# Patient Record
Sex: FEMALE | Race: WHITE | NOT HISPANIC OR LATINO | Employment: OTHER | URBAN - METROPOLITAN AREA
[De-identification: names, ages, dates, MRNs, and addresses within clinical notes are randomized per-mention and may not be internally consistent; named-entity substitution may affect disease eponyms.]

---

## 2021-09-07 ENCOUNTER — ANESTHESIA EVENT (INPATIENT)
Dept: OPERATING ROOM | Facility: HOSPITAL | Age: 61
DRG: 461 | End: 2021-09-07
Payer: COMMERCIAL

## 2021-09-07 NOTE — ANESTHESIOLOGIST PRE-PROCEDURE CHART REVIEW
I confirm that I have reviewed the available information in the medical record prior to the scheduled surgery.    Pending labs/covid/pcp note

## 2021-09-16 ENCOUNTER — APPOINTMENT (OUTPATIENT)
Dept: LAB | Facility: HOSPITAL | Age: 61
End: 2021-09-16
Attending: ORTHOPAEDIC SURGERY
Payer: COMMERCIAL

## 2021-09-16 ENCOUNTER — TRANSCRIBE ORDERS (OUTPATIENT)
Dept: REGISTRATION | Facility: HOSPITAL | Age: 61
End: 2021-09-16
Payer: COMMERCIAL

## 2021-09-16 ENCOUNTER — APPOINTMENT (OUTPATIENT)
Dept: PREADMISSION TESTING | Facility: HOSPITAL | Age: 61
End: 2021-09-16
Attending: ORTHOPAEDIC SURGERY
Payer: COMMERCIAL

## 2021-09-16 DIAGNOSIS — M17.0 BILATERAL PRIMARY OSTEOARTHRITIS OF KNEE: Primary | ICD-10-CM

## 2021-09-16 DIAGNOSIS — Z11.59 ENCOUNTER FOR SCREENING FOR OTHER VIRAL DISEASES: ICD-10-CM

## 2021-09-16 DIAGNOSIS — M17.0 BILATERAL PRIMARY OSTEOARTHRITIS OF KNEE: ICD-10-CM

## 2021-09-16 LAB
ABO + RH BLD: NORMAL
ALBUMIN SERPL-MCNC: 3.9 G/DL (ref 3.4–5)
ALP SERPL-CCNC: 71 IU/L (ref 35–126)
ALT SERPL-CCNC: 51 IU/L (ref 11–54)
ANION GAP SERPL CALC-SCNC: 10 MEQ/L (ref 3–15)
APTT PPP: 27 SEC (ref 23–35)
AST SERPL-CCNC: 45 IU/L (ref 15–41)
BILIRUB SERPL-MCNC: 0.4 MG/DL (ref 0.3–1.2)
BLD GP AB SCN SERPL QL: NEGATIVE
BLOOD BANK CMNT PATIENT-IMP: NORMAL
BUN SERPL-MCNC: 13 MG/DL (ref 8–20)
CALCIUM SERPL-MCNC: 9 MG/DL (ref 8.9–10.3)
CHLORIDE SERPL-SCNC: 103 MEQ/L (ref 98–109)
CO2 SERPL-SCNC: 27 MEQ/L (ref 22–32)
CREAT SERPL-MCNC: 0.8 MG/DL (ref 0.6–1.1)
D AG BLD QL: POSITIVE
ERYTHROCYTE [DISTWIDTH] IN BLOOD BY AUTOMATED COUNT: 13.2 % (ref 11.7–14.4)
EST. AVERAGE GLUCOSE BLD GHB EST-MCNC: 108 MG/DL
GFR SERPL CREATININE-BSD FRML MDRD: >60 ML/MIN/1.73M*2
GLUCOSE SERPL-MCNC: 93 MG/DL (ref 70–99)
HBA1C MFR BLD HPLC: 5.4 %
HCT VFR BLDCO AUTO: 37.6 % (ref 35–45)
HGB BLD-MCNC: 12.1 G/DL (ref 11.8–15.7)
INR PPP: 1
LABORATORY COMMENT REPORT: NORMAL
MCH RBC QN AUTO: 31 PG (ref 28–33.2)
MCHC RBC AUTO-ENTMCNC: 32.2 G/DL (ref 32.2–35.5)
MCV RBC AUTO: 96.4 FL (ref 83–98)
PDW BLD AUTO: 9.9 FL (ref 9.4–12.3)
PLATELET # BLD AUTO: 219 K/UL (ref 150–369)
POTASSIUM SERPL-SCNC: 4.8 MEQ/L (ref 3.6–5.1)
PROT SERPL-MCNC: 5.8 G/DL (ref 6–8.2)
PROTHROMBIN TIME: 12.8 SEC (ref 12.2–14.5)
RBC # BLD AUTO: 3.9 M/UL (ref 3.93–5.22)
SARS-COV-2 RNA RESP QL NAA+PROBE: NEGATIVE
SODIUM SERPL-SCNC: 140 MEQ/L (ref 136–144)
SPECIMEN EXP DATE BLD: NORMAL
WBC # BLD AUTO: 4.91 K/UL (ref 3.8–10.5)

## 2021-09-16 PROCEDURE — 85610 PROTHROMBIN TIME: CPT

## 2021-09-16 PROCEDURE — 86900 BLOOD TYPING SEROLOGIC ABO: CPT

## 2021-09-16 PROCEDURE — 36415 COLL VENOUS BLD VENIPUNCTURE: CPT

## 2021-09-16 PROCEDURE — 86850 RBC ANTIBODY SCREEN: CPT

## 2021-09-16 PROCEDURE — U0003 INFECTIOUS AGENT DETECTION BY NUCLEIC ACID (DNA OR RNA); SEVERE ACUTE RESPIRATORY SYNDROME CORONAVIRUS 2 (SARS-COV-2) (CORONAVIRUS DISEASE [COVID-19]), AMPLIFIED PROBE TECHNIQUE, MAKING USE OF HIGH THROUGHPUT TECHNOLOGIES AS DESCRIBED BY CMS-2020-01-R: HCPCS

## 2021-09-16 PROCEDURE — 85027 COMPLETE CBC AUTOMATED: CPT

## 2021-09-16 PROCEDURE — 85730 THROMBOPLASTIN TIME PARTIAL: CPT

## 2021-09-16 PROCEDURE — 83036 HEMOGLOBIN GLYCOSYLATED A1C: CPT

## 2021-09-16 PROCEDURE — 80053 COMPREHEN METABOLIC PANEL: CPT

## 2021-09-20 ENCOUNTER — APPOINTMENT (INPATIENT)
Dept: RADIOLOGY | Facility: HOSPITAL | Age: 61
DRG: 461 | End: 2021-09-20
Attending: ORTHOPAEDIC SURGERY
Payer: COMMERCIAL

## 2021-09-20 ENCOUNTER — ANESTHESIA (INPATIENT)
Dept: OPERATING ROOM | Facility: HOSPITAL | Age: 61
DRG: 461 | End: 2021-09-20
Payer: COMMERCIAL

## 2021-09-20 ENCOUNTER — HOSPITAL ENCOUNTER (INPATIENT)
Facility: HOSPITAL | Age: 61
LOS: 3 days | Discharge: HOME HEALTH | DRG: 461 | End: 2021-09-23
Attending: ORTHOPAEDIC SURGERY | Admitting: ORTHOPAEDIC SURGERY
Payer: COMMERCIAL

## 2021-09-20 DIAGNOSIS — M17.10 ARTHRITIS OF KNEE: Primary | ICD-10-CM

## 2021-09-20 PROBLEM — I10 HTN (HYPERTENSION): Status: ACTIVE | Noted: 2021-09-20

## 2021-09-20 PROBLEM — E78.5 HLD (HYPERLIPIDEMIA): Status: ACTIVE | Noted: 2021-09-20

## 2021-09-20 PROBLEM — G40.909 EPILEPSY (CMS/HCC): Status: ACTIVE | Noted: 2021-09-20

## 2021-09-20 LAB
ABO + RH BLD: NORMAL
ALBUMIN SERPL-MCNC: 4 G/DL (ref 3.4–5)
ALP SERPL-CCNC: 70 IU/L (ref 35–126)
ALT SERPL-CCNC: 48 IU/L (ref 11–54)
ANION GAP SERPL CALC-SCNC: 10 MEQ/L (ref 3–15)
AST SERPL-CCNC: 51 IU/L (ref 15–41)
BASOPHILS # BLD: 0.01 K/UL (ref 0.01–0.1)
BASOPHILS NFR BLD: 0.1 %
BILIRUB SERPL-MCNC: 0.5 MG/DL (ref 0.3–1.2)
BUN SERPL-MCNC: 13 MG/DL (ref 8–20)
CALCIUM SERPL-MCNC: 8.2 MG/DL (ref 8.9–10.3)
CHLORIDE SERPL-SCNC: 103 MEQ/L (ref 98–109)
CO2 SERPL-SCNC: 24 MEQ/L (ref 22–32)
CREAT SERPL-MCNC: 0.8 MG/DL (ref 0.6–1.1)
D AG BLD QL: POSITIVE
DIFFERENTIAL METHOD BLD: ABNORMAL
EOSINOPHIL # BLD: 0.01 K/UL (ref 0.04–0.36)
EOSINOPHIL NFR BLD: 0.1 %
ERYTHROCYTE [DISTWIDTH] IN BLOOD BY AUTOMATED COUNT: 12.5 % (ref 11.7–14.4)
GFR SERPL CREATININE-BSD FRML MDRD: >60 ML/MIN/1.73M*2
GLUCOSE SERPL-MCNC: 149 MG/DL (ref 70–99)
HCT VFR BLDCO AUTO: 37.9 % (ref 35–45)
HGB BLD-MCNC: 12.2 G/DL (ref 11.8–15.7)
IMM GRANULOCYTES # BLD AUTO: 0.04 K/UL (ref 0–0.08)
IMM GRANULOCYTES NFR BLD AUTO: 0.4 %
LABORATORY COMMENT REPORT: NORMAL
LYMPHOCYTES # BLD: 0.59 K/UL (ref 1.2–3.5)
LYMPHOCYTES NFR BLD: 5.7 %
MAGNESIUM SERPL-MCNC: 1.9 MG/DL (ref 1.8–2.5)
MCH RBC QN AUTO: 31.2 PG (ref 28–33.2)
MCHC RBC AUTO-ENTMCNC: 32.2 G/DL (ref 32.2–35.5)
MCV RBC AUTO: 96.9 FL (ref 83–98)
MONOCYTES # BLD: 0.08 K/UL (ref 0.28–0.8)
MONOCYTES NFR BLD: 0.8 %
NEUTROPHILS # BLD: 9.55 K/UL (ref 1.7–7)
NEUTS SEG NFR BLD: 92.9 %
NRBC BLD-RTO: 0 %
PDW BLD AUTO: 9.2 FL (ref 9.4–12.3)
PLATELET # BLD AUTO: 258 K/UL (ref 150–369)
POTASSIUM SERPL-SCNC: 4.1 MEQ/L (ref 3.6–5.1)
PROT SERPL-MCNC: 6.6 G/DL (ref 6–8.2)
RBC # BLD AUTO: 3.91 M/UL (ref 3.93–5.22)
SODIUM SERPL-SCNC: 137 MEQ/L (ref 136–144)
WBC # BLD AUTO: 10.28 K/UL (ref 3.8–10.5)

## 2021-09-20 PROCEDURE — 63600000 HC DRUGS/DETAIL CODE: Performed by: ORTHOPAEDIC SURGERY

## 2021-09-20 PROCEDURE — 25000000 HC PHARMACY GENERAL: Performed by: ORTHOPAEDIC SURGERY

## 2021-09-20 PROCEDURE — 27200000 HC STERILE SUPPLY: Performed by: ORTHOPAEDIC SURGERY

## 2021-09-20 PROCEDURE — 63600000 HC DRUGS/DETAIL CODE: Performed by: NURSE ANESTHETIST, CERTIFIED REGISTERED

## 2021-09-20 PROCEDURE — 25800000 HC PHARMACY IV SOLUTIONS: Performed by: ORTHOPAEDIC SURGERY

## 2021-09-20 PROCEDURE — 85025 COMPLETE CBC W/AUTO DIFF WBC: CPT | Performed by: HOSPITALIST

## 2021-09-20 PROCEDURE — 80175 DRUG SCREEN QUAN LAMOTRIGINE: CPT | Performed by: HOSPITALIST

## 2021-09-20 PROCEDURE — 63700000 HC SELF-ADMINISTRABLE DRUG: Performed by: STUDENT IN AN ORGANIZED HEALTH CARE EDUCATION/TRAINING PROGRAM

## 2021-09-20 PROCEDURE — 63600000 HC DRUGS/DETAIL CODE: Mod: JW | Performed by: NURSE ANESTHETIST, CERTIFIED REGISTERED

## 2021-09-20 PROCEDURE — 63700000 HC SELF-ADMINISTRABLE DRUG: Performed by: HOSPITALIST

## 2021-09-20 PROCEDURE — 83735 ASSAY OF MAGNESIUM: CPT | Performed by: HOSPITALIST

## 2021-09-20 PROCEDURE — 99254 IP/OBS CNSLTJ NEW/EST MOD 60: CPT | Performed by: STUDENT IN AN ORGANIZED HEALTH CARE EDUCATION/TRAINING PROGRAM

## 2021-09-20 PROCEDURE — 63700000 HC SELF-ADMINISTRABLE DRUG: Performed by: ORTHOPAEDIC SURGERY

## 2021-09-20 PROCEDURE — 36000005 HC OR LEVEL 5 INITIAL 30MIN: Performed by: ORTHOPAEDIC SURGERY

## 2021-09-20 PROCEDURE — 36415 COLL VENOUS BLD VENIPUNCTURE: CPT | Performed by: ORTHOPAEDIC SURGERY

## 2021-09-20 PROCEDURE — C1776 JOINT DEVICE (IMPLANTABLE): HCPCS | Performed by: ORTHOPAEDIC SURGERY

## 2021-09-20 PROCEDURE — 0SRD0J9 REPLACEMENT OF LEFT KNEE JOINT WITH SYNTHETIC SUBSTITUTE, CEMENTED, OPEN APPROACH: ICD-10-PCS | Performed by: ORTHOPAEDIC SURGERY

## 2021-09-20 PROCEDURE — 71000011 HC PACU PHASE 1 EA ADDL MIN: Performed by: ORTHOPAEDIC SURGERY

## 2021-09-20 PROCEDURE — 0SRC0J9 REPLACEMENT OF RIGHT KNEE JOINT WITH SYNTHETIC SUBSTITUTE, CEMENTED, OPEN APPROACH: ICD-10-PCS | Performed by: ORTHOPAEDIC SURGERY

## 2021-09-20 PROCEDURE — C1713 ANCHOR/SCREW BN/BN,TIS/BN: HCPCS | Performed by: ORTHOPAEDIC SURGERY

## 2021-09-20 PROCEDURE — 37000001 HC ANESTHESIA GENERAL: Performed by: ORTHOPAEDIC SURGERY

## 2021-09-20 PROCEDURE — 71000001 HC PACU PHASE 1 INITIAL 30MIN: Performed by: ORTHOPAEDIC SURGERY

## 2021-09-20 PROCEDURE — 80053 COMPREHEN METABOLIC PANEL: CPT | Performed by: HOSPITALIST

## 2021-09-20 PROCEDURE — 93005 ELECTROCARDIOGRAM TRACING: CPT | Performed by: ORTHOPAEDIC SURGERY

## 2021-09-20 PROCEDURE — 25000000 HC PHARMACY GENERAL: Performed by: ANESTHESIOLOGY

## 2021-09-20 PROCEDURE — 21400000 HC ROOM AND CARE CCU/INTERMEDIATE

## 2021-09-20 PROCEDURE — 25000000 HC PHARMACY GENERAL: Performed by: NURSE ANESTHETIST, CERTIFIED REGISTERED

## 2021-09-20 PROCEDURE — 36000015 HC OR LEVEL 5 EA ADDL MIN: Performed by: ORTHOPAEDIC SURGERY

## 2021-09-20 PROCEDURE — 73560 X-RAY EXAM OF KNEE 1 OR 2: CPT | Mod: 50

## 2021-09-20 PROCEDURE — 63600000 HC DRUGS/DETAIL CODE: Performed by: STUDENT IN AN ORGANIZED HEALTH CARE EDUCATION/TRAINING PROGRAM

## 2021-09-20 DEVICE — CEMENT BONE SMART SET MV 40G: Type: IMPLANTABLE DEVICE | Site: KNEE | Status: FUNCTIONAL

## 2021-09-20 DEVICE — FEMORAL COMPONENT JOURNEY II BCS COCR SIZE 4 LEFT: Type: IMPLANTABLE DEVICE | Site: KNEE | Status: FUNCTIONAL

## 2021-09-20 DEVICE — IMPLANTABLE DEVICE: Type: IMPLANTABLE DEVICE | Site: KNEE | Status: FUNCTIONAL

## 2021-09-20 DEVICE — PATELLAR COMPONENT RESURFACING ROUND 32MM: Type: IMPLANTABLE DEVICE | Site: KNEE | Status: FUNCTIONAL

## 2021-09-20 DEVICE — FEMORAL COMPONENT JOURNEY II BCS COCR SIZE 4 RIGHT: Type: IMPLANTABLE DEVICE | Site: KNEE | Status: FUNCTIONAL

## 2021-09-20 DEVICE — JOURNEY II BCS XLPE ARTICULAR                                    INSERT SIZE 1-2 RIGHT 9MM
Type: IMPLANTABLE DEVICE | Site: KNEE | Status: FUNCTIONAL
Brand: JOURNEY

## 2021-09-20 RX ORDER — OXYCODONE HYDROCHLORIDE 5 MG/1
5 TABLET ORAL EVERY 4 HOURS PRN
Status: DISCONTINUED | OUTPATIENT
Start: 2021-09-20 | End: 2021-09-22

## 2021-09-20 RX ORDER — NAPROXEN SODIUM 220 MG/1
81 TABLET, FILM COATED ORAL 2 TIMES DAILY
Status: DISCONTINUED | OUTPATIENT
Start: 2021-09-20 | End: 2021-09-23 | Stop reason: HOSPADM

## 2021-09-20 RX ORDER — ROPINIROLE 1 MG/1
2 TABLET, FILM COATED ORAL NIGHTLY
Status: DISCONTINUED | OUTPATIENT
Start: 2021-09-20 | End: 2021-09-23 | Stop reason: HOSPADM

## 2021-09-20 RX ORDER — ROPINIROLE 1 MG/1
3 TABLET, FILM COATED ORAL 3 TIMES DAILY
Status: DISCONTINUED | OUTPATIENT
Start: 2021-09-20 | End: 2021-09-20

## 2021-09-20 RX ORDER — AMOXICILLIN 250 MG
1 CAPSULE ORAL 2 TIMES DAILY
Status: DISCONTINUED | OUTPATIENT
Start: 2021-09-20 | End: 2021-09-23 | Stop reason: HOSPADM

## 2021-09-20 RX ORDER — HYDROMORPHONE HYDROCHLORIDE 1 MG/ML
0.5 INJECTION, SOLUTION INTRAMUSCULAR; INTRAVENOUS; SUBCUTANEOUS
Status: DISCONTINUED | OUTPATIENT
Start: 2021-09-20 | End: 2021-09-20 | Stop reason: HOSPADM

## 2021-09-20 RX ORDER — SODIUM CHLORIDE 9 MG/ML
INJECTION, SOLUTION INTRAVENOUS CONTINUOUS
Status: ACTIVE | OUTPATIENT
Start: 2021-09-20 | End: 2021-09-21

## 2021-09-20 RX ORDER — IBUPROFEN 200 MG
16-32 TABLET ORAL AS NEEDED
Status: DISCONTINUED | OUTPATIENT
Start: 2021-09-20 | End: 2021-09-23 | Stop reason: HOSPADM

## 2021-09-20 RX ORDER — ONDANSETRON HYDROCHLORIDE 2 MG/ML
4 INJECTION, SOLUTION INTRAVENOUS
Status: DISCONTINUED | OUTPATIENT
Start: 2021-09-20 | End: 2021-09-20 | Stop reason: HOSPADM

## 2021-09-20 RX ORDER — BUPIVACAINE HYDROCHLORIDE 7.5 MG/ML
INJECTION, SOLUTION INTRASPINAL
Status: COMPLETED | OUTPATIENT
Start: 2021-09-20 | End: 2021-09-20

## 2021-09-20 RX ORDER — HYDROMORPHONE HYDROCHLORIDE 1 MG/ML
0.25 INJECTION, SOLUTION INTRAMUSCULAR; INTRAVENOUS; SUBCUTANEOUS EVERY 4 HOURS PRN
Status: DISCONTINUED | OUTPATIENT
Start: 2021-09-20 | End: 2021-09-20

## 2021-09-20 RX ORDER — DEXTROSE 50 % IN WATER (D50W) INTRAVENOUS SYRINGE
25 AS NEEDED
Status: DISCONTINUED | OUTPATIENT
Start: 2021-09-20 | End: 2021-09-23 | Stop reason: HOSPADM

## 2021-09-20 RX ORDER — SERTRALINE HYDROCHLORIDE 100 MG/1
300 TABLET, FILM COATED ORAL DAILY
Status: DISCONTINUED | OUTPATIENT
Start: 2021-09-21 | End: 2021-09-23 | Stop reason: HOSPADM

## 2021-09-20 RX ORDER — MELOXICAM 7.5 MG/1
15 TABLET ORAL ONCE
Status: COMPLETED | OUTPATIENT
Start: 2021-09-20 | End: 2021-09-20

## 2021-09-20 RX ORDER — KETOROLAC TROMETHAMINE 30 MG/ML
30 INJECTION, SOLUTION INTRAMUSCULAR; INTRAVENOUS EVERY 6 HOURS PRN
Status: DISCONTINUED | OUTPATIENT
Start: 2021-09-20 | End: 2021-09-22

## 2021-09-20 RX ORDER — PREGABALIN 150 MG/1
150 CAPSULE ORAL ONCE
Status: COMPLETED | OUTPATIENT
Start: 2021-09-20 | End: 2021-09-20

## 2021-09-20 RX ORDER — HYDROMORPHONE HYDROCHLORIDE 1 MG/ML
0.25 INJECTION, SOLUTION INTRAMUSCULAR; INTRAVENOUS; SUBCUTANEOUS EVERY 4 HOURS PRN
Status: DISCONTINUED | OUTPATIENT
Start: 2021-09-20 | End: 2021-09-22

## 2021-09-20 RX ORDER — OXYCODONE HCL 20 MG/1
20 TABLET, FILM COATED, EXTENDED RELEASE ORAL ONCE
Status: COMPLETED | OUTPATIENT
Start: 2021-09-20 | End: 2021-09-20

## 2021-09-20 RX ORDER — LISINOPRIL 5 MG/1
5 TABLET ORAL DAILY
Status: DISCONTINUED | OUTPATIENT
Start: 2021-09-20 | End: 2021-09-21

## 2021-09-20 RX ORDER — ONDANSETRON 8 MG/1
8 TABLET, ORALLY DISINTEGRATING ORAL ONCE
Status: COMPLETED | OUTPATIENT
Start: 2021-09-20 | End: 2021-09-20

## 2021-09-20 RX ORDER — CEFAZOLIN SODIUM 2 G/50ML
SOLUTION INTRAVENOUS AS NEEDED
Status: DISCONTINUED | OUTPATIENT
Start: 2021-09-20 | End: 2021-09-20 | Stop reason: SURG

## 2021-09-20 RX ORDER — SODIUM CHLORIDE 9 MG/ML
INJECTION, SOLUTION INTRAVENOUS CONTINUOUS
Status: DISCONTINUED | OUTPATIENT
Start: 2021-09-20 | End: 2021-09-20

## 2021-09-20 RX ORDER — PROPOFOL 10 MG/ML
INJECTION, EMULSION INTRAVENOUS CONTINUOUS PRN
Status: DISCONTINUED | OUTPATIENT
Start: 2021-09-20 | End: 2021-09-20 | Stop reason: SURG

## 2021-09-20 RX ORDER — OXYCODONE HYDROCHLORIDE 5 MG/1
5 TABLET ORAL EVERY 4 HOURS PRN
Status: DISCONTINUED | OUTPATIENT
Start: 2021-09-20 | End: 2021-09-20

## 2021-09-20 RX ORDER — ONDANSETRON HYDROCHLORIDE 2 MG/ML
4 INJECTION, SOLUTION INTRAVENOUS EVERY 8 HOURS PRN
Status: DISCONTINUED | OUTPATIENT
Start: 2021-09-20 | End: 2021-09-23 | Stop reason: HOSPADM

## 2021-09-20 RX ORDER — POLYETHYLENE GLYCOL 3350 17 G/17G
17 POWDER, FOR SOLUTION ORAL DAILY
Status: DISCONTINUED | OUTPATIENT
Start: 2021-09-20 | End: 2021-09-23 | Stop reason: HOSPADM

## 2021-09-20 RX ORDER — LORAZEPAM 2 MG/ML
1 INJECTION INTRAMUSCULAR DAILY PRN
Status: DISCONTINUED | OUTPATIENT
Start: 2021-09-20 | End: 2021-09-23 | Stop reason: HOSPADM

## 2021-09-20 RX ORDER — BUSPIRONE HYDROCHLORIDE 10 MG/1
10 TABLET ORAL 4 TIMES DAILY
Status: DISCONTINUED | OUTPATIENT
Start: 2021-09-20 | End: 2021-09-23 | Stop reason: HOSPADM

## 2021-09-20 RX ORDER — MIDAZOLAM HYDROCHLORIDE 2 MG/2ML
INJECTION, SOLUTION INTRAMUSCULAR; INTRAVENOUS AS NEEDED
Status: DISCONTINUED | OUTPATIENT
Start: 2021-09-20 | End: 2021-09-20 | Stop reason: SURG

## 2021-09-20 RX ORDER — DEXTROSE 40 %
15-30 GEL (GRAM) ORAL AS NEEDED
Status: DISCONTINUED | OUTPATIENT
Start: 2021-09-20 | End: 2021-09-23 | Stop reason: HOSPADM

## 2021-09-20 RX ORDER — TRAMADOL HYDROCHLORIDE 50 MG/1
50 TABLET ORAL 4 TIMES DAILY
Status: DISCONTINUED | OUTPATIENT
Start: 2021-09-20 | End: 2021-09-20

## 2021-09-20 RX ORDER — ALUMINUM HYDROXIDE, MAGNESIUM HYDROXIDE, AND SIMETHICONE 1200; 120; 1200 MG/30ML; MG/30ML; MG/30ML
30 SUSPENSION ORAL EVERY 4 HOURS PRN
Status: DISCONTINUED | OUTPATIENT
Start: 2021-09-20 | End: 2021-09-23 | Stop reason: HOSPADM

## 2021-09-20 RX ORDER — ROPINIROLE 0.5 MG/1
0.5 TABLET, FILM COATED ORAL
Status: DISCONTINUED | OUTPATIENT
Start: 2021-09-21 | End: 2021-09-23 | Stop reason: HOSPADM

## 2021-09-20 RX ORDER — SODIUM CHLORIDE 0.9 G/100ML
INJECTION, SOLUTION IRRIGATION AS NEEDED
Status: DISCONTINUED | OUTPATIENT
Start: 2021-09-20 | End: 2021-09-20 | Stop reason: HOSPADM

## 2021-09-20 RX ORDER — PANTOPRAZOLE SODIUM 20 MG/1
20 TABLET, DELAYED RELEASE ORAL NIGHTLY
Status: DISCONTINUED | OUTPATIENT
Start: 2021-09-20 | End: 2021-09-23 | Stop reason: HOSPADM

## 2021-09-20 RX ORDER — LORAZEPAM 2 MG/ML
0.5 INJECTION INTRAMUSCULAR ONCE
Status: COMPLETED | OUTPATIENT
Start: 2021-09-20 | End: 2021-09-20

## 2021-09-20 RX ORDER — PHENYLEPHRINE HYDROCHLORIDE 10 MG/ML
INJECTION INTRAVENOUS AS NEEDED
Status: DISCONTINUED | OUTPATIENT
Start: 2021-09-20 | End: 2021-09-20 | Stop reason: SURG

## 2021-09-20 RX ORDER — LORAZEPAM 2 MG/ML
1 INJECTION INTRAMUSCULAR AS NEEDED
Status: DISCONTINUED | OUTPATIENT
Start: 2021-09-20 | End: 2021-09-20

## 2021-09-20 RX ORDER — DEXAMETHASONE SODIUM PHOSPHATE 4 MG/ML
INJECTION, SOLUTION INTRA-ARTICULAR; INTRALESIONAL; INTRAMUSCULAR; INTRAVENOUS; SOFT TISSUE AS NEEDED
Status: DISCONTINUED | OUTPATIENT
Start: 2021-09-20 | End: 2021-09-20 | Stop reason: SURG

## 2021-09-20 RX ORDER — MONTELUKAST SODIUM 10 MG/1
10 TABLET ORAL DAILY
Status: DISCONTINUED | OUTPATIENT
Start: 2021-09-21 | End: 2021-09-23 | Stop reason: HOSPADM

## 2021-09-20 RX ORDER — LORAZEPAM 2 MG/ML
INJECTION INTRAMUSCULAR
Status: DISPENSED
Start: 2021-09-20 | End: 2021-09-21

## 2021-09-20 RX ORDER — FAMOTIDINE 20 MG/1
20 TABLET, FILM COATED ORAL ONCE
Status: COMPLETED | OUTPATIENT
Start: 2021-09-20 | End: 2021-09-20

## 2021-09-20 RX ORDER — ROPINIROLE 1 MG/1
1 TABLET, FILM COATED ORAL
Status: DISCONTINUED | OUTPATIENT
Start: 2021-09-21 | End: 2021-09-23 | Stop reason: HOSPADM

## 2021-09-20 RX ORDER — FENTANYL CITRATE 50 UG/ML
50 INJECTION, SOLUTION INTRAMUSCULAR; INTRAVENOUS
Status: DISCONTINUED | OUTPATIENT
Start: 2021-09-20 | End: 2021-09-20 | Stop reason: HOSPADM

## 2021-09-20 RX ORDER — LAMOTRIGINE 200 MG/1
400 TABLET, EXTENDED RELEASE ORAL DAILY
Status: DISCONTINUED | OUTPATIENT
Start: 2021-09-21 | End: 2021-09-23 | Stop reason: HOSPADM

## 2021-09-20 RX ORDER — DEXAMETHASONE SODIUM PHOSPHATE 4 MG/ML
10 INJECTION, SOLUTION INTRA-ARTICULAR; INTRALESIONAL; INTRAMUSCULAR; INTRAVENOUS; SOFT TISSUE DAILY
Status: COMPLETED | OUTPATIENT
Start: 2021-09-20 | End: 2021-09-22

## 2021-09-20 RX ORDER — ATORVASTATIN CALCIUM 40 MG/1
40 TABLET, FILM COATED ORAL NIGHTLY
Status: DISCONTINUED | OUTPATIENT
Start: 2021-09-20 | End: 2021-09-23 | Stop reason: HOSPADM

## 2021-09-20 RX ADMIN — SODIUM CHLORIDE: 9 INJECTION, SOLUTION INTRAVENOUS at 18:16

## 2021-09-20 RX ADMIN — VANCOMYCIN HYDROCHLORIDE 1 G: 1 INJECTION, POWDER, LYOPHILIZED, FOR SOLUTION INTRAVENOUS at 11:55

## 2021-09-20 RX ADMIN — OXYCODONE HYDROCHLORIDE 20 MG: 20 TABLET, FILM COATED, EXTENDED RELEASE ORAL at 12:03

## 2021-09-20 RX ADMIN — DOCUSATE SODIUM AND SENNOSIDES 1 TABLET: 50; 8.6 TABLET ORAL at 21:06

## 2021-09-20 RX ADMIN — MELOXICAM 15 MG: 7.5 TABLET ORAL at 12:02

## 2021-09-20 RX ADMIN — MIDAZOLAM HYDROCHLORIDE 1 MG: 1 INJECTION, SOLUTION INTRAMUSCULAR; INTRAVENOUS at 12:23

## 2021-09-20 RX ADMIN — KETOROLAC TROMETHAMINE 30 MG: 30 INJECTION, SOLUTION INTRAMUSCULAR; INTRAVENOUS at 18:41

## 2021-09-20 RX ADMIN — SODIUM CHLORIDE: 9 INJECTION, SOLUTION INTRAVENOUS at 11:48

## 2021-09-20 RX ADMIN — TRANEXAMIC ACID 1200 MG: 100 INJECTION, SOLUTION INTRAVENOUS at 12:42

## 2021-09-20 RX ADMIN — ROPINIROLE HYDROCHLORIDE 2 MG: 1 TABLET, FILM COATED ORAL at 21:29

## 2021-09-20 RX ADMIN — PHENYLEPHRINE HYDROCHLORIDE 100 MCG: 10 INJECTION INTRAVENOUS at 14:07

## 2021-09-20 RX ADMIN — PROPOFOL 50 MCG/KG/MIN: 10 INJECTION, EMULSION INTRAVENOUS at 12:40

## 2021-09-20 RX ADMIN — CEFAZOLIN 2 G: 330 INJECTION, POWDER, FOR SOLUTION INTRAMUSCULAR; INTRAVENOUS at 12:49

## 2021-09-20 RX ADMIN — BUPIVACAINE HYDROCHLORIDE IN DEXTROSE 1.6 ML: 7.5 INJECTION, SOLUTION SUBARACHNOID at 12:37

## 2021-09-20 RX ADMIN — MIDAZOLAM HYDROCHLORIDE 1 MG: 1 INJECTION, SOLUTION INTRAMUSCULAR; INTRAVENOUS at 12:18

## 2021-09-20 RX ADMIN — DEXAMETHASONE SODIUM PHOSPHATE 10 MG: 4 INJECTION, SOLUTION INTRA-ARTICULAR; INTRALESIONAL; INTRAMUSCULAR; INTRAVENOUS; SOFT TISSUE at 16:07

## 2021-09-20 RX ADMIN — SODIUM CHLORIDE: 9 INJECTION, SOLUTION INTRAVENOUS at 19:55

## 2021-09-20 RX ADMIN — LORAZEPAM 0.5 MG: 2 INJECTION INTRAMUSCULAR; INTRAVENOUS at 19:17

## 2021-09-20 RX ADMIN — PANTOPRAZOLE SODIUM 20 MG: 20 TABLET, DELAYED RELEASE ORAL at 21:17

## 2021-09-20 RX ADMIN — DEXAMETHASONE SODIUM PHOSPHATE 10 MG: 4 INJECTION, SOLUTION INTRA-ARTICULAR; INTRALESIONAL; INTRAMUSCULAR; INTRAVENOUS; SOFT TISSUE at 12:53

## 2021-09-20 RX ADMIN — LISINOPRIL 5 MG: 5 TABLET ORAL at 21:07

## 2021-09-20 RX ADMIN — FAMOTIDINE 20 MG: 20 TABLET ORAL at 12:02

## 2021-09-20 RX ADMIN — BUSPIRONE HYDROCHLORIDE 10 MG: 10 TABLET ORAL at 21:16

## 2021-09-20 RX ADMIN — ONDANSETRON 8 MG: 8 TABLET, ORALLY DISINTEGRATING ORAL at 12:05

## 2021-09-20 RX ADMIN — CEFAZOLIN SODIUM 2 G: 10 POWDER, FOR SOLUTION INTRAVENOUS at 21:29

## 2021-09-20 RX ADMIN — ATORVASTATIN CALCIUM 40 MG: 40 TABLET, FILM COATED ORAL at 21:16

## 2021-09-20 RX ADMIN — ASPIRIN 81 MG CHEWABLE TABLET 81 MG: 81 TABLET CHEWABLE at 21:06

## 2021-09-20 RX ADMIN — PREGABALIN 150 MG: 150 CAPSULE ORAL at 12:03

## 2021-09-20 ASSESSMENT — PATIENT HEALTH QUESTIONNAIRE - PHQ9: SUM OF ALL RESPONSES TO PHQ9 QUESTIONS 1 & 2: 0

## 2021-09-20 NOTE — OP NOTE
Pre-op Diagnosis: Severe Arthritis of the Bilateral knees  Post-op Diagnosis: same    Procedure Bilateral Total knee replacement    Surgeon: Bennett Em MD  Assistant: Andreina Maria    Specimen: None    Anesthesia: Regional    EBL: Minimal    The patient was taken to the operating room where regional anesthesia was instituted by the anesthesiologist. Next both lower extremities were prepped and draped in usual sterile fashion. The right was addressed first. Next that limb was exsanguinated with an Esmarch bandage and the tourniquet was inflated to 350 mmHg. An anterior approach and he was utilizing incision was carried out through the subcutaneous tissue down to the level of the fascia. The fascia was then divided in a standard median parapatellar patella arthrotomy. Next a freehand osteotomy of the patella was performed and the patella was sized to 32 mm. A patella button of that size was then applied. Next the drill was used to gain access to intramedullary canal the femur area was thoroughly irrigated and evacuated.. The guide alice was placed and the distal femur was then cut at 6° of valgus in standard manner. The femur was then sized to size 4 and the distal femur was then cut using that size cutting block to accommodate a femoral component. Attention was turned to the tibia where extramedullary alignment was used to make a perpendicular cut of the proximal tibia in standard manner. Next a laminar  was placed to debride the posterior aspect of the knee of osteophytes and meniscal remnants. The tibia was then sized to size 2. A trial reduction using the previously mentioned sizes for femur and tibia respectively along with a 9 mm tibial component was felt to produce satisfactory range of motion stability kinematics balance and patellar tracking. These trials were then deemed acceptable and subsequently the trials were removed from the femur the knee was thoroughly irrigated and evacuated and  then dried. Next the cement was mixed in the doughy cement was infiltrated all the cut bony surfaces and the final components of the Journey II total knee replacement system by [employer] was then cemented into place.  Size 4 femur, size 2 tibia, 9 mm. Polyethylene spacer, and 32 mm patella were then cemented into place. Excess cement was removed from all areas and the knee was then evaluated for range of motion stability kinematics balance and patella tracking, all of which were satisfactory. At this point time  the wound was thoroughly irrigated and evacuated and then the wound was closed with #2 Quill the deep layer O- Quill in the subcutaneous layer and 3-0 Vicryl in the subcuticular layer augmented with Dermabond. Finally prior to the completion of closure, dilute Marcaine solution was infiltrated into the reggie-incisional tissues for postoperative pain control. A sterile compressive dressing was then placed and the tourniquet was then released. At this point in time a total knee was now performed on the opposite side exactly as had been described above. The sizes used were similar and specifically, size size 4 on the Femur, size size 2 on the tibia, 11 mm poly spacer, 32 mm patella button. Closeure was performed in the same way and dilute Marcaine was also used. The tourniquet was released, and the pateint was placed in a sterile compressive dressing. The patient was then transferred to the recovery room in stable condition. I was present for the entire case.

## 2021-09-20 NOTE — ANESTHESIA PREPROCEDURE EVALUATION
Relevant Problems   MUSCULOSKELETAL   (+) Arthritis of knee       Anesthesia ROS/MED HX    Anesthesia History - neg  Pulmonary    asthma  Neuro/Psych - neg  Cardiovascular   hypertension   Cardiac clearance reviewed, Covid19 Test Reviewed and ECG reviewed  Abnormal ECG      Comments: Medical and cardiac clearances noted.  GI/Hepatic   GERD    Control: well controlled  Musculoskeletal- neg  Renal Disease- neg  Endo/Other- neg  Body Habitus: Normal       Past Surgical History:   Procedure Laterality Date   •  SECTION      x1   • SINUS SURGERY         Physical Exam    Airway   Mallampati: III   TM distance: >3 FB   Neck ROM: full  Cardiovascular - normal   Rhythm: regular   Rate: normalPulmonary - normal   clear to auscultation  Other Findings   Medical and cardiac clearances noted.  Dental    Teeth Problems: caps        Anesthesia Plan    Plan: general and spinal   ASA 2

## 2021-09-20 NOTE — CONSULTS
Hospital Medicine Service -  Inpatient Consultation         Requesting Physician: Dr. Em    Reason for Consultation: medical management     HISTORY OF PRESENT ILLNESS        This is a 60 y.o. female with a pmh of epilepsy, unknown clear trigger, possibly light, seen s/p bilateral total knee. Patient had a brief seizure upon returning to the floor.  Abated without intervention but continued to have clonic spasms though mentation was at baseline.  Given low dose of ativan at this time. She does confirm having taken her lamictal this morning.  Has been taking echinacea. Has been supplementing vit k for cold.  at bedside.    PAST MEDICAL AND SURGICAL HISTORY        Past Medical History:   Diagnosis Date   • Arthritis     OA   • Asthma    • Depression    • Deviated septum    • Epilepsy (CMS/HCC)     Last seizure 8 years ago   • GERD (gastroesophageal reflux disease)    • Hypertension    • Lipid disorder    • Motion sickness    • RLS (restless legs syndrome)        Past Surgical History:   Procedure Laterality Date   •  SECTION      x1   • SINUS SURGERY         PCP: Edwige Huff CRNP    MEDICATIONS        Home Medications:  Medications Prior to Admission   Medication Sig Dispense Refill Last Dose   • ascorbic acid (VITAMIN C) 500 mg tablet Take 500 mg by mouth daily.   Past Week at Unknown time   • atorvastatin (LIPITOR) 40 mg tablet Take 40 mg by mouth nightly.   2021 at Unknown time   • busPIRone (BUSPAR) 10 mg tablet Take 10 mg by mouth 4 (four) times a day.   2021 at Unknown time   • ECHINACEA ORAL Take by mouth.   Past Week at Unknown time   • esomeprazole magnesium (NEXIUM ORAL) Take 20 mg by mouth nightly.   2021 at Unknown time   • fluticasone furoate-vilanteroL (BREO ELLIPTA) 200-25 mcg/dose blister with device powder for inhalation Inhale 1 puff daily.   2021 at Unknown time   • lamoTRIgine (LAMICTAL XR) 200 mg tablet extended release 24hr Take 400 mg by mouth  daily. Patient takes two 200 mg once a day in the morning   9/20/2021 at Unknown time   • lisinopriL (PRINIVIL) 5 mg tablet Take 5 mg by mouth daily.   9/19/2021 at Unknown time   • montelukast (SINGULAIR) 10 mg tablet Take 10 mg by mouth daily.   9/20/2021 at Unknown time   • multivitamin tablet Take 1 tablet by mouth daily.   Past Month at Unknown time   • nortriptyline (PAMELOR) 75 mg capsule Take 75 mg by mouth nightly.   9/19/2021 at Unknown time   • phytonadione, vit K1, (phytonadione, vitamin K1,) 100 mcg tablet Take 100 mcg by mouth daily.   Past Week at Unknown time   • rOPINIRole (REQUIP) 1 mg tablet Take 3 mg by mouth 3 (three) times a day. 0.5 mg in afternoon, 1 mg am and 1mg dinner and 2mg bedtime    9/20/2021 at Unknown time   • sertraline (ZOLOFT) 100 mg tablet Take 300 mg by mouth daily. Patient takes three 100 mg tabs in AM./   9/20/2021 at Unknown time       Current inpatient medications were personally reviewed.    ALLERGIES        Penicillins and Sulfa (sulfonamide antibiotics)    FAMILY HISTORY        History reviewed. No pertinent family history.    SOCIAL HISTORY        Social History     Socioeconomic History   • Marital status:      Spouse name: None   • Number of children: None   • Years of education: None   • Highest education level: None   Occupational History   • None   Tobacco Use   • Smoking status: Never Smoker   • Smokeless tobacco: Never Used   Vaping Use   • Vaping Use: Never used   Substance and Sexual Activity   • Alcohol use: Yes     Alcohol/week: 2.0 standard drinks     Types: 2 Glasses of wine per week   • Drug use: Never   • Sexual activity: None   Other Topics Concern   • None   Social History Narrative   • None     Social Determinants of Health     Financial Resource Strain:    • Difficulty of Paying Living Expenses: Not on file   Food Insecurity:    • Worried About Running Out of Food in the Last Year: Not on file   • Ran Out of Food in the Last Year: Not on file  "  Transportation Needs:    • Lack of Transportation (Medical): Not on file   • Lack of Transportation (Non-Medical): Not on file   Physical Activity:    • Days of Exercise per Week: Not on file   • Minutes of Exercise per Session: Not on file   Stress:    • Feeling of Stress : Not on file   Social Connections:    • Frequency of Communication with Friends and Family: Not on file   • Frequency of Social Gatherings with Friends and Family: Not on file   • Attends [employer] Services: Not on file   • Active Member of Clubs or Organizations: Not on file   • Attends Club or Organization Meetings: Not on file   • Marital Status: Not on file   Intimate Partner Violence:    • Fear of Current or Ex-Partner: Not on file   • Emotionally Abused: Not on file   • Physically Abused: Not on file   • Sexually Abused: Not on file   Housing Stability:    • Unable to Pay for Housing in the Last Year: Not on file   • Number of Places Lived in the Last Year: Not on file   • Unstable Housing in the Last Year: Not on file       REVIEW OF SYSTEMS        All other systems reviewed and negative except as noted in HPI    PHYSICAL EXAMINATION        Visit Vitals  /63   Pulse 84   Temp 36.6 °C (97.9 °F) (Oral)   Resp 18   Ht 1.575 m (5' 2\")   Wt 58.5 kg (129 lb)   SpO2 95%   Breastfeeding No   BMI 23.59 kg/m²     Body mass index is 23.59 kg/m².    Intake/Output Summary (Last 24 hours) at 9/20/2021 1759  Last data filed at 9/20/2021 1700  Gross per 24 hour   Intake 1262 ml   Output --   Net 1262 ml     Constitutional: no acute distress, well developed  Eyes: EOMI, non-icteric   ENMT: moist mucous membranes, no JVD  CV: RRR, no murmurs detected  Pulm: normal air entry, CTAB,   GI: Bowel sounds are normal, soft  MSK: no cyanosis, clubbing  Neuro: awake, alert, rhythmic twitching with movement    LABS / EKG        Labs  CBC:  Results from last 7 days   Lab Units 09/16/21  1025   WBC K/uL 4.91   HEMOGLOBIN g/dL 12.1   HEMATOCRIT % 37.6 "   PLATELETS K/uL 219        CMP:  Results from last 7 days   Lab Units 09/16/21  1025   SODIUM mEQ/L 140   POTASSIUM mEQ/L 4.8   CHLORIDE mEQ/L 103   CO2 mEQ/L 27   BUN mg/dL 13   CREATININE mg/dL 0.8   CALCIUM mg/dL 9.0   ALBUMIN g/dL 3.9   BILIRUBIN TOTAL mg/dL 0.4   ALK PHOS IU/L 71   ALT IU/L 51   AST IU/L 45*   GLUCOSE mg/dL 93       COAG:  Results from last 7 days   Lab Units 09/16/21  1025   INR  1.0   PTT sec 27         ECG/Telemetry      Imaging      ASSESSMENT AND RECOMMENDATIONS           HLD (hyperlipidemia)  Assessment & Plan  Cont statin    HTN (hypertension)  Assessment & Plan  Cont home meds    Epilepsy (CMS/[facility])  Assessment & Plan  First seizure in nearly 10 years  nonfocal exam,  Given low dose ativan for ?clonic activity given   Continue lamictal  On review of medications tramadol was ordered but not given. Has been dc'd  Consult neuro  Consider eeg  Continue per neuro  Seizure precautions    * Arthritis of knee  Assessment & Plan  No vit k supplement  Continue mgmt per primary team       Thank you for the opportunity to take part in the patient's care. Will continue to follow during her hospitalization     Emil Ross MD  9/20/2021

## 2021-09-20 NOTE — OR SURGEON
Pre-Procedure patient identification:  I am the primary operating surgeon/proceduralist and I have identified the patient and confirmed laterality is bilateral on 09/20/21 at 11:31 AM Bennett Em MD  Phone Number: [phone]

## 2021-09-20 NOTE — NURSING NOTE
Patient had arrived to unit from PACU at approximately 1740, patient was awake, alert and oriented x3, but appeared very shaky, per PACU RN this is how she presented there as well.  Able to complete assessment, patient denied any pain or discomfort, BLE wrapped in ace bandages and CDI with ice packs in place.  Denies any numbness or tingling to feet, able to dorsi/plantar flex and SCDS on.  VSS.     While talking with patient, she had taken a bite of pizza and swallowed, and then became unresponsive and seizing for approximately 2 mins. No choking as patient had swallowed, and then appeared in a post dictal state for an additional 2 mins. RRT was called immediately when started seizing.  After that, she was alert but disoriented to place and time.  Blood pressure was elevated, but now WNL.  Labs drawn, EKG done, no medications given.    At this time, patient placed on DASH tele monitor until able to transfer to tele bed.  VS again stable, on CHRISTINA monitoring per protocol/order.  Patient drank some juice and ate a cookie under RN supervision, but did not want to eat anything else. Spouse now at bedside and updated.  Still shaky, which patient/spouse report as not her baseline.  Resting in bed with alarm on and call bell in reach, waiting on tele bed.  Patient and spouse aware of transfer.

## 2021-09-20 NOTE — PERIOPERATIVE NURSING NOTE
Pt. Is progressing nicely. Pacu criteria for discharge has been met. V/S will be recorded every 1/2 hour.

## 2021-09-20 NOTE — NURSING NOTE
Patient for transfer to [facility] via bed, report given to Sandrine GILLIS.  Verified with jose Kahn for patient to travel off the floor without a monitor.  Spouse aware.  Spouse also made aware to bring patients own lamictal, as not stocked in hospital.

## 2021-09-20 NOTE — HOSPITAL COURSE
Fide is a 60 y.o. female admitted on 9/20/2021 with Osteoarthritis of both knees, unspecified osteoarthritis type [M17.0]  Arthritis of knee [M17.10]. Principal problem is Arthritis of knee.    Past Medical History  Fide has a past medical history of Arthritis, Asthma, Depression, Deviated septum, Epilepsy (CMS/[facility]), GERD (gastroesophageal reflux disease), Hypertension, Lipid disorder, Motion sickness, and RLS (restless legs syndrome).    History of Present Illness   Bilateral Total knee replacement per Dr. Em 9/20/21, WBAT BLE  Pt had a rapid response called 9/20 for a seizure. She also had low BP in 90s overnight.  Bolused 500cc and BP in low 100s.  Asymptomatic.

## 2021-09-20 NOTE — ANESTHESIA PROCEDURE NOTES
Spinal Block    Patient location during procedure: OR  Start time: 9/20/2021 12:34 PM  End time: 9/20/2021 12:38 PM  Staffing  Performed: anesthesiologist   Anesthesiologist: Gary Hernández MD  Reason for block: primary anesthetic  Preanesthetic Checklist  Completed: patient identified, site marked, surgical consent, pre-op evaluation, timeout performed, IV checked, risks and benefits discussed, monitors and equipment checked and sterile field maintained during procedure  Spinal Block  Patient position: sitting  Prep: Betadine and site prepped and draped  Patient monitoring: heart rate, cardiac monitor, continuous pulse ox and blood pressure  Approach: midline  Location: L3-4  Injection technique: single-shot  Needle  Needle type: pencil-tip   Needle gauge: 25 G  Needle length: 10 cm  Needle insertion depth: 6 cm  Assessment  Sensory level: T4  Medications Administered - 9/20/2021 12:37 PM   Bupivacaine PF (MARCAINE SPINAL) 0.75% intrathecal injection, 1.6 mL

## 2021-09-20 NOTE — ANESTHESIOLOGIST PRE-PROCEDURE ATTESTATION
Pre-Procedure Patient Identification:  I am the Primary Anesthesiologist and have identified the patient on 09/20/21 at 11:58 AM.   I have confirmed the procedure(s) will be performed by the following surgeon/proceduralist Bennett Em MD.

## 2021-09-20 NOTE — H&P
Ortho H&P  Admitting Diagnosis:  Osteoarthritis of both knees, unspecified osteoarthritis type [M17.0]  HPI     There is a hard copy H and P available for review. There are no changes and we may proceed.

## 2021-09-20 NOTE — BRIEF OP NOTE
KNEE TOTAL BILATERAL REPLACEMENT (B) Procedure Note    Procedure:    KNEE TOTAL BILATERAL REPLACEMENT  CPT(R) Code:  92107 - IN TOTAL KNEE ARTHROPLASTY      Pre-op Diagnosis     * Osteoarthritis of both knees, unspecified osteoarthritis type [M17.0]       Post-op Diagnosis     * Osteoarthritis of both knees, unspecified osteoarthritis type [M17.0]    Surgeon(s) and Role:     * Bennett Em MD - Primary     * Mario Soria DO - Resident - Assisting    Anesthesia: Choice    Staff:   Circulator: Emilia Vu, ELIS; Dagmar Garcia RN  Scrub Person: Shasta Villa RN  Registered Nurse First Assistant: Layla Maria RN    Procedure Details   Bilateral TKR    Estimated Blood Loss: No blood loss documented.    Specimens:                Order Name Source Comment Collection Info Order Time   REPEAT ABO/RH (TYPE CHECK) Blood, Venous  Collected By: Rachel Cummins RN 9/20/2021 11:35 AM     Release to patient   Immediate              Drains: * No LDAs found *    Implants:   Implant Name Type Inv. Item Serial No.  Lot No. LRB No. Used Action   CEMENT BONE SMART SET MV 40G - [id number]  CEMENT BONE SMART SET MV 40G  [employer] [id number] Right 2 Implanted   CEMENT BONE SMART SET MV 40G - [id number]  CEMENT BONE SMART SET MV 40G  [employer] [id number] Left 2 Implanted   TIBIAL BASEPLATE SIZE 2 RIGHT - [id number]  TIBIAL BASEPLATE SIZE 2 RIGHT  SMITH  [id number] Right 1 Implanted   Journey II Articular Insert    Other [id number] Right 1 Implanted   PATELLAR COMPONENT RESURFACING ROUND 32MM - [id number]  PATELLAR COMPONENT RESURFACING ROUND 32MM  [place]  [id number] Right 1 Implanted   FEMORAL COMPONENT JOURNEY II BCS COCR SIZE 4 RIGHT - [id number]  FEMORAL COMPONENT JOURNEY II BCS COCR SIZE 4 RIGHT  [place]  [id number] Right 1 Implanted   COMPONENT TIBIAL BASEPLATE NONPOROUS JOURNEY SIZE 2 LEFT - [id number]  COMPONENT TIBIAL BASEPLATE NONPOROUS JOURNEY SIZE 2 LEFT  SMITH  [id number] Left 1  Implanted   INSERT ARTIC JOURNEY II BCS XLPE SZ 1-2 LT 11MM - [id number]  INSERT ARTIC JOURNEY II BCS XLPE SZ 1-2 LT 11MM  [place]  [id number] Left 1 Implanted   PATELLAR COMPONENT RESURFACING ROUND 32MM - [id number]  PATELLAR COMPONENT RESURFACING ROUND 32MM  [place]  [id number] Left 1 Implanted   FEMORAL COMPONENT JOURNEY II BCS COCR SIZE 4 LEFT - [id number]  FEMORAL COMPONENT JOURNEY II BCS COCR SIZE 4 LEFT  SMITH  [id number] Left 1 Implanted              Complications:  None; patient tolerated the procedure well.           Disposition: PACU - hemodynamically stable.           Condition: stable    Bennett Em MD  Phone Number: [phone]

## 2021-09-21 PROBLEM — E83.51 HYPOCALCEMIA: Status: ACTIVE | Noted: 2021-09-21

## 2021-09-21 LAB
ANION GAP SERPL CALC-SCNC: 6 MEQ/L (ref 3–15)
ATRIAL RATE: 102
ATRIAL RATE: 103
BUN SERPL-MCNC: 13 MG/DL (ref 8–20)
CA-I BLD-SCNC: 1.07 MMOL/L (ref 1.15–1.27)
CALCIUM SERPL-MCNC: 7.5 MG/DL (ref 8.9–10.3)
CHLORIDE SERPL-SCNC: 105 MEQ/L (ref 98–109)
CO2 SERPL-SCNC: 24 MEQ/L (ref 22–32)
CREAT SERPL-MCNC: 0.6 MG/DL (ref 0.6–1.1)
ERYTHROCYTE [DISTWIDTH] IN BLOOD BY AUTOMATED COUNT: 12.6 % (ref 11.7–14.4)
GFR SERPL CREATININE-BSD FRML MDRD: >60 ML/MIN/1.73M*2
GLUCOSE SERPL-MCNC: 113 MG/DL (ref 70–99)
HCT VFR BLDCO AUTO: 29.3 % (ref 35–45)
HGB BLD-MCNC: 9.5 G/DL (ref 11.8–15.7)
MCH RBC QN AUTO: 31.1 PG (ref 28–33.2)
MCHC RBC AUTO-ENTMCNC: 32.4 G/DL (ref 32.2–35.5)
MCV RBC AUTO: 96.1 FL (ref 83–98)
P AXIS: 46
P AXIS: 50
PDW BLD AUTO: 9.1 FL (ref 9.4–12.3)
PLATELET # BLD AUTO: 232 K/UL (ref 150–369)
POTASSIUM SERPL-SCNC: 3.7 MEQ/L (ref 3.6–5.1)
PR INTERVAL: 154
PR INTERVAL: 156
QRS DURATION: 86
QRS DURATION: 90
QT INTERVAL: 356
QT INTERVAL: 366
QTC CALCULATION(BAZETT): 466
QTC CALCULATION(BAZETT): 477
R AXIS: -57
R AXIS: -57
RBC # BLD AUTO: 3.05 M/UL (ref 3.93–5.22)
SODIUM SERPL-SCNC: 135 MEQ/L (ref 136–144)
T WAVE AXIS: 46
T WAVE AXIS: 59
VENTRICULAR RATE: 102
VENTRICULAR RATE: 103
WBC # BLD AUTO: 12.84 K/UL (ref 3.8–10.5)

## 2021-09-21 PROCEDURE — 82306 VITAMIN D 25 HYDROXY: CPT | Performed by: HOSPITALIST

## 2021-09-21 PROCEDURE — 63600000 HC DRUGS/DETAIL CODE: Mod: JW | Performed by: ORTHOPAEDIC SURGERY

## 2021-09-21 PROCEDURE — 25000000 HC PHARMACY GENERAL: Performed by: ORTHOPAEDIC SURGERY

## 2021-09-21 PROCEDURE — 63700000 HC SELF-ADMINISTRABLE DRUG: Performed by: ORTHOPAEDIC SURGERY

## 2021-09-21 PROCEDURE — 36415 COLL VENOUS BLD VENIPUNCTURE: CPT | Performed by: ORTHOPAEDIC SURGERY

## 2021-09-21 PROCEDURE — 25800000 HC PHARMACY IV SOLUTIONS: Performed by: ORTHOPAEDIC SURGERY

## 2021-09-21 PROCEDURE — 80048 BASIC METABOLIC PNL TOTAL CA: CPT | Performed by: ORTHOPAEDIC SURGERY

## 2021-09-21 PROCEDURE — 97166 OT EVAL MOD COMPLEX 45 MIN: CPT | Mod: GO

## 2021-09-21 PROCEDURE — 97162 PT EVAL MOD COMPLEX 30 MIN: CPT | Mod: GP

## 2021-09-21 PROCEDURE — 63700000 HC SELF-ADMINISTRABLE DRUG: Performed by: HOSPITALIST

## 2021-09-21 PROCEDURE — 99223 1ST HOSP IP/OBS HIGH 75: CPT | Performed by: STUDENT IN AN ORGANIZED HEALTH CARE EDUCATION/TRAINING PROGRAM

## 2021-09-21 PROCEDURE — 99233 SBSQ HOSP IP/OBS HIGH 50: CPT | Performed by: HOSPITALIST

## 2021-09-21 PROCEDURE — 85027 COMPLETE CBC AUTOMATED: CPT | Performed by: ORTHOPAEDIC SURGERY

## 2021-09-21 PROCEDURE — 97530 THERAPEUTIC ACTIVITIES: CPT | Mod: GP

## 2021-09-21 PROCEDURE — 25800000 HC PHARMACY IV SOLUTIONS: Performed by: NURSE PRACTITIONER

## 2021-09-21 PROCEDURE — 80175 DRUG SCREEN QUAN LAMOTRIGINE: CPT | Performed by: STUDENT IN AN ORGANIZED HEALTH CARE EDUCATION/TRAINING PROGRAM

## 2021-09-21 PROCEDURE — 97116 GAIT TRAINING THERAPY: CPT | Mod: GP

## 2021-09-21 PROCEDURE — 25800000 HC PHARMACY IV SOLUTIONS: Performed by: HOSPITALIST

## 2021-09-21 PROCEDURE — 63700000 HC SELF-ADMINISTRABLE DRUG: Performed by: STUDENT IN AN ORGANIZED HEALTH CARE EDUCATION/TRAINING PROGRAM

## 2021-09-21 PROCEDURE — 82330 ASSAY OF CALCIUM: CPT | Performed by: STUDENT IN AN ORGANIZED HEALTH CARE EDUCATION/TRAINING PROGRAM

## 2021-09-21 PROCEDURE — 21400000 HC ROOM AND CARE CCU/INTERMEDIATE

## 2021-09-21 PROCEDURE — 25000000 HC PHARMACY GENERAL: Performed by: HOSPITALIST

## 2021-09-21 PROCEDURE — 25800000 HC PHARMACY IV SOLUTIONS: Performed by: STUDENT IN AN ORGANIZED HEALTH CARE EDUCATION/TRAINING PROGRAM

## 2021-09-21 PROCEDURE — 97535 SELF CARE MNGMENT TRAINING: CPT | Mod: GO

## 2021-09-21 PROCEDURE — 93005 ELECTROCARDIOGRAM TRACING: CPT | Performed by: ORTHOPAEDIC SURGERY

## 2021-09-21 RX ORDER — POTASSIUM CHLORIDE 750 MG/1
20 TABLET, FILM COATED, EXTENDED RELEASE ORAL AS NEEDED
Status: DISCONTINUED | OUTPATIENT
Start: 2021-09-21 | End: 2021-09-23 | Stop reason: HOSPADM

## 2021-09-21 RX ORDER — MELOXICAM 7.5 MG/1
7.5 TABLET ORAL DAILY
Qty: 30 TABLET | Refills: 0 | Status: ON HOLD | OUTPATIENT
Start: 2021-09-21 | End: 2021-10-14 | Stop reason: SDUPTHER

## 2021-09-21 RX ORDER — POTASSIUM CHLORIDE 750 MG/1
40 TABLET, FILM COATED, EXTENDED RELEASE ORAL AS NEEDED
Status: DISCONTINUED | OUTPATIENT
Start: 2021-09-21 | End: 2021-09-23 | Stop reason: HOSPADM

## 2021-09-21 RX ORDER — ALBUTEROL SULFATE 0.83 MG/ML
2.5 SOLUTION RESPIRATORY (INHALATION) EVERY 6 HOURS PRN
Status: DISCONTINUED | OUTPATIENT
Start: 2021-09-21 | End: 2021-09-23 | Stop reason: HOSPADM

## 2021-09-21 RX ORDER — POTASSIUM CHLORIDE 14.9 MG/ML
20 INJECTION INTRAVENOUS AS NEEDED
Status: DISCONTINUED | OUTPATIENT
Start: 2021-09-21 | End: 2021-09-23 | Stop reason: HOSPADM

## 2021-09-21 RX ORDER — ALBUTEROL SULFATE 90 UG/1
2 INHALANT RESPIRATORY (INHALATION) EVERY 6 HOURS PRN
Status: DISCONTINUED | OUTPATIENT
Start: 2021-09-21 | End: 2021-09-21 | Stop reason: CLARIF

## 2021-09-21 RX ORDER — FLUTICASONE FUROATE AND VILANTEROL 200; 25 UG/1; UG/1
1 POWDER RESPIRATORY (INHALATION) DAILY
Status: DISCONTINUED | OUTPATIENT
Start: 2021-09-21 | End: 2021-09-23 | Stop reason: HOSPADM

## 2021-09-21 RX ORDER — POLYETHYLENE GLYCOL 3350 17 G/17G
17 POWDER, FOR SOLUTION ORAL DAILY
Qty: 90 PACKET | Refills: 0 | COMMUNITY
Start: 2021-09-22 | End: 2021-10-15 | Stop reason: HOSPADM

## 2021-09-21 RX ORDER — AMOXICILLIN 250 MG
1 CAPSULE ORAL 2 TIMES DAILY
Qty: 178 TABLET | Refills: 0 | COMMUNITY
Start: 2021-09-21 | End: 2021-10-15 | Stop reason: HOSPADM

## 2021-09-21 RX ORDER — DOXYCYCLINE 100 MG/1
100 CAPSULE ORAL 2 TIMES DAILY
Qty: 14 CAPSULE | Refills: 0 | Status: SHIPPED | OUTPATIENT
Start: 2021-09-21 | End: 2021-09-28

## 2021-09-21 RX ORDER — NAPROXEN SODIUM 220 MG/1
81 TABLET, FILM COATED ORAL 2 TIMES DAILY
Qty: 178 TABLET | Refills: 0 | Status: ON HOLD | COMMUNITY
Start: 2021-09-21 | End: 2021-10-14 | Stop reason: SDUPTHER

## 2021-09-21 RX ADMIN — DOCUSATE SODIUM AND SENNOSIDES 1 TABLET: 50; 8.6 TABLET ORAL at 20:28

## 2021-09-21 RX ADMIN — VANCOMYCIN HYDROCHLORIDE 1 G: 1 INJECTION, POWDER, LYOPHILIZED, FOR SOLUTION INTRAVENOUS at 01:32

## 2021-09-21 RX ADMIN — ATORVASTATIN CALCIUM 40 MG: 40 TABLET, FILM COATED ORAL at 20:28

## 2021-09-21 RX ADMIN — BUSPIRONE HYDROCHLORIDE 10 MG: 10 TABLET ORAL at 13:25

## 2021-09-21 RX ADMIN — SODIUM CHLORIDE: 9 INJECTION, SOLUTION INTRAVENOUS at 03:45

## 2021-09-21 RX ADMIN — PANTOPRAZOLE SODIUM 20 MG: 20 TABLET, DELAYED RELEASE ORAL at 20:29

## 2021-09-21 RX ADMIN — ASPIRIN 81 MG CHEWABLE TABLET 81 MG: 81 TABLET CHEWABLE at 20:29

## 2021-09-21 RX ADMIN — ROPINIROLE HYDROCHLORIDE 1 MG: 1 TABLET, FILM COATED ORAL at 08:30

## 2021-09-21 RX ADMIN — ROPINIROLE HYDROCHLORIDE 0.5 MG: 0.5 TABLET, FILM COATED ORAL at 13:25

## 2021-09-21 RX ADMIN — BUSPIRONE HYDROCHLORIDE 10 MG: 10 TABLET ORAL at 17:45

## 2021-09-21 RX ADMIN — SODIUM CHLORIDE 1000 ML: 9 INJECTION, SOLUTION INTRAVENOUS at 10:41

## 2021-09-21 RX ADMIN — KETOROLAC TROMETHAMINE 30 MG: 30 INJECTION, SOLUTION INTRAMUSCULAR; INTRAVENOUS at 08:33

## 2021-09-21 RX ADMIN — BUSPIRONE HYDROCHLORIDE 10 MG: 10 TABLET ORAL at 08:30

## 2021-09-21 RX ADMIN — MONTELUKAST SODIUM 10 MG: 10 TABLET, FILM COATED ORAL at 08:30

## 2021-09-21 RX ADMIN — FLUTICASONE FUROATE AND VILANTEROL 1 PUFF: 200; 25 POWDER RESPIRATORY (INHALATION) at 11:46

## 2021-09-21 RX ADMIN — SODIUM CHLORIDE 500 ML: 9 INJECTION, SOLUTION INTRAVENOUS at 03:07

## 2021-09-21 RX ADMIN — KETOROLAC TROMETHAMINE 30 MG: 30 INJECTION, SOLUTION INTRAMUSCULAR; INTRAVENOUS at 20:28

## 2021-09-21 RX ADMIN — DEXAMETHASONE SODIUM PHOSPHATE 10 MG: 4 INJECTION, SOLUTION INTRA-ARTICULAR; INTRALESIONAL; INTRAMUSCULAR; INTRAVENOUS; SOFT TISSUE at 08:31

## 2021-09-21 RX ADMIN — CALCIUM CHLORIDE 1 G: 100 INJECTION, SOLUTION INTRAVENOUS at 09:49

## 2021-09-21 RX ADMIN — POTASSIUM CHLORIDE 20 MEQ: 750 TABLET, EXTENDED RELEASE ORAL at 10:45

## 2021-09-21 RX ADMIN — ROPINIROLE HYDROCHLORIDE 1 MG: 1 TABLET, FILM COATED ORAL at 17:45

## 2021-09-21 RX ADMIN — BUSPIRONE HYDROCHLORIDE 10 MG: 10 TABLET ORAL at 20:29

## 2021-09-21 RX ADMIN — SODIUM CHLORIDE: 9 INJECTION, SOLUTION INTRAVENOUS at 11:45

## 2021-09-21 RX ADMIN — CEFAZOLIN SODIUM 2 G: 10 POWDER, FOR SOLUTION INTRAVENOUS at 05:30

## 2021-09-21 RX ADMIN — DOCUSATE SODIUM AND SENNOSIDES 1 TABLET: 50; 8.6 TABLET ORAL at 08:30

## 2021-09-21 RX ADMIN — SODIUM CHLORIDE 500 ML: 9 INJECTION, SOLUTION INTRAVENOUS at 16:45

## 2021-09-21 RX ADMIN — SERTRALINE HYDROCHLORIDE 300 MG: 100 TABLET ORAL at 08:30

## 2021-09-21 RX ADMIN — LAMOTRIGINE 400 MG: 200 TABLET, EXTENDED RELEASE ORAL at 11:46

## 2021-09-21 RX ADMIN — OXYCODONE HYDROCHLORIDE 5 MG: 5 TABLET ORAL at 13:25

## 2021-09-21 RX ADMIN — ROPINIROLE HYDROCHLORIDE 2 MG: 1 TABLET, FILM COATED ORAL at 20:29

## 2021-09-21 RX ADMIN — ASPIRIN 81 MG CHEWABLE TABLET 81 MG: 81 TABLET CHEWABLE at 08:30

## 2021-09-21 RX ADMIN — KETOROLAC TROMETHAMINE 30 MG: 30 INJECTION, SOLUTION INTRAMUSCULAR; INTRAVENOUS at 14:56

## 2021-09-21 ASSESSMENT — COGNITIVE AND FUNCTIONAL STATUS - GENERAL
WALKING IN HOSPITAL ROOM: 2 - A LOT
AFFECT: WFL
HELP NEEDED FOR PERSONAL GROOMING: 3 - A LITTLE
HELP NEEDED FOR BATHING: 3 - A LITTLE
STANDING UP FROM CHAIR USING ARMS: 3 - A LITTLE
MOVING TO AND FROM BED TO CHAIR: 3 - A LITTLE
EATING MEALS: 4 - NONE
DRESSING REGULAR UPPER BODY CLOTHING: 3 - A LITTLE
AFFECT: WFL
AFFECT: WFL
MOVING TO AND FROM BED TO CHAIR: 3 - A LITTLE
DRESSING REGULAR UPPER BODY CLOTHING: 3 - A LITTLE
TOILETING: 3 - A LITTLE
AFFECT: WFL
CLIMB 3 TO 5 STEPS WITH RAILING: 2 - A LOT
STANDING UP FROM CHAIR USING ARMS: 3 - A LITTLE
TOILETING: 3 - A LITTLE
CLIMB 3 TO 5 STEPS WITH RAILING: 2 - A LOT
DRESSING REGULAR LOWER BODY CLOTHING: 3 - A LITTLE
EATING MEALS: 4 - NONE
WALKING IN HOSPITAL ROOM: 3 - A LITTLE
HELP NEEDED FOR PERSONAL GROOMING: 3 - A LITTLE
HELP NEEDED FOR BATHING: 2 - A LOT
DRESSING REGULAR LOWER BODY CLOTHING: 2 - A LOT

## 2021-09-21 NOTE — PROGRESS NOTES
BP have been soft.  Will give 500cc bolus.  Otherwise, asymptomatic.      - Will reassess in AM after bolus.      Mario Soria,   Orthopedic Surgery PGY-2  *[phone]

## 2021-09-21 NOTE — PLAN OF CARE
Problem: Adult Inpatient Plan of Care  Goal: Plan of Care Review  Outcome: Progressing  Flowsheets (Taken 9/21/2021 1146)  Plan of Care Reviewed With: patient  Outcome Summary: Pt seen for OT eval. Pt requires close S to min A for bed mobility and fxl transfers, + orthostatic hypotensive upon stance limiting mobility and fxl adls. Pt required max A for bedside LBD with close S for pericare/grooming bedside. Will continue to assess further fxl mobility and adl management as able. Medical team notified and pt to be receiving additional bolus post orthostatics. Anticipate ongoing progress as able. Rec home with family assist upon d/c     Problem: Joint Function Impaired (Knee Arthroplasty)  Goal: Optimal Functional Ability  Outcome: Progressing

## 2021-09-21 NOTE — PROGRESS NOTES
Per pt medical rounds today. Pt is not stable for d/c today.  Pt had knee total bilateral replacement. Pt is rec got [facility]. Pt choice is [facility]. SW made a referral for [facility] through [facility]. SW will follow for d/c planning needs. Camila Alves. MSW -secure chat

## 2021-09-21 NOTE — PROGRESS NOTES
Patient: Fide Aimn  Location:  [facility] 3 Main 0313  MRN:  [id number]  Today's date:  9/21/2021    Patient left in chair w/ call bell w/in reach & chair alarm activated. Pt's  present. RN notified of patient's status.    Fide is a 60 y.o. female admitted on 9/20/2021 with Osteoarthritis of both knees, unspecified osteoarthritis type [M17.0]  Arthritis of knee [M17.10]. Principal problem is Arthritis of knee.    Past Medical History  Fide has a past medical history of Arthritis, Asthma, Depression, Deviated septum, Epilepsy (CMS/HCC), GERD (gastroesophageal reflux disease), Hypertension, Lipid disorder, Motion sickness, and RLS (restless legs syndrome).    History of Present Illness   Bilateral Total knee replacement per Dr. Em 9/20/21, WBAT BLE  Pt had a rapid response called 9/20 for a seizure. She also had low BP in 90s overnight.  Bolused 500cc and BP in low 100s.  Asymptomatic.       PT Vitals    Date/Time Pulse SpO2 Pt Activity O2 Therapy BP BP Location BP Method Pt Position Observations [facility]   09/21/21 1331 92 93 % At rest None (Room air) 107/57 Right upper arm Automatic Lying PT/OT [facility]   09/21/21 1333 -- -- -- -- 113/51 Right upper arm Automatic Sitting PT/OT- EOB [facility]   09/21/21 1337 95 -- -- -- 95/52 Right upper arm Automatic Standing PT/OT [facility]   09/21/21 1350 88 92 % At rest None (Room air) 90/61 Right upper arm Automatic Sitting PT/OT- post mobility [facility]      PT Pain    Date/Time Pain Type Side/Orientation Location Rating: Rest Rating: Activity Interventions [facility]   09/21/21 1331 Pain Assessment bilateral knee 0 7 position adjusted EE          Prior Living Environment      Most Recent Value   People in Home spouse  [pt reports he can assist]   Current Living Arrangements home   Living Environment Comment 2SH w/ 0 steps to enter, [place] on first floor. 2nd floor bedroom/bathroom w/ walk in shower + GB. Pt reports she will have bed on first floor at d/c.        Prior Level of Function      Most  Recent Value   Dominant Hand left   Ambulation independent   Transferring independent   Toileting independent   Bathing independent   Dressing independent   Eating independent   Prior Level of Function Comment Independent PTA w/o AD, drives   Assistive Device Currently Used at Home grab bar,  shower chair           PT Evaluation and Treatment - 09/21/21 1330        PT Time Calculation    Start Time 1330     Stop Time 1354     Time Calculation (min) 24 min        Session Details    Document Type daily treatment/progress note     Mode of Treatment physical therapy        General Information    Patient Profile Reviewed yes     Patient/Family/Caregiver Comments/Observations Pt's  present t/o session     General Observations of Patient RN cleared pt for session. Pt received in bed, agreeable to therapy     Existing Precautions/Restrictions fall; aspiration; seizures; weight bearing; head of bed elevated 30 degrees        Weight-bearing Status    Left LE Weight-Bearing Status weight-bearing as tolerated (WBAT)     Right LE Weight-Bearing Status weight-bearing as tolerated (WBAT)        Cognition/Psychosocial    Affect/Mental Status (Cognition) WFL     Orientation Status (Cognition) oriented x 4     Follows Commands (Cognition) follows multi-step commands     Comment, Cognition pleasant/cooperaive/receptive. Req'd cues for safety during fxnl mobility        Range of Motion (ROM)    Left Lower Extremity (ROM) left LE ROM is WFL except; knee     Knee, Left (ROM) 70 degrees flexion, lacking 17 degrees of extension     Right Lower Extremity (ROM) right LE ROM is WFL except; knee     Knee, Right (ROM) 60 degrees flexion, lacking 10 degrees of extension     Comment: Range of Motion measured sitting in chair        Bed Mobility    [place], Supine to Sit close supervision     Assistive Device head of bed elevated     Comment (Bed Mobility) OOB to the left        Sit to Stand Transfer    [place], Sit to Stand  Transfer close supervision; verbal cues     Verbal Cues safety; hand placement     Assistive Device walker, front-wheeled     Comment from bed & commode        Stand to Sit Transfer    [place], Stand to Sit Transfer close supervision; verbal cues     Verbal Cues safety     Assistive Device walker, front-wheeled     Comment to commode & chair        Gait Training    [place], Gait close supervision     Assistive Device walker, front-wheeled   + chair follow    Distance in Feet 70 feet     Pattern (Gait) step-through     Deviations/Abnormal Patterns (Gait) base of support, narrow; gait speed decreased; step length decreased; stride length decreased   dec LE clearance    Maintains Weight-bearing Status (Gait) able to maintain     Comment (Gait/Stairs) 70'x1, 10'x1. Req'd cues for safe RW mgmt i.e. pushing instead of lifting. Fwd posture w/ inc reliance on RW for UE support. Pt deferred further ambulation at this time. Will assess 10MWT next session.        Stairs Training    Comment TBA. Pt & her  report 1 step to enter home        Safety Issues, Functional Mobility    Impairments Affecting Function (Mobility) balance; endurance/activity tolerance; pain; range of motion (ROM)        Balance    Static Sitting Balance WFL; sitting in chair; sitting, edge of bed     Sit to Stand Dynamic Balance mild impairment     Static Standing Balance WFL; supported   RW    Dynamic Standing Balance mild impairment; supported   RW    Comment, Balance close supervision for mobility, mildly unsteady but no overt LOBs observed        AM-PAC (TM) - Mobility (Current Function)    Turning from your back to your side while in a flat bed without using bedrails? 3 - A Little     Moving from lying on your back to sitting on the side of a flat bed without using bedrails? 3 - A Little     Moving to and from a bed to a chair? 3 - A Little     Standing up from a chair using your arms? 3 - A Little     To walk in a hospital room? 3 - A  Little     Climbing 3-5 steps with a railing? 2 - A Lot     AM-PAC (TM) Mobility Score 17        Assessment/Plan (PT)    Daily Outcome Statement PT treatment session complete. [facility] 17. Patient at close supervision level for mobility w/ RW d/t pain as well as balance, ROM & endurance deficits. Improved activity tolerance vs. initial evaluation. Req'd cues for safety at times. She req cont'd skilled PT to address noted deficits in order to max safety & independence for all fxnl activities & dec fall risk. Rec home w/ assist + home PT at d/c.     Rehab Potential good, to achieve stated therapy goals     Therapy Frequency 5 times/wk     Planned Therapy Interventions balance training; bed mobility training; gait training; neuromuscular re-education; home exercise program; patient/family education; stair training; ROM (range of motion); transfer training               PT Assessment/Plan      Most Recent Value   PT Recommended Discharge Disposition home with assistance,  home with home health at 09/21/2021 1330   Anticipated Equipment Needs at Discharge (PT) walker, front-wheeled at 09/21/2021 1330   Patient/Family Therapy Goals Statement decrease pain at 09/21/2021 1330                    Education Documentation  Rehabilitation Therapy, taught by Reba Stallings PT at 9/21/2021  2:20 PM.  Learner: Significant Other  Readiness: Acceptance  Method: Explanation, Handout  Response: Verbalizes Understanding  Comment: PT POC, safety during mobility, use of call bell, ensure staff member present prior to standing/mobility, provided ortho department knee handout to patient, to perform LE AROM/TE to promote mobility/strength    Rehabilitation Therapy, taught by Reba Stallings PT at 9/21/2021  2:20 PM.  Learner: Patient  Readiness: Acceptance  Method: Explanation, Handout  Response: Verbalizes Understanding  Comment: PT POC, safety during mobility, use of call bell, ensure staff member present prior to standing/mobility,  provided ortho department knee handout to patient, to perform LE AROM/TE to promote mobility/strength    Rehabilitation Therapy, taught by Reba Stallings, PT at 9/21/2021 12:31 PM.  Learner: Patient  Readiness: Acceptance  Method: Explanation  Response: Verbalizes Understanding  Comment: Role of PT, PT POC, safety during mobility, LE AROM/TE to promote mobility/strength, use of call bell          PT Goals      Most Recent Value   Bed Mobility Goal 1    Activity/Assistive Device bed mobility activities, all at 09/21/2021 0916   [place] independent at 09/21/2021 0916   Time Frame by discharge at 09/21/2021 0916   Progress/Outcome continuing progress toward goal,  goal ongoing at 09/21/2021 1330   Transfer Goal 1    Activity/Assistive Device all transfers,  walker, front-wheeled at 09/21/2021 0916   [place] modified independence at 09/21/2021 0916   Time Frame by discharge at 09/21/2021 0916   Progress/Outcome continuing progress toward goal,  goal ongoing at 09/21/2021 1330   Gait Training Goal 1    Activity/Assistive Device gait (walking locomotion),  walker, front-wheeled at 09/21/2021 0916   [place] modified independence at 09/21/2021 0916   Distance 150 at 09/21/2021 0916   Time Frame by discharge at 09/21/2021 0916   Progress/Outcome goal ongoing,  continuing progress toward goal at 09/21/2021 1330   Stairs Goal 1    Activity/Assistive Device stairs, all skills at 09/21/2021 1330   [place] supervision required at 09/21/2021 1330   Number of Stairs 1 at 09/21/2021 1330   Time Frame by discharge at 09/21/2021 1330   Progress/Outcome goal ongoing at 09/21/2021 1330

## 2021-09-21 NOTE — ASSESSMENT & PLAN NOTE
Status post bilateral total knee arthroplasty 9/20 by Dr. Em  Aspirin for DVT prophylaxis per orthopedics  Pain management , preop antibiotics per orthopedics  PT/OT eval  SNF placement

## 2021-09-21 NOTE — ANESTHESIA POSTPROCEDURE EVALUATION
Patient: Fide Amin    Procedure Summary     Date: 09/20/21 Room / Location:  OR 6 / PH OR    Anesthesia Start: 1221 Anesthesia Stop: 1458    Procedure: KNEE TOTAL BILATERAL REPLACEMENT (Bilateral Knee) Diagnosis:       Osteoarthritis of both knees, unspecified osteoarthritis type      (Osteoarthritis Both Knees M17.0)    Surgeons: Bennett Em MD Responsible Provider: Gary Hernández MD    Anesthesia Type: general, spinal ASA Status: 2          Anesthesia Type: general, spinal  PACU Vitals  9/20/2021 1449 - 9/20/2021 1549      9/20/2021  1455 9/20/2021  1500 9/20/2021  1515 9/20/2021  1530    BP: 99/54 96/53 106/59 107/56    Temp: 36.5 °C (97.7 °F) -- -- --    Pulse: 86 84 80 78    Resp: 16 14 18 22              9/20/2021  1545             BP: 107/57       Temp: --       Pulse: 80       Resp: 18               Anesthesia Post Evaluation    Pain management: adequate  Patient participation: complete - patient participated  Level of consciousness: awake and alert  Cardiovascular status: acceptable  Airway Patency: adequate  Respiratory status: acceptable  Hydration status: acceptable  Anesthetic complications: no  Comments: Patient seen and evaluated in PACU on date of surgery.

## 2021-09-21 NOTE — NURSING NOTE
Pt c/o severe cramping in RLE, however BP remains low. Ortho resident made aware and will see pt after their OR case.    Per ortho resident-  Hold narcotics at this time until BP stabilizes; Ice and elevate.     NV checks WNL. Wctm closely.

## 2021-09-21 NOTE — PATIENT CARE CONFERENCE
Care Progression Rounds Note  Date: 9/21/2021  Time: 10:36 AM     Patient Name: Fide Amin     Medical Record Number: [id number]   YOB: 1960  Sex: Female      Room/Bed: 0313    Admitting Diagnosis: Osteoarthritis of both knees, unspecified osteoarthritis type [M17.0]  Arthritis of knee [M17.10]   Admit Date/Time: 9/20/2021 11:08 AM    Primary Diagnosis: Arthritis of knee  Principal Problem: Arthritis of knee    GMLOS: pending  Anticipated Discharge Date: 9/22/2021    AM-PAC:  Mobility Score:      Discharge Planning:  Current Living Arrangements: home    Barriers to Discharge:  Barriers to Discharge: Medical issues not resolved    Participants:  , social work/services, nursing

## 2021-09-21 NOTE — PROGRESS NOTES
Patient:  Fide Amin  Location:  [facility] 3 Main 0313  MRN:  [id number]  Today's date:  9/21/2021   RN cleared for session. Pt left bedside chair with personal items in place and call bell within reach, posey alarm intact. Pt reports no further questions for OT at this time and all needs are met. RN notified      Fide is a 60 y.o. female admitted on 9/20/2021 with Osteoarthritis of both knees, unspecified osteoarthritis type [M17.0]  Arthritis of knee [M17.10]. Principal problem is Arthritis of knee.    Past Medical History  Fide has a past medical history of Arthritis, Asthma, Depression, Deviated septum, Epilepsy (CMS/HCC), GERD (gastroesophageal reflux disease), Hypertension, Lipid disorder, Motion sickness, and RLS (restless legs syndrome).    History of Present Illness   Bilateral Total knee replacement per Dr. Em 9/20/21, WBAT BLE  Pt had a rapid response called 9/20 for a seizure. She also had low BP in 90s overnight.  Bolused 500cc and BP in low 100s.  Asymptomatic.       OT Vitals    Date/Time Pulse SpO2 Pt Activity O2 Therapy BP BP Location BP Method Pt Position Observations [facility]   09/21/21 1331 92 93 % At rest None (Room air) 107/57 Right upper arm Automatic Lying PT/OT [facility]   09/21/21 1333 -- -- -- -- 113/51 Right upper arm Automatic Sitting PT/OT- EOB [facility]   09/21/21 1337 95 -- -- -- 95/52 Right upper arm Automatic Standing PT/OT [facility]      OT Pain    Date/Time Pain Type Side/Orientation Location Rating: Rest Rating: Activity Interventions [facility]   09/21/21 1325 Pain Assessment -- knee 5 -- medication administered [facility]   09/21/21 1331 Pain Assessment bilateral knee 0 7 position adjusted EE          Prior Living Environment      Most Recent Value   People in Home spouse  [pt reports he can assist]   Current Living Arrangements home   Living Environment Comment 2SH w/ 0 steps to enter, [place] on first floor. 2nd floor bedroom/bathroom w/ walk in shower + GB. Pt reports she will have bed on first floor at  d/c.        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    Environmental Supports and Barriers supportive spouse           OT Evaluation and Treatment - 09/21/21 1325        OT Time Calculation    Start Time 1325     Stop Time 1344     Time Calculation (min) 19 min        Session Details    Document Type daily treatment/progress note     Mode of Treatment occupational therapy        General Information    Patient Profile Reviewed yes     Patient/Family/Caregiver Comments/Observations RN cleared for session, pt spouse present     General Observations of Patient pt rec'd supine in bed, agreeable to participate in therapy     Existing Precautions/Restrictions aspiration; fall; seizures; weight bearing        Weight-bearing Status    Left LE Weight-Bearing Status weight-bearing as tolerated (WBAT)     Right LE Weight-Bearing Status weight-bearing as tolerated (WBAT)        Cognition/Psychosocial    Affect/Mental Status (Cognition) WFL     Orientation Status (Cognition) oriented x 4     Follows Commands (Cognition) WFL     Cognitive Function WFL     Comment, Cognition Pt is pleasant and cooperative, follows all commands and ask for assistance appropriately        Bed Mobility    [place], Supine to Sit close supervision     Assistive Device head of bed elevated     Comment (Bed Mobility) continues to demo good core control and strength to complete task, VSS with positional change        Transfers    Transfers toilet transfer     Maintains  Weight-Bearing Status (Transfers) able to maintain        Sit to Stand Transfer    [place], Sit to Stand Transfer close supervision     Verbal Cues safety     Assistive Device walker, front-wheeled     Comment increased anterior leaning prior to stance, VSS upon stance this session        Stand to Sit Transfer    [place], Stand to Sit Transfer close supervision     Verbal Cues safety     Assistive Device walker, front-wheeled     Comment quick eccentric control 2/2 pain, provided pt with cueing for BLE walkout method for pain management        Toilet Transfer    Transfer Technique sit-stand; stand-sit     [place], Toilet Transfer close supervision     Verbal Cues safety     Assistive Device commode, 3-in-1     Comment benefits from elevated surface for pain management, use of walkout method for descent        Safety Issues, Functional Mobility    Impairments Affecting Function (Mobility) balance; endurance/activity tolerance; pain     Comment, Safety Issues/Impairments (Mobility) VSS for session, requires close S with RW for prolonged mobility        Balance    Balance Assessment sitting static balance; sitting dynamic balance; sit to stand dynamic balance; standing static balance; standing dynamic balance     Static Sitting Balance WFL     Dynamic Sitting Balance WFL     Sit to Stand Dynamic Balance mild impairment     Static Standing Balance WFL     Dynamic Standing Balance mild impairment     Comment, Balance close S for safety with RW, no overt LOB noted, mild anterior leaning 2/2 pain        Functional Reach Test    Trial One: Functional Reach Test (in) 7.5 inches     Trial Two: Functional Reach Test (in) 7 inches     Average (in) 7.25 inches        Lower Body Dressing    Tasks doff; don; socks     [place] minimum assist (75% or more patient effort)     Position supported sitting     Comment initiated foward flexed technique with side sitting, required increased assistance for over toe  management        Grooming    Self-Performance washes, rinses and dries hands     [place] close supervision     Position supported standing     Comment close S for safety reaching outside [place] to obtain supplies        Toileting    [place] close supervision; perform bladder hygiene     Position supported sitting     Comment pt completed pericare from seated position with close S for balance/safety        AM-PAC (TM) - ADL (Current Function)    Putting on and taking off regular lower body clothing? 3 - A Little     Bathing? 3 - A Little     Toileting? 3 - A Little     Putting on/taking off regular upper body clothing? 3 - A Little     How much help for taking care of personal grooming? 3 - A Little     Eating meals? 4 - None     AM-PAC (TM) ADL Score 19        Assessment/Plan (OT)    Daily Outcome Statement Pt seen for OT session. Pt had improvements with orthostatics for session, required close S for bed mobility, fxl transfers/mobility with RW, commode transfer, toileting and grooming with min A for LBD. Pt is limited by pain, balance, endurance. Will continue to follow for OT to address POC. Rec home with assist/home health + home OT upon d/c.     Rehab Potential good, to achieve stated therapy goals     Therapy Frequency 5 times/wk               OT Assessment/Plan      Most Recent Value   OT Recommended Discharge Disposition home with assistance,  home with home health at 09/21/2021 1325   Anticipated Equipment Needs At Discharge (OT) commode, 3-in-1,  bathing equipment,  dressing equipment at 09/21/2021 0912   Patient/Family Therapy Goal Statement to go home at 09/21/2021 1325                    Education Documentation  Treatment Plan, taught by Lian Cuevas OT at 9/21/2021  2:59 PM.  Learner: Patient  Readiness: Acceptance  Method: Explanation  Response: Verbalizes Understanding  Comment: transfer safety, adl management    Treatment Plan, taught by Lian Cuevas OT at 9/21/2021 11:47  JORGE LUIS.  Learner: Patient  Readiness: Acceptance  Method: Explanation  Response: Verbalizes Understanding  Comment: transer safety, adl management          OT Goals      Most Recent Value   Bed Mobility Goal 1    Activity/Assistive Device bed mobility activities, all at 09/21/2021 0912   [place] modified independence at 09/21/2021 0912   Time Frame by discharge at 09/21/2021 0912   Progress/Outcome goal ongoing at 09/21/2021 0912   Transfer Goal 1    Activity/Assistive Device all transfers at 09/21/2021 0912   [place] modified independence at 09/21/2021 0912   Time Frame by discharge at 09/21/2021 0912   Progress/Outcome goal ongoing at 09/21/2021 0912   Dressing Goal 1    Activity/Adaptive Equipment dressing skills, all at 09/21/2021 0912   [place] modified independence at 09/21/2021 0912   Time Frame by discharge at 09/21/2021 0912   Progress/Outcome goal ongoing at 09/21/2021 0912   Toileting Goal 1    Activity/Assistive Device toileting skills, all at 09/21/2021 0912   [place] modified independence at 09/21/2021 0912   Time Frame by discharge at 09/21/2021 0912   Progress/Outcome goal ongoing at 09/21/2021 0912

## 2021-09-21 NOTE — NURSING NOTE
BP 97/53, pt asymptomatic. On call ortho team paged.    [id number] Call back received from on call ortho resident and verbal order received to give IV NSS bolus 500cc . Pt made aware.

## 2021-09-21 NOTE — PLAN OF CARE
Problem: Adult Inpatient Plan of Care  Goal: Plan of Care Review  Outcome: Progressing  Flowsheets (Taken 9/21/2021 0605)  Progress: improving  Plan of Care Reviewed With: patient  Outcome Summary: Pt AAOx3. S/P B/L total knee, dressing CDI. Neurovascular checks are intact. Denied any pain to B/L LE. IV bolus NSS 500cc given for low BP, BP improving. Tolerating IV fluids. No nausea/vomiting this shift. Purewick catheter intact and pt voiding without any issues. No seizure like activities this shift. Encouraged pt OOB, stated she will get OOB later this AM. No acute distress, call bell left in reach.

## 2021-09-21 NOTE — PROGRESS NOTES
Hospital Medicine Service -  Daily Progress Note       SUBJECTIVE     Interval History: No acute events overnight. No acute events overnight. Feels ok. Denies chest pain or dyspnea.     OBJECTIVE        Vital signs in last 24 hours:  Temp:  [36.4 °C (97.6 °F)-37 °C (98.6 °F)] 36.7 °C (98.1 °F)  Heart Rate:  [] 84  Resp:  [14-28] 15  BP: ()/(40-91) 102/51  I/O last 3 completed shifts:  In: 3392 [P.O.:480; I.V.:1700; IV Piggyback:1212]  Out: 700 [Urine:700]    PHYSICAL EXAMINATION        GEN: well-developed and well-nourished; not in acute distress  HEENT: normocephalic; atraumatic  NECK: no JVD; no bruits  CARDIO: regular rate and rhythm; no murmurs or rubs  RESP: clear to auscultation bilaterally; no rales, rhonchi, or wheezes  ABD: soft, non-distended, non-tender, normal bowel sounds  EXT: no cyanosis, clubbing, or edema, bilateral knee dressing  SKIN: clean, dry, warm, and intact  MUSCULOSKELETAL: no injury or deformity  NEURO: alert and oriented x 3; nonfocal  BEHAVIOR/EMOTIONAL: appropriate; cooperative     LABS / IMAGING / TELE        Labs  Lab Results   Component Value Date    WBC 12.84 (H) 09/21/2021    HGB 9.5 (L) 09/21/2021    HCT 29.3 (L) 09/21/2021    MCV 96.1 09/21/2021     09/21/2021     Lab Results   Component Value Date    GLUCOSE 113 (H) 09/21/2021    CALCIUM 7.5 (L) 09/21/2021     (L) 09/21/2021    K 3.7 09/21/2021    CO2 24 09/21/2021     09/21/2021    BUN 13 09/21/2021    CREATININE 0.6 09/21/2021     Lab Results   Component Value Date    INR 1.0 09/16/2021       Imaging  X-RAY KNEE BILATERAL 1 OR 2 VIEWS    Result Date: 9/20/2021  IMPRESSION: Satisfactory postoperative appearance status post right and left total knee arthroplasty.      ECG/Telemetry  I have independently reviewed the telemetry. No events for the last 24 hours.    ASSESSMENT AND PLAN      Epilepsy (CMS/[facility])  Assessment & Plan  First seizure in nearly 10 years  Continue lamictal  Patient seen by  neurology  Check Lamictal trough level  Patient compliant with medication and did not miss a dose of Lamictal  No need for EEG per neurology  Patient's  would bring in Lamictal XR    Hypocalcemia  Assessment & Plan  Replace, recheck      HLD (hyperlipidemia)  Assessment & Plan  Cont statin    HTN (hypertension)  Assessment & Plan  Cont home meds    * Arthritis of knee  Assessment & Plan  Status post bilateral total knee arthroplasty 9/20 by Dr. Em  Aspirin for DVT prophylaxis per orthopedics  Pain management , preop antibiotics per orthopedics  PT/OT eval  SNF placement       VTE Assessment: [place]    Code Status: Full Code  Estimated discharge date: 9/22/2021     Elgin Navarro MD  9/21/2021  9:56 AM

## 2021-09-21 NOTE — PLAN OF CARE
Problem: Adult Inpatient Plan of Care  Goal: Plan of Care Review  Flowsheets  Taken 9/21/2021 1530 by Shasta Alves MSW  Outcome Summary: SW met with pt at bedside for initial assessment. Pt lives with nickolas in a 2Sh with one step to enter. Pt will have FF set up upon d/c home. Pt denies hx of DME, SNF and HHC. Pt will use [facility] if rec. Pt is independent with ADL'S. pt confirmed [employer], [facility] and PCP. Pt is coviod vacc, Pt  will transport pt home.  Taken 9/21/2021 1230 by Reba Stallings PT  Plan of Care Reviewed With: patient  Goal: Readiness for Transition of Care  Intervention: Mutually Develop Transition Plan  Flowsheets  Taken 9/21/2021 1530 by Shasta Alves MSW  Anticipated Discharge Disposition: home with home health  Equipment Needed After Discharge: none  Discharge Coordination/Progress: Home when stable  Anticipated Changes Related to Illness: none  Readmission Within the Last 30 Days: no previous admission in last 30 days  Patient/Family Anticipated Services at Transition: none  Patient/Family Anticipates Transition to: home with family  Transportation Anticipated: family or friend will provide  Concerns to be Addressed:   no discharge needs identified   denies needs/concerns at this time  Type of Home Care Services:   home OT   home PT   nursing  Taken 9/21/2021 0916 by Reba Stallings PT  Assistive Device/Animal Currently Used at Home:   grab bar   shower chair

## 2021-09-21 NOTE — PROGRESS NOTES
Patient: Fide Amin  Location:  [facility] 3 Main 0313  MRN:  [id number]  Today's date:  9/21/2021     Patient left in bed w/ nasal cannula donned, call bell w/in reach & bed alarm activated. RN notified of patient's status.    Fide is a 60 y.o. female admitted on 9/20/2021 with Osteoarthritis of both knees, unspecified osteoarthritis type [M17.0]  Arthritis of knee [M17.10]. Principal problem is Arthritis of knee.    Past Medical History  Fide has a past medical history of Arthritis, Asthma, Depression, Deviated septum, Epilepsy (CMS/HCC), GERD (gastroesophageal reflux disease), Hypertension, Lipid disorder, Motion sickness, and RLS (restless legs syndrome).    History of Present Illness   Bilateral Total knee replacement per Dr. Em 9/20/21, WBAT BLE  Pt had a rapid response called 9/20 for a seizure. She also had low BP in 90s overnight.  Bolused 500cc and BP in low 100s.  Asymptomatic.       PT Vitals    Date/Time Pulse SpO2 Pt Activity O2 Therapy O2 Del Method O2 Flow Rate BP BP Location BP Method Pt Position Observations [facility]   09/21/21 0916 80 97 % At rest Supplemental oxygen Nasal cannula 2 L/min 99/55 Right upper arm Automatic Lying PT/OT [facility] [place]   09/21/21 0918 84 93 % At rest None (Room air) -- -- 83/52 Right upper arm Automatic Sitting PT/OT- EOB, pt asymptomatic [facility] [place]   09/21/21 0920 -- -- -- -- -- -- 65/40 Right upper arm Automatic Standing PT/OT-pt asymptomatic [facility] [place]   09/21/21 0922 -- -- -- -- -- -- 102/51 Right upper arm Automatic Lying PT/OT- back supine EE      PT Pain    Date/Time Pain Type Side/Orientation Location Rating: Rest Rating: Activity Interventions [facility]   09/21/21 0916 Pain Assessment bilateral knee 3 5 position adjusted EEC          Prior Living Environment      Most Recent Value   People in Home spouse  [pt reports he can assist]   Current Living Arrangements home   Living Environment Comment 2SH w/ 0 steps to enter, DE on first floor. 2nd floor bedroom/bathroom w/ walk in shower +  GB. Pt reports she will have bed on first floor at d/c.        Prior Level of Function      Most Recent Value   Dominant Hand left   Ambulation independent   Transferring independent   Toileting independent   Bathing independent   Dressing independent   Eating independent   Prior Level of Function Comment Independent PTA w/o AD, drives   Assistive Device Currently Used at Home grab bar,  shower chair           PT Evaluation and Treatment - 09/21/21 0916        PT Time Calculation    Start Time 0916     Stop Time 0932     Time Calculation (min) 16 min        Session Details    Document Type initial evaluation     Mode of Treatment physical therapy        General Information    Patient Profile Reviewed yes     General Observations of Patient RN cleared pt for session. Pt received in bed w/ nasal cannula donned, agreeable to therapy     Existing Precautions/Restrictions fall; aspiration; seizures; weight bearing; head of bed elevated 30 degrees        Weight-bearing Status    Left LE Weight-Bearing Status weight-bearing as tolerated (WBAT)     Right LE Weight-Bearing Status weight-bearing as tolerated (WBAT)        Cognition/Psychosocial    Affect/Mental Status (Cognition) WFL     Orientation Status (Cognition) oriented x 4     Follows Commands (Cognition) follows multi-step commands     Comment, Cognition pleasant/cooperative/receptive. Req'd cues for safety at times.        Sensory Assessment (Somatosensory)    Sensory Assessment (Somatosensory) LE sensation intact   grossly to light touch       Range of Motion (ROM)    Left Lower Extremity (ROM) left LE ROM is WFL except; knee     Knee, Left (ROM) 63 degrees flexion, lacking 23 degrees extension     Right Lower Extremity (ROM) right LE ROM is WFL except; knee     Knee, Right (ROM) 60 degrees flexion, lacking 15 degrees extension     Comment: Range of Motion measured sitting EOB        Strength Comprehensive (MMT)    Comment Assessed functionally BLEs at least 3/5         Bed Mobility    [place], Supine to Sit close supervision     [place], Sit to Supine minimum assist (75% or more patient effort); 1 person assist     Assistive Device head of bed elevated     Comment (Bed Mobility) OOB to the left, assist at LEs for sit to supine        Bed to Chair Transfer    Comment Deferred d/t orthostatic BP        Sit to Stand Transfer    [place], Sit to Stand Transfer close supervision; verbal cues     Verbal Cues safety; hand placement     Assistive Device walker, front-wheeled     Comment from bed        Stand to Sit Transfer    [place], Stand to Sit Transfer close supervision; verbal cues     Verbal Cues safety     Assistive Device walker, front-wheeled     Comment to bed        Gait Training    Comment (Gait/Stairs) Deferred d/t orthostatic BP        Safety Issues, Functional Mobility    Impairments Affecting Function (Mobility) balance; endurance/activity tolerance; pain; range of motion (ROM)     Comment, Safety Issues/Impairments (Mobility) Orthostatic BP (see vitals section, pt asymptomatic) limiting mobility assessment, RN & ortho NP notified        Balance    Balance Assessment sitting static balance; sit to stand dynamic balance; standing static balance; standing dynamic balance     Static Sitting Balance WFL; sitting, edge of bed     Sit to Stand Dynamic Balance mild impairment     Static Standing Balance WFL; supported   RW    Dynamic Standing Balance not tested        Therapeutic Exercise    Therapeutic Exercise lower extremity        Lower Extremity (Therapeutic Exercise)    Comment Educated regarding performing LE AROM/TE (i.e. glut sets, quad sets, heel slides) to promote mobility/strength        AM-PAC (TM) - Mobility (Current Function)    Turning from your back to your side while in a flat bed without using bedrails? 3 - A Little     Moving from lying on your back to sitting on the side of a flat bed without using bedrails? 3 - A Little     Moving  to and from a bed to a chair? 3 - A Little     Standing up from a chair using your arms? 3 - A Little     To walk in a hospital room? 2 - A Lot     Climbing 3-5 steps with a railing? 2 - A Lot     AM-PAC (TM) Mobility Score 16        Assessment/Plan (PT)    Daily Outcome Statement [facility] 16. Patient at close supervision/Nanda level for bed mobility & close supervision STS w/ RW. Further mobiltiy assessment deferred d/t orthostatic BP. She presents w/ pain as well as balance, endurance & ROM deficits which interfere w/ her ability to perform safe & independent fxnl mobility at this time. She req cont'd skilled PT to further assess mobility as able & address noted deficits in order to max fxnl independence & dec fall risk. Rec home w/ assist + home PT pending progress/further assessment.     Rehab Potential good, to achieve stated therapy goals     Therapy Frequency 5 times/wk     Planned Therapy Interventions balance training; bed mobility training; gait training; home exercise program; neuromuscular re-education; patient/family education; ROM (range of motion); strengthening; transfer training               PT Assessment/Plan      Most Recent Value   PT Recommended Discharge Disposition home with assistance,  home with home health  [pending progress/further assessment] at 09/21/2021 0916   Anticipated Equipment Needs at Discharge (PT) walker, front-wheeled at 09/21/2021 0916   Patient/Family Therapy Goals Statement return home at 09/21/2021 0916                    Education Documentation  Rehabilitation Therapy, taught by Reba Stallings, PT at 9/21/2021 12:31 PM.  Learner: Patient  Readiness: Acceptance  Method: Explanation  Response: Verbalizes Understanding  Comment: Role of PT, PT POC, safety during mobility, LE AROM/TE to promote mobility/strength, use of call bell          PT Goals      Most Recent Value   Bed Mobility Goal 1    Activity/Assistive Device bed mobility activities, all at 09/21/2021 0916    [place] independent at 09/21/2021 0916   Time Frame by discharge at 09/21/2021 0916   Progress/Outcome goal ongoing at 09/21/2021 0916   Transfer Goal 1    Activity/Assistive Device all transfers,  walker, front-wheeled at 09/21/2021 0916   [place] modified independence at 09/21/2021 0916   Time Frame by discharge at 09/21/2021 0916   Progress/Outcome goal ongoing at 09/21/2021 0916   Gait Training Goal 1    Activity/Assistive Device gait (walking locomotion),  walker, front-wheeled at 09/21/2021 0916   [place] modified independence at 09/21/2021 0916   Distance 150 at 09/21/2021 0916   Time Frame by discharge at 09/21/2021 0916   Progress/Outcome goal ongoing at 09/21/2021 0916

## 2021-09-21 NOTE — PLAN OF CARE
Problem: Adult Inpatient Plan of Care  Goal: Plan of Care Review  Outcome: Progressing  Flowsheets (Taken 9/21/2021 1230)  Progress: improving  Plan of Care Reviewed With: patient  Outcome Summary: close supervision/[place] bed mobility, close supervision STS w/ RW. Further mobility assessment limited d/t orthostatic BP. RN & ortho NP notified.

## 2021-09-21 NOTE — PROGRESS NOTES
Ortho Daily Progress Note    Subjective     Interval History: Patient seen and examined at bedside. Pt had a rapid response called yesterday for a seizure.  Feels fine this AM.  She also had low BP in 90s overnight.  Bolused 500cc and BP in low 100s.  Asymptomatic.        Objective     Vital signs in last 24 hours:  Temp:  [36.4 °C (97.6 °F)-37 °C (98.6 °F)] 36.4 °C (97.6 °F)  Heart Rate:  [] 80  Resp:  [14-28] 16  BP: ()/(51-91) 100/55      Intake/Output Summary (Last 24 hours) at 9/21/2021 0743  Last data filed at 9/21/2021 0645  Gross per 24 hour   Intake 3392 ml   Output 700 ml   Net 2692 ml     Intake/Output this shift:  No intake/output data recorded.    Labs:  Results from last 7 days   Lab Units 09/21/21  0525 09/20/21  1753 09/16/21  1025   WBC K/uL 12.84* 10.28 4.91   HEMOGLOBIN g/dL 9.5* 12.2 12.1   HEMATOCRIT % 29.3* 37.9 37.6   PLATELETS K/uL 232 258 219     Results from last 7 days   Lab Units 09/16/21  1025   INR  1.0   PTT sec 27       Microbiology Results     Procedure Component Value Units Date/Time    COVID-19 PAT/Pre-procedural [[id number]]  (Normal) Collected: 09/16/21 1025    Specimen: Nasopharyngeal Swab from Nasopharynx Updated: 09/16/21 2028    Narrative:      The following orders were created for panel order COVID-19 PAT/Pre-procedural.  Procedure                               Abnormality         Status                     ---------                               -----------         ------                     SARS-CoV-2 (COVID-19), PCR[[id number]]   Normal              Final result                 Please view results for these tests on the individual orders.    SARS-CoV-2 (COVID-19), PCR [[id number]]  (Normal) Collected: 09/16/21 1025    Specimen: Nasopharyngeal Swab from Nasopharynx Updated: 09/16/21 2028     SARS-CoV-2 (COVID-19) Negative            Imaging:  PO XR b/l knees: well positioned components       Physical Exam:  Gen: NAD, Cooperative  HEENT: No masses  CV: BCR, toes  warm and well perfused  ABD: Nontender, nondistended  Neuro: Alert and oriented  Psych: hernando mood/affect  Respiratory: no Respiratory distress  MSK:  B/L LE  Dressings CDI  SILT  Motor 5/5  Foot WWP    A/P: 60F s/p b/l TKAs 9/20    Ancef 24 hours  WBAT RLE  PT/OT  OOB  DC PLANNING  DVT: ASA  Pain control    Mario Soria DO PGY2  Page [phone]

## 2021-09-21 NOTE — ASSESSMENT & PLAN NOTE
First seizure in nearly 10 years  Continue lamictal  Patient seen by neurology  Check Lamictal trough level  Patient compliant with medication and did not miss a dose of Lamictal  No need for EEG per neurology  Patient's  would bring in Lamictal XR

## 2021-09-21 NOTE — CONSULTS
Inpatient Neurology Consultation - [facility]    Patient Name: Fide Amin  Patient : 1960  Patient MRN: [id number]    Date of service: 21  Requesting provider: Bennett Em MD [[id number]]    Reason for consultation: Seizure    HPI: 60-year-old woman with a history of epilepsy.  She is currently postop day 1 from bilateral knee replacements.  Neurology was consulted following a breakthrough seizure.  She was diagnosed with epilepsy in  following 2 generalized seizures.  She was placed on lamotrigine at that time.  She is currently on lamotrigine  mg daily.  She has had no further seizures since.  Following her surgery, she had a 2-minute generalized seizure.  She was given lorazepam.  She had some myoclonic jerking following the seizure.  Further semiology is not available.  She took her morning dose of lamotrigine.  She is currently at her baseline.    ROS: A 10-point review of systems was performed and was negative unless noted above.    • aspirin  81 mg oral BID   • atorvastatin  40 mg oral Nightly   • busPIRone  10 mg oral QID   • dexamethasone  10 mg intravenous Daily   • lamoTRIgine  400 mg oral Daily   • lisinopriL  5 mg oral Daily   • montelukast  10 mg oral Daily   • pantoprazole  20 mg oral Nightly   • polyethylene glycol  17 g oral Daily   • rOPINIRole  0.5 mg oral Daily with lunch   • rOPINIRole  1 mg oral BID AC   • rOPINIRole  2 mg oral Nightly   • sennosides-docusate sodium  1 tablet oral BID   • sertraline  300 mg oral Daily     Past Medical History:   Diagnosis Date   • Arthritis     OA   • Asthma    • Depression    • Deviated septum    • Epilepsy (CMS/HCC)     Last seizure 8 years ago   • GERD (gastroesophageal reflux disease)    • Hypertension    • Lipid disorder    • Motion sickness    • RLS (restless legs syndrome)      family history is not on file.  Social History     Tobacco Use   • Smoking status: Never Smoker   • Smokeless  tobacco: Never Used   Vaping Use   • Vaping Use: Never used   Substance Use Topics   • Alcohol use: Yes     Alcohol/week: 2.0 standard drinks     Types: 2 Glasses of wine per week   • Drug use: Never     Examination:   No acute distress.  Non toxic appearing.  Non labored breathing.  RRR.  Soft non tender abdomen.  Wide awake and alert.  Normal comprehension and attention.  No evidence of aphasia.  Pupils are equal and reactive.  Some difficulty maintaining smooth pursuit.  Conjugate gaze.  Full extraocular movements.  Full strength throughout.  Bilateral knee replacements.  Subtle, low amplitude diffuse myoclonic jerking.  Reports normal sensation throughout.   Brisk UE reflexes.     Assessment & Recommendations:     Regarding her seizure, patient has a history of well-controlled epilepsy on lamotrigine 400 mg XR therapy.  I placed an order for a lamotrigine trough.  She is on an adequate dose so I would not make any changes until her lamotrigine trough returns (will likely be acted upon in outpatient setting).    She is currently back to baseline.  No need for EEG or further neuro imaging at this point.       Regarding her mild myoclonus, likely toxic-metabolic in the setting of recent anesthetic.  She does have hypocalcemia - I will obtain an ionized calcium.  Please correct calcium as this can precipitate both seizures and myoclonus.        Tony Ty M.D.  Neurology

## 2021-09-21 NOTE — PROGRESS NOTES
Patient:  Fide Amin  Location:  [facility] 3 Main 0313  MRN:  [id number]  Today's date:  9/21/2021   RN cleared for session. Pt left bedside with personal items in place and call bell within reach,  alarm intact. Pt reports no further questions for OT at this time and all needs are met. RN notified of orthostatics      Fide is a 60 y.o. female admitted on 9/20/2021 with Osteoarthritis of both knees, unspecified osteoarthritis type [M17.0]  Arthritis of knee [M17.10]. Principal problem is Arthritis of knee.    Past Medical History  Fide has a past medical history of Arthritis, Asthma, Depression, Deviated septum, Epilepsy (CMS/HCC), GERD (gastroesophageal reflux disease), Hypertension, Lipid disorder, Motion sickness, and RLS (restless legs syndrome).    History of Present Illness   Bilateral Total knee replacement per Dr. Em 9/20/21, WBAT BLE  Pt had a rapid response called 9/20 for a seizure. She also had low BP in 90s overnight.  Bolused 500cc and BP in low 100s.  Asymptomatic.       OT Vitals    Date/Time Pulse SpO2 Pt Activity O2 Therapy O2 Del Method O2 Flow Rate BP BP Location BP Method Pt Position Observations [facility]   09/21/21 0916 80 97 % At rest Supplemental oxygen Nasal cannula 2 L/min 99/55 Right upper arm Automatic Lying PT/OT [facility]   09/21/21 0918 84 93 % At rest None (Room air) -- -- 83/52 Right upper arm Automatic Sitting PT/OT- EOB, pt asymptomatic [facility]   09/21/21 0920 -- -- -- -- -- -- 65/40 Right upper arm Automatic Standing PT/OT-pt asymptomatic [facility]   09/21/21 0922 -- -- -- -- -- -- 102/51 Right upper arm Automatic Lying PT/OT- back supine EE      OT Pain    Date/Time Pain Type Side/Orientation Location Rating: Rest Rating: Activity Interventions [facility]   09/21/21 0916 Pain Assessment bilateral knee 3 5 position adjusted EEC          Prior Living Environment      Most Recent Value   People in Home spouse  [pt reports he can assist]   Current Living Arrangements home   Living Environment  Comment 2SH w/ 0 steps to enter, [place] on first floor. 2nd floor bedroom/bathroom w/ walk in shower + GB. Pt reports she will have bed on first floor at d/c.        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    Environmental Supports and Barriers supportive spouse           OT Evaluation and Treatment - 09/21/21 0912        OT Time Calculation    Start Time 0912     Stop Time 0931     Time Calculation (min) 19 min        Session Details    Document Type initial evaluation     Mode of Treatment occupational therapy        General Information    Patient Profile Reviewed yes     Onset of Illness/Injury or Date of Surgery 09/20/21     Referring Physician Manav     Patient/Family/Caregiver Comments/Observations R cleared for session     General Observations of Patient pt rec'd supine in bed, agreeable to participate in therapy     Existing Precautions/Restrictions fall; weight bearing; seizures; aspiration        Weight-bearing Status    Left LE Weight-Bearing Status weight-bearing as tolerated (WBAT)     Right LE Weight-Bearing Status weight-bearing as tolerated (WBAT)        Cognition/Psychosocial    Affect/Mental Status (Cognition) WFL     Orientation Status (Cognition) oriented x 4     Follows Commands (Cognition) follows multi-step commands; over 90% accuracy     Cognitive Function WFL     Comment, Cognition Pt is pleasant and cooperative, follows multistep commands and ask for assistance appropriately        Hearing Assessment    Hearing Status WFL         Vision Assessment/Intervention    Visual Impairment/Limitations corrective lenses full-time        Sensory Assessment (Somatosensory)    Sensory Assessment (Somatosensory) UE sensation intact        Range of Motion (ROM)    Range of Motion ROM is WFL; bilateral upper extremities        Strength (Manual Muscle Testing)    Strength (Manual Muscle Testing) strength is WFL; bilateral upper extremities   4/5 grossly       Bed Mobility    [place], Supine to Sit close supervision     [place], Sit to Supine minimum assist (75% or more patient effort)     Assistive Device bed rails     Comment (Bed Mobility) able to bring BLE to EOB and boost at trunk, required increased assistance to return supine in bed 2/2 orthostatics. Upon initial EOB sitting, pt reported no dizziness, had mild drop in BP and performed ankle pumps/shoulder flex/ext to promote blood flow prior to standing        Sit to Stand Transfer    [place], Sit to Stand Transfer close supervision     Verbal Cues safety     Assistive Device walker, front-wheeled     Comment + orthostatic hypotensive upon stance, asymptomatic however pt continued to drop in BP with positional changes, returned safely supine in bed        Stand to Sit Transfer    [place], Stand to Sit Transfer minimum assist (75% or more patient effort)     Verbal Cues safety     Assistive Device walker, front-wheeled     Comment slow eccentric control, limited by orthostatics and mild pain        Toilet Transfer    Comment deferred for safety 2/2 orthostatic hypotension        Safety Issues, Functional Mobility    Impairments Affecting Function (Mobility) balance; endurance/activity tolerance; pain     Comment, Safety Issues/Impairments (Mobility) deferred mobility 2/2 orthostatic hypotension to 60/44 upon stance, returned safely supine in bed        Balance    Balance Assessment sitting static balance; sitting dynamic balance; sit to stand dynamic balance; standing static  balance; standing dynamic balance     Static Sitting Balance WFL     Dynamic Sitting Balance WFL     Sit to Stand Dynamic Balance mild impairment     Static Standing Balance mild impairment     Dynamic Standing Balance mild impairment     Balance Test Results Functional Reach Test     Comment, Balance close S to min A to support with RW, limited by orthostati hypotension        Functional Reach Test    Trial One: Functional Reach Test (in) 0 inches     Trial Two: Functional Reach Test (in) 0 inches   NT 2/2 orthostatic hypotension in stance    Average (in) 0 inches        Lower Body Dressing    Tasks doff; don; socks     [place] maximum assist (25-49% patient effort)     Position sitting up in bed     Comment comleted sitting up in bed 2/2 orthostatic hypotension, will continue to assess side sit foward flex technique as able. Pt has supportive spouse that can assist at home        Grooming    Self-Performance washes, rinses and dries hands     [place] supervision; set up     Position sitting up in bed     Comment adapted to upright in bed 2/2 orthostatics, required setup assistance        Toileting    [place] close supervision; perform bladder hygiene     Position edge of bed sitting     Comment pt initiated pericare from seated position prior to orthostatics occuring in stance        BADL Safety/Performance    Impairments, BADL Safety/Performance balance; endurance/activity tolerance; pain     Skilled BADL Treatment/Intervention BADL process/adaptation training     Progress in BADL Status cueing required; use of environmental adaptations        AM-PAC (TM) - ADL (Current Function)    Putting on and taking off regular lower body clothing? 2 - A Lot     Bathing? 2 - A Lot     Toileting? 3 - A Little     Putting on/taking off regular upper body clothing? 3 - A Little     How much help for taking care of personal grooming? 3 - A Little     Eating meals? 4 - None     AM-PAC (TM) ADL Score 17         Assessment/Plan (OT)    Daily Outcome Statement Pt seen for OT eval. Pt requires close S to min A for bed mobility and fxl transfers, + orthostatic hypotensive upon stance limiting mobility and fxl adls. Pt required max A for bedside LBD with close S for pericare/grooming bedside. Will continue to assess further fxl mobility and adl management as able. Medical team notified and pt to be receiving additional bolus post orthostatics. Anticipate ongoing progress as able. Rec home with family assist upon d/c     Rehab Potential good, to achieve stated therapy goals     Therapy Frequency 5 times/wk     Planned Therapy Interventions activity tolerance training; adaptive equipment training; BADL retraining; functional balance retraining; occupation/activity based interventions; patient/caregiver education/training; strengthening exercise; transfer/mobility retraining               OT Assessment/Plan      Most Recent Value   OT Recommended Discharge Disposition home with assistance at 09/21/2021 0912   Anticipated Equipment Needs At Discharge (OT) commode, 3-in-1,  bathing equipment,  dressing equipment at 09/21/2021 0912   Patient/Family Therapy Goal Statement to go home at 09/21/2021 0912                    Education Documentation  Treatment Plan, taught by Lian Cuevas OT at 9/21/2021 11:47 AM.  Learner: Patient  Readiness: Acceptance  Method: Explanation  Response: Verbalizes Understanding  Comment: transer safety, adl management          OT Goals      Most Recent Value   Bed Mobility Goal 1    Activity/Assistive Device bed mobility activities, all at 09/21/2021 0912   [place] modified independence at 09/21/2021 0912   Time Frame by discharge at 09/21/2021 0912   Progress/Outcome goal ongoing at 09/21/2021 0912   Transfer Goal 1    Activity/Assistive Device all transfers at 09/21/2021 0912   [place] modified independence at 09/21/2021 0912   Time Frame by discharge at 09/21/2021 0912   Progress/Outcome  goal ongoing at 09/21/2021 0912   Dressing Goal 1    Activity/Adaptive Equipment dressing skills, all at 09/21/2021 0912   [place] modified independence at 09/21/2021 0912   Time Frame by discharge at 09/21/2021 0912   Progress/Outcome goal ongoing at 09/21/2021 0912   Toileting Goal 1    Activity/Assistive Device toileting skills, all at 09/21/2021 0912   [place] modified independence at 09/21/2021 0912   Time Frame by discharge at 09/21/2021 0912   Progress/Outcome goal ongoing at 09/21/2021 0912

## 2021-09-21 NOTE — NURSING NOTE
Pt hypotensive, but asymptomatic. NV check intact. Most recent BP 88/46. Dr. Navarro made aware. Awaiting orders. Wctm closely.     1645: NSS bolus initiated. Wctm closely.

## 2021-09-22 LAB
25(OH)D3 SERPL-MCNC: 42 NG/ML (ref 30–100)
ALBUMIN SERPL-MCNC: 2.8 G/DL (ref 3.4–5)
ANION GAP SERPL CALC-SCNC: 6 MEQ/L (ref 3–15)
BASOPHILS # BLD: 0.03 K/UL (ref 0.01–0.1)
BASOPHILS NFR BLD: 0.3 %
BUN SERPL-MCNC: 11 MG/DL (ref 8–20)
CALCIUM SERPL-MCNC: 7.3 MG/DL (ref 8.9–10.3)
CHLORIDE SERPL-SCNC: 108 MEQ/L (ref 98–109)
CO2 SERPL-SCNC: 23 MEQ/L (ref 22–32)
CREAT SERPL-MCNC: 0.6 MG/DL (ref 0.6–1.1)
DIFFERENTIAL METHOD BLD: ABNORMAL
EOSINOPHIL # BLD: 0.07 K/UL (ref 0.04–0.36)
EOSINOPHIL NFR BLD: 0.7 %
ERYTHROCYTE [DISTWIDTH] IN BLOOD BY AUTOMATED COUNT: 12.9 % (ref 11.7–14.4)
GFR SERPL CREATININE-BSD FRML MDRD: >60 ML/MIN/1.73M*2
GLUCOSE SERPL-MCNC: 102 MG/DL (ref 70–99)
HCT VFR BLDCO AUTO: 26.1 % (ref 35–45)
HGB BLD-MCNC: 8.5 G/DL (ref 11.8–15.7)
IMM GRANULOCYTES # BLD AUTO: 0.03 K/UL (ref 0–0.08)
IMM GRANULOCYTES NFR BLD AUTO: 0.3 %
LYMPHOCYTES # BLD: 1.4 K/UL (ref 1.2–3.5)
LYMPHOCYTES NFR BLD: 13.5 %
MAGNESIUM SERPL-MCNC: 1.7 MG/DL (ref 1.8–2.5)
MCH RBC QN AUTO: 31.4 PG (ref 28–33.2)
MCHC RBC AUTO-ENTMCNC: 32.6 G/DL (ref 32.2–35.5)
MCV RBC AUTO: 96.3 FL (ref 83–98)
MONOCYTES # BLD: 0.82 K/UL (ref 0.28–0.8)
MONOCYTES NFR BLD: 7.9 %
NEUTROPHILS # BLD: 8.01 K/UL (ref 1.7–7)
NEUTS SEG NFR BLD: 77.3 %
NRBC BLD-RTO: 0 %
PDW BLD AUTO: 9.1 FL (ref 9.4–12.3)
PLATELET # BLD AUTO: 213 K/UL (ref 150–369)
POTASSIUM SERPL-SCNC: 3.7 MEQ/L (ref 3.6–5.1)
RBC # BLD AUTO: 2.71 M/UL (ref 3.93–5.22)
SODIUM SERPL-SCNC: 137 MEQ/L (ref 136–144)
WBC # BLD AUTO: 10.36 K/UL (ref 3.8–10.5)

## 2021-09-22 PROCEDURE — 63600000 HC DRUGS/DETAIL CODE: Performed by: NURSE PRACTITIONER

## 2021-09-22 PROCEDURE — 25800000 HC PHARMACY IV SOLUTIONS: Performed by: NURSE PRACTITIONER

## 2021-09-22 PROCEDURE — 97530 THERAPEUTIC ACTIVITIES: CPT | Mod: GP,CQ

## 2021-09-22 PROCEDURE — 25800000 HC PHARMACY IV SOLUTIONS: Performed by: HOSPITALIST

## 2021-09-22 PROCEDURE — 36415 COLL VENOUS BLD VENIPUNCTURE: CPT | Performed by: HOSPITALIST

## 2021-09-22 PROCEDURE — 21400000 HC ROOM AND CARE CCU/INTERMEDIATE

## 2021-09-22 PROCEDURE — 99232 SBSQ HOSP IP/OBS MODERATE 35: CPT | Performed by: HOSPITALIST

## 2021-09-22 PROCEDURE — 82040 ASSAY OF SERUM ALBUMIN: CPT | Performed by: HOSPITALIST

## 2021-09-22 PROCEDURE — 83735 ASSAY OF MAGNESIUM: CPT | Performed by: HOSPITALIST

## 2021-09-22 PROCEDURE — 63700000 HC SELF-ADMINISTRABLE DRUG: Performed by: ORTHOPAEDIC SURGERY

## 2021-09-22 PROCEDURE — 85025 COMPLETE CBC W/AUTO DIFF WBC: CPT | Performed by: HOSPITALIST

## 2021-09-22 PROCEDURE — 63600000 HC DRUGS/DETAIL CODE: Performed by: HOSPITALIST

## 2021-09-22 PROCEDURE — 63600000 HC DRUGS/DETAIL CODE: Mod: JW | Performed by: ORTHOPAEDIC SURGERY

## 2021-09-22 PROCEDURE — 80048 BASIC METABOLIC PNL TOTAL CA: CPT | Performed by: HOSPITALIST

## 2021-09-22 PROCEDURE — 63700000 HC SELF-ADMINISTRABLE DRUG: Performed by: STUDENT IN AN ORGANIZED HEALTH CARE EDUCATION/TRAINING PROGRAM

## 2021-09-22 PROCEDURE — 63700000 HC SELF-ADMINISTRABLE DRUG: Performed by: HOSPITALIST

## 2021-09-22 PROCEDURE — 63700000 HC SELF-ADMINISTRABLE DRUG: Performed by: NURSE PRACTITIONER

## 2021-09-22 RX ORDER — OXYCODONE HYDROCHLORIDE 5 MG/1
5 TABLET ORAL EVERY 4 HOURS PRN
Status: DISCONTINUED | OUTPATIENT
Start: 2021-09-22 | End: 2021-09-22

## 2021-09-22 RX ORDER — KETOROLAC TROMETHAMINE 30 MG/ML
30 INJECTION, SOLUTION INTRAMUSCULAR; INTRAVENOUS
Status: COMPLETED | OUTPATIENT
Start: 2021-09-22 | End: 2021-09-23

## 2021-09-22 RX ORDER — SODIUM CHLORIDE 9 MG/ML
INJECTION, SOLUTION INTRAVENOUS CONTINUOUS
Status: DISCONTINUED | OUTPATIENT
Start: 2021-09-22 | End: 2021-09-23

## 2021-09-22 RX ORDER — OXYCODONE HYDROCHLORIDE 5 MG/1
5-10 TABLET ORAL EVERY 4 HOURS PRN
Status: DISCONTINUED | OUTPATIENT
Start: 2021-09-22 | End: 2021-09-23

## 2021-09-22 RX ORDER — MORPHINE SULFATE 2 MG/ML
2 INJECTION, SOLUTION INTRAMUSCULAR; INTRAVENOUS EVERY 4 HOURS PRN
Status: DISCONTINUED | OUTPATIENT
Start: 2021-09-22 | End: 2021-09-22

## 2021-09-22 RX ORDER — CALCIUM GLUCONATE 98 MG/ML
1 INJECTION, SOLUTION INTRAVENOUS ONCE
Status: DISCONTINUED | OUTPATIENT
Start: 2021-09-22 | End: 2021-09-22

## 2021-09-22 RX ADMIN — ASPIRIN 81 MG CHEWABLE TABLET 81 MG: 81 TABLET CHEWABLE at 20:57

## 2021-09-22 RX ADMIN — OXYCODONE HYDROCHLORIDE 5 MG: 5 TABLET ORAL at 05:26

## 2021-09-22 RX ADMIN — DOCUSATE SODIUM AND SENNOSIDES 1 TABLET: 50; 8.6 TABLET ORAL at 09:52

## 2021-09-22 RX ADMIN — MONTELUKAST SODIUM 10 MG: 10 TABLET, FILM COATED ORAL at 09:51

## 2021-09-22 RX ADMIN — DEXAMETHASONE SODIUM PHOSPHATE 10 MG: 4 INJECTION, SOLUTION INTRA-ARTICULAR; INTRALESIONAL; INTRAMUSCULAR; INTRAVENOUS; SOFT TISSUE at 09:52

## 2021-09-22 RX ADMIN — OXYCODONE HYDROCHLORIDE 5 MG: 5 TABLET ORAL at 21:19

## 2021-09-22 RX ADMIN — ROPINIROLE HYDROCHLORIDE 1 MG: 1 TABLET, FILM COATED ORAL at 16:56

## 2021-09-22 RX ADMIN — BUSPIRONE HYDROCHLORIDE 10 MG: 10 TABLET ORAL at 17:53

## 2021-09-22 RX ADMIN — ASPIRIN 81 MG CHEWABLE TABLET 81 MG: 81 TABLET CHEWABLE at 09:51

## 2021-09-22 RX ADMIN — SERTRALINE HYDROCHLORIDE 300 MG: 100 TABLET ORAL at 09:52

## 2021-09-22 RX ADMIN — BUSPIRONE HYDROCHLORIDE 10 MG: 10 TABLET ORAL at 09:51

## 2021-09-22 RX ADMIN — POLYETHYLENE GLYCOL (3350) 17 G: 17 POWDER, FOR SOLUTION ORAL at 09:51

## 2021-09-22 RX ADMIN — SODIUM CHLORIDE: 9 INJECTION, SOLUTION INTRAVENOUS at 17:53

## 2021-09-22 RX ADMIN — SODIUM CHLORIDE: 9 INJECTION, SOLUTION INTRAVENOUS at 04:44

## 2021-09-22 RX ADMIN — PANTOPRAZOLE SODIUM 20 MG: 20 TABLET, DELAYED RELEASE ORAL at 21:19

## 2021-09-22 RX ADMIN — BUSPIRONE HYDROCHLORIDE 10 MG: 10 TABLET ORAL at 22:29

## 2021-09-22 RX ADMIN — CALCIUM GLUCONATE 1000 MG: 98 INJECTION, SOLUTION INTRAVENOUS at 17:12

## 2021-09-22 RX ADMIN — OXYCODONE HYDROCHLORIDE 5 MG: 5 TABLET ORAL at 10:54

## 2021-09-22 RX ADMIN — KETOROLAC TROMETHAMINE 30 MG: 30 INJECTION, SOLUTION INTRAMUSCULAR; INTRAVENOUS at 10:51

## 2021-09-22 RX ADMIN — BUSPIRONE HYDROCHLORIDE 10 MG: 10 TABLET ORAL at 14:27

## 2021-09-22 RX ADMIN — KETOROLAC TROMETHAMINE 30 MG: 30 INJECTION, SOLUTION INTRAMUSCULAR; INTRAVENOUS at 04:10

## 2021-09-22 RX ADMIN — SODIUM CHLORIDE 1000 ML: 9 INJECTION, SOLUTION INTRAVENOUS at 12:45

## 2021-09-22 RX ADMIN — OXYCODONE HYDROCHLORIDE 10 MG: 5 TABLET ORAL at 15:29

## 2021-09-22 RX ADMIN — KETOROLAC TROMETHAMINE 30 MG: 30 INJECTION, SOLUTION INTRAMUSCULAR; INTRAVENOUS at 16:58

## 2021-09-22 RX ADMIN — FLUTICASONE FUROATE AND VILANTEROL 1 PUFF: 200; 25 POWDER RESPIRATORY (INHALATION) at 09:53

## 2021-09-22 RX ADMIN — OXYCODONE HYDROCHLORIDE 5 MG: 5 TABLET ORAL at 09:50

## 2021-09-22 RX ADMIN — ROPINIROLE HYDROCHLORIDE 1 MG: 1 TABLET, FILM COATED ORAL at 09:52

## 2021-09-22 RX ADMIN — ROPINIROLE HYDROCHLORIDE 0.5 MG: 0.5 TABLET, FILM COATED ORAL at 12:49

## 2021-09-22 RX ADMIN — LAMOTRIGINE 400 MG: 200 TABLET, EXTENDED RELEASE ORAL at 09:54

## 2021-09-22 RX ADMIN — KETOROLAC TROMETHAMINE 30 MG: 30 INJECTION, SOLUTION INTRAMUSCULAR; INTRAVENOUS at 22:08

## 2021-09-22 RX ADMIN — ATORVASTATIN CALCIUM 40 MG: 40 TABLET, FILM COATED ORAL at 21:19

## 2021-09-22 RX ADMIN — ROPINIROLE HYDROCHLORIDE 2 MG: 1 TABLET, FILM COATED ORAL at 21:19

## 2021-09-22 RX ADMIN — DOCUSATE SODIUM AND SENNOSIDES 1 TABLET: 50; 8.6 TABLET ORAL at 20:57

## 2021-09-22 ASSESSMENT — COGNITIVE AND FUNCTIONAL STATUS - GENERAL
MOVING TO AND FROM BED TO CHAIR: 3 - A LITTLE
STANDING UP FROM CHAIR USING ARMS: 3 - A LITTLE
WALKING IN HOSPITAL ROOM: 3 - A LITTLE
CLIMB 3 TO 5 STEPS WITH RAILING: 2 - A LOT

## 2021-09-22 NOTE — DISCHARGE INSTRUCTIONS
TOTAL KNEE      MEDICATION:    If you are taking Aspirin:  Enteric coated aspirin 2 times a day for blood clot prevention and take for 6 weeks.  May use over-the-counter Pepcid or Zantac if stomach upset occurs.    PAIN CONTROL:    You will be given a prescription for pain medication. Take your pain medicine as prescribed with food if possible. You can expect to have pain during the healing process from the incision and it will improve.     Use anti-inflammatories everyday (if prescribed) until you see your doctor. Opioid pain reliever is to be taken only as needed for pain.    DO NOT DRIVE WHILE TAKING PAIN MEDICATION.    Some patients find that they have some difficulty with constipation after surgery. This is due to a combination of things. Most of this is due to the pain medication you are taking. The pain medications, along with a decrease in activity, can sometimes lead to discomfort from constipation. You should eat plenty of fresh fruits, vegetables, drink fruit juices and drink plenty of water.    INCISION CARE:  Look at incision every day. Keep incision clean and dry.  Maintain Mepilex dressing for 5 days total.  Maintain ELISHA stockings. May remove for showering and at night while sleeping.  If you have steri-strips, leave them on until they fall off.  Your staples or stitches will be removed about 18 to 24 days after surgery. Your incision will heal and the swelling and bruising will get better over the next 3 weeks.  If you have glue closing your incision, do NOT pull or pick off.  It will dissolve naturally.  You may shower but no tub baths, swimming, jacuzzi tubs, or any other activity that would require submersion of your incision in water.      Call your doctor if you notice any of the following:  Fever over 101.5 degrees  Drainage from incision  Increased swelling around incision  Increased redness around incision line  Thigh/calf pain or swelling in legs  Chest pain  Chest congestion  Problems with  breathing    ACTIVITY:  You may progress to a cane when ready.  You may put as much weight as you can tolerate on your operative leg (unless instructed otherwise).  Balance rest and activity.    DO NOT SMOKE! Smoking is not healthy for your healing process.  No driving for at least 3 weeks. You must also be off of prescription opioids.    KNEE PRECAUTIONS:  No pillow under the knee.  No twisting or kneeling.    FOLLOW Up:  Call now for an appointment in 3 weeks from your date of surgery with Dr. Em. [phone]        *FOR ANY ORTHOPEDIC ISSUES OR CONCERNS THAT NEED TO BE ADDRESSED IN PERSON, YOU MAY REPORT TO THE URGENT CARE LOCATED AT THE [facility] WHICH HAS EVENING AND WEEKEND HOURS, INSTEAD OF REPORTING TO THE EMERGENCY ROOM    For your and/or your 's information, important details about activity and expectations that were reviewed during your hospital stay can be at found in our Discharge Video overview at [email or web address]       [facility]- referral completed for skilled nursing and physical therapy. Phone: [phone]. If you do not hear from agency within 24 hours of discharge please call the listed number.

## 2021-09-22 NOTE — PROGRESS NOTES
Hospital Medicine Service -  Daily Progress Note       SUBJECTIVE     Interval History: No acute events overnight. No acute events overnight. Feels ok. Denies chest pain or dyspnea.  No episodes of seizures.  Reports bilateral knee pain as she is  Unable to take narcotics as needed secondary to low blood pressure   OBJECTIVE        Vital signs in last 24 hours:  Temp:  [36.7 °C (98.1 °F)-37.1 °C (98.8 °F)] 37.1 °C (98.7 °F)  Heart Rate:  [79-90] 84  Resp:  [18] 18  BP: (76-97)/(42-58) 86/50  I/O last 3 completed shifts:  In: 3370 [P.O.:720; I.V.:1800; IV Piggyback:850]  Out: 1100 [Urine:1100]    PHYSICAL EXAMINATION        GEN: well-developed and well-nourished; not in acute distress  HEENT: normocephalic; atraumatic  NECK: no JVD; no bruits  CARDIO: regular rate and rhythm; no murmurs or rubs  RESP: clear to auscultation bilaterally; no rales, rhonchi, or wheezes  ABD: soft, non-distended, non-tender, normal bowel sounds  EXT: no cyanosis, clubbing, or edema, bilateral knee dressing  SKIN: clean, dry, warm, and intact  MUSCULOSKELETAL: no injury or deformity  NEURO: alert and oriented x 3; nonfocal  BEHAVIOR/EMOTIONAL: appropriate; cooperative     LABS / IMAGING / TELE        Labs  Lab Results   Component Value Date    WBC 10.36 09/22/2021    HGB 8.5 (L) 09/22/2021    HCT 26.1 (L) 09/22/2021    MCV 96.3 09/22/2021     09/22/2021     Lab Results   Component Value Date    GLUCOSE 102 (H) 09/22/2021    CALCIUM 7.3 (L) 09/22/2021     09/22/2021    K 3.7 09/22/2021    CO2 23 09/22/2021     09/22/2021    BUN 11 09/22/2021    CREATININE 0.6 09/22/2021     Lab Results   Component Value Date    INR 1.0 09/16/2021       Imaging  X-RAY KNEE BILATERAL 1 OR 2 VIEWS    Result Date: 9/20/2021  IMPRESSION: Satisfactory postoperative appearance status post right and left total knee arthroplasty.      ECG/Telemetry  I have independently reviewed the telemetry. No events for the last 24 hours.    ASSESSMENT AND  PLAN      Epilepsy (CMS/[facility])  Assessment & Plan  First seizure in nearly 10 years  Continue lamictal  Patient seen by neurology  Lamictal trough level pending  Patient compliant with medication and did not miss a dose of Lamictal  No need for EEG per neurology  PT/OT following  Patient does not want SNF placement    Hypocalcemia  Assessment & Plan  Replace, recheck      HLD (hyperlipidemia)  Assessment & Plan  Cont statin    HTN (hypertension)  Assessment & Plan  Cont home meds    * Arthritis of knee  Assessment & Plan  Status post bilateral total knee arthroplasty 9/20 by Dr. Em  Aspirin for DVT prophylaxis per orthopedics  Pain management , preop antibiotics per orthopedics  PT/OT eval  SNF placement    Hypotension  Likely secondary to narcotics, dehydration, blood loss  Hydrate  Encourage patient to increase p.o. fluid intake  Follow blood pressure    Anemia  Secondary to postoperative blood loss  Hemoglobin trended down from 12.1 to 8.5   VTE Assessment: [place]    Code Status: Full Code  Estimated discharge date: 9/23/2021     Elgin Navarro MD  9/22/2021  2:49 PM

## 2021-09-22 NOTE — PROGRESS NOTES
Ortho Daily Progress Note    Subjective     Interval History: Patient seen and examined at bedside. Had low BP's overnight and some 10/10 L thigh cramping.  Bolused 500cc and BP in low 100s.  Asymptomatic.  Denies new numbness/tingling.      Objective     Vital signs in last 24 hours:  Temp:  [36.7 °C (98.1 °F)-37.1 °C (98.8 °F)] 36.7 °C (98.1 °F)  Heart Rate:  [71-95] 85  Resp:  [15-18] 18  BP: ()/(40-61) 93/50      Intake/Output Summary (Last 24 hours) at 9/22/2021 0744  Last data filed at 9/22/2021 0444  Gross per 24 hour   Intake 1240 ml   Output 400 ml   Net 840 ml     Intake/Output this shift:  No intake/output data recorded.    Labs:  Results from last 7 days   Lab Units 09/22/21  0413 09/21/21  0525 09/20/21  1753   WBC K/uL 10.36 12.84* 10.28   HEMOGLOBIN g/dL 8.5* 9.5* 12.2   HEMATOCRIT % 26.1* 29.3* 37.9   PLATELETS K/uL 213 232 258     Results from last 7 days   Lab Units 09/16/21  1025   INR  1.0   PTT sec 27       Microbiology Results     Procedure Component Value Units Date/Time    COVID-19 PAT/Pre-procedural [[id number]]  (Normal) Collected: 09/16/21 1025    Specimen: Nasopharyngeal Swab from Nasopharynx Updated: 09/16/21 2028    Narrative:      The following orders were created for panel order COVID-19 PAT/Pre-procedural.  Procedure                               Abnormality         Status                     ---------                               -----------         ------                     SARS-CoV-2 (COVID-19), PCR[[id number]]   Normal              Final result                 Please view results for these tests on the individual orders.    SARS-CoV-2 (COVID-19), PCR [[id number]]  (Normal) Collected: 09/16/21 1025    Specimen: Nasopharyngeal Swab from Nasopharynx Updated: 09/16/21 2028     SARS-CoV-2 (COVID-19) Negative            Imaging:  PO XR b/l knees: well positioned components       Physical Exam:  Gen: NAD, Cooperative  HEENT: No masses  CV: BCR, toes warm and well perfused  ABD:  Nontender, nondistended  Neuro: Alert and oriented  Psych: [place] mood/affect  Respiratory: no Respiratory distress  MSK:  B/L LE  Dressings CDI - L dressing with strike through along superior portion of incision  - significant edema and ecchymosis around the knee  - SILT  - Motor in tact Q/HS/TA/Gsc/ehl/fhl  - Foot WWP    A/P: 60F POD #2 s/p b/l TKAs 9/20. Continuing to have low blood pressures    Plan:    - continue to bolus with fluids for BP support  - multimodal pain control  - ice, elevation  - WBAT RLE  - PT/OT  - OOB  - DC PLANNING  - DVT: ASA    Plan to be discussed with Dr. Manav Vu, DO  Orthopedic Surgery, PGY-1  Please page #[phone] with any questions or concerns

## 2021-09-22 NOTE — PATIENT CARE CONFERENCE
Care Progression Rounds Note  Date: 9/22/2021  Time: 11:03 AM     Patient Name: Fide Amin     Medical Record Number: [id number]   YOB: 1960  Sex: Female      Room/Bed: 0313    Admitting Diagnosis: Osteoarthritis of both knees, unspecified osteoarthritis type [M17.0]  Arthritis of knee [M17.10]   Admit Date/Time: 9/20/2021 11:08 AM    Primary Diagnosis: Arthritis of knee  Principal Problem: Arthritis of knee    GMLOS: pending  Anticipated Discharge Date: 9/23/2021    AM-PAC:  Mobility Score: 17    Discharge Planning:  Current Living Arrangements: home  Anticipated Discharge Disposition: home with home health  Type of Home Care Services: home OT, home PT, nursing    Barriers to Discharge:  Barriers to Discharge: Medical issues not resolved    Participants:  nursing, , social work/services

## 2021-09-22 NOTE — PROGRESS NOTES
Patient: Fide Amin  Location: [facility] 3 Main 0313  MRN: [id number]  Today's date: 9/22/2021    Attempted to see patient for therapy. Unable due to medical hold (BP 86/47 supine Holding PT till Later).

## 2021-09-22 NOTE — PROGRESS NOTES
Patient: Fide Amin  Location: [facility] 3 Main 0313  MRN: [id number]  Today's date: 9/22/2021    Attempted to see patient for therapy. Unable due to medical hold.   pt with low BP, to be held for now per RN. OT to f/u later today as able/appropriate

## 2021-09-22 NOTE — PROGRESS NOTES
Spoke with patient's spouse who is agreeable to RN/PT home care services with [facility].  Spouse made aware that there will be an out of pocket co insurance per visit and agreed to payment.  Spouse declined bedside commode at this time stating that patient has a first floor set up and prefers to use  powder room.   Will continue to follow.

## 2021-09-22 NOTE — PROGRESS NOTES
9/22/202111:14 AM spoke to Ortho NP, plan is for probable dc tomorrow. Pt set up with [facility], sw updated [facility] liaison. Ghislaine Hill LSW [phone]

## 2021-09-22 NOTE — PROGRESS NOTES
Patient: Fide Amin  Location:  [facility] 3 Main 0313  MRN:  [id number]  Today's date:  9/22/2021 Pt. In the chair Alarm on call castro in hand     Fide is a 60 y.o. female admitted on 9/20/2021 with Osteoarthritis of both knees, unspecified osteoarthritis type [M17.0]  Arthritis of knee [M17.10]. Principal problem is Arthritis of knee.    Past Medical History  Fide has a past medical history of Arthritis, Asthma, Depression, Deviated septum, Epilepsy (CMS/HCC), GERD (gastroesophageal reflux disease), Hypertension, Lipid disorder, Motion sickness, and RLS (restless legs syndrome).    History of Present Illness   Bilateral Total knee replacement per Dr. Em 9/20/21, WBAT BLE  Pt had a rapid response called 9/20 for a seizure. She also had low BP in 90s overnight.  Bolused 500cc and BP in low 100s.  Asymptomatic.       PT Vitals    Date/Time BP [facility]   09/22/21 1354 86/50 after ambulation 110/66 NOÉ      PT Pain    Date/Time Pain Type Rating: Rest Rating: Activity Interventions [facility]   09/22/21 1354 Pain Assessment 3 5 position adjusted Ice 20 min After session NOÉ          Prior Living Environment      Most Recent Value   People in Home spouse  [pt reports he can assist]   Current Living Arrangements home   Living Environment Comment 2SH w/ 0 steps to enter, [place] on first floor. 2nd floor bedroom/bathroom w/ walk in shower + GB. Pt reports she will have bed on first floor at d/c.        Prior Level of Function      Most Recent Value   Dominant Hand left   Ambulation independent   Transferring independent   Toileting independent   Bathing independent   Dressing independent   Eating independent   Prior Level of Function Comment Independent PTA w/o AD, drives   Assistive Device Currently Used at Home grab bar,  shower chair           PT Evaluation and Treatment - 09/22/21 1354        PT Time Calculation    Start Time 1354     Stop Time 1423     Time Calculation (min) 29 min        Session Details    Document Type  daily treatment/progress note     Mode of Treatment physical therapy        General Information    Patient Profile Reviewed yes     Onset of Illness/Injury or Date of Surgery 09/20/21     General Observations of Patient Pt. in the chair     Existing Precautions/Restrictions fall; aspiration; seizures        Weight-bearing Status    Left LE Weight-Bearing Status weight-bearing as tolerated (WBAT)     Right LE Weight-Bearing Status weight-bearing as tolerated (WBAT)        Range of Motion (ROM)    Knee, Left (ROM) AROM sitting -18'-74'     Knee, Right (ROM) AROM sitting -16-70'        Sit to Stand Transfer    Waller, Sit to Stand Transfer supervision     Assistive Device walker, front-wheeled     Comment steady stood three times this session        Stand to Sit Transfer    [place], Stand to Sit Transfer supervision     Assistive Device walker, front-wheeled     Comment sitting no allowing knees to bend with sitting reeducated on improving ROM        Gait Training    Waller, Gait supervision     Assistive Device walker, front-wheeled     Distance in Feet 80 feet     Pattern (Gait) step-to     Advanced Gait Activity 10 Meter Walk Test (Self-Selected Velocity)     Comment (Gait/Stairs) Pt following step to gait no dizzyness BP increasing with activity        10 Meter Walk Test (Self-Selected Velocity)    Trial One: Ten Meter Walk Test (sec) 59 seconds     Trial Two: Ten Meter Walk Test (sec) 54 seconds     Trial One: Gait Speed (m/s) 0.1 m/s     Trial Two: Gait Speed (m/s) 0.11 m/s     Average Gait Speed (m/s): Two Trials 0.11 m/s        Balance    Static Sitting Balance WFL     Dynamic Sitting Balance WFL     Sit to Stand Dynamic Balance mild impairment     Static Standing Balance mild impairment     Dynamic Standing Balance mild impairment     Comment, Balance with RW        Lower Extremity (Therapeutic Exercise)    Exercise Position/Type supine; seated     General Exercise quad sets; gluteal sets;  heel slides; LAQ (long arc quad); ankle pumps     Reps and Sets x8 x15 ankle pumps        AM-PAC (TM) - Mobility (Current Function)    Turning from your back to your side while in a flat bed without using bedrails? 4 - None     Moving from lying on your back to sitting on the side of a flat bed without using bedrails? 4 - None     Moving to and from a bed to a chair? 3 - A Little     Standing up from a chair using your arms? 3 - A Little     To walk in a hospital room? 3 - A Little     Climbing 3-5 steps with a railing? 2 - A Lot     AM-PAC (TM) Mobility Score 19        Assessment/Plan (PT)    Daily Outcome Statement [facility] 19 Supervision all mobiity Progressing BP increasing during ambulation     Rehab Potential good, to achieve stated therapy goals     Therapy Frequency 5 times/wk     Planned Therapy Interventions bed mobility training; gait training; stair training; ROM (range of motion)               PT Assessment/Plan      Most Recent Value   PT Recommended Discharge Disposition home with home health,  home with assistance at 09/22/2021 1354   Anticipated Equipment Needs at Discharge (PT) walker, front-wheeled at 09/22/2021 1354   Patient/Family Therapy Goals Statement decrease pain at 09/21/2021 1330                    Education Documentation  Rehabilitation Therapy, taught by Joel Thomas PTA at 9/22/2021  2:38 PM.  Learner: Patient  Readiness: Acceptance  Method: Explanation  Response: Verbalizes Understanding, Needs Reinforcement  Comment: exercises call if needing to stand. walker use          PT Goals      Most Recent Value   Bed Mobility Goal 1    Activity/Assistive Device bed mobility activities, all at 09/21/2021 0916   [place] independent at 09/21/2021 0916   Time Frame by discharge at 09/21/2021 0916   Progress/Outcome continuing progress toward goal,  goal ongoing at 09/21/2021 1330   Transfer Goal 1    Activity/Assistive Device all transfers,  walker, front-wheeled at 09/21/2021 0916    [place] modified independence at 09/21/2021 0916   Time Frame by discharge at 09/21/2021 0916   Progress/Outcome continuing progress toward goal,  goal ongoing at 09/21/2021 1330   Gait Training Goal 1    Activity/Assistive Device gait (walking locomotion),  walker, front-wheeled at 09/21/2021 0916   [place] modified independence at 09/21/2021 0916   Distance 150 at 09/21/2021 0916   Time Frame by discharge at 09/21/2021 0916   Progress/Outcome goal ongoing,  continuing progress toward goal at 09/21/2021 1330   Stairs Goal 1    Activity/Assistive Device stairs, all skills at 09/21/2021 1330   [place] supervision required at 09/21/2021 1330   Number of Stairs 1 at 09/21/2021 1330   Time Frame by discharge at 09/21/2021 1330   Progress/Outcome goal ongoing at 09/21/2021 1330

## 2021-09-22 NOTE — NURSING NOTE
At 4 am bp 86/42 manually and hr 86 patient asymptomatic with severe pain given Toradol no relief. Made Dr. Martínez aware renewed IV fluids at 100 ml/hr and wants oxycodone dose given. Will continue to monitor.     Lali Cortez RN

## 2021-09-23 ENCOUNTER — APPOINTMENT (INPATIENT)
Dept: RADIOLOGY | Facility: HOSPITAL | Age: 61
DRG: 461 | End: 2021-09-23
Attending: HOSPITALIST
Payer: COMMERCIAL

## 2021-09-23 VITALS
RESPIRATION RATE: 16 BRPM | BODY MASS INDEX: 23.74 KG/M2 | SYSTOLIC BLOOD PRESSURE: 98 MMHG | TEMPERATURE: 98.5 F | HEIGHT: 62 IN | HEART RATE: 91 BPM | WEIGHT: 129 LBS | OXYGEN SATURATION: 92 % | DIASTOLIC BLOOD PRESSURE: 48 MMHG

## 2021-09-23 LAB
LAMOTRIGINE SERPL-MCNC: 9 MCG/ML (ref 4–18)
SARS-COV-2 RNA RESP QL NAA+PROBE: NEGATIVE

## 2021-09-23 PROCEDURE — U0002 COVID-19 LAB TEST NON-CDC: HCPCS | Performed by: NURSE PRACTITIONER

## 2021-09-23 PROCEDURE — 63700000 HC SELF-ADMINISTRABLE DRUG: Performed by: NURSE PRACTITIONER

## 2021-09-23 PROCEDURE — 63600000 HC DRUGS/DETAIL CODE: Performed by: NURSE PRACTITIONER

## 2021-09-23 PROCEDURE — 63600105 HC IODINE BASED CONTRAST: Performed by: HOSPITALIST

## 2021-09-23 PROCEDURE — 63700000 HC SELF-ADMINISTRABLE DRUG: Performed by: ORTHOPAEDIC SURGERY

## 2021-09-23 PROCEDURE — G1004 CDSM NDSC: HCPCS

## 2021-09-23 PROCEDURE — 71260 CT THORAX DX C+: CPT | Mod: ME

## 2021-09-23 PROCEDURE — 99233 SBSQ HOSP IP/OBS HIGH 50: CPT | Performed by: HOSPITALIST

## 2021-09-23 PROCEDURE — 25800000 HC PHARMACY IV SOLUTIONS: Performed by: HOSPITALIST

## 2021-09-23 PROCEDURE — 97530 THERAPEUTIC ACTIVITIES: CPT | Mod: GP,CQ

## 2021-09-23 PROCEDURE — 63700000 HC SELF-ADMINISTRABLE DRUG: Performed by: HOSPITALIST

## 2021-09-23 PROCEDURE — 63700000 HC SELF-ADMINISTRABLE DRUG: Performed by: STUDENT IN AN ORGANIZED HEALTH CARE EDUCATION/TRAINING PROGRAM

## 2021-09-23 RX ORDER — CEFUROXIME AXETIL 500 MG/1
500 TABLET ORAL 2 TIMES DAILY
Qty: 10 TABLET | Refills: 0 | Status: SHIPPED | OUTPATIENT
Start: 2021-09-23 | End: 2021-09-28

## 2021-09-23 RX ORDER — BACITRACIN 500 UNIT/G
OINTMENT (GRAM) TOPICAL 3 TIMES DAILY
Status: DISCONTINUED | OUTPATIENT
Start: 2021-09-23 | End: 2021-09-23 | Stop reason: HOSPADM

## 2021-09-23 RX ORDER — HYDROMORPHONE HYDROCHLORIDE 2 MG/1
2 TABLET ORAL EVERY 4 HOURS PRN
Status: DISCONTINUED | OUTPATIENT
Start: 2021-09-23 | End: 2021-09-23 | Stop reason: HOSPADM

## 2021-09-23 RX ORDER — AZITHROMYCIN 500 MG/1
500 TABLET, FILM COATED ORAL DAILY
Qty: 5 TABLET | Refills: 0 | Status: SHIPPED | OUTPATIENT
Start: 2021-09-23 | End: 2021-09-28

## 2021-09-23 RX ORDER — ACETAMINOPHEN 325 MG/1
975 TABLET ORAL 3 TIMES DAILY
Status: DISCONTINUED | OUTPATIENT
Start: 2021-09-23 | End: 2021-09-23 | Stop reason: HOSPADM

## 2021-09-23 RX ORDER — ACETAMINOPHEN 325 MG/1
975 TABLET ORAL 3 TIMES DAILY
Qty: 810 TABLET | Refills: 0 | COMMUNITY
Start: 2021-09-23 | End: 2021-10-15 | Stop reason: HOSPADM

## 2021-09-23 RX ORDER — HYDROMORPHONE HYDROCHLORIDE 2 MG/1
2 TABLET ORAL EVERY 4 HOURS PRN
Qty: 30 TABLET | Refills: 0 | Status: SHIPPED | OUTPATIENT
Start: 2021-09-23 | End: 2021-09-28

## 2021-09-23 RX ORDER — LISINOPRIL 5 MG/1
5 TABLET ORAL DAILY
Qty: 30 TABLET | Refills: 0 | Status: SHIPPED | OUTPATIENT
Start: 2021-09-23 | End: 2021-10-15 | Stop reason: HOSPADM

## 2021-09-23 RX ORDER — BACITRACIN ZINC 500 UNIT/G
OINTMENT (GRAM) TOPICAL DAILY
Qty: 120 G | Refills: 0 | Status: SHIPPED | OUTPATIENT
Start: 2021-09-23 | End: 2021-10-03

## 2021-09-23 RX ORDER — BISACODYL 10 MG/1
10 SUPPOSITORY RECTAL DAILY PRN
Status: DISCONTINUED | OUTPATIENT
Start: 2021-09-23 | End: 2021-09-23 | Stop reason: HOSPADM

## 2021-09-23 RX ADMIN — ACETAMINOPHEN 975 MG: 325 TABLET, FILM COATED ORAL at 14:16

## 2021-09-23 RX ADMIN — KETOROLAC TROMETHAMINE 30 MG: 30 INJECTION, SOLUTION INTRAMUSCULAR; INTRAVENOUS at 16:21

## 2021-09-23 RX ADMIN — ROPINIROLE HYDROCHLORIDE 0.5 MG: 0.5 TABLET, FILM COATED ORAL at 11:15

## 2021-09-23 RX ADMIN — HYDROMORPHONE HYDROCHLORIDE 2 MG: 2 TABLET ORAL at 09:45

## 2021-09-23 RX ADMIN — BACITRACIN: 500 OINTMENT TOPICAL at 09:22

## 2021-09-23 RX ADMIN — SERTRALINE HYDROCHLORIDE 300 MG: 100 TABLET ORAL at 09:17

## 2021-09-23 RX ADMIN — POLYETHYLENE GLYCOL (3350) 17 G: 17 POWDER, FOR SOLUTION ORAL at 09:26

## 2021-09-23 RX ADMIN — FLUTICASONE FUROATE AND VILANTEROL 1 PUFF: 200; 25 POWDER RESPIRATORY (INHALATION) at 09:25

## 2021-09-23 RX ADMIN — ROPINIROLE HYDROCHLORIDE 1 MG: 1 TABLET, FILM COATED ORAL at 16:19

## 2021-09-23 RX ADMIN — DOCUSATE SODIUM AND SENNOSIDES 1 TABLET: 50; 8.6 TABLET ORAL at 09:17

## 2021-09-23 RX ADMIN — BUSPIRONE HYDROCHLORIDE 10 MG: 10 TABLET ORAL at 09:17

## 2021-09-23 RX ADMIN — KETOROLAC TROMETHAMINE 30 MG: 30 INJECTION, SOLUTION INTRAMUSCULAR; INTRAVENOUS at 11:16

## 2021-09-23 RX ADMIN — BUSPIRONE HYDROCHLORIDE 10 MG: 10 TABLET ORAL at 14:16

## 2021-09-23 RX ADMIN — IOHEXOL 100 ML: 350 INJECTION, SOLUTION INTRAVENOUS at 15:16

## 2021-09-23 RX ADMIN — ASPIRIN 81 MG CHEWABLE TABLET 81 MG: 81 TABLET CHEWABLE at 09:17

## 2021-09-23 RX ADMIN — OXYCODONE HYDROCHLORIDE 10 MG: 5 TABLET ORAL at 06:45

## 2021-09-23 RX ADMIN — KETOROLAC TROMETHAMINE 30 MG: 30 INJECTION, SOLUTION INTRAMUSCULAR; INTRAVENOUS at 04:00

## 2021-09-23 RX ADMIN — ACETAMINOPHEN 975 MG: 325 TABLET, FILM COATED ORAL at 09:17

## 2021-09-23 RX ADMIN — MONTELUKAST SODIUM 10 MG: 10 TABLET, FILM COATED ORAL at 09:17

## 2021-09-23 RX ADMIN — LAMOTRIGINE 400 MG: 200 TABLET, EXTENDED RELEASE ORAL at 09:19

## 2021-09-23 RX ADMIN — ROPINIROLE HYDROCHLORIDE 1 MG: 1 TABLET, FILM COATED ORAL at 06:45

## 2021-09-23 RX ADMIN — SODIUM CHLORIDE: 9 INJECTION, SOLUTION INTRAVENOUS at 03:14

## 2021-09-23 RX ADMIN — OXYCODONE HYDROCHLORIDE 10 MG: 5 TABLET ORAL at 02:10

## 2021-09-23 ASSESSMENT — COGNITIVE AND FUNCTIONAL STATUS - GENERAL
WALKING IN HOSPITAL ROOM: 3 - A LITTLE
STANDING UP FROM CHAIR USING ARMS: 3 - A LITTLE
MOVING TO AND FROM BED TO CHAIR: 3 - A LITTLE
CLIMB 3 TO 5 STEPS WITH RAILING: 3 - A LITTLE

## 2021-09-23 NOTE — CONSULTS
Mrs. Amin was in bed when I entered the room trying to get the tv to work.  She is hoping to be released soon and looking forward to going home.  No spiritual care needs at this time.    Angie Mina, Spiritual Care Associate

## 2021-09-23 NOTE — PROGRESS NOTES
Ortho Daily Progress Note    Subjective     Interval History: Patient seen and examined at bedside. BP slowly improving, now low 100s systolic.  Asymptomatic.  Denies new numbness/tingling.  She does admit to some blisters over laterals aspects of b/l knees.  This could be from swelling.        Objective     Vital signs in last 24 hours:  Temp:  [36.6 °C (97.9 °F)-37.1 °C (98.8 °F)] 37.1 °C (98.8 °F)  Heart Rate:  [76-87] 76  Resp:  [18-20] 20  BP: ()/(44-58) 108/53      Intake/Output Summary (Last 24 hours) at 9/23/2021 0630  Last data filed at 9/22/2021 1400  Gross per 24 hour   Intake 500 ml   Output --   Net 500 ml     Intake/Output this shift:  No intake/output data recorded.    Labs:  Results from last 7 days   Lab Units 09/22/21  0413 09/21/21  0525 09/20/21  1753   WBC K/uL 10.36 12.84* 10.28   HEMOGLOBIN g/dL 8.5* 9.5* 12.2   HEMATOCRIT % 26.1* 29.3* 37.9   PLATELETS K/uL 213 232 258     Results from last 7 days   Lab Units 09/16/21  1025   INR  1.0   PTT sec 27       Microbiology Results     Procedure Component Value Units Date/Time    COVID-19 PAT/Pre-procedural [[id number]]  (Normal) Collected: 09/16/21 1025    Specimen: Nasopharyngeal Swab from Nasopharynx Updated: 09/16/21 2028    Narrative:      The following orders were created for panel order COVID-19 PAT/Pre-procedural.  Procedure                               Abnormality         Status                     ---------                               -----------         ------                     SARS-CoV-2 (COVID-19), PCR[[id number]]   Normal              Final result                 Please view results for these tests on the individual orders.    SARS-CoV-2 (COVID-19), PCR [[id number]]  (Normal) Collected: 09/16/21 1025    Specimen: Nasopharyngeal Swab from Nasopharynx Updated: 09/16/21 2028     SARS-CoV-2 (COVID-19) Negative            Imaging:  PO XR b/l knees: well positioned components       Physical Exam:  Gen: NAD, Cooperative  HEENT: No  masses  CV: BCR, toes warm and well perfused  ABD: Nontender, nondistended  Neuro: Alert and oriented  Psych: hernando mood/affect  Respiratory: no Respiratory distress  MSK:  B/L LE  Dressings CDI - L dressing with strike through along superior portion of incision  - significant edema and ecchymosis around the knee now with blisters over lateral aspects of b/l knees  - SILT  - Motor in tact Q/HS/TA/Gsc/ehl/fhl  - Foot WWP    A/P: 60F s/p b/l TKAs 9/20. Continuing to have low blood pressures but improving     Plan:    - continue to bolus with fluids for BP support  - multimodal pain control  - ice, elevation  - WBAT RLE  - PT/OT  - OOB  - DC PLANNING  - DVT: ASA    Plan to be discussed with Dr. Manav Soria, DO  Orthopedic Surgery, PGY-2  Please page #[phone] with any questions or concerns

## 2021-09-23 NOTE — NURSING NOTE
Discussed discharge instructions wit pt and pt's . IV access removed from pt. Telemetry monitor removed from pt and returned to monitor station. Medications returned to pt. Pt to leave hospital in private vehicle with all of her belongings.

## 2021-09-23 NOTE — PLAN OF CARE
Problem: Adult Inpatient Plan of Care  Goal: Plan of Care Review  Outcome: Progressing     Problem: Fall Injury Risk  Goal: Absence of Fall and Fall-Related Injury  Outcome: Progressing     Problem: Joint Function Impaired (Knee Arthroplasty)  Goal: Optimal Functional Ability  Outcome: Progressing    Pt for CT Chest. Awaiting results. Pt continues on room air. Pt resting in bedside chair.  at the bedside. Fall precautions in place, chair alarm turned on, and call bell within reach. Will continue to monitor pt till end of shift.

## 2021-09-23 NOTE — PROGRESS NOTES
9/23/202111:49 AM spoke to ortho NP, plan is for dc home today. NP wants to make sure homecare is set up including a visiting RN. Spoke to [facility] liaison and provided update. sw met with pt at bedside, pt agreeable to homecare and has very supportive . Pt was provided a RW from therapy department. pts spouse declined the need for commode as pt has first floor bathroom set up. Ghislaine Hill LSW [phone]

## 2021-09-23 NOTE — NURSING NOTE
Pt desaturating on room air. AYE Meyers, made aware of this. Kalpesh, RRT, at the bedside to assess. Pt's oxygen level was 92-97% on room air. Kalpesh encouraged IS usage. Dr. Navarro asked to come see pt. Will continue to monitor pt till end of shift.

## 2021-09-23 NOTE — PROGRESS NOTES
Patient: Fide Amin  Location:  [facility] 3 Main 0313  MRN:  [id number]  Today's date:  9/23/2021 Pt. In the chair alarm on call castro in hand     Fide is a 60 y.o. female admitted on 9/20/2021 with Osteoarthritis of both knees, unspecified osteoarthritis type [M17.0]  Arthritis of knee [M17.10]. Principal problem is Arthritis of knee.    Past Medical History  Fide has a past medical history of Arthritis, Asthma, Depression, Deviated septum, Epilepsy (CMS/HCC), GERD (gastroesophageal reflux disease), Hypertension, Lipid disorder, Motion sickness, and RLS (restless legs syndrome).    History of Present Illness   Bilateral Total knee replacement per Dr. Em 9/20/21, WBAT BLE  Pt had a rapid response called 9/20 for a seizure. She also had low BP in 90s overnight.  Bolused 500cc and BP in low 100s.  Asymptomatic.       PT Vitals    Date/Time Pulse SpO2 BP [facility]   09/23/21 1115 85 97 % 122/58 NOÉ      PT Pain    Date/Time Pain Type Location Rating: Rest Rating: Activity Interventions [facility]   09/23/21 1115 Pain Assessment knee 6 6 position adjusted NOÉ   09/23/21 1116 Pain Assessment knee 6 6 -- BRM          Prior Living Environment      Most Recent Value   People in Home spouse  [pt reports he can assist]   Current Living Arrangements home   Living Environment Comment 2SH w/ 0 steps to enter, [place] on first floor. 2nd floor bedroom/bathroom w/ walk in shower + GB. Pt reports she will have bed on first floor at d/c.        Prior Level of Function      Most Recent Value   Dominant Hand left   Ambulation independent   Transferring independent   Toileting independent   Bathing independent   Dressing independent   Eating independent   Prior Level of Function Comment Independent PTA w/o AD, drives   Assistive Device Currently Used at Home grab bar,  shower chair           PT Evaluation and Treatment - 09/23/21 1115        PT Time Calculation    Start Time 1115     Stop Time 1153     Time Calculation (min) 38 min         Session Details    Document Type daily treatment/progress note     Mode of Treatment physical therapy        General Information    Patient Profile Reviewed yes     Onset of Illness/Injury or Date of Surgery 09/20/21     General Observations of Patient Pt. i the chair     Existing Precautions/Restrictions fall; aspiration; seizures        Weight-bearing Status    Left LE Weight-Bearing Status weight-bearing as tolerated (WBAT)     Right LE Weight-Bearing Status weight-bearing as tolerated (WBAT)        Range of Motion (ROM)    Knee, Left (ROM) AROM sitting -16'-68'     Knee, Right (ROM) AROM sittting -14'-70'        Bed Mobility    Comment (Bed Mobility) reports  will assist.        Sit to Stand Transfer    [place], Sit to Stand Transfer supervision     Assistive Device walker, front-wheeled     Comment steady to walker stood 4 time this session        Stand to Sit Transfer    [place], Stand to Sit Transfer supervision     Assistive Device walker, front-wheeled     Comment encouraged to keep feet on the ground allow kneees to bend not able  to follow not effecting balance        Gait Training    [place], Gait supervision     Assistive Device walker, front-wheeled     Distance in Feet 85 feet     Pattern (Gait) step-to     Comment (Gait/Stairs) Pt steady with step to gait stopping every 15' to assess Spo2 85-95 with insp. effort. [place] notified        Stairs Training    [place], Stairs minimum assist (75% or more patient effort); 1 person assist     Assistive Device other (see comments)   walker    Number of Stairs 1     Comment  instructed on on  step backwards then off frontwards  demonstrated correctly        Balance    Sit to Stand Dynamic Balance WFL     Static Standing Balance WFL     Dynamic Standing Balance WFL   with RW       Lower Extremity (Therapeutic Exercise)    Exercise Position/Type seated; AROM (active range of motion)     General Exercise quad sets; ankle  pumps; heel slides; LAQ (long arc quad); gluteal sets     Reps and Sets x8        AM-PAC (TM) - Mobility (Current Function)    Turning from your back to your side while in a flat bed without using bedrails? 3 - A Little     Moving from lying on your back to sitting on the side of a flat bed without using bedrails? 3 - A Little     Moving to and from a bed to a chair? 3 - A Little     Standing up from a chair using your arms? 3 - A Little     To walk in a hospital room? 3 - A Little     Climbing 3-5 steps with a railing? 3 - A Little     AM-PAC (TM) Mobility Score 18        Assessment/Plan (PT)    Daily Outcome Statement [facility] 18 supervision transfers and and gait  min A on step for stability of walker only. Pt. will benefit from walker since surgery . will benefit from cont PT. RN aware of fluctuating Spo2 before and during session     Rehab Potential good, to achieve stated therapy goals     Therapy Frequency 5 times/wk     Planned Therapy Interventions ROM (range of motion)               PT Assessment/Plan      Most Recent Value   PT Recommended Discharge Disposition home with home health,  home with assistance at 09/23/2021 1115   Anticipated Equipment Needs at Discharge (PT) walker, front-wheeled at 09/23/2021 1115   Patient/Family Therapy Goals Statement decrease pain at 09/21/2021 1330                    Education Documentation  Post op Program Education, taught by Joel Thomas PTA at 9/23/2021 12:11 PM.  Learner: Family  Readiness: Acceptance  Method: Explanation  Response: Verbalizes Understanding  Comment: Transfers Gait, step into house  simulation    Post op Program Education, taught by Joel Thomas PTA at 9/23/2021 12:11 PM.  Learner: Patient  Readiness: Acceptance  Method: Explanation  Response: Verbalizes Understanding  Comment: Transfers Gait, step into house  simulation          PT Goals      Most Recent Value   Bed Mobility Goal 1    Activity/Assistive Device bed mobility activities, all at  09/21/2021 0916   [place] independent at 09/21/2021 0916   Time Frame by discharge at 09/21/2021 0916   Progress/Outcome continuing progress toward goal,  goal ongoing at 09/21/2021 1330   Transfer Goal 1    Activity/Assistive Device all transfers,  walker, front-wheeled at 09/21/2021 0916   [place] modified independence at 09/21/2021 0916   Time Frame by discharge at 09/21/2021 0916   Progress/Outcome continuing progress toward goal,  goal ongoing at 09/21/2021 1330   Gait Training Goal 1    Activity/Assistive Device gait (walking locomotion),  walker, front-wheeled at 09/21/2021 0916   [place] modified independence at 09/21/2021 0916   Distance 150 at 09/21/2021 0916   Time Frame by discharge at 09/21/2021 0916   Progress/Outcome goal ongoing,  continuing progress toward goal at 09/21/2021 1330   Stairs Goal 1    Activity/Assistive Device stairs, all skills at 09/21/2021 1330   [place] supervision required at 09/21/2021 1330   Number of Stairs 1 at 09/21/2021 1330   Time Frame by discharge at 09/21/2021 1330   Progress/Outcome goal ongoing at 09/21/2021 1330

## 2021-09-23 NOTE — PLAN OF CARE
Plan of Care Review  Plan of Care Reviewed With: patient  Progress: improving  Outcome Summary: pt complain of 10/10 pain to knees. PRN oxycodone provides minimal relief,  toradol appears to be effective with Ice pack.  pt complaining of constipation, prn dulcolax ordered. IVF infusing at 125ml/hr. VSS.

## 2021-09-23 NOTE — PROGRESS NOTES
CT results reviewed with Dr. Navarro>  No CT evidence of pulmonary embolism.  Scattered bilateral airspace opacities most likely representing multifocal pneumonia.  Covid 19 pneumonia should be excluded.  Will check stat Covid test  Patient medically ok for dc per Dr. Navarro on ceftin 500mg BID and zithromax 500mg daily x 5 days  Results as well as plan discussed with patient and  who was at bedside.

## 2021-09-23 NOTE — PROGRESS NOTES
Patient: Fide Amin  Location: [facility] 3 Main 0313  MRN: [id number]  Today's date: 9/23/2021    Attempted to see patient for therapy. Unable due to medical hold.  Attempted to see Patient however Patient for dressing Change will follow as able.

## 2021-09-23 NOTE — NURSING NOTE
Spoke with Dr. Navarro about pt's oxygen saturation level. CT Scan ordered. Pt made aware of this. Will continue to monitor pt till end of shift.

## 2021-09-23 NOTE — NURSING NOTE
Pt's BP was 92/43. AYE Magallanes, made aware of this. Will continue to monitor pt till end of shift.

## 2021-09-23 NOTE — PROGRESS NOTES
Hospital Medicine Service -  Daily Progress Note       SUBJECTIVE     Interval History: No acute events overnight. No acute events overnight. Feels ok. Denies chest pain or dyspnea. Reports bilateral thigh pain.  Blisters noted around bilateral knee replacements.  Desaturated to 80s when she ambulated with PT today. Sats improved after incentive spirometry   OBJECTIVE        Vital signs in last 24 hours:  Temp:  [36.6 °C (97.9 °F)-37.1 °C (98.8 °F)] 36.7 °C (98.1 °F)  Heart Rate:  [76-88] 85  Resp:  [18-20] 18  BP: ()/(42-58) 122/58  I/O last 3 completed shifts:  In: 1500 [P.O.:500; I.V.:1000]  Out: -     PHYSICAL EXAMINATION        GEN: well-developed and well-nourished; not in acute distress  HEENT: normocephalic; atraumatic  NECK: no JVD; no bruits  CARDIO: regular rate and rhythm; no murmurs or rubs  RESP: clear to auscultation bilaterally; no rales, rhonchi, or wheezes  ABD: soft, non-distended, non-tender, normal bowel sounds  EXT: no cyanosis, clubbing, or edema, bilateral knee dressing blisters around the knee bilaterally  SKIN: clean, dry, warm, and intact  MUSCULOSKELETAL: no injury or deformity  NEURO: alert and oriented x 3; nonfocal  BEHAVIOR/EMOTIONAL: appropriate; cooperative     LABS / IMAGING / TELE        Labs  Lab Results   Component Value Date    WBC 10.36 09/22/2021    HGB 8.5 (L) 09/22/2021    HCT 26.1 (L) 09/22/2021    MCV 96.3 09/22/2021     09/22/2021     Lab Results   Component Value Date    GLUCOSE 102 (H) 09/22/2021    CALCIUM 7.3 (L) 09/22/2021     09/22/2021    K 3.7 09/22/2021    CO2 23 09/22/2021     09/22/2021    BUN 11 09/22/2021    CREATININE 0.6 09/22/2021     Lab Results   Component Value Date    INR 1.0 09/16/2021       Imaging  X-RAY KNEE BILATERAL 1 OR 2 VIEWS    Result Date: 9/20/2021  IMPRESSION: Satisfactory postoperative appearance status post right and left total knee arthroplasty.      ECG/Telemetry  I have independently reviewed the  telemetry. No events for the last 24 hours.    ASSESSMENT AND PLAN      Epilepsy (CMS/[facility])  Assessment & Plan  First seizure in nearly 10 years  Continue lamictal  Patient seen by neurology  Lamictal trough level pending  Patient compliant with medication and did not miss a dose of Lamictal  No need for EEG per neurology  PT/OT following  Patient does not want SNF placement  Home with home care    Hypocalcemia  Assessment & Plan  Replace, recheck  Vitamin D3 normal 42      HLD (hyperlipidemia)  Assessment & Plan  Cont statin    HTN (hypertension)  Assessment & Plan  Cont home meds    * Arthritis of knee  Assessment & Plan  Status post bilateral total knee arthroplasty 9/20 by Dr. Em  Aspirin for DVT prophylaxis per orthopedics  Pain management , preop antibiotics per orthopedics  PT/OT eval  SNF placement    Hypotension  Likely secondary to narcotics, dehydration, blood loss  Hydrate  Encourage patient to increase p.o. fluid intake  Follow blood pressure    Anemia  Secondary to postoperative blood loss  Hemoglobin trended down from 12.1 to 8.5    Hypoxia with exertion secondary to pneumonia  Would check CT chest to rule out PE  Likely secondary to atelectasis  Sats improved with incentive spirometry and patient does not require oxygen  ADD:  CT chest showed no evidence of PE showed scattered bilateral airspace opacities most likely representing multifocal pneumonia COVID-19 should be ruled out  Repeat COVID-19 test is negative  Patient initiated on Ceftin and Zithromax for 5 days  Patient is discharged home in stable condition   VTE Assessment: [place]    Code Status: Full Code  Estimated discharge date: 9/23/2021     Elgin Navarro MD  9/23/2021  2:56 PM

## 2021-09-23 NOTE — PROGRESS NOTES
Patient: Fide Amin  Location: [facility] 3 Main 0313  MRN: [id number]  Today's date: 9/23/2021    Attempted to see patient for therapy. Unable due to medical hold.  RN reports patient for Cat Scan to R/O PE Hold OT

## 2021-09-24 NOTE — DISCHARGE SUMMARY
Ortho Discharge Summary    Admitting Provider: Bennett Em MD  Discharge Provider: No att. providers found  Primary Care Physician at Discharge: Edwige Huff CRNP [phone]     Admission Date: 9/20/2021     Discharge Date: 9/24/2021    Primary Discharge Diagnosis  Arthritis of knee    Secondary Discharge Diagnosis      Discharge Disposition  [facility]  Code Status at Discharge: Prior    Discharge Medications     Medication List      START taking these medications    acetaminophen 325 mg tablet  Commonly known as: TYLENOL  Take 3 tablets (975 mg total) by mouth 3 (three) times a day for 270 doses.  Dose: 975 mg     aspirin 81 mg chewable tablet  Take 1 tablet (81 mg total) by mouth 2 (two) times a day for 178 doses.  Dose: 81 mg     azithromycin 500 mg tablet  Commonly known as: ZITHROMAX  Take 1 tablet (500 mg total) by mouth daily for 5 days.  Dose: 500 mg     bacitracin ointment  Apply topically daily for 10 days.     cefuroxime 500 mg tablet  Commonly known as: CEFTIN  Take 1 tablet (500 mg total) by mouth 2 (two) times a day for 5 days.  Dose: 500 mg     doxycycline hyclate 100 mg capsule  Commonly known as: VIBRAMYCIN  Take 1 capsule (100 mg total) by mouth 2 (two) times a day for 7 days.  Dose: 100 mg     HYDROmorphone 2 mg tablet  Commonly known as: DILAUDID  Take 1 tablet (2 mg total) by mouth every 4 (four) hours as needed for moderate pain or severe pain for up to 5 days.  Dose: 2 mg     meloxicam 7.5 mg tablet  Commonly known as: MOBIC  Take 1 tablet (7.5 mg total) by mouth daily.  Dose: 7.5 mg     polyethylene glycol 17 gram packet  Commonly known as: MIRALAX  Take 17 g by mouth daily for 90 doses.  Dose: 17 g     sennosides-docusate sodium 8.6-50 mg  Commonly known as: SENOKOT-S  Take 1 tablet by mouth 2 (two) times a day for 178 doses.  Dose: 1 tablet        CHANGE how you take these medications    lisinopriL 5 mg tablet  Commonly known as: PRINIVIL  Take 1 tablet (5 mg  total) by mouth daily. HOLD FOR 1 WEEK- PLEASE FOLLOW UP WITH YOUR PCP BEFORE RESTARTING  Dose: 5 mg  What changed: additional instructions        CONTINUE taking these medications    ascorbic acid 500 mg tablet  Commonly known as: VITAMIN C  Take 500 mg by mouth daily.  Dose: 500 mg     atorvastatin 40 mg tablet  Commonly known as: LIPITOR  Take 40 mg by mouth nightly.  Dose: 40 mg     busPIRone 10 mg tablet  Commonly known as: BUSPAR  Take 10 mg by mouth 4 (four) times a day.  Dose: 10 mg     ECHINACEA ORAL  Take by mouth.     fluticasone furoate-vilanteroL 200-25 mcg/dose blister with device powder for inhalation  Commonly known as: BREO ELLIPTA  Inhale 1 puff daily.  Dose: 1 puff     lamoTRIgine 200 mg tablet extended release 24hr  Commonly known as: LAMICTAL XR  Take 400 mg by mouth daily. Patient takes two 200 mg once a day in the morning  Dose: 400 mg     montelukast 10 mg tablet  Commonly known as: SINGULAIR  Take 10 mg by mouth daily.  Dose: 10 mg     multivitamin tablet  Take 1 tablet by mouth daily.  Dose: 1 tablet     NEXIUM ORAL  Take 20 mg by mouth nightly.  Dose: 20 mg     nortriptyline 75 mg capsule  Commonly known as: PAMELOR  Take 75 mg by mouth nightly.  Dose: 75 mg     phytonadione (vitamin K1) 100 mcg tablet  Take 100 mcg by mouth daily.  Dose: 100 mcg     rOPINIRole 1 mg tablet  Commonly known as: REQUIP  Take 3 mg by mouth 3 (three) times a day. 0.5 mg in afternoon, 1 mg am and 1mg dinner and 2mg bedtime  Dose: 3 mg     sertraline 100 mg tablet  Commonly known as: ZOLOFT  Take 300 mg by mouth daily. Patient takes three 100 mg tabs in AM./  Dose: 300 mg            Active Issues Requiring Follow-up  Issue: bilateral TKR  Responsible Individual: patient  What is Needed:   Follow-up Appointments Arranged: Yes     Outpatient Follow-Up  Encounter Information    This patient does not currently have any appointments scheduled.           Test Results Pending at Discharge  Pending Labs     Order Current  Status    Lamotrigine In process          DETAILS OF HOSPITAL STAY    Presenting Problem/History of Present Illness  Osteoarthritis of both knees, unspecified osteoarthritis type [M17.0]  Arthritis of knee [M17.10]      Hospital Course  Patient was admitted for elective bilateral total knee replacement surgery which was carried out in the operating room without complication.  Patient on the way from the PACU to the floor or soon after arriving on the floor did sustain a seizure.  The patient does have a history of seizures but had not had one in about a decade.  Patient had no further issues regarding seizures going forward.  And she had a standard convalescence her pain was well controlled and her DVT prophylaxis was provided by aspirin.  The patient completed her in-hospital physical therapy and demonstrated the ability to care for herself, and was subsequently discharged to home in stable condition with instructions to follow-up with Dr. Em in roughly 3 weeks time.    Operative Procedures Performed  Procedure(s):  KNEE TOTAL BILATERAL REPLACEMENT  Consults: general medicine

## 2021-09-25 LAB — LAMOTRIGINE SERPL-MCNC: 6.7 MCG/ML (ref 4–18)

## 2021-10-04 ENCOUNTER — BMR PREADMISSION ASSESSMENT (OUTPATIENT)
Dept: ADMISSIONS | Facility: REHABILITATION | Age: 61
End: 2021-10-04

## 2021-10-05 VITALS
SYSTOLIC BLOOD PRESSURE: 133 MMHG | OXYGEN SATURATION: 96 % | HEART RATE: 82 BPM | DIASTOLIC BLOOD PRESSURE: 61 MMHG | TEMPERATURE: 97.5 F | HEIGHT: 62 IN | WEIGHT: 130 LBS | BODY MASS INDEX: 23.92 KG/M2

## 2021-10-05 PROBLEM — F32.A DEPRESSION: Status: ACTIVE | Noted: 2021-10-05

## 2021-10-05 PROBLEM — Z96.653 HISTORY OF TOTAL KNEE ARTHROPLASTY, BILATERAL: Status: ACTIVE | Noted: 2021-10-05

## 2021-10-05 LAB
BUN SERPL-MCNC: 13 MG/DL
CHLORIDE SERPL-SCNC: 105 MEQ/L
CO2 SERPL-SCNC: 25 MEQ/L
CREAT SERPL-MCNC: 0.6 MG/DL
EXTERNAL HEMATOCRIT: 27 %
EXTERNAL HEMOGLOBIN: 8.5 G/DL
EXTERNAL PLATELET COUNT: 545 K/ΜL
EXTERNAL WBC: 6.9 G/DL
GLUCOSE SERPL-MCNC: 95 MG/DL
POTASSIUM SERPL-SCNC: 3.7 MEQ/L
SODIUM SERPL-SCNC: 139 MEQ/L

## 2021-10-05 NOTE — HOSPITAL COURSE
61yo female who presented to [facility] on 9/20 for planned B TKA. CT of chest completed 9/23 results reviewed with no CT evidence of pulmonary embolism.  Scattered bilateral airspace opacities noted and believed to be most likely representing multifocal pneumonia.  Covid 19 pneumonia was excluded. Patient was medically cleared for discahrge on ceftin 500mg BID and zithromax 500mg daily x 5 days and with instructions for f/u care. Pt. Presented to [facility] on 9/24 for SOB and dx with aspiration PNA. Pt. Intubated on 9/25/21 and extubated on 10/1/21. Pt. Is now medically stable and ready for d/c.    At baseline, pt. Is fully independent with all mobility and self-care. Pt. Presently requires mod A with mobility and self-care, is mildly confused and demo decreased activity tolerance and functional endurance. Pt. Is therefore recommended for acute inpatient rehab to maximize level of functional independence and safety for d/c home and return to previous life roles.     Medications as of 10/5/21:  Acetaminophen  Albuterol  Atorvastatin  Bisacodyl  Breo ellipta  Buspirone  Colace  Docusate sodium-senna  Hydromorphone  Lamotrigine  Lorazepam  Lovenox  Miralax  Montelukast  Nortriptyline  Oxycodone  Ropinirole  Senna  Sertraline  Tylenol  Vitamin C    Pulmonology 10/4/21:  Completed 7 days of antibiotics  MRSA cultures negative, sputum cultures ordered no growth thus far, blood cultures with no growth to date  Extubated to nasal cannula  Aspiration precautions  HOB >35-40  SPO2 goals 92%-96%  Scheduled duonebs, fine to continue Breo 200  CXR with persistent bibasilar opacities, will need follow-up imaging in 4 to 6 weeks to 100 resolution  VT prophylaxis with Lovenox  PT for mobilization, patient deconditioned from recent illness  Outpatient pulmonary follow up with Dr. Leyva

## 2021-10-05 NOTE — BMR PREADMISSION NOTE
[facility]  Preadmission Assessment    Patient Name:  Fide Amin  YOB: 1960    Referral Date:  10/04/21  Evaluation Date:  10/05/21  Referring Facility Admission Date: 09/24/21  Referring Facility: [facility]   Referring Provider:       Reason for Referral: Fide Amin is a 60 y.o. female whose primary indication for inpatient rehabilitation is Ortho.     Pertinent History of Current Functional Problem:  59yo female who presented to [facility] on 9/20 for planned B TKA. CT of chest completed 9/23 results reviewed with no CT evidence of pulmonary embolism.  Scattered bilateral airspace opacities noted and believed to be most likely representing multifocal pneumonia.  Covid 19 pneumonia was excluded. Patient was medically cleared for discahrge on ceftin 500mg BID and zithromax 500mg daily x 5 days and with instructions for f/u care. Pt. Presented to [facility] on 9/24 for SOB and dx with aspiration PNA. Pt. Intubated on 9/25/21 and extubated on 10/1/21. Pt. Is now medically stable and ready for d/c.    At baseline, pt. Is fully independent with all mobility and self-care. Pt. Presently requires mod A with mobility and self-care, is mildly confused and demo decreased activity tolerance and functional endurance. Pt. Is therefore recommended for acute inpatient rehab to maximize level of functional independence and safety for d/c home and return to previous life roles.     Medications as of 10/5/21:  Acetaminophen  Albuterol  Atorvastatin  Bisacodyl  Breo ellipta  Buspirone  Colace  Docusate sodium-senna  Hydromorphone  Lamotrigine  Lorazepam  Lovenox  Miralax  Montelukast  Nortriptyline  Oxycodone  Ropinirole  Senna  Sertraline  Tylenol  Vitamin C    Pulmonology 10/4/21:  Completed 7 days of antibiotics  MRSA cultures negative, sputum cultures ordered no growth thus far, blood cultures with no growth to date  Extubated to nasal cannula  Aspiration precautions  HOB >35-40  SPO2  goals 92%-96%  Scheduled duonebs, fine to continue Breo 200  CXR with persistent bibasilar opacities, will need follow-up imaging in 4 to 6 weeks to 100 resolution  VT prophylaxis with Lovenox  PT for mobilization, patient deconditioned from recent illness  Outpatient pulmonary follow up with Dr. Leyva      Active Medical Conditions:  Patient Active Problem List   Diagnosis   • Arthritis of knee   • Epilepsy (CMS/HCC)   • HTN (hypertension)   • HLD (hyperlipidemia)   • Hypocalcemia   • Depression   • History of total knee arthroplasty, bilateral       Active Medications:  Active Medications   Medication Sig Dispense Refill   • acetaminophen (TYLENOL) 325 mg tablet Take 3 tablets (975 mg total) by mouth 3 (three) times a day for 270 doses. 810 tablet 0   • ascorbic acid (VITAMIN C) 500 mg tablet Take 500 mg by mouth daily.     • aspirin 81 mg chewable tablet Take 1 tablet (81 mg total) by mouth 2 (two) times a day for 178 doses. 178 tablet 0   • atorvastatin (LIPITOR) 40 mg tablet Take 40 mg by mouth nightly.     • busPIRone (BUSPAR) 10 mg tablet Take 10 mg by mouth 4 (four) times a day.     • ECHINACEA ORAL Take by mouth.     • esomeprazole magnesium (NEXIUM ORAL) Take 20 mg by mouth nightly.     • fluticasone furoate-vilanteroL (BREO ELLIPTA) 200-25 mcg/dose blister with device powder for inhalation Inhale 1 puff daily.     • lamoTRIgine (LAMICTAL XR) 200 mg tablet extended release 24hr Take 400 mg by mouth daily. Patient takes two 200 mg once a day in the morning     • lisinopriL (PRINIVIL) 5 mg tablet Take 1 tablet (5 mg total) by mouth daily. HOLD FOR 1 WEEK- PLEASE FOLLOW UP WITH YOUR PCP BEFORE RESTARTING 30 tablet 0   • meloxicam (MOBIC) 7.5 mg tablet Take 1 tablet (7.5 mg total) by mouth daily. 30 tablet 0   • montelukast (SINGULAIR) 10 mg tablet Take 10 mg by mouth daily.     • multivitamin tablet Take 1 tablet by mouth daily.     • nortriptyline (PAMELOR) 75 mg capsule Take 75 mg by mouth nightly.     •  phytonadione, vit K1, (phytonadione, vitamin K1,) 100 mcg tablet Take 100 mcg by mouth daily.     • polyethylene glycol (MIRALAX) 17 gram packet Take 17 g by mouth daily for 90 doses. 90 packet 0   • rOPINIRole (REQUIP) 1 mg tablet Take 3 mg by mouth 3 (three) times a day. 0.5 mg in afternoon, 1 mg am and 1mg dinner and 2mg bedtime      • sennosides-docusate sodium (SENOKOT-S) 8.6-50 mg Take 1 tablet by mouth 2 (two) times a day for 178 doses. 178 tablet 0   • sertraline (ZOLOFT) 100 mg tablet Take 300 mg by mouth daily. Patient takes three 100 mg tabs in AM./     No medication comments found.    HISTORY:    Past Medical History:   Diagnosis Date   • Arthritis     OA   • Asthma    • Depression    • Deviated septum    • Epilepsy (CMS/HCC)     Last seizure 8 years ago   • GERD (gastroesophageal reflux disease)    • Hypertension    • Lipid disorder    • Motion sickness    • RLS (restless legs syndrome)      Past Surgical History:   Procedure Laterality Date   •  SECTION      x1   • SINUS SURGERY       Tobacco Use as of 10/4/2021     Smoking Status Smoking Start Date Smoking Quit Date Packs/Day Years Used    Never Smoker -- -- -- --    Types Comments Smokeless Tobacco Status Smokeless Tobacco Quit Date Source    -- -- Never Used -- Provider            Alcohol Use as of 10/4/2021     Alcohol Use Drinks/Week Alcohol/Week Comments Source    Yes 2 Glasses of wine 2.0 standard drinks -- Provider            Drug Use as of 10/4/2021     Drug Use Types Frequency Comments Source    Never -- -- -- Provider            Sexual Activity as of 10/4/2021     Sexually Active Birth Control Partners Comments Source    -- -- -- -- Provider            Socioeconomic as of 10/4/2021     Marital Status Spouse Name Number of Children Years Education Education Level Preferred Language Ethnicity Race Source     -- -- -- -- English Not , /a, or [place] origin White --        Allergies  Allergies   Allergen Reactions    • Penicillins Other (see comments)     Rash and Asthma issues   • Sulfa (Sulfonamide Antibiotics) Hives       Premorbid Functional Status:   Dominant Hand: left  Ambulation: assistive equipment (SC)  Transferring: independent  Toileting: assistive equipment (commode)  Bathing: assistive equipment (showe chair)  Dressing: independent  Eating: independent  Communication: understands/communicates without difficulty  Swallowing: swallows foods/liquids without difficulty  Baseline Diet/Method of Nutritional Intake: no diet restrictions  Assistive Device/Animal Currently Used at Home: shower chair; commode, 3-in-1; cane, straight; walker, front-wheeled; raised toilet  Prior Level of Function Comment: Mod I    Living Environment:  People in Home: spouse  Current Living Arrangements: home  Living Environment Comment: 2 STH; primary bathroom and bedroom on 2nd floor - walk in shower with shower chair with back, raisted toilet seat w/ rails  Number of Stairs, Main Entrance: 3  Number of Stairs, Within Home, Primary: 12    TEST RESULTS:  Chemistry (Up to last 3 results from the past 720 hours)      09/21 0525 09/22 0413 10/05 0000    Sodium       135            137            139         Potassium       3.7            3.7            3.7         BUN       13            11            13         Creatinine       0.6            0.6            0.6         Glucose       113            102            95         CO2       24            23            25         Chloride       105            108            105           Hepatic (Up to last 3 results from the past 720 hours)      09/16 1025 09/20 1753    ALT (SGPT)       51            48         AST (SGOT)       45            51         Alkaline Phosphatase       71            70         Bilirubin, Total       0.4            0.5           Metabolic (Up to last 3 results from the past 720 hours)      09/16 1025    Hemoglobin A1C       5.4           Hematologic (Up to last 3 results from  the past 720 hours)      09/21 0525 09/22 0413 10/05 0000    WBC       12.84            10.36            6.9         Hemoglobin       9.5  Comment:  ALL RESULTS HAVE BEEN CHECKED            8.5            8.5         Hematocrit       29.3            26.1            27         Platelets       232            213            545         Protime       --            --            --         INR       --            --            --           09/16 1025        WBC       --           Hemoglobin       --           Hematocrit       --           Platelets       --           Protime       12.8           INR       1.0  Comment:  INR has no defined significance when PT is within Reference Range.             Other (Up to last 3 results from the past 720 hours)      09/16 1025    INR       1.0  Comment:  INR has no defined significance when PT is within Reference Range.                  ASSESSMENT:  Vitals:  Temp:  [36.4 °C (97.5 °F)] 36.4 °C (97.5 °F)  Heart Rate:  [82] 82  BP: (133)/(61) 133/61  FiO2 (%) (Set):  [28 %] 28 %    Lines/Drains/Airways:  Lines, Drains & Airways: Peripheral IV  Peripheral Iv Insertion Date: 10/03/21    Risk for Clinical Complications:  Falls: Moderate    Precautions:  Existing Precautions/Restrictions: fall; seizures; aspiration      Current Diet:   Diet: thin liquids, regular solids    Current Functional Status:   Preadmission Current Function     Row Name 10/05/21 1300       Cognition/Psychosocial    Orientation Status (Cognition) oriented x 3       Upper Body Dressing    [place] supervision       Lower Body Dressing    [place] maximum assist (25-49% patient effort)       Toileting    [place] maximum assist (25-49% patient effort)       Grooming    [place] supervision       Bed Mobility    [place], Supine to Sit supervision       Bed to Chair Transfer    [place], Bed to Chair moderate assist (50-74% patient effort)    Assistive Device walker, front-wheeled       Sit to  Stand Transfer    [place], Sit to Stand Transfer moderate assist (50-74% patient effort)    Assistive Device walker, front-wheeled       Stand to Sit Transfer    [place], Stand to Sit Transfer moderate assist (50-74% patient effort)    Assistive Device walker, front-wheeled       Gait Training    [place], Gait moderate assist (50-74% patient effort)    Assistive Device walker, front-wheeled    Distance in Feet 5 feet                Support System:       Patient/Family Goals:       Educational Background:      RECOMMENDATIONS / PLAN:  Special Needs:  Is an  Needed/Used?: N  Spiritual, Cultural Beliefs, Jainism Practices, Values that Affect Care: no      Plan:  Identified Referral Needs: speech language pathology, physical therapy, occupational therapy, medical consultative services  OT Frequency: 5-7 times per week  OT Intensity: 1 hour  PT Frequency: 5-7 times per week  PT Intensity: 1 hour  SLP Frequency: 5-7 times per week  SLP Intensity: 1 hour    Therapy Intensity: Requires, can tolerate and will benefit from 3 hours of therapy at least 5 days per week  Projected Length of Stay (days): 10 days  Patient is willing to participate in rehab program: yes    Impairments to be addressed: cognitive function, mobility, safety, self-care  Medical Necessity Admission Criteria: seizure disorder, confusion/cognitive issues, active infection, other active medical conditions (see comments) (B TKR)    Expected Level of Function at Discharge:  Expected Functional Improvement: cognitive function; mobility; safety; self-care  Self-Care: Independent  Sphincter Control: Independent  Transfers: Independent  Locomotion: Independent  Communication: Independent  Social Cognition: Independent      Post-Discharge Needs:  Anticipated Discharge Disposition: home with home health  Type of Home Care Services: home OT; home PT  Equipment Needed After Discharge: none  Discharge Coordination/Progress: Home when  stable

## 2021-10-06 ENCOUNTER — HOSPITAL ENCOUNTER (INPATIENT)
Facility: REHABILITATION | Age: 61
LOS: 9 days | Discharge: HOME HEALTH | DRG: 948 | End: 2021-10-15
Attending: PHYSICAL MEDICINE & REHABILITATION | Admitting: PHYSICAL MEDICINE & REHABILITATION
Payer: COMMERCIAL

## 2021-10-06 DIAGNOSIS — F43.23 ADJUSTMENT DISORDER WITH MIXED ANXIETY AND DEPRESSED MOOD: ICD-10-CM

## 2021-10-06 DIAGNOSIS — R60.9 EDEMA, UNSPECIFIED TYPE: ICD-10-CM

## 2021-10-06 DIAGNOSIS — F43.22 ADJUSTMENT DISORDER WITH ANXIETY: ICD-10-CM

## 2021-10-06 DIAGNOSIS — J81.0 ACUTE PULMONARY EDEMA (CMS/HCC): Primary | ICD-10-CM

## 2021-10-06 PROBLEM — R53.81 PHYSICAL DECONDITIONING: Status: ACTIVE | Noted: 2021-10-06

## 2021-10-06 PROCEDURE — 12800000 HC ROOM AND CARE SEMIPRIVATE REHAB

## 2021-10-06 PROCEDURE — 63700000 HC SELF-ADMINISTRABLE DRUG: Performed by: PHYSICAL MEDICINE & REHABILITATION

## 2021-10-06 RX ORDER — ALUMINUM HYDROXIDE, MAGNESIUM HYDROXIDE, AND SIMETHICONE 1200; 120; 1200 MG/30ML; MG/30ML; MG/30ML
30 SUSPENSION ORAL EVERY 4 HOURS PRN
Status: DISCONTINUED | OUTPATIENT
Start: 2021-10-06 | End: 2021-10-15 | Stop reason: HOSPADM

## 2021-10-06 RX ORDER — HYDROMORPHONE HYDROCHLORIDE 2 MG/1
2 TABLET ORAL EVERY 4 HOURS PRN
Status: DISCONTINUED | OUTPATIENT
Start: 2021-10-06 | End: 2021-10-15 | Stop reason: HOSPADM

## 2021-10-06 RX ORDER — MELOXICAM 7.5 MG/1
7.5 TABLET ORAL DAILY
Status: DISCONTINUED | OUTPATIENT
Start: 2021-10-07 | End: 2021-10-15 | Stop reason: HOSPADM

## 2021-10-06 RX ORDER — DOCUSATE SODIUM 100 MG/1
100 CAPSULE, LIQUID FILLED ORAL 2 TIMES DAILY
Status: DISCONTINUED | OUTPATIENT
Start: 2021-10-06 | End: 2021-10-07

## 2021-10-06 RX ORDER — ACETAMINOPHEN 325 MG/1
650 TABLET ORAL
Status: DISCONTINUED | OUTPATIENT
Start: 2021-10-06 | End: 2021-10-15 | Stop reason: HOSPADM

## 2021-10-06 RX ORDER — PANTOPRAZOLE SODIUM 40 MG/1
40 TABLET, DELAYED RELEASE ORAL
Status: DISCONTINUED | OUTPATIENT
Start: 2021-10-07 | End: 2021-10-15 | Stop reason: HOSPADM

## 2021-10-06 RX ORDER — LAMOTRIGINE 100 MG/1
400 TABLET ORAL DAILY
Status: DISCONTINUED | OUTPATIENT
Start: 2021-10-07 | End: 2021-10-06

## 2021-10-06 RX ORDER — ACETAMINOPHEN 325 MG/1
650 TABLET ORAL EVERY 4 HOURS PRN
Status: DISCONTINUED | OUTPATIENT
Start: 2021-10-06 | End: 2021-10-15 | Stop reason: HOSPADM

## 2021-10-06 RX ORDER — ALBUTEROL SULFATE 90 UG/1
2 INHALANT RESPIRATORY (INHALATION) EVERY 4 HOURS PRN
Status: DISCONTINUED | OUTPATIENT
Start: 2021-10-06 | End: 2021-10-15 | Stop reason: HOSPADM

## 2021-10-06 RX ORDER — BISACODYL 10 MG/1
10 SUPPOSITORY RECTAL DAILY PRN
Status: DISCONTINUED | OUTPATIENT
Start: 2021-10-06 | End: 2021-10-15 | Stop reason: HOSPADM

## 2021-10-06 RX ORDER — ROPINIROLE 1 MG/1
1 TABLET, FILM COATED ORAL DAILY
Status: DISCONTINUED | OUTPATIENT
Start: 2021-10-07 | End: 2021-10-15 | Stop reason: HOSPADM

## 2021-10-06 RX ORDER — MONTELUKAST SODIUM 10 MG/1
10 TABLET ORAL NIGHTLY
Status: DISCONTINUED | OUTPATIENT
Start: 2021-10-06 | End: 2021-10-15 | Stop reason: HOSPADM

## 2021-10-06 RX ORDER — ROPINIROLE 1 MG/1
2 TABLET, FILM COATED ORAL NIGHTLY
Status: DISCONTINUED | OUTPATIENT
Start: 2021-10-06 | End: 2021-10-15 | Stop reason: HOSPADM

## 2021-10-06 RX ORDER — ROPINIROLE 0.5 MG/1
0.5 TABLET, FILM COATED ORAL
Status: DISCONTINUED | OUTPATIENT
Start: 2021-10-07 | End: 2021-10-15 | Stop reason: HOSPADM

## 2021-10-06 RX ORDER — NORTRIPTYLINE HYDROCHLORIDE 25 MG/1
75 CAPSULE ORAL NIGHTLY
Status: DISCONTINUED | OUTPATIENT
Start: 2021-10-06 | End: 2021-10-15 | Stop reason: HOSPADM

## 2021-10-06 RX ORDER — LAMOTRIGINE 200 MG/1
400 TABLET, EXTENDED RELEASE ORAL DAILY
Status: DISCONTINUED | OUTPATIENT
Start: 2021-10-07 | End: 2021-10-15 | Stop reason: HOSPADM

## 2021-10-06 RX ORDER — ATORVASTATIN CALCIUM 40 MG/1
40 TABLET, FILM COATED ORAL DAILY
Status: DISCONTINUED | OUTPATIENT
Start: 2021-10-07 | End: 2021-10-07

## 2021-10-06 RX ORDER — ROPINIROLE 1 MG/1
1 TABLET, FILM COATED ORAL
Status: DISCONTINUED | OUTPATIENT
Start: 2021-10-06 | End: 2021-10-15 | Stop reason: HOSPADM

## 2021-10-06 RX ORDER — LIDOCAINE 560 MG/1
2 PATCH PERCUTANEOUS; TOPICAL; TRANSDERMAL DAILY
Status: DISCONTINUED | OUTPATIENT
Start: 2021-10-07 | End: 2021-10-15 | Stop reason: HOSPADM

## 2021-10-06 RX ORDER — POLYETHYLENE GLYCOL 3350 17 G/17G
17 POWDER, FOR SOLUTION ORAL DAILY PRN
Status: DISCONTINUED | OUTPATIENT
Start: 2021-10-06 | End: 2021-10-15 | Stop reason: HOSPADM

## 2021-10-06 RX ORDER — SERTRALINE HYDROCHLORIDE 100 MG/1
300 TABLET, FILM COATED ORAL DAILY
Status: DISCONTINUED | OUTPATIENT
Start: 2021-10-07 | End: 2021-10-15 | Stop reason: HOSPADM

## 2021-10-06 RX ORDER — SENNOSIDES 8.6 MG/1
2 TABLET ORAL
Status: DISCONTINUED | OUTPATIENT
Start: 2021-10-07 | End: 2021-10-07

## 2021-10-06 RX ORDER — FAMOTIDINE 20 MG/1
20 TABLET, FILM COATED ORAL NIGHTLY
Status: DISCONTINUED | OUTPATIENT
Start: 2021-10-06 | End: 2021-10-15 | Stop reason: HOSPADM

## 2021-10-06 RX ORDER — ASCORBIC ACID 500 MG
500 TABLET ORAL DAILY
Status: DISCONTINUED | OUTPATIENT
Start: 2021-10-07 | End: 2021-10-07

## 2021-10-06 RX ORDER — BUSPIRONE HYDROCHLORIDE 5 MG/1
10 TABLET ORAL
Status: DISCONTINUED | OUTPATIENT
Start: 2021-10-06 | End: 2021-10-15 | Stop reason: HOSPADM

## 2021-10-06 RX ORDER — NAPROXEN SODIUM 220 MG/1
81 TABLET, FILM COATED ORAL 2 TIMES DAILY
Status: DISCONTINUED | OUTPATIENT
Start: 2021-10-06 | End: 2021-10-15 | Stop reason: HOSPADM

## 2021-10-06 RX ADMIN — ROPINIROLE HYDROCHLORIDE 1 MG: 1 TABLET, FILM COATED ORAL at 18:54

## 2021-10-06 RX ADMIN — FAMOTIDINE 20 MG: 20 TABLET ORAL at 21:07

## 2021-10-06 RX ADMIN — ASPIRIN 81 MG CHEWABLE TABLET 81 MG: 81 TABLET CHEWABLE at 21:10

## 2021-10-06 RX ADMIN — ROPINIROLE HYDROCHLORIDE 2 MG: 1 TABLET, FILM COATED ORAL at 21:08

## 2021-10-06 RX ADMIN — ACETAMINOPHEN 650 MG: 325 TABLET, FILM COATED ORAL at 18:54

## 2021-10-06 RX ADMIN — MONTELUKAST 10 MG: 10 TABLET, FILM COATED ORAL at 21:10

## 2021-10-06 RX ADMIN — BUSPIRONE HYDROCHLORIDE 10 MG: 5 TABLET ORAL at 21:06

## 2021-10-06 RX ADMIN — NORTRIPTYLINE HYDROCHLORIDE 75 MG: 25 CAPSULE ORAL at 21:07

## 2021-10-06 ASSESSMENT — PATIENT HEALTH QUESTIONNAIRE - PHQ9: SUM OF ALL RESPONSES TO PHQ9 QUESTIONS 1 & 2: 0

## 2021-10-06 NOTE — H&P
Patient Name: Fide Amin  MRN: [id number]  : 1960  Admission Date: 10/6/2021    Chief Complaint:    Dysfunction in ambulation, transfers and self-care status post recent bilateral total knee replacements, recent pneumonia, ventilator-dependent respiratory failure, seizure disorder, deconditioning, multiple medical problems. (Patient was admitted to [facility] on 10/6/21. Records reviewed on 10/6/21. Patient evaluated on 10/6/21 at about 5:35 PM. Full H&P done on 10/6/21. Orders put in CPOE on 10/6/21. Plan of care discussed with patient and with her  at bedside.  Discussed with nurse.)    History of Present Illness:    Ms. Fide Amin is a 60-year-old right handed white female with chronic conditions significant for polyarticular degenerative arthritis, hypertension, hyperlipidemia, seizure disorder, anxiety, depression, asthma, restless leg syndrome had elective bilateral total knee replacements secondary to degenerative arthritis of both knees by Dr. Bennett Em at [facility] on 21. Postoperatively, on the way from the PACU to the floor or soon after arriving on the floor, the patient did sustain a seizure and she does have a history of seizure disorder but had not had one in about a decade.  She was seen by neurologist at [facility] after her seizure.  Postoperative course was significant for hypotension which was felt to be secondary to narcotics, dehydration as well as blood loss.  She had hypoxia with exertion. CT of chest on 21 revealed no CT evidence of pulmonary embolism, but scattered bilateral airspace opacities were noted and believed to be most likely representing multifocal pneumonia.  Covid 19 pneumonia was excluded. She was allowed weightbearing as tolerated on both lower extremities and on Aspirin and SCDs for DVT prophylaxis. She was apparently offered SNF placement per her records but she declined it.  She was medically cleared for discharge  on Ceftin 500mg BID and Zithromax 500mg daily x 5 days and with instructions for follow-up care and discharged to home on 21 with home care.  Subsequently, she presented to the ER at [facility] on 21 with increased shortness of breath and was diagnosed with aspiration pneumonia.  She was intubated on 21 and eventually extubated on 10/1/21. She reports she was on oxygen in the acute care hospital and will try to wean her off oxygen as possible.  She has had multiple blisters on both knees.  On 10/5/21, hemoglobin was 8.5, WBC count 6.9, platelets were 545, BUN was 13 and creatinine was 0.6.  She has been needing assistance for mobility, transfers, self-care. I have reviewed the preadmission screening and concur with that information and there are no significant changes from patient's preadmission screening. She is transferred to [facility] on 10/6/21 for further rehabilitation care.    Past Medical History:    Past Medical History:   Diagnosis Date   • Arthritis     OA   • Asthma    • Depression    • Deviated septum    • Epilepsy (CMS/HCC)     Last seizure 8 years ago   • GERD (gastroesophageal reflux disease)    • Hypertension    • Lipid disorder    • Motion sickness    • RLS (restless legs syndrome)        Past Surgical History:    Past Surgical History:   Procedure Laterality Date   •  SECTION      x1   • SINUS SURGERY         Medications:    Current Facility-Administered Medications   Medication Dose Route Frequency   • acetaminophen  650 mg oral With meals & nightly   • acetaminophen  650 mg oral q4h PRN   • acetaminophen  650 mg oral q4h PRN   • albuterol HFA  2 puff inhalation q4h PRN   • alum-mag hydroxide-simeth  30 mL oral q4h PRN   • [START ON 10/7/2021] ascorbic acid  500 mg oral Daily   • aspirin  81 mg oral BID   • [START ON 10/7/2021] atorvastatin  40 mg oral Daily   • bisacodyL  10 mg rectal Daily PRN   • busPIRone  10 mg oral With meals &  nightly   • docusate sodium  100 mg oral BID   • famotidine  20 mg oral Nightly   • HYDROmorphone  2 mg oral q4h PRN   • [START ON 10/7/2021] lamoTRIgine  400 mg oral Daily   • [START ON 10/7/2021] lidocaine  2 patch Topical Daily   • [START ON 10/7/2021] meloxicam  7.5 mg oral Daily   • montelukast  10 mg oral Nightly   • [START ON 10/7/2021] multivit-min-iron-folic-vit K1  1 tablet oral Daily   • NON FORMULARY MEDICATION REQUEST  2 puff inhalation Daily   • nortriptyline  75 mg oral Nightly   • [START ON 10/7/2021] pantoprazole  40 mg oral Daily before breakfast   • polyethylene glycol  17 g oral Daily PRN   • [START ON 10/7/2021] rOPINIRole  0.5 mg oral Daily with lunch   • rOPINIRole  1 mg oral Daily with dinner   • [START ON 10/7/2021] rOPINIRole  1 mg oral Daily   • rOPINIRole  2 mg oral Nightly   • [START ON 10/7/2021] senna  2 tablet oral Daily with lunch   • [START ON 10/7/2021] sertraline  300 mg oral Daily       Allergies:    Allergies   Allergen Reactions   • Penicillins Other (see comments)     Rash and Asthma issues   • Sulfa (Sulfonamide Antibiotics) Hives       Family History:    No family history on file.    Social History:    The patient is  and lives with her  in a 2 level home.  3 steps to enter, bedroom and bathroom on the second level, full flight of stairs to reach up there.  She retired from working as an .  She denies smoking, drinks alcohol socially and denies using recreational drugs.    Social History     Tobacco Use   • Smoking status: Never Smoker   • Smokeless tobacco: Never Used   Vaping Use   • Vaping Use: Never used   Substance Use Topics   • Alcohol use: Yes     Alcohol/week: 2.0 standard drinks     Types: 2 Glasses of wine per week   • Drug use: Never       Functional History:    The patient is a right-hand dominant person. She was independent in ADLs and ambulation prior to her hospitalization.     Review Of Systems:    A complete and  "comprehensive review of systems was performed. The patient denies any chest pain, shortness of breath. She uses glasses. She indicates last bowel movement was today.  She indicates that she is able to void. I mentioned to her we will monitor post void residuals. She has some pain in her knees.  She does have large blisters around both knee incisions.  Otherwise, a complete and comprehensive review of systems is negative.    Physical Examination:    Blood pressure (!) 147/60, pulse 83, temperature 36.7 °C (98.1 °F), temperature source Oral, resp. rate 20, height 1.575 m (5' 2.01\"), weight 63.9 kg (140 lb 14 oz), SpO2 92 %, not currently breastfeeding.    Body mass index is 25.76 kg/m².    General Appearance: Not in acute distress  Head/Ear/Nose/Throat: Normocephalic; Atraumatic.   Eye: EOMI; PERRL.   Neck: No JVD; No Bruits.   Respiratory: Decreased breath sounds at bases.   Cardiovascular: RRR; Normal S1, S2.   Gastrointestinal: Soft; NT; +BS.   Extremities: Bilateral lower extremity edema noted.  Healing incisions noted on anterior aspect of both knees.  She has multiple large blisters present on both knees, with the ones on the right knee drying out.  Dressing is present on the blisters on left knee. She is able to do active straight leg raise more easily on the right and with some difficulty on the left.  Musculoskeletal: Functional active range of motion in both upper extremities.  Some limitation of active range of motion in both hips and knees, limited by pain.    Neurological: AAO ×3. Speech is fluent. Cranial nerve examination does not reveal any gross facial asymmetry. Strength testing about 4+/5 strength in both upper extremities.  Bilateral hip flexion is 3/5.  Right quadriceps is 3+/5, left quadriceps is 3/5.  Bilateral ankle dorsi and plantar flexion are 4/5.  She is grossly able to localize touch and position sense in her toes.  Deep tendon reflexes are hypoactive and symmetric bilaterally.  Plantars " are flexor.  Coordination is functional upper extremities.  Both knee jerks were not tested.  Behavior/Emotional: Appropriate; Cooperative.   Skin: No obvious rashes noted.  Bilateral knee incisions healing.  She has multiple large blisters present on both knees, with the ones on the right knee drying out.  Dressing is present on the blisters on left knee.  She has bruising noted in both lower extremities including her feet after recent bilateral knee replacements.    Assessment and Plan:    ASSESSMENT PLAN:  1. 60-year-old right handed white female with chronic conditions significant for polyarticular degenerative arthritis, hypertension, hyperlipidemia, seizure disorder, anxiety, depression, asthma, restless leg syndrome had elective bilateral total knee replacements secondary to degenerative arthritis of both knees by Dr. Bennett Em at [facility] on 9/20/21. Postoperatively, on the way from the PACU to the floor or soon after arriving on the floor, the patient did sustain a seizure and she does have a history of seizure disorder but had not had one in about a decade.  She was seen by neurologist at [facility] after her seizure.  Postoperative course was significant for hypotension which was felt to be secondary to narcotics, dehydration as well as blood loss.  She had hypoxia with exertion. CT of chest on 9/23/21 revealed no CT evidence of pulmonary embolism, but scattered bilateral airspace opacities were noted and believed to be most likely representing multifocal pneumonia.  Covid 19 pneumonia was excluded. She was allowed weightbearing as tolerated on both lower extremities and on Aspirin and SCDs for DVT prophylaxis. She was apparently offered SNF placement per her records but she declined it.  She was medically cleared for discharge on Ceftin 500mg BID and Zithromax 500mg daily x 5 days and with instructions for follow-up care and discharged to home on 9/23/21 with home care.  Subsequently, she  presented to the ER at [facility] on 9/24/21 with increased shortness of breath and was diagnosed with aspiration pneumonia.  She was intubated on 9/25/21 and eventually extubated on 10/1/21. She reports she was on oxygen in the acute care hospital and will try to wean her off oxygen as possible.  She has had multiple blisters on both knees.  On 10/5/21, hemoglobin was 8.5, WBC count 6.9, platelets were 545, BUN was 13 and creatinine was 0.6.  She has been needing assistance for mobility, transfers, self-care. She is transferred to [facility] on 10/6/21 for further rehabilitation care.    2. DVT prophylaxis - on Aspirin.  On SCDs.  Check doppler.    3.  Deconditioning - status post recent bilateral total knee replacements, recent pneumonia, ventilator-dependent respiratory failure, seizure disorder, deconditioning, multiple medical problems - continue PT, OT, psychology.  Follow falls precautions, cardiac precautions, monitor pulse oximetry in therapy.    4. GI - On Pepcid for GI prophylaxis.  On Protonix.  Continue Colace, Senokot.  On PRN MiraLAX.  On PRN Dulcolax suppository. On PRN Maalox.      5.  - voiding.  Denies dysuria.  Monitor post residuals.    6.  Seizure disorder -on Lamictal XR.  Follow seizure precautions.    7. Pain - on Mobic.  On Tylenol.  On PRN Dilaudid.  Ice to knees.  On topical Lidoderm patches.    8. Hematology - monitor hemoglobin, platelets.  Anemia - on MVI.    9.  Psychiatry - history of anxiety and depression - on BuSpar.  On Zoloft.  On Pamelor.    10.  Pulmonary - H/O asthma, recent pneumonia, ventilator-dependent respiratory failure.  On Singulair.  On Breo Ellipta.  On PRN Ventolin HFA.    11.  Restless leg syndrome - on Requip.    12. Skin - bilateral knee incisions stable.  Multiple blisters noted on both incisions.    13.  Hyperlipidemia - on Lipitor.    14. F/E/N - On MVI. On vitamin C.      15.  Rehabilitation medicine - Continue  comprehensive rehabilitation care. Continue PT, OT, psychology.  We will follow falls precautions, cardiac precautions, monitor pulse oximetry in therapy and follow aspiration precautions.      16. Reviewed labs today.    17. Consultations - Dr. Nunez will be consulted from internal medicine standpoint.  Dr. Vaughn of psychiatry will be consulted regarding anxiety and depression.  Nutrition will be consulted.  Will consult psychology.  She will follow-up appointment with orthopedic surgeon on outpatient basis.    Estimated Length Of Stay:    Will be about 10 days, and will be adjusted in team meetings depending upon functional status and progress in therapy.     Goals Of This Stay:    Goals of this stay would be to increase her strength; improve her endurance; maximize her independence in transfers, ambulation, self care, keeping her weight-bearing as tolerated on both lower extremities; improving range of motion of her knees, evaluate the need for assistive devices and adaptive equipment; do patient and family education; do caregiver training as appropriate; monitor her medically, monitor healing of her knee incisions and try to control her pain.     Post Admission Physician Evaluation:    Fide Amin is admitted to [facility] for comprehensive inpatient rehabilitation for Ortho status post recent bilateral total knee replacements, recent pneumonia, ventilator-dependent respiratory failure, seizure disorder, deconditioning, multiple medical problems with functional deficits in cognitive function; mobility; safety; self-care. Patient is receiving the following services: speech language pathology; physical therapy; occupational therapy; medical consultative services, rehabitation nursing care, case management evaluation, psychology services.    Active medical management is required for  Patient Active Problem List   Diagnosis   • Arthritis of knee   • Epilepsy (CMS/[facility])   • HTN (hypertension)    • HLD (hyperlipidemia)   • Hypocalcemia   • Depression   • History of total knee arthroplasty, bilateral   • Physical deconditioning       Premorbid Function  Dominant Hand: left  Ambulation: assistive equipment (SC)  Transferring: independent  Toileting: assistive equipment (commode)  Bathing: assistive equipment (showe chair)  Dressing: independent  Eating: independent  Communication: understands/communicates without difficulty  Swallowing: swallows foods/liquids without difficulty  Baseline Diet/Method of Nutritional Intake: no diet restrictions  Assistive Device/Animal Currently Used at Home: shower chair; commode, 3-in-1; cane, straight; walker, front-wheeled; raised toilet  Prior Level of Function Comment: Mod I      Current Function  Gait  [place], Gait: moderate assist (50-74% patient effort)  Assistive Device: walker, front-wheeled  Comment (Gait/Stairs): Pt steady with step to gait stopping every 15' to assess Spo2 85-95 with insp. effort. [place] notified    Stairs  [place], Stairs: minimum assist (75% or more patient effort); 1 person assist  Assistive Device: other (see comments) (walker)  Number of Stairs: 1  Comment:  instructed on on  step backwards then off frontwards  demonstrated correctly    Wheelchair     Transfers  [place], Supine to Sit: supervision  [place], Sit to Supine: minimum assist (75% or more patient effort); 1 person assist  Assistive Device: head of bed elevated  Comment (Bed Mobility): reports  will assist.  Maintains Weight-Bearing Status (Transfers): able to maintain  [place], Bed to Chair: moderate assist (50-74% patient effort)  Assistive Device: walker, front-wheeled  Comment: Deferred d/t orthostatic BP  [place], Sit to Stand Transfer: moderate assist (50-74% patient effort)  Verbal Cues: safety; hand placement  Assistive Device: walker, front-wheeled  Comment: steady to walker stood 4 time this session  [place], Stand  to Sit Transfer: moderate assist (50-74% patient effort)  Verbal Cues: safety  Assistive Device: walker, front-wheeled  Comment: encouraged to keep feet on the ground allow kneees to bend not able  to follow not effecting balance    Transfer Technique: sit-stand; stand-sit  [place], Toilet Transfer: close supervision  Verbal Cues: safety  Assistive Device: commode, 3-in-1  Comment: benefits from elevated surface for pain management, use of walkout method for descent    Self Care  Tasks: doff; don; socks  [place]: maximum assist (25-49% patient effort)  Comment: initiated foward flexed technique with side sitting, required increased assistance for over toe management  [place]: maximum assist (25-49% patient effort)  Comment: pt completed pericare from seated position with close S for balance/safety  Self-Performance: washes, rinses and dries hands  [place]: supervision  Comment: close S for safety reaching outside [place] to obtain supplies    Cognition  Affect/Mental Status (Cognition): WFL  Orientation Status (Cognition): oriented x 3  Follows Commands (Cognition): follows multi-step commands  Cognitive Function: WFL  Comment, Cognition: pleasant/cooperaive/receptive. Req'd cues for safety during fxnl mobility    Communication     Swallow       Risk for Complications  Falls: Moderate      Expected Level of Function  Expected Functional Improvement: cognitive function; mobility; safety; self-care  Self-Care: Independent  Sphincter Control: Independent  Transfers: Independent  Locomotion: Independent  Communication: Independent  Social Cognition: Independent      Anticipated Discharge Plan  Anticipated Discharge Disposition: home with home health  Type of Home Care Services: home OT; home PT      I have reviewed the pre-admission screening and there are no relevant changes.    Expected length of stay: 10 days          Fito Michael MD  10/6/2021

## 2021-10-07 ENCOUNTER — APPOINTMENT (INPATIENT)
Dept: PHYSICAL THERAPY | Facility: REHABILITATION | Age: 61
DRG: 948 | End: 2021-10-07
Payer: COMMERCIAL

## 2021-10-07 ENCOUNTER — APPOINTMENT (INPATIENT)
Dept: CARDIOLOGY | Facility: REHABILITATION | Age: 61
DRG: 948 | End: 2021-10-07
Attending: PHYSICAL MEDICINE & REHABILITATION
Payer: COMMERCIAL

## 2021-10-07 ENCOUNTER — APPOINTMENT (INPATIENT)
Dept: WOUND CARE | Facility: REHABILITATION | Age: 61
DRG: 948 | End: 2021-10-07
Payer: COMMERCIAL

## 2021-10-07 ENCOUNTER — APPOINTMENT (INPATIENT)
Dept: RADIOLOGY | Facility: REHABILITATION | Age: 61
DRG: 948 | End: 2021-10-07
Attending: INTERNAL MEDICINE
Payer: COMMERCIAL

## 2021-10-07 ENCOUNTER — APPOINTMENT (OUTPATIENT)
Dept: PSYCHOLOGY | Facility: CLINIC | Age: 61
End: 2021-10-07
Attending: PHYSICAL MEDICINE & REHABILITATION
Payer: COMMERCIAL

## 2021-10-07 ENCOUNTER — APPOINTMENT (INPATIENT)
Dept: OCCUPATIONAL THERAPY | Facility: REHABILITATION | Age: 61
DRG: 948 | End: 2021-10-07
Payer: COMMERCIAL

## 2021-10-07 LAB
BILIRUB UR QL STRIP.AUTO: NEGATIVE MG/DL
BSA FOR ECHO PROCEDURE: 1.67 M2
CLARITY UR REFRACT.AUTO: CLEAR
COLOR UR AUTO: YELLOW
GLUCOSE UR STRIP.AUTO-MCNC: NEGATIVE MG/DL
HGB UR QL STRIP.AUTO: NEGATIVE
KETONES UR STRIP.AUTO-MCNC: NEGATIVE MG/DL
LEUKOCYTE ESTERASE UR QL STRIP.AUTO: NEGATIVE
NITRITE UR QL STRIP.AUTO: NEGATIVE
PH UR STRIP.AUTO: 7 [PH]
PROT UR QL STRIP.AUTO: NEGATIVE
SP GR UR REFRACT.AUTO: 1.02
UROBILINOGEN UR STRIP-ACNC: 0.2 EU/DL

## 2021-10-07 PROCEDURE — 90791 PSYCH DIAGNOSTIC EVALUATION: CPT | Performed by: PSYCHOLOGIST

## 2021-10-07 PROCEDURE — 63600000 HC DRUGS/DETAIL CODE: Performed by: INTERNAL MEDICINE

## 2021-10-07 PROCEDURE — 97163 PT EVAL HIGH COMPLEX 45 MIN: CPT

## 2021-10-07 PROCEDURE — 63700000 HC SELF-ADMINISTRABLE DRUG: Performed by: PHYSICAL MEDICINE & REHABILITATION

## 2021-10-07 PROCEDURE — 97167 OT EVAL HIGH COMPLEX 60 MIN: CPT | Mod: GO

## 2021-10-07 PROCEDURE — 12800000 HC ROOM AND CARE SEMIPRIVATE REHAB

## 2021-10-07 PROCEDURE — 71046 X-RAY EXAM CHEST 2 VIEWS: CPT

## 2021-10-07 PROCEDURE — 63700000 HC SELF-ADMINISTRABLE DRUG: Performed by: INTERNAL MEDICINE

## 2021-10-07 PROCEDURE — 81003 URINALYSIS AUTO W/O SCOPE: CPT | Performed by: SURGERY

## 2021-10-07 PROCEDURE — 93970 EXTREMITY STUDY: CPT | Mod: 50

## 2021-10-07 PROCEDURE — 97535 SELF CARE MNGMENT TRAINING: CPT | Mod: GO

## 2021-10-07 RX ORDER — ENOXAPARIN SODIUM 100 MG/ML
40 INJECTION SUBCUTANEOUS
Status: DISCONTINUED | OUTPATIENT
Start: 2021-10-07 | End: 2021-10-15 | Stop reason: HOSPADM

## 2021-10-07 RX ORDER — GUAIFENESIN 600 MG/1
600 TABLET, EXTENDED RELEASE ORAL 2 TIMES DAILY
Status: DISCONTINUED | OUTPATIENT
Start: 2021-10-07 | End: 2021-10-15 | Stop reason: HOSPADM

## 2021-10-07 RX ORDER — FLUTICASONE FUROATE AND VILANTEROL 200; 25 UG/1; UG/1
1 POWDER RESPIRATORY (INHALATION) DAILY
Status: DISCONTINUED | OUTPATIENT
Start: 2021-10-07 | End: 2021-10-08

## 2021-10-07 RX ORDER — ATORVASTATIN CALCIUM 40 MG/1
40 TABLET, FILM COATED ORAL
Status: DISCONTINUED | OUTPATIENT
Start: 2021-10-08 | End: 2021-10-15 | Stop reason: HOSPADM

## 2021-10-07 RX ORDER — AMOXICILLIN 250 MG
2 CAPSULE ORAL 2 TIMES DAILY
Status: DISCONTINUED | OUTPATIENT
Start: 2021-10-07 | End: 2021-10-14

## 2021-10-07 RX ORDER — ALBUTEROL SULFATE 90 UG/1
2 INHALANT RESPIRATORY (INHALATION)
Status: DISCONTINUED | OUTPATIENT
Start: 2021-10-07 | End: 2021-10-15 | Stop reason: HOSPADM

## 2021-10-07 RX ADMIN — BUSPIRONE HYDROCHLORIDE 10 MG: 5 TABLET ORAL at 12:39

## 2021-10-07 RX ADMIN — BUSPIRONE HYDROCHLORIDE 10 MG: 5 TABLET ORAL at 21:11

## 2021-10-07 RX ADMIN — ACETAMINOPHEN 650 MG: 325 TABLET, FILM COATED ORAL at 21:12

## 2021-10-07 RX ADMIN — MELOXICAM 7.5 MG: 7.5 TABLET ORAL at 09:00

## 2021-10-07 RX ADMIN — ROPINIROLE HYDROCHLORIDE 2 MG: 1 TABLET, FILM COATED ORAL at 21:11

## 2021-10-07 RX ADMIN — FLUTICASONE FUROATE AND VILANTEROL 1 PUFF: 200; 25 POWDER RESPIRATORY (INHALATION) at 16:30

## 2021-10-07 RX ADMIN — ACETAMINOPHEN 650 MG: 325 TABLET, FILM COATED ORAL at 12:38

## 2021-10-07 RX ADMIN — LAMOTRIGINE 400 MG: 200 TABLET, EXTENDED RELEASE ORAL at 09:00

## 2021-10-07 RX ADMIN — FAMOTIDINE 20 MG: 20 TABLET ORAL at 21:11

## 2021-10-07 RX ADMIN — ACETAMINOPHEN 650 MG: 325 TABLET, FILM COATED ORAL at 09:00

## 2021-10-07 RX ADMIN — ROPINIROLE HYDROCHLORIDE 0.5 MG: 0.5 TABLET, FILM COATED ORAL at 12:38

## 2021-10-07 RX ADMIN — SERTRALINE HYDROCHLORIDE 300 MG: 100 TABLET ORAL at 09:00

## 2021-10-07 RX ADMIN — PANTOPRAZOLE SODIUM 40 MG: 40 TABLET, DELAYED RELEASE ORAL at 09:00

## 2021-10-07 RX ADMIN — ROPINIROLE HYDROCHLORIDE 1 MG: 1 TABLET, FILM COATED ORAL at 16:28

## 2021-10-07 RX ADMIN — ROPINIROLE HYDROCHLORIDE 1 MG: 1 TABLET, FILM COATED ORAL at 09:00

## 2021-10-07 RX ADMIN — ASPIRIN 81 MG CHEWABLE TABLET 81 MG: 81 TABLET CHEWABLE at 21:11

## 2021-10-07 RX ADMIN — BUSPIRONE HYDROCHLORIDE 10 MG: 5 TABLET ORAL at 16:28

## 2021-10-07 RX ADMIN — ALBUTEROL SULFATE 2 PUFF: 90 AEROSOL, METERED RESPIRATORY (INHALATION) at 14:13

## 2021-10-07 RX ADMIN — MONTELUKAST 10 MG: 10 TABLET, FILM COATED ORAL at 21:11

## 2021-10-07 RX ADMIN — LIDOCAINE 2 PATCH: 246 PATCH TOPICAL at 09:00

## 2021-10-07 RX ADMIN — ALBUTEROL SULFATE 2 PUFF: 90 AEROSOL, METERED RESPIRATORY (INHALATION) at 18:38

## 2021-10-07 RX ADMIN — GUAIFENESIN 600 MG: 600 TABLET ORAL at 12:00

## 2021-10-07 RX ADMIN — ENOXAPARIN SODIUM 40 MG: 100 INJECTION SUBCUTANEOUS at 18:42

## 2021-10-07 RX ADMIN — Medication 1 TABLET: at 09:00

## 2021-10-07 RX ADMIN — ASPIRIN 81 MG CHEWABLE TABLET 81 MG: 81 TABLET CHEWABLE at 09:00

## 2021-10-07 RX ADMIN — ACETAMINOPHEN 650 MG: 325 TABLET, FILM COATED ORAL at 16:27

## 2021-10-07 RX ADMIN — NORTRIPTYLINE HYDROCHLORIDE 75 MG: 25 CAPSULE ORAL at 21:12

## 2021-10-07 RX ADMIN — GUAIFENESIN 600 MG: 600 TABLET ORAL at 21:12

## 2021-10-07 RX ADMIN — BUSPIRONE HYDROCHLORIDE 10 MG: 5 TABLET ORAL at 09:00

## 2021-10-07 SDOH — ECONOMIC STABILITY: FOOD INSECURITY: WITHIN THE PAST 12 MONTHS, YOU WORRIED THAT YOUR FOOD WOULD RUN OUT BEFORE YOU GOT MONEY TO BUY MORE.: NEVER TRUE

## 2021-10-07 SDOH — ECONOMIC STABILITY: FOOD INSECURITY: WITHIN THE PAST 12 MONTHS, THE FOOD YOU BOUGHT JUST DIDN'T LAST AND YOU DIDN'T HAVE MONEY TO GET MORE.: NEVER TRUE

## 2021-10-07 ASSESSMENT — MINI MENTAL STATE EXAM
WHAT STATE [OR PROVINCE] ARE WE IN?: 1-->CORRECT
SAY: I WOULD LIKE YOU TO REPEAT THIS PHRASE AFTER ME: NO IFS, ANDS, OR BUTS.: 1-->ABLE TO PERFORM
WHAT IS TODAY'S DATE?: 1-->CORRECT
PLEASE NAME 2 OBJECTS? (EX. PEN, WATCH): 2-->NAMES THEM BOTH OBJECTS
WHAT CITY/TOWN ARE WE IN?: 1-->CORRECT
SPELL THE WORD WORLD. NOW SPELL IT BACKWARDS.: 5-->DLROW
WHAT DAY OF THE WEEK IS THIS?: 1-->CORRECT
NOW WHAT WERE THE THREE OBJECTS I ASKED YOU TO REMEMBER?: 1-->1 OUT OF 3 CORRECT
WHAT IS THE NAME OF THIS BUILDING [IN FACILITY]?/WHAT IS THE STREET ADDRESS OF THIS HOUSE [IN HOME]?: 1-->CORRECT
SAY:  READ THE WORDS ON THE PAGE AND THEN DO WHAT IT SAYS.  THEN HAND THE PERSON
THE SHEET WITH CLOSE YOUR EYES ON IT.  IF THE SUBJECT READS AND DOES NOT CLOSE THEIR
EYES, REPEAT UP TO THREE TIMES.  SCORE ONLY IF SUBJECT CLOSES EYES.: 1-->ABLE TO PERFORM
SAY: PUT THE PAPER DOWN ON THE FLOOR, SCORE IF PAPER IS PLACED BACK ON FLOOR: 3-->ALL CORRECT
NOW WHAT WERE THE THREE OBJECTS I ASKED YOU TO REMEMBER?: 3-->ALL CORRECT
WHAT YEAR IS THIS?: 1-->CORRECT
WHAT IS THE NAME OF THIS BUILDING [IN FACILITY]?/WHAT IS THE STREET ADDRESS OF THIS HOUSE [IN HOME]?: 1-->CORRECT
WHICH SEASON IS THIS?: 1-->CORRECT
WHAT MONTH IS THIS?: 1-->CORRECT
HAND THE PERSON A PENCIL AND PAPER. SAY: WRITE ANY COMPLETE SENTENCE ON THAT

PIECE OF PAPER. (NOTE: THE SENTENCE MUST MAKE SENSE. IGNORE SPELLING ERRORS): 1-->ABLE TO PERFORM

## 2021-10-07 ASSESSMENT — COGNITIVE AND FUNCTIONAL STATUS - GENERAL
CONCENTRATION: IMPAIRED, MILD
AFFECT: FULL RANGE
PSYCHOMOTOR FUNCTIONING: DECREASED
AROUSAL LEVEL: ALERT
REMOTE MEMORY: WNL
MOOD: FRUSTRATED;HOPEFUL
ORIENTATION: FULLY ORIENTED
IMPULSE CONTROL: INTACT
LIBIDO: NON-CONTRIBUTORY
DELUSIONS: NONE OR AGE APPROPRIATE
RECENT MEMORY: WNL
SPEECH: REGULAR;UNABLE TO ASSESS
EYE_CONTACT: WNL
INSIGHT: INTACT
SLEEP_WAKE_CYCLE: EARLY MORNING WAKING
APPETITE: NO CHANGE
ATTENTION: WNL
APPEARANCE: WELL GROOMED
THOUGHT_CONTENT: APPROPRIATE
EST. PREMORBID INTELLIGENCE: ABOVE AVERAGE
PERCEPTUAL FUNCTION: NORMAL
THOUGHT_PROCESS: WNL

## 2021-10-07 NOTE — PLAN OF CARE
Plan of Care Review  Plan of Care Reviewed With: patient  Progress: improving  Outcome Summary: Pt AAOx4, pleasant, cooperative. Medications reviewed. Seizure precautions maintained. Pt requests 2L O2 via NC AAT. Aspirin for DVT prophylaxis. Continent of b/b. PVRs completed today. B/l knee blisters and incisions open to air. Min A with RW for mobility. B/L LE edema. Pain managed with tylenol. Pt requests influenza vaccination if not contraindicated with pnuemonia diagnosis, physicians aware. Patient able to make needs known, call bell within reach. Safety maintained. No current concerns for nursing.

## 2021-10-07 NOTE — PROGRESS NOTES
Patient: Fide Amin  Location: [facility] [address]  MRN: [id number]  Today's date: 10/7/2021    History of Present Illness  Fide is a 60 y.o. female admitted on 10/6/2021 with Physical deconditioning [R53.81]. Principal problem is Physical deconditioning.   Mrs. Fide Amin is a 60 year old female who presented to [facility] on 9/20/21 for planned B TKA by Dr. Em. Pt suffered a seizure post op after 8 years of having no seizures. CT of chest completed 9/23 results reviewed with no CT evidence of pulmonary embolism.  Scattered bilateral airspace opacities noted and believed to be most likely representing multifocal pneumonia.  Covid 19 pneumonia was excluded. Patient was medically cleared for discharge 9/23/21 on ceftin 500mg BID and zithromax 500mg daily x 5 days and with instructions for f/u care.  Pt. Present[facility] 9/[facility] for SOB and dx with aspiration PNA. Pt. Intubated on 9/25/21 and extubated on 10/1/21. Pt is now medically stable and admitted to [facility] for acute inpatient rehab.      Past Medical History  Fide has a past medical history of Arthritis, Asthma, Depression, Deviated septum, Epilepsy (CMS/HCC), GERD (gastroesophageal reflux disease), Hypertension, Lipid disorder, Motion sickness, and RLS (restless legs syndrome).      OT Vitals    Date/Time Pulse SpO2 Pt Activity O2 Therapy O2 Del Method O2 Flow Rate BP BP Location BP Method Pt Position [facility]   10/07/21 0735 88 90 % At rest Supplemental oxygen Nasal cannula 2 L/min 143/65 Left upper arm Automatic Lying JV   10/07/21 0815 -- 90 % At rest Supplemental oxygen Nasal cannula 2 L/min -- -- -- -- JV   10/07/21 0817 94 94 % At rest Supplemental oxygen Nasal cannula 3 L/min 163/64 Left upper arm Automatic Sitting JV   10/07/21 0823 92 90 % -- following transfer Supplemental oxygen Nasal cannula 3 L/min 143/65 Left upper arm Automatic Sitting JV      OT Pain    Date/Time Pain Type Side/Orientation Location Rating: Rest  Interventions Who   10/07/21 0735 Pain Assessment left knee 5 diversional activity provided J   10/07/21 0820 Pain Reassessment -- -- 10 -- TG          Prior Living Environment      Most Recent Value   People in Home spouse  [4 kids]   Current Living Arrangements home   Home Accessibility stairs to enter home (Group),  stairs within home (Group)  [2+1 STEPHANY, ff inside, NE 1st floor]   Living Environment Comment Pt lives in 2 . Pt reports two small steps to enter and FF to 2nd floor. Pt reports plans to have 1st floor set-up. Pt has guest room on 1st floor c 1/2 bath. Pt reports purchased commode and 2 RTS c handles.   Number of Stairs, Main Entrance 2   Surface of Stairs, Main Entrance concrete   Stair Railings, Main Entrance none   Location, Patient Bedroom second floor, must negotiate stairs to access   Patient Bedroom Access Comment Ability to have first floor set up   Location, Bathroom first (main) floor,  second floor, must negotiate stairs to access   Bathroom Access Comment 2nd floor FB c shower stall. (+) grab bar in shower stall. Pt reports has built in shower bench in stall.   Number of Stairs, Within Home, Primary other (see comments)  [7+7]   Surface of Stairs, Within Home, Primary hardwood   Stair Railings, Within Home, Primary railings on both sides of stairs   Landing, Stairs, Within Home, Primary railings present          Prior Level of Function      Most Recent Value   Dominant Hand right   Ambulation independent   Transferring independent   Toileting independent   Bathing independent   Dressing independent   Prior Level of Function Comment Pt was d/c'd home for one day after surgery using RW.   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   Occupational History/Life  "Experiences Pt lives at home in [facility]  ([place]). Pt reports  also retired. Pt is a . Pt has epilepsy and hx of last seizure 2011 then had a seizure after BKA surgery 9/2021.   Patient Goals \"To get well enough to be able to go home and get PT there\"           IRF OT Evaluation and Treatment - 10/07/21 0740        OT Time Calculation    Start Time 0730     Stop Time 0850     Time Calculation (min) 80 min        Session Details    Document Type initial evaluation     Mode of Treatment occupational therapy; individual therapy        General Information    Patient Profile Reviewed yes     General Observations of Patient Pt received awake in bed; pleasant and agreeable to eval.     Existing Precautions/Restrictions cardiac; aspiration; fall; seizures        Cognition/Psychosocial    Cognitive Function memory deficit     Comment, Short Term Memory 3/3 immediate and 3/3 delayed recall c category clue required to ID 1/3 words. 14/15 BIMS.        Vision Assessment/Intervention    Visual Impairment/Limitations corrective lenses full-time; WFL        Sensory Assessment (Somatosensory)    Left UE Sensory Assessment intact; proprioception; light touch awareness     Right UE Sensory Assessment intact; proprioception; light touch awareness        Range of Motion (ROM)    Range of Motion bilateral upper extremities; ROM is WNL        Strength Comprehensive (MMT)    Comment Strength 3/5 per functional observation. MMT to be assessed.        Bed Mobility    Comment (Bed Mobility) MIN A supine to sit EOB for scooting anteriorly to edge of bed only.        Sit to Stand Transfer    [place], Sit to Stand Transfer minimum assist (75% or more patient effort)     Verbal Cues preparatory posture; safety; technique     Assistive Device walker, front-wheeled        Stand to Sit Transfer    [place], Stand to Sit Transfer minimum assist (75% or more patient effort)     Verbal Cues hand placement; safety     " Assistive Device walker, front-wheeled        Stand Pivot Transfer    [place], Stand Pivot/Stand Step Transfer moderate assist (50-74% patient effort)     Verbal Cues preparatory posture; safety; technique     Assistive Device walker, front-wheeled     Comment SPT EOB to w/c and short distance ambulatory approach        Toilet Transfer    Comment Unsafe to complete prior to therapeutic intervention.        Shower Transfer    Comment Unsafe to complete prior to therapeutic intervention.        Safety Issues, Functional Mobility    Comment, Safety Issues/Impairments (Mobility) MIN A ambulating c RW short distance within bathroom only.        Motor Skills    Coordination 9 Hole Peg Test of Fine Motor Coordination Results   TBA       Bathing    Self-Performance chest; left arm; abdomen; right arm; front perineal area; buttocks; left upper leg; right upper leg     [place] Assistance buttocks; left lower leg, including foot; right lower leg, including foot     [place] minimum assist (75% or more patient effort)     Position supported sitting; supported standing     Setup Assistance adaptive equipment setup; obtain supplies     Adaptive Equipment grab bar/tub rail; hand-held shower spray hose; shower chair     Comment Pt performs seated on shower chair c back. VCs for safety to prevent bending forward c washing/drying feet. MIN A for thoroughness dry feet. Assist to dry buttocks in standing.        Upper Body Dressing    Self-Performance threads left arm, shirt; threads right arm, shirt; pulls shirt over head/around back; pulls shirt down/adjusts     [place] supervision; set up     Position supported sitting     Adaptive Equipment none     Comment VCs for managing nasal cannula.        Lower Body Dressing    Self-Performance threads left leg, underpants; threads right leg, underpants     [place] Assistance obtains clothes; pulls underpants up or down; threads left leg, pants/shorts; threads right leg,  pants/shorts; pulls pants/shorts up or down; dons/doffs left sock; dons/doffs right sock     [place] maximum assist (25-49% patient effort)     Position supported sitting; supported standing     Adaptive Equipment none     [place], Footwear maximum assist (25-49% patient effort)     Comment VCs and assist provided for safety 2* pt bending too far forward to complete threading tasks. OT threading pants for safety. Pt standing c anterior grab bar support while OT assists c clothing magement up. Pt doffs socks, requires D assist to don footwear.        Grooming    [place] Assistance brushes/jacobs hair; oral care (brushing teeth, cleaning dentures)     [place] supervision; set up     Position supported sitting; sink side     Setup Assistance obtain supplies     Adaptive Equipment none     [place], Oral Hygiene supervision; set up     Comment Seated in w/c        Toileting    [place] moderate assist (50-74% patient effort)     Position unsupported standing; unsupported sitting     Setup Assistance adaptive equipment setup     Adaptive Equipment bariatric; commode, 3-in-1     Comment MOD A for balance in stance while pt performs clothing management down. Pt performs hygiene.        Therapy Assessment/Plan (OT)    Rehab Potential/Prognosis (OT) good, to achieve stated therapy goals     Frequency of Treatment (OT) 5-7 times per week; 60-90 minutes per day     Estimated Duration of Therapy (OT) 3 weeks     Problem List (OT) balance; mobility; strength; range of motion (ROM); pain   Activity tolerance    Activity Limitations Related to Problem List BADLs not performed adequately or safely; IADLs not performed adequately or safely; community activities not performed adequately or safely; home management activity not performed adequately or safely     Planned Therapy Interventions activity tolerance training; adaptive equipment training; BADL retraining; functional balance retraining; IADL retraining;  occupation/activity based interventions; passive ROM/stretching; patient/caregiver education/training; ROM/therapeutic exercise; strengthening exercise; transfer/mobility retraining     Comment, Therapy Assessment/Plan (OT) Initial OT eval completed. Pt was I PTA in BADLs and functional transfers. Pt currently unsafe to complete toilet and shower transfer s skilled OT intervention. Pt currently requiring assistance for safe performance of all BADLs. Pt will benefit from skilled OT intervention to maximize I and safety c BADLs and functional transfers and to address above stated problem areas. OT POC established                      Education Documentation  Orientation to Care Setting, Routine, taught by Vangie Cordova OT at 10/7/2021  2:37 PM.  Learner: Patient  Readiness: Acceptance  Method: Explanation  Response: Verbalizes Understanding  Comment: Pt educated on role of IP OT and OT POC. Pt educated on 3 hr rule and use of call bell.          IRF OT Goals      Most Recent Value   Transfer Goal 1    Activity/Assistive Device toilet at 10/07/2021 0740   [place] supervision required at 10/07/2021 0740   Time Frame short-term goal (STG),  1 week at 10/07/2021 0740   Transfer Goal 2    Activity/Assistive Device toilet at 10/07/2021 0740   [place] modified independence at 10/07/2021 0740   Time Frame long-term goal (LTG),  14 days or less at 10/07/2021 0740   Transfer Goal 3    Activity/Assistive Device shower at 10/07/2021 0740   [place] supervision required at 10/07/2021 0740   Time Frame short-term goal (STG),  1 week at 10/07/2021 0740   Transfer Goal 4    Activity/Assistive Device shower at 10/07/2021 0740   [place] modified independence at 10/07/2021 0740   Time Frame long-term goal (LTG),  14 days or less at 10/07/2021 0740   Bathing Goal 1    Activity/Assistive Device bathing skills, all at 10/07/2021 0740   [place] supervision required at 10/07/2021 0740   Time Frame short-term goal  (STG),  1 week at 10/07/2021 0740   Bathing Goal 2    Activity/Assistive Device bathing skills, all at 10/07/2021 0740   [place] set-up required at 10/07/2021 0740   Time Frame long-term goal (LTG),  14 days or less at 10/07/2021 0740   UB Dressing Goal 1    Activity/Assistive Device upper body dressing at 10/07/2021 0740   [place] minimum assist (75% or more patient effort) at 10/07/2021 0740   Time Frame short-term goal (STG),  1 week at 10/07/2021 0740   Strategies/Barriers c clothing retrieval at 10/07/2021 0740   UB Dressing Goal 2    [place] modified independence at 10/07/2021 0740   Time Frame long-term goal (LTG),  14 days or less at 10/07/2021 0740   LB Dressing Goal 1    [place] minimum assist (75% or more patient effort) at 10/07/2021 0740   Time Frame short-term goal (STG),  1 week at 10/07/2021 0740   Strategies/Barriers c AE at 10/07/2021 0740   LB Dressing Goal 2    [place] modified independence at 10/07/2021 0740   Time Frame long-term goal (LTG),  14 days or less at 10/07/2021 0740   Grooming Goal 1    [place] supervision required at 10/07/2021 0740   Time Frame short-term goal (STG),  1 week at 10/07/2021 0740   Strategies/Barriers standing at 10/07/2021 0740   Grooming Goal 2    [place] modified independence at 10/07/2021 0740   Time Frame long-term goal (LTG),  14 days or less at 10/07/2021 0740   Toileting Goal 1    [place] minimum assist (75% or more patient effort) at 10/07/2021 0740   Time Frame short-term goal (STG),  1 week at 10/07/2021 0740   Toileting Goal 2    [place] modified independence at 10/07/2021 0740   Time Frame long-term goal (LTG),  14 days or less at 10/07/2021 0740

## 2021-10-07 NOTE — PLAN OF CARE
Plan of Care Review  Plan of Care Reviewed With: patient  Progress: improving  Outcome Summary: Patient is AAOx3. Meds  and safety measures were reviewed with patient. Patient is sleeping well on 2 L oxygen. no c/o pain or discomfort.

## 2021-10-07 NOTE — CONSULTS
Consult Note    Reason For Referral: Medical comanagements  Referring Provider: Fito Chisholm MD   Outside records reviewed.    CC:S/p b/l TKA, epilepsy with post op recurrence, aspiration multifocal PNA complicated by VDRF, physical deconditioning.     59 yo female, with PMH of epilepsy (last episodes 2011), depression/anxiety, RLS with intermittent clonic spasms, asthma on ICS/LABA and rescue albuterol inhaler, HTN, HLD, anemia, hypocalcemia, DJD with severe b/l knee OA for which she presented to [facility] on 9/20/21 and underwent an elective b/l TKA, of note, following her surgery, she has a transient seizure episode, resolved by its self, further complicated by suspected aspiration, CT of chest on 9/23 reviewed no evidence of pulmonary embolism, scattered bilateral airspace opacities, believed to be most likely representing multifocal pneumonia. Covid 19 pneumonia was excluded. Patient was medically cleared for discahrge on ceftin 500mg BID and zithromax 500mg daily x 5 days and with instructions for f/u care. After discharge from [facility], she presented and was admitted to [facility] on 9/24 for dyspnea, diagnosed with aspiration PNA with acute respiratory failure, s/p intubation/ventilation support on 9/25/21 and extubated on 10/1/21. Completed 7-day course of ABX, MRSA negative, BCX NGTD, provided bronchodilator and steroid therapy, CXR with persistent bibasilar opacities, will need follow-up imaging in 4 to 6 weeks. Functionally, she has physical deconditioning, requiring inpt acute rehab, transferred to [facility] on 10/6/21.          Denies nausea, vomiting, abdominal pain or discomfort, dysuria, cough/sputum, running nose, sore throat, chest pain, palpitation, SOB or orthopnea, dizziness or LH,  HA.    Tolerating Diet:regular thin liquid diet       Allergies:    Allergies   Allergen Reactions   • Penicillins Other (see comments)     Rash and Asthma issues   • Sulfa (Sulfonamide Antibiotics) Hives       Current medication  list:  Current Facility-Administered Medications   Medication Dose Route Frequency Provider Last Rate Last Admin   • acetaminophen (TYLENOL) tablet 650 mg  650 mg oral With meals & nightly Fito Michael MD   650 mg at 10/07/21 0900   • acetaminophen (TYLENOL) tablet 650 mg  650 mg oral q4h PRN Fito Michael MD       • acetaminophen (TYLENOL) tablet 650 mg  650 mg oral q4h PRN Fito Michael MD       • albuterol HFA (VENTOLIN HFA) 90 mcg/actuation inhaler 2 puff  2 puff inhalation q4h PRN Fito Michael MD       • alum-mag hydroxide-simeth (MAALOX) 200-200-20 mg/5 mL suspension 30 mL  30 mL oral q4h PRN Fito Michael MD       • aspirin chewable tablet 81 mg  81 mg oral BID Fito Michael MD   81 mg at 10/07/21 0900   • [START ON 10/8/2021] atorvastatin (LIPITOR) tablet 40 mg  40 mg oral Daily (6p) Noé Nunez MD       • bisacodyL (DULCOLAX) 10 mg suppository 10 mg  10 mg rectal Daily PRN Fito Michael MD       • busPIRone (BUSPAR) tablet 10 mg  10 mg oral With meals & nightly Fito Michael MD   10 mg at 10/07/21 0900   • docusate sodium (COLACE) capsule 100 mg  100 mg oral BID Fito Michael MD       • famotidine (PEPCID) tablet 20 mg  20 mg oral Nightly Fito Michael MD   20 mg at 10/06/21 2107   • HYDROmorphone (DILAUDID) tablet 2 mg  2 mg oral q4h PRN Fito Michael MD       • lamoTRIgine (LAMICTAL XR) extended release tablet 400 mg  400 mg oral Daily Fito Michael MD   400 mg at 10/07/21 0900   • lidocaine (ASPERCREME) 4 % topical patch 2 patch  2 patch Topical Daily Fito Michael MD   2 patch at 10/07/21 0900   • meloxicam (MOBIC) tablet 7.5 mg  7.5 mg oral Daily Fito Michael MD   7.5 mg at 10/07/21 0900   • montelukast (SINGULAIR) tablet 10 mg  10 mg oral Nightly Fito Michael MD   10 mg at 10/06/21 2110   • multivit-min-iron-folic-vit K1 (CENTRUM) chewable tablet 1 tablet  1 tablet oral Daily Fito Michael MD   1  tablet at 10/07/21 09   • NON FORMULARY MEDICATION REQUEST  2 puff inhalation Daily Fito Michael MD       • nortriptyline (PAMELOR) capsule 75 mg  75 mg oral Nightly Fito Michael MD   75 mg at 10/06/21 2107   • pantoprazole (PROTONIX) tablet,delayed release (DR/EC) 40 mg  40 mg oral Daily before breakfast Fito Michael MD   40 mg at 10/07/21 09   • polyethylene glycol (MIRALAX) 17 gram packet 17 g  17 g oral Daily PRN Fito Michael MD       • rOPINIRole (REQUIP) tablet 0.5 mg  0.5 mg oral Daily with lunch Fito Michael MD       • rOPINIRole (REQUIP) tablet 1 mg  1 mg oral Daily with dinner Fito Michael MD   1 mg at 10/06/21 1854   • rOPINIRole (REQUIP) tablet 1 mg  1 mg oral Daily Fito Michael MD   1 mg at 10/07/21 09   • rOPINIRole (REQUIP) tablet 2 mg  2 mg oral Nightly Fito Michael MD   2 mg at 10/06/21 2108   • senna (SENOKOT) tablet 2 tablet  2 tablet oral Daily with lunch Fito Michael MD       • sertraline (ZOLOFT) tablet 300 mg  300 mg oral Daily Fito Michael MD   300 mg at 10/07/21 09       Past Medical History:   Past Medical History:   Diagnosis Date   • Arthritis     OA   • Asthma    • Depression    • Deviated septum    • Epilepsy (CMS/HCC)     Last seizure 8 years ago   • GERD (gastroesophageal reflux disease)    • Hypertension    • Lipid disorder    • Motion sickness    • RLS (restless legs syndrome)        Past Surgical History:   Past Surgical History:   Procedure Laterality Date   •  SECTION      x1   • SINUS SURGERY         Social History:   Social History     Socioeconomic History   • Marital status:      Spouse name: Not on file   • Number of children: Not on file   • Years of education: Not on file   • Highest education level: Not on file   Occupational History   • Not on file   Tobacco Use   • Smoking status: Never Smoker   • Smokeless tobacco: Never Used   Vaping Use   • Vaping Use: Never used   Substance  "and Sexual Activity   • Alcohol use: Yes     Alcohol/week: 2.0 standard drinks     Types: 2 Glasses of wine per week   • Drug use: Never   • Sexual activity: Not on file   Other Topics Concern   • Not on file   Social History Narrative   • Not on file     Social Determinants of Health     Financial Resource Strain:    • Difficulty of Paying Living Expenses: Not on file   Food Insecurity: No Food Insecurity   • Worried About Running Out of Food in the Last Year: Never true   • Ran Out of Food in the Last Year: Never true   Transportation Needs:    • Lack of Transportation (Medical): Not on file   • Lack of Transportation (Non-Medical): Not on file   Physical Activity:    • Days of Exercise per Week: Not on file   • Minutes of Exercise per Session: Not on file   Stress:    • Feeling of Stress : Not on file   Social Connections:    • Frequency of Communication with Friends and Family: Not on file   • Frequency of Social Gatherings with Friends and Family: Not on file   • Attends [employer] Services: Not on file   • Active Member of Clubs or Organizations: Not on file   • Attends Club or Organization Meetings: Not on file   • Marital Status: Not on file   Intimate Partner Violence:    • Fear of Current or Ex-Partner: Not on file   • Emotionally Abused: Not on file   • Physically Abused: Not on file   • Sexually Abused: Not on file   Housing Stability:    • Unable to Pay for Housing in the Last Year: Not on file   • Number of Places Lived in the Last Year: Not on file   • Unstable Housing in the Last Year: Not on file       Family History:   Reviewed, not contributory to her current HPI    ROS  Complete Review of Systems:  All other systems reviewed and not remarkable except as noted in the HPI.    Vital signs:  Visit Vitals  BP (!) 149/66 (BP Location: Right upper arm, Patient Position: Sitting)   Pulse 91   Temp 38 °C (100.4 °F) (Oral)   Resp 20   Ht 1.575 m (5' 2.01\")   Wt 63.9 kg (140 lb 14 oz)   SpO2 95%   BMI 25.76 " kg/m²       Physical Examination:  Head/Ear/Nose/Throat: normocephalic; atraumatic; moisture mouth mm, no oropharyngeal thrush noted.   Eyes: anicteric sclera, EOMI; PERRL.   Neck : supple, no JVD, no carotid bruits appeciated.   Respiratory: no evidence of labored breathing, lung sounds CTA b/l, good aeration bibasilar area, no w/r/c.   Cardiovascular: RRR; normal S1, S2; no m/r/g; no S3 or S4.   Gastrointestinal: soft; NT; BS normal; mildly distended; no CVAT b/l.   Genitourinary: no vazquez.   Extremities : s/p b/l TKA, incisional site residual erythema with mild swelling .   Neurological: AO x 2-3, fluent speeches, following commands, CNS II-XII grossly intact; no focal neurologic deficits, noted intermittent clonic spasms.   Behavior/Emotional: in NAD, appropriate; cooperative.   Skin: clean, dry and intact.    Labs:  Lab Results   Component Value Date    WBC 10.36 09/22/2021    HGB 8.5 (L) 09/22/2021    HCT 26.1 (L) 09/22/2021    MCV 96.3 09/22/2021     09/22/2021     Lab Results   Component Value Date    GLUCOSE 95 10/05/2021    CALCIUM 7.3 (L) 09/22/2021     10/05/2021    K 3.7 10/05/2021    CO2 25 10/05/2021     10/05/2021    BUN 13 10/05/2021    CREATININE 0.6 10/05/2021       Imaging  OSH imaging study reports reviewed      Assessment    CC:S/p b/l TKA, epilepsy with post op recurrence, aspiration multifocal PNA complicated by VDRF, physical deconditioning.     61 yo female, with PMH of epilepsy (last episodes 2011), depression/anxiety, RLS with intermittent clonic spasms, asthma on ICS/LABA and rescue albuterol inhaler, HTN, HLD, anemia, hypocalcemia, DJD with severe b/l knee OA for which she presented to [facility] on 9/20/21 and underwent an elective b/l TKA, of note, following her surgery, she has a transient seizure episode, resolved by its self, further complicated by suspected aspiration, CT of chest on 9/23 reviewed no evidence of pulmonary embolism, scattered bilateral airspace  opacities, believed to be most likely representing multifocal pneumonia. Covid 19 pneumonia was excluded. Patient was medically cleared for discahrge on ceftin 500mg BID and zithromax 500mg daily x 5 days and with instructions for f/u care. After discharge from [facility], she presented and was admitted to [facility] on 9/24 for dyspnea, diagnosed with aspiration PNA with acute respiratory failure, s/p intubation/ventilation support on 9/25/21 and extubated on 10/1/21. Completed 7-day course of ABX, MRSA negative, BCX NGTD, provided bronchodilator and steroid therapy, CXR with persistent bibasilar opacities, will need follow-up imaging in 4 to 6 weeks. Functionally, she has physical deconditioning, requiring inpt acute rehab, transferred to [facility] on 10/6/21.     1. S/p b/l TKA, epilepsy with post op recurrence, aspiration multifocal PNA complicated by VDRF, physical deconditioning: inpt acute rehab, gait and balancing training, fall precaution, medical management, DVT prophylaxis, dermal defense, f/u with surgeon and pulmonologist as scheduled.    2.Pulm-asthma AE, s/p aspiration multifocal PNA complicated by VDRF, residual air space diseases: monitor SO2, prn NC O2, keep SO2>92%, incentive spirometry, LABA/ICS, bronchodilator   nebulizer, mucolytic agent, pulm toileting. Repeat CXR, f/u with her pulmonologist.     3. Anemia: f/u H/H, no need PRBC based on current H/H level, check iron profile, po iron with Vit C as indicated.    4. GI-constipation, GERD: provide constipation bowel regimen, po hydration, fiber intake, timed toilet visits; PPI for GERD and GI prophy.    5. S/p b/l TKA, residual b/l knee swelling, risk of DVT, DVT prophy: SCD, early ambulation, SQ lovenox, check LE venous DUS to r/o DVT.      6. HTN, HLD: not on HTN meds, monitor BP, add as indicated, on statin .     7. Neuro- epilepsy with post op recurrence, neuropathy, RLS with intermittent clonic spasms: consult neurologist, cont AED, on Requip and  nortriptyline, iron supplement if has deficiency, regular neuro checks, f/u with her neurologist as outpt     8. Renal, electrolytes: monitor renal function and volume status,   monitor and keep electrolytes balance.    9. Psych-anxiety, depression: on Buspar and sertraline, consult psychiatrist for comanagement, psychology CBT, SW support.     10. - urinary retention/intontinence: timed voiding trial, monitor PVR with prn cic, check UA/UCX, UTI precaution.    Billing code: [id number]  Diagnoses:  Patient Active Problem List   Diagnosis   • Arthritis of knee   • Epilepsy (CMS/[facility])   • HTN (hypertension)   • HLD (hyperlipidemia)   • Hypocalcemia   • Depression   • History of total knee arthroplasty, bilateral   • Physical deconditioning           Noé Nunez MD  10/7/2021

## 2021-10-07 NOTE — PROGRESS NOTES
Subjective    Patient seen and examined on rounds.  Chart reviewed.  Events overnight noted.  History reviewed briefly with patient.    CC:  Deficits in mobility, transfers, self-care status post recent bilateral total knee replacements, recent pneumonia, ventilator-dependent respiratory failure, seizure disorder, deconditioning, multiple medical problems    HPI:  Ms. Fide Amin is a 60-year-old right handed white female with chronic conditions significant for polyarticular degenerative arthritis, hypertension, hyperlipidemia, seizure disorder, anxiety, depression, asthma, restless leg syndrome had elective bilateral total knee replacements secondary to degenerative arthritis of both knees by Dr. Bennett Em at [facility] on 9/20/21. Postoperatively, on the way from the PACU to the floor or soon after arriving on the floor, the patient did sustain a seizure and she does have a history of seizure disorder but had not had one in about a decade.  She was seen by neurologist at [facility] after her seizure.  Postoperative course was significant for hypotension which was felt to be secondary to narcotics, dehydration as well as blood loss.  She had hypoxia with exertion. CT of chest on 9/23/21 revealed no CT evidence of pulmonary embolism, but scattered bilateral airspace opacities were noted and believed to be most likely representing multifocal pneumonia.  Covid 19 pneumonia was excluded. She was allowed weightbearing as tolerated on both lower extremities and on Aspirin and SCDs for DVT prophylaxis. She was apparently offered SNF placement per her records but she declined it.  She was medically cleared for discharge on Ceftin 500mg BID and Zithromax 500mg daily x 5 days and with instructions for follow-up care and discharged to home on 9/23/21 with home care.  Subsequently, she presented to the ER at [facility] on 9/24/21 with increased shortness of breath and was diagnosed with  "aspiration pneumonia.  She was intubated on 9/25/21 and eventually extubated on 10/1/21. She reports she was on oxygen in the acute care hospital and will try to wean her off oxygen as possible.  She has had multiple blisters on both knees.  On 10/5/21, hemoglobin was 8.5, WBC count 6.9, platelets were 545, BUN was 13 and creatinine was 0.6.  She has been needing assistance for mobility, transfers, self-care. She is transferred to [facility] on 10/6/21 for further rehabilitation care.     SUBJ: Slept well last night.  Tolerating therapies per endurance.  Denies increased pain.  Had a bowel movement.  Voiding well.  Inspected bilateral knee incisions which are stable.  She did not have lab work today, labs will be drawn tomorrow per nursing.    ROS: Denies chest pain or shortness of breath. Other ROS negative. Past, family, social history is unchanged.    Physical Exam      Blood pressure (!) 119/57, pulse 75, temperature 37 °C (98.6 °F), temperature source Oral, resp. rate 20, height 1.575 m (5' 2.01\"), weight 63.9 kg (140 lb 14 oz), SpO2 92 %, not currently breastfeeding.  Body mass index is 25.76 kg/m².    General Appearance: Not in acute distress  Head/Ear/Nose/Throat: Normocephalic; Atraumatic.   Eye: EOMI; PERRL.   Neck: No JVD; No Bruits.   Respiratory: Decreased breath sounds at bases.   Cardiovascular: RRR; Normal S1, S2.   Gastrointestinal: Soft; NT; +BS.   Extremities: Bilateral lower extremity edema noted.  Healing incisions noted on anterior aspect of both knees.  She has multiple large blisters present on both knees, with the ones on the right knee drying out.  Dressing is present on the blisters on left knee. She is able to do active straight leg raise more easily on the right and with some difficulty on the left.  Musculoskeletal: Functional active range of motion in both upper extremities.  Some limitation of active range of motion in both hips and knees, limited by pain.  "   Neurological: AAO ×3. Speech is fluent. Cranial nerve examination does not reveal any gross facial asymmetry. Strength testing about 4+/5 strength in both upper extremities.  Bilateral hip flexion is 3/5.  Right quadriceps is 3+/5, left quadriceps is 3/5.  Bilateral ankle dorsi and plantar flexion are 4/5.  She is grossly able to localize touch and position sense in her toes.  Deep tendon reflexes are hypoactive and symmetric bilaterally.  Plantars are flexor.  Coordination is functional upper extremities.  Both knee jerks were not tested.  Behavior/Emotional: Appropriate; Cooperative.   Skin: No obvious rashes noted.  Bilateral knee incisions healing.  She has multiple large blisters present on both knees, with the ones on the right knee drying out.  Dressing is present on the blisters on left knee.  She has bruising noted in both lower extremities including her feet after recent bilateral knee replacements.      Current Facility-Administered Medications:   •  acetaminophen (TYLENOL) tablet 650 mg, 650 mg, oral, With meals & nightly, Fito Michael MD, 650 mg at 10/07/21 1238  •  acetaminophen (TYLENOL) tablet 650 mg, 650 mg, oral, q4h PRN, Fito Michael MD  •  acetaminophen (TYLENOL) tablet 650 mg, 650 mg, oral, q4h PRN, Fito Michael MD  •  albuterol HFA (VENTOLIN HFA) 90 mcg/actuation inhaler 2 puff, 2 puff, inhalation, q4h PRN, Fito Michael MD  •  albuterol HFA (VENTOLIN HFA) 90 mcg/actuation inhaler 2 puff, 2 puff, inhalation, QID (6a, 10a, 2p, 6p), Noé Nunez MD, 2 puff at 10/07/21 1413  •  alum-mag hydroxide-simeth (MAALOX) 200-200-20 mg/5 mL suspension 30 mL, 30 mL, oral, q4h PRN, Fito Michael MD  •  aspirin chewable tablet 81 mg, 81 mg, oral, BID, Fito Michael MD, 81 mg at 10/07/21 0900  •  [START ON 10/8/2021] atorvastatin (LIPITOR) tablet 40 mg, 40 mg, oral, Daily (6p), Noé Nunez MD  •  bisacodyL (DULCOLAX) 10 mg suppository 10 mg, 10 mg, rectal, Daily  PRN, Fito Michael MD  •  busPIRone (BUSPAR) tablet 10 mg, 10 mg, oral, With meals & nightly, Fito Michael MD, 10 mg at 10/07/21 1239  •  enoxaparin (LOVENOX) syringe 40 mg, 40 mg, subcutaneous, Daily (6p), Noé Nunez MD  •  famotidine (PEPCID) tablet 20 mg, 20 mg, oral, Nightly, Fito Michael MD, 20 mg at 10/06/21 2107  •  fluticasone furoate-vilanteroL (BREO ELLIPTA) 200-25 mcg/dose powder for inhalation 1 puff, 1 puff, inhalation, Daily, Noé Nunez MD  •  guaiFENesin (MUCINEX) 12 hr ER tablet 600 mg, 600 mg, oral, BID, Noé Nunez MD, 600 mg at 10/07/21 1200  •  HYDROmorphone (DILAUDID) tablet 2 mg, 2 mg, oral, q4h PRN, Fito Michael MD  •  lamoTRIgine (LAMICTAL XR) extended release tablet 400 mg, 400 mg, oral, Daily, Fito Michael MD, 400 mg at 10/07/21 0900  •  lidocaine (ASPERCREME) 4 % topical patch 2 patch, 2 patch, Topical, Daily, Fito Michael MD, 2 patch at 10/07/21 0900  •  meloxicam (MOBIC) tablet 7.5 mg, 7.5 mg, oral, Daily, Fito Michael MD, 7.5 mg at 10/07/21 0900  •  montelukast (SINGULAIR) tablet 10 mg, 10 mg, oral, Nightly, Fito Michael MD, 10 mg at 10/06/21 2110  •  multivit-min-iron-folic-vit K1 (CENTRUM) chewable tablet 1 tablet, 1 tablet, oral, Daily, Fito Michael MD, 1 tablet at 10/07/21 0900  •  nortriptyline (PAMELOR) capsule 75 mg, 75 mg, oral, Nightly, Fito Michael MD, 75 mg at 10/06/21 2107  •  pantoprazole (PROTONIX) tablet,delayed release (DR/EC) 40 mg, 40 mg, oral, Daily before breakfast, Fito Michael MD, 40 mg at 10/07/21 0900  •  polyethylene glycol (MIRALAX) 17 gram packet 17 g, 17 g, oral, Daily PRN, Fito Michael MD  •  rOPINIRole (REQUIP) tablet 0.5 mg, 0.5 mg, oral, Daily with lunch, Fito Michael MD, 0.5 mg at 10/07/21 1238  •  rOPINIRole (REQUIP) tablet 1 mg, 1 mg, oral, Daily with dinner, Fito Michael MD, 1 mg at 10/06/21 1542  •  rOPINIRole (REQUIP) tablet 1 mg, 1 mg, oral,  Daily, Fito Michael MD, 1 mg at 10/07/21 0900  •  rOPINIRole (REQUIP) tablet 2 mg, 2 mg, oral, Nightly, Fito Michael MD, 2 mg at 10/06/21 2108  •  sennosides-docusate sodium (SENOKOT-S) 8.6-50 mg per tablet 2 tablet, 2 tablet, oral, BID, Noé Nunez MD  •  sertraline (ZOLOFT) tablet 300 mg, 300 mg, oral, Daily, Fito Michael MD, 300 mg at 10/07/21 0900       Labs / Radiology    Lab Results   Component Value Date    WBC 10.36 09/22/2021    HGB 8.5 (L) 09/22/2021    HCT 26.1 (L) 09/22/2021    MCV 96.3 09/22/2021     09/22/2021     Lab Results   Component Value Date    GLUCOSE 95 10/05/2021    CALCIUM 7.3 (L) 09/22/2021     10/05/2021    K 3.7 10/05/2021    CO2 25 10/05/2021     10/05/2021    BUN 13 10/05/2021    CREATININE 0.6 10/05/2021     Assessment and Plan    ASSESSMENT PLAN:  1. 60-year-old right handed white female with chronic conditions significant for polyarticular degenerative arthritis, hypertension, hyperlipidemia, seizure disorder, anxiety, depression, asthma, restless leg syndrome had elective bilateral total knee replacements secondary to degenerative arthritis of both knees by Dr. Bennett Em at [facility] on 9/20/21. Postoperatively, on the way from the PACU to the floor or soon after arriving on the floor, the patient did sustain a seizure and she does have a history of seizure disorder but had not had one in about a decade.  She was seen by neurologist at [facility] after her seizure.  Postoperative course was significant for hypotension which was felt to be secondary to narcotics, dehydration as well as blood loss.  She had hypoxia with exertion. CT of chest on 9/23/21 revealed no CT evidence of pulmonary embolism, but scattered bilateral airspace opacities were noted and believed to be most likely representing multifocal pneumonia.  Covid 19 pneumonia was excluded. She was allowed weightbearing as tolerated on both lower extremities and on  Aspirin and SCDs for DVT prophylaxis. She was apparently offered SNF placement per her records but she declined it.  She was medically cleared for discharge on Ceftin 500mg BID and Zithromax 500mg daily x 5 days and with instructions for follow-up care and discharged to home on 9/23/21 with home care.  Subsequently, she presented to the ER at [facility] on 9/24/21 with increased shortness of breath and was diagnosed with aspiration pneumonia.  She was intubated on 9/25/21 and eventually extubated on 10/1/21. She reports she was on oxygen in the acute care hospital and will try to wean her off oxygen as possible.  She has had multiple blisters on both knees.  On 10/5/21, hemoglobin was 8.5, WBC count 6.9, platelets were 545, BUN was 13 and creatinine was 0.6.  She has been needing assistance for mobility, transfers, self-care. She is transferred to [facility] on 10/6/21 for further rehabilitation care.     2. DVT prophylaxis - on Aspirin.  On SCDs.  Check doppler.  Platelets 213 on 9/22/2021.     3.  Deconditioning - status post recent bilateral total knee replacements, recent pneumonia, ventilator-dependent respiratory failure, seizure disorder, deconditioning, multiple medical problems - continue PT, OT, psychology.  Follow falls precautions, cardiac precautions, monitor pulse oximetry in therapy.     4. GI - On Pepcid for GI prophylaxis.  On Protonix.  Continue Colace, Senokot.  On PRN MiraLAX.  On PRN Dulcolax suppository. On PRN Maalox.       5.  - voiding.  Denies dysuria.  Monitor post residuals.     6.  Seizure disorder -on Lamictal XR.  Follow seizure precautions.     7. Pain - on Mobic.  On Tylenol.  On PRN Dilaudid.  Ice to knees.  On topical Lidoderm patches.     8. Hematology -  Anemia - on MVI.  Hemoglobin 8.5, WBC 10.36 on 9/22/2021.     9.  Psychiatry - history of anxiety and depression - on BuSpar.  On Zoloft.  On Pamelor.     10.  Pulmonary - H/O asthma,  recent pneumonia, ventilator-dependent respiratory failure.  On Singulair.  On Breo Ellipta.  On PRN Ventolin HFA.     11.  Restless leg syndrome - on Requip.     12. Skin - bilateral knee incisions stable.  Multiple blisters noted on both incisions.     13.  Hyperlipidemia - on Lipitor.     14. F/E/N - On MVI. On vitamin C.       15.  Rehabilitation medicine - Continue comprehensive rehabilitation care. Continue PT, OT, psychology.  We will follow falls precautions, cardiac precautions, monitor pulse oximetry in therapy and follow aspiration precautions.Slept well last night.  Tolerating therapies per endurance.  Denies increased pain.  Had a bowel movement.  Voiding well.  Inspected bilateral knee incisions which are stable.  She did not have lab work today, labs will be drawn tomorrow per nursing.       16. Reviewed labs today.  BUN 13, creatinine 0.6 on 10/5/2021.      Fito Michael MD  10/7/2021

## 2021-10-07 NOTE — PLAN OF CARE
Problem: Rehabilitation ([facility]) Plan of Care  Goal: Plan of Care Review  Outcome: Progressing  Flowsheets (Taken 10/6/2021 2002)  Progress: improving  Plan of Care Reviewed With:   patient   spouse  Outcome Summary: Patient welcomed to the unit. Reviewed with patient and the spouse what to expect while in rehab. Questions answered.  Reviewed how to use call bell and reminded patient not to get out of bed without assistance. Patient was continent of urine and transferred to bedside commode with assist of one. Patient made comfortable in bed.

## 2021-10-07 NOTE — PROGRESS NOTES
10/07/21 0907   Food Insecurity   Within the past 12 months, you worried that your food would run out before you got the money to buy more. Never true   Within the past 12 months, the food you bought just didn’t last and you didn’t have money to get more. Never true

## 2021-10-07 NOTE — CONSULTS
Psychiatry Consult      Fide Amin is a 60 y.o. female  Patient was admitted for comprehensive rehabilitation including PT/OT/Speech tx/Recr tx/nutition management. Referred by Rehabilitation Medicine Service for evaluation and treatment of psychiatric condition.     History of Present Medical Illness  CC:S/p b/l TKA, epilepsy with post op recurrence, aspiration multifocal PNA complicated by VDRF, physical deconditioning.      59 yo female, with PMH of epilepsy (last episodes 2011), depression/anxiety, RLS with intermittent clonic spasms, asthma on ICS/LABA and rescue albuterol inhaler, HTN, HLD, anemia, hypocalcemia, DJD with severe b/l knee OA for which she presented to [facility] on 9/20/21 and underwent an elective b/l TKA, of note, following her surgery, she has a transient seizure episode, resolved by its self, further complicated by suspected aspiration, CT of chest on 9/23 reviewed no evidence of pulmonary embolism, scattered bilateral airspace opacities, believed to be most likely representing multifocal pneumonia. Covid 19 pneumonia was excluded. Patient was medically cleared for discahrge on ceftin 500mg BID and zithromax 500mg daily x 5 days and with instructions for f/u care. After discharge from [facility], she presented and was admitted to [facility] on 9/24 for dyspnea, diagnosed with aspiration PNA with acute respiratory failure, s/p intubation/ventilation support on 9/25/21 and extubated on 10/1/21. Completed 7-day course of ABX, MRSA negative, BCX NGTD, provided bronchodilator and steroid therapy, CXR with persistent bibasilar opacities, will need follow-up imaging in 4 to 6 weeks. Functionally, she has physical deconditioning, requiring inpt acute rehab, transferred to [facility] on 10/6/21.            Denies nausea, vomiting, abdominal pain or discomfort, dysuria, cough/sputum, running nose, sore throat, chest pain, palpitation, SOB or orthopnea, dizziness or LH,  HA.     Outside records reviewed. Pertinent radiology  results reviewed. Pertinent lab results reviewed..  Patient seen, chart reviewed, records reviewed.  She has a longstanding history of depression and anxiety.  For many years now she is taken a consistent pharmacologic regimen.  She has taken Zoloft 300 mg now for over 20 years, nortriptyline 75 at night and BuSpar 10 mg 4 times a day.  She stated she had very difficult depression and anxiety in the past and that this regimen has been helpful to her.  She does have some shakiness and jitteriness.  She attributes this to her asthma medication.  I indicated to her that akathisia can be related to Zoloft.  She denied any internal restlessness.  We reviewed her Zoloft dosing and explained that it is significantly above the normal range.  I discussed with her lowering Zoloft and potentially if needed increasing nortriptyline but she is very hesitant as she has been on this stable pharmacologic regimen for many years.  She denies GI side effect, headaches or sedation.  Insight is fair.   was present.  He gave me a form that confirmed her psychotropic medications.  Psychiatric History:     Significant for Depressions / anxiety         No psychosis / concepcion ocd or otherwise  Substance Use History:  Substance use:   No history of substance abuse or dependence  Alcohol use social    Family History: No family history on file.    Social History:   Social History     Socioeconomic History   • Marital status:      Spouse name: Not on file   • Number of children: Not on file   • Years of education: Not on file   • Highest education level: Not on file   Occupational History   • Not on file   Tobacco Use   • Smoking status: Never Smoker   • Smokeless tobacco: Never Used   Vaping Use   • Vaping Use: Never used   Substance and Sexual Activity   • Alcohol use: Yes     Alcohol/week: 2.0 standard drinks     Types: 2 Glasses of wine per week   • Drug use: Never   • Sexual activity: Not on file   Other Topics Concern   • Not  on file   Social History Narrative   • Not on file     Social Determinants of Health     Financial Resource Strain:    • Difficulty of Paying Living Expenses: Not on file   Food Insecurity: No Food Insecurity   • Worried About Running Out of Food in the Last Year: Never true   • Ran Out of Food in the Last Year: Never true   Transportation Needs:    • Lack of Transportation (Medical): Not on file   • Lack of Transportation (Non-Medical): Not on file   Physical Activity:    • Days of Exercise per Week: Not on file   • Minutes of Exercise per Session: Not on file   Stress:    • Feeling of Stress : Not on file   Social Connections:    • Frequency of Communication with Friends and Family: Not on file   • Frequency of Social Gatherings with Friends and Family: Not on file   • Attends Jewish Services: Not on file   • Active Member of Clubs or Organizations: Not on file   • Attends Club or Organization Meetings: Not on file   • Marital Status: Not on file   Intimate Partner Violence:    • Fear of Current or Ex-Partner: Not on file   • Emotionally Abused: Not on file   • Physically Abused: Not on file   • Sexually Abused: Not on file   Housing Stability:    • Unable to Pay for Housing in the Last Year: Not on file   • Number of Places Lived in the Last Year: Not on file   • Unstable Housing in the Last Year: Not on file       Medical History:   Past Medical History:   Diagnosis Date   • Arthritis     OA   • Asthma    • Depression    • Deviated septum    • Epilepsy (CMS/HCC)     Last seizure 8 years ago   • GERD (gastroesophageal reflux disease)    • Hypertension    • Lipid disorder    • Motion sickness    • RLS (restless legs syndrome)        Surgical History:   Past Surgical History:   Procedure Laterality Date   •  SECTION      x1   • SINUS SURGERY         Allergies:   Allergies   Allergen Reactions   • Penicillins Other (see comments)     Rash and Asthma issues   • Sulfa (Sulfonamide Antibiotics) Hives        MENTAL STATUS EXAM  Appearance: well groomed  Gait and Motor: no abnormal movements  Speech: normal rate/rhythm/volume  Mood: depressed and anxious  Affect: constricted  Associations: coherent  Thought Process: goal-directed  Thought Content: no auditory or visual hallucinations.  Suicidality/Homicidality: denies  Judgement/Insight: minimizes severity level of illness  Orientation: situation  Memory: knows current president  Attention: distracted  Knowledge: normal  Language: normal      Current Medications:  •  acetaminophen, 650 mg, oral, With meals & nightly  •  acetaminophen, 650 mg, oral, q4h PRN  •  acetaminophen, 650 mg, oral, q4h PRN  •  albuterol HFA, 2 puff, inhalation, q4h PRN  •  albuterol HFA, 2 puff, inhalation, QID (6a, 10a, 2p, 6p)  •  alum-mag hydroxide-simeth, 30 mL, oral, q4h PRN  •  aspirin, 81 mg, oral, BID  •  [START ON 10/8/2021] atorvastatin, 40 mg, oral, Daily (6p)  •  bisacodyL, 10 mg, rectal, Daily PRN  •  busPIRone, 10 mg, oral, With meals & nightly  •  enoxaparin, 40 mg, subcutaneous, Daily (6p)  •  famotidine, 20 mg, oral, Nightly  •  fluticasone furoate-vilanteroL, 1 puff, inhalation, Daily  •  guaiFENesin, 600 mg, oral, BID  •  HYDROmorphone, 2 mg, oral, q4h PRN  •  lamoTRIgine, 400 mg, oral, Daily  •  lidocaine, 2 patch, Topical, Daily  •  meloxicam, 7.5 mg, oral, Daily  •  montelukast, 10 mg, oral, Nightly  •  multivit-min-iron-folic-vit K1, 1 tablet, oral, Daily  •  nortriptyline, 75 mg, oral, Nightly  •  pantoprazole, 40 mg, oral, Daily before breakfast  •  polyethylene glycol, 17 g, oral, Daily PRN  •  rOPINIRole, 0.5 mg, oral, Daily with lunch  •  rOPINIRole, 1 mg, oral, Daily with dinner  •  rOPINIRole, 1 mg, oral, Daily  •  rOPINIRole, 2 mg, oral, Nightly  •  sennosides-docusate sodium, 2 tablet, oral, BID  •  sertraline, 300 mg, oral, Daily    Home Medications:  •  ascorbic acid, Take 500 mg by mouth daily.  •  atorvastatin, Take 40 mg by mouth nightly.  •  busPIRone,  Take 10 mg by mouth 4 (four) times a day.  •  ECHINACEA ORAL, Take by mouth.  •  esomeprazole magnesium (NEXIUM ORAL), Take 20 mg by mouth nightly.  •  fluticasone furoate-vilanteroL, Inhale 1 puff daily.  •  lamoTRIgine, Take 400 mg by mouth daily. Patient takes two 200 mg once a day in the morning  •  montelukast, Take 10 mg by mouth daily.  •  multivitamin, Take 1 tablet by mouth daily.  •  nortriptyline, Take 75 mg by mouth nightly.  •  phytonadione (vitamin K1), Take 100 mcg by mouth daily.  •  rOPINIRole, Take 3 mg by mouth 3 (three) times a day. 0.5 mg in afternoon, 1 mg am and 1mg dinner and 2mg bedtime   •  sertraline, Take 300 mg by mouth daily. Patient takes three 100 mg tabs in AM./  •  acetaminophen, Take 3 tablets (975 mg total) by mouth 3 (three) times a day for 270 doses.  •  aspirin, Take 1 tablet (81 mg total) by mouth 2 (two) times a day for 178 doses.  •  lisinopriL, Take 1 tablet (5 mg total) by mouth daily. HOLD FOR 1 WEEK- PLEASE FOLLOW UP WITH YOUR PCP BEFORE RESTARTING  •  meloxicam, Take 1 tablet (7.5 mg total) by mouth daily.  •  polyethylene glycol, Take 17 g by mouth daily for 90 doses.  •  sennosides-docusate sodium, Take 1 tablet by mouth 2 (two) times a day for 178 doses.    Objective     Vital Signs for the last 24 hours:  Temp:  [36.7 °C (98.1 °F)-38 °C (100.4 °F)] 38 °C (100.4 °F)  Heart Rate:  [79-94] 82  Resp:  [18-20] 20  BP: (126-163)/(58-66) 130/63    Labs  Lab Results   Component Value Date    WBC 10.36 09/22/2021    HGB 8.5 (L) 09/22/2021    HCT 26.1 (L) 09/22/2021    MCV 96.3 09/22/2021     09/22/2021     Lab Results   Component Value Date    GLUCOSE 95 10/05/2021    CALCIUM 7.3 (L) 09/22/2021     10/05/2021    K 3.7 10/05/2021    CO2 25 10/05/2021     10/05/2021    BUN 13 10/05/2021    CREATININE 0.6 10/05/2021       Imaging  [unfilled]      60 y.o. female being consulted for management recommendations and patient co-management       Plan     Patient  Active Problem List   Diagnosis   • Arthritis of knee   • Epilepsy (CMS/[facility])   • HTN (hypertension)   • HLD (hyperlipidemia)   • Hypocalcemia   • Depression   • History of total knee arthroplasty, bilateral   • Physical deconditioning         Assessment/Plan    Depression: CBT automatic anxious and negative thoughts  Adjustment Disorder to Disability: supportive therapy  Sleep:monitor  Insight into illness: insight therapy/psychoeducation  Psychotherapy: supportive  Monitor: Mood, Speech, Appetite, Energy, Cognition, Behavioral, Impulsivity, Agitation  Family Support  Medications:  Hg 8.5  Sodium 139  Support atd  Promote insight  BuSpar 10 4 times a day  Zoloft 300  I discussed in detail with the patient and her  my concern about her current Zoloft dose and how it is likely contributing to her jumpiness and jitteriness though she feels this is related to her asthma medicines.  She was clear that she wants to stay on her current dose.  We agreed to do so for now but to continue to monitor for any potential side effects.  Sodium is okay and within normal limits.    Cody Vaughn MD  10/7/2021  3:15 PM

## 2021-10-07 NOTE — PLAN OF CARE
Problem: Rehabilitation (IRF) Plan of Care  Goal: Plan of Care Review  Flowsheets (Taken 10/7/2021 1243)  Progress: improving  Plan of Care Reviewed With: patient  Outcome Summary: PT initial eval completed     Problem: Rehabilitation (IRF) Plan of Care  Goal: Patient-Specific Goal (Individualized)  Flowsheets (Taken 10/7/2021 1243)  Patient-Specific Goals (Include Timeframe): Go home and have enough basic skills to take care of self safely.  Individualized Care Needs: Nothing stated  Anxieties, Fears or Concerns: Nothing stated     Problem: Rehabilitation (IRF) Plan of Care  Goal: Patient-Specific Goal (Individualized)  Flowsheets (Taken 10/7/2021 1243)  Patient-Specific Goals (Include Timeframe): Go home and have enough basic skills to take care of self safely.  Individualized Care Needs: Nothing stated  Anxieties, Fears or Concerns: Nothing stated

## 2021-10-07 NOTE — PROGRESS NOTES
Patient: Fide Amin  Location: [facility] [address]  MRN: [id number]  Today's date: 10/7/2021    History of Present Illness  Fide is a 60 y.o. female admitted on 10/6/2021 with Physical deconditioning [R53.81]. Principal problem is Physical deconditioning.   Mrs. Fide Amin is a 60 year old female who presented to [facility] on 9/20/21 for planned B TKA by Dr. Em. Pt suffered a seizure post op after 8 years of having no seizures. CT of chest completed 9/23 results reviewed with no CT evidence of pulmonary embolism.  Scattered bilateral airspace opacities noted and believed to be most likely representing multifocal pneumonia.  Covid 19 pneumonia was excluded. Patient was medically cleared for discharge 9/23/21 on ceftin 500mg BID and zithromax 500mg daily x 5 days and with instructions for f/u care.  Pt. Presented to [facility] on 9/24 for SOB and dx with aspiration PNA. Pt. Intubated on 9/25/21 and extubated on 10/1/21. Pt is now medically stable and admitted to [facility] for acute inpatient rehab.      Past Medical History  Fide has a past medical history of Arthritis, Asthma, Depression, Deviated septum, Epilepsy (CMS/HCC), GERD (gastroesophageal reflux disease), Hypertension, Lipid disorder, Motion sickness, and RLS (restless legs syndrome).      OT Vitals    Date/Time Pulse SpO2 O2 Therapy O2 Del Method O2 Flow Rate BP BP Location BP Method Pt Position Who   10/07/21 0858 91 95 % Supplemental oxygen Nasal cannula 3 L/min 149/66 Right upper arm Automatic Sitting JV      OT Pain    Date/Time Pain Type Side/Orientation Location Rating: Rest Interventions Who   10/07/21 0858 Pain Reassessment left knee 9 position adjusted JV   10/07/21 0900 Pain Assessment -- knee 10 -- TG          Prior Living Environment      Most Recent Value   People in Home spouse  [4 kids]   Current Living Arrangements home   Home Accessibility stairs to enter home (Group),  stairs within home (Group)  [2+1 STEPHANY, ff  "inside, MS 1st floor]   Living Environment Comment Pt lives in 2 . Pt reports two small steps to enter and FF to 2nd floor. Pt reports plans to have 1st floor set-up. Pt has guest room on 1st floor c 1/2 bath. Pt reports purchased commode and 2 RTS c handles.   Number of Stairs, Main Entrance 2   Surface of Stairs, Main Entrance concrete   Stair Railings, Main Entrance none   Location, Patient Bedroom second floor, must negotiate stairs to access   Patient Bedroom Access Comment Ability to have first floor set up   Location, Bathroom first (main) floor,  second floor, must negotiate stairs to access   Bathroom Access Comment 2nd floor FB c shower stall. (+) grab bar in shower stall. Pt reports has built in shower bench in stall.   Number of Stairs, Within Home, Primary other (see comments)  [7+7]   Surface of Stairs, Within Home, Primary hardwood   Stair Railings, Within Home, Primary railings on both sides of stairs   Landing, Stairs, Within Home, Primary railings present          Prior Level of Function      Most Recent Value   Dominant Hand right   Ambulation independent   Transferring independent   Toileting independent   Bathing independent   Dressing independent   Prior Level of Function Comment Pt was d/c'd home for one day after surgery using RW.   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   Occupational History/Life Experiences Pt lives at home in [facility] c  ([place]). Pt reports  also retired. Pt is a . Pt has epilepsy and hx of last seizure 2011 then had a seizure after BKA surgery 9/2021.   Patient Goals \"To get well enough to be able to go home and get PT there\"           IRF OT Evaluation and Treatment - 10/07/21 0850        OT Time Calculation "    Start Time 0850     Stop Time 0900     Time Calculation (min) 10 min        Session Details    Document Type daily treatment/progress note     Mode of Treatment occupational therapy; individual therapy        General Information    General Observations of Patient Pt agreeable to trial recommended DME for functional transfers.        Toilet Transfer    Transfer Technique stand pivot     [place], Toilet Transfer moderate assist (50-74% patient effort)     Verbal Cues preparatory posture; safety; technique     Assistive Device commode, 3-in-1; walker, front-wheeled     Comment SPT to/from commode over toilet.        Shower Transfer    Transfer Technique stand pivot     [place], Shower Transfer moderate assist (50-74% patient effort)     Verbal Cues preparatory posture; safety; technique     Assistive Device grab bars/tub rail; shower chair; walker, front-wheeled     Comment Ambulatory approach to/from shower chair in barrier free shower stall.        Daily Progress Summary (OT)    Daily Outcome Statement Pt benefiting from skilled OT intervention using recommended DME for performance of safe toilet and shower transfer.                           IRF OT Goals      Most Recent Value   Transfer Goal 1    Activity/Assistive Device toilet at 10/07/2021 0740   [place] supervision required at 10/07/2021 0740   Time Frame short-term goal (STG),  1 week at 10/07/2021 0740   Transfer Goal 2    Activity/Assistive Device toilet at 10/07/2021 0740   [place] modified independence at 10/07/2021 0740   Time Frame long-term goal (LTG),  14 days or less at 10/07/2021 0740   Transfer Goal 3    Activity/Assistive Device shower at 10/07/2021 0740   [place] supervision required at 10/07/2021 0740   Time Frame short-term goal (STG),  1 week at 10/07/2021 0740   Transfer Goal 4    Activity/Assistive Device shower at 10/07/2021 0740   [place] modified independence at 10/07/2021 0740   Time Frame long-term goal  (LTG),  14 days or less at 10/07/2021 0740   Bathing Goal 1    Activity/Assistive Device bathing skills, all at 10/07/2021 0740   [place] supervision required at 10/07/2021 0740   Time Frame short-term goal (STG),  1 week at 10/07/2021 0740   Bathing Goal 2    Activity/Assistive Device bathing skills, all at 10/07/2021 0740   [place] set-up required at 10/07/2021 0740   Time Frame long-term goal (LTG),  14 days or less at 10/07/2021 0740   UB Dressing Goal 1    Activity/Assistive Device upper body dressing at 10/07/2021 0740   [place] minimum assist (75% or more patient effort) at 10/07/2021 0740   Time Frame short-term goal (STG),  1 week at 10/07/2021 0740   Strategies/Barriers c clothing retrieval at 10/07/2021 0740   UB Dressing Goal 2    [place] modified independence at 10/07/2021 0740   Time Frame long-term goal (LTG),  14 days or less at 10/07/2021 0740   LB Dressing Goal 1    [place] minimum assist (75% or more patient effort) at 10/07/2021 0740   Time Frame short-term goal (STG),  1 week at 10/07/2021 0740   Strategies/Barriers c AE at 10/07/2021 0740   LB Dressing Goal 2    [place] modified independence at 10/07/2021 0740   Time Frame long-term goal (LTG),  14 days or less at 10/07/2021 0740   Grooming Goal 1    [place] supervision required at 10/07/2021 0740   Time Frame short-term goal (STG),  1 week at 10/07/2021 0740   Strategies/Barriers standing at 10/07/2021 0740   Grooming Goal 2    [place] modified independence at 10/07/2021 0740   Time Frame long-term goal (LTG),  14 days or less at 10/07/2021 0740   Toileting Goal 1    [place] minimum assist (75% or more patient effort) at 10/07/2021 0740   Time Frame short-term goal (STG),  1 week at 10/07/2021 0740   Toileting Goal 2    [place] modified independence at 10/07/2021 0740   Time Frame long-term goal (LTG),  14 days or less at 10/07/2021 0720

## 2021-10-07 NOTE — CONSULTS
114/114W    Clinical Course: Patient is a 60 y.o. female who was admitted on 10/6/2021 with a diagnosis of Physical deconditioning [R53.81].     Nutrition Interventions/Recommendations:   1. Continue Regular diet  2. Encouraged nutrition for healing and recovery  3. Patient has alternative foods list  At nutrition risk level 1    Past Medical History:   Diagnosis Date   • Arthritis     OA   • Asthma    • Depression    • Deviated septum    • Epilepsy (CMS/HCC)     Last seizure 8 years ago   • GERD (gastroesophageal reflux disease)    • Hypertension    • Lipid disorder    • Motion sickness    • RLS (restless legs syndrome)      Past Surgical History:   Procedure Laterality Date   •  SECTION      x1   • SINUS SURGERY          Adult Nutrition Assessment - 10/07/21 1400        Charting Type    Nutrition Charting Type Nutrition Brief Assessment     Nutrition Status Classification Mild nutritional compromise     Time Spent (Minutes) 35        Reason for Assessment    Reason For Assessment physician consult        Nutrition/Diet History    Typical Food/Fluid Intake Follows low carb, low fat, high protein diet     Diet Prior to Admission Regular/thins     Intake (%) 75%     Food Preferences None provided     Cultural/Faith Preferences None     Vitamin/Mineral/Herbal Supplements Vit C, MVI     Food Allergies NKFA     Factors Affecting Nutritional Intake pain        Usual Body Weight (UBW)    Usual Body Weight 57.6 kg (127 lb)     Weight Loss other (see comments)   weight gain due to edema       Body Mass Index (BMI)    BMI Assessment BMI 25-29.9: overweight        Labs/Procedures/Meds    Lab Results Reviewed reviewed     Lab Results Comments WDL        Medications    Pertinent Medications Reviewed reviewed, pertinent     Pertinent Medications Comments buspar, colace, senokot, zoloft        Physical Findings    Overall Physical Appearance overweight     Oral/Mouth Cavity --   WDL    Skin surgical incision; edema    2+ b/l LE       Nutrition Order    Nutrition Order meets nutritional requirements     Nutrition Order Comments Regular        PES Statement    Nutritional Needs Met? Yes                 CMP Results       10/05/21 09/22/21 09/21/21      0413 0525     137 135    K 3.7 3.7 3.7    Cl 105 108 105    CO2 25 23 24    Glucose 95 102 113    BUN 13 11 13    Creatinine 0.6 0.6 0.6    Calcium -- 7.3 7.5    Anion Gap -- 6 6    Albumin -- 2.8 --    EGFR -- >60.0 >60.0        Lab Results   Component Value Date    ALT 48 09/20/2021    AST 51 (H) 09/20/2021    ALKPHOS 70 09/20/2021    BILITOT 0.5 09/20/2021     No results found for: [id number]  Lab Results   Component Value Date    WBC 10.36 09/22/2021    HGB 8.5 (L) 09/22/2021    HCT 26.1 (L) 09/22/2021    MCV 96.3 09/22/2021     09/22/2021     No results found for: CHOL  No results found for: HDL  No results found for: LDLCALC  No results found for: TRIG  No results found for: CHOLHDL  Lab Results   Component Value Date    CALCIUM 7.3 (L) 09/22/2021     Glucose Results       09/16/21     1025    HBG A1C 5.4    EST AVG GLUCOSE 108         Comment for EST AVG GLUCOSE at 1025 on 09/16/21: Estimate of average glucose concentration continuously over 24 hours for previous 2 to 3 months(Per ADA Recommendation).            • acetaminophen  650 mg oral With meals & nightly   • albuterol HFA  2 puff inhalation QID (6a, 10a, 2p, 6p)   • aspirin  81 mg oral BID   • [START ON 10/8/2021] atorvastatin  40 mg oral Daily (6p)   • busPIRone  10 mg oral With meals & nightly   • enoxaparin  40 mg subcutaneous Daily (6p)   • famotidine  20 mg oral Nightly   • fluticasone furoate-vilanteroL  1 puff inhalation Daily   • guaiFENesin  600 mg oral BID   • lamoTRIgine  400 mg oral Daily   • lidocaine  2 patch Topical Daily   • meloxicam  7.5 mg oral Daily   • montelukast  10 mg oral Nightly   • multivit-min-iron-folic-vit K1  1 tablet oral Daily   • nortriptyline  75 mg oral Nightly   •  pantoprazole  40 mg oral Daily before breakfast   • rOPINIRole  0.5 mg oral Daily with lunch   • rOPINIRole  1 mg oral Daily with dinner   • rOPINIRole  1 mg oral Daily   • rOPINIRole  2 mg oral Nightly   • sennosides-docusate sodium  2 tablet oral BID   • sertraline  300 mg oral Daily          Dietary Orders   (From admission, onward)             Start     Ordered    10/06/21 1734  Adult Diet Regular; Thin Liquids  Diet effective now        References:&nbsp;&nbsp;&nbsp;&nbsp;IDDSI Diet reference   Question Answer Comment   Diet Texture Regular    Fluid Consistency: Thin Liquids        10/06/21 1733              Wt Readings from Last 3 Encounters:   10/06/21 63.9 kg (140 lb 14 oz)   10/05/21 59 kg (130 lb)   09/20/21 58.5 kg (129 lb)       Weights (last 7 days)     Date/Time Weight    10/06/21 1656 63.9 kg (140 lb 14 oz)          Clinical Comments:  Patient is eating 75% of her meals.  She denies weight loss, states her current weight is elevated due to edema.  Protein intake encouraged for healing.  Patient states she follows a low carb/high protein diet normally.      Date: 10/07/21  Signature: Violette Day RD

## 2021-10-07 NOTE — HOSPITAL COURSE
History of Present Illness  Fide is a 60 y.o. female admitted on 10/6/2021 with Physical deconditioning [R53.81]. Principal problem is Physical deconditioning.   Mrs. Fide Amin is a 60 year old female who presented to [facility] on 9/20/21 for planned B TKA by Dr. Em. Pt suffered a seizure post op after 8 years of having no seizures. CT of chest completed 9/23 results reviewed with no CT evidence of pulmonary embolism.  Scattered bilateral airspace opacities noted and believed to be most likely representing multifocal pneumonia.  Covid 19 pneumonia was excluded. Patient was medically cleared for discharge 9/23/21 on ceftin 500mg BID and zithromax 500mg daily x 5 days and with instructions for f/u care.  Pt. Presented to [facility] on 9/24 for SOB and dx with aspiration PNA. Pt. Intubated on 9/25/21 and extubated on 10/1/21. Pt is now medically stable and admitted to [facility] for acute inpatient rehab.      Past Medical History  Fide has a past medical history of Arthritis, Asthma, Depression, Deviated septum, Epilepsy (CMS/HCC), GERD (gastroesophageal reflux disease), Hypertension, Lipid disorder, Motion sickness, and RLS (restless legs syndrome).

## 2021-10-07 NOTE — PROGRESS NOTES
Patient: Fide Amin  Location: [facility] [address]  MRN: [id number]  Today's date: 10/7/2021    No notes on file    PT Vitals    Date/Time Pulse HR Source SpO2 O2 Therapy O2 Del Method O2 Flow Rate BP BP Location BP Method Pt Position Observations [facility]   10/07/21 0953 86 Monitor 91 % Supplemental oxygen Nasal cannula 2 L/min 134/60 Right upper arm Automatic Lying Start of session [facility]   10/07/21 1022 85 Monitor 93 % Supplemental oxygen Nasal cannula 2 L/min 140/66 Left upper arm Automatic Sitting -- [facility]   10/07/21 1113 82 Monitor 97 % Supplemental oxygen Nasal cannula 2 L/min 130/63 Right upper arm Automatic Sitting End of session, prior to retrnnto bed [place]      PT Pain    Date/Time Pain Type Side/Orientation Location Rating: Rest Interventions [facility]   10/07/21 0953 Pain Assessment left R knee 5 knee 10 premedicated for activity [facility]   10/07/21 1022 Pain Reassessment left knee 5 position adjusted [facility]   10/07/21 1113 Pain Reassessment left knee 9 premedicated for activity [place]          Prior Living Environment      Most Recent Value   People in Home spouse  [4 kids]   Current Living Arrangements home   Home Accessibility stairs to enter home (Group),  stairs within home (Group)  [2+1 STEPHANY, ff inside, [place] 1st floor]   Living Environment Comment Pt lives in 2 . Pt reports two small steps to enter and FF to 2nd floor. Pt reports plans to have 1st floor set-up. Pt has guest room on 1st floor c 1/2 bath. Pt reports purchased commode and 2 RTS c handles.   Number of Stairs, Main Entrance 2   Surface of Stairs, Main Entrance concrete   Stair Railings, Main Entrance none   Location, Patient Bedroom second floor, must negotiate stairs to access   Patient Bedroom Access Comment Ability to have first floor set up   Location, Bathroom first (main) floor,  second floor, must negotiate stairs to access   Bathroom Access Comment 2nd floor FB c shower stall. (+) grab bar in shower stall. Pt reports has built in  shower bench in stall.   Number of Stairs, Within Home, Primary other (see comments)  [7+7]   Surface of Stairs, Within Home, Primary hardwood   Stair Railings, Within Home, Primary railings on both sides of stairs   Landing, Stairs, Within Home, Primary railings present          Prior Level of Function      Most Recent Value   Dominant Hand right   Ambulation independent   Transferring independent   Toileting independent   Bathing independent   Dressing independent   Prior Level of Function Comment Pt was d/c'd home for one day after surgery using RW.   Assistive Device Currently Used at Home commode, 3-in-1,  raised toilet,  walker, front-wheeled  [RTS c handles]           IRF PT Evaluation and Treatment - 10/07/21 1003        PT Time Calculation    Start Time 0953     Stop Time 1123     Time Calculation (min) 90 min        Session Details    Document Type initial evaluation     Mode of Treatment physical therapy; individual therapy        General Information    Patient Profile Reviewed yes     General Observations of Patient Pt received in bed, pleasant and cooperative.     Existing Precautions/Restrictions aspiration; cardiac; fall; seizures; oxygen therapy device and L/min        Sensory Assessment (Somatosensory)    Left LE Sensory Assessment general sensation; light touch awareness; proprioception; intact     Right LE Sensory Assessment general sensation; light touch awareness; proprioception; intact        Range of Motion (ROM)    Left Lower Extremity (ROM) left LE ROM is WFL except; knee; ankle     Knee, Left (ROM) 0-75 degrees     Ankle, Left (ROM) Ankle DF 0     Right Lower Extremity (ROM) right LE ROM is WFL except; knee; ankle     Knee, Right (ROM) 0-85 degrees     Ankle, Right (ROM) Ankle DF 0        Strength (Manual Muscle Testing)    Hip, Left (Strength) Flex:3-/5; Ext/IR/ER NT; Abd/add >+2/5     Knee, Left (Strength) Ext 3-/5; Flex: mod resist GE     Ankle, Left (Strength) DF: 5/5; INv/EV/PF max  resist GE     Hip, Right (Strength) Flex: 4/5 Ext/IR/ER NT; Abd/add >= 2/5     Knee, Right (Strength) Ext 4/5; Flex mod Resist GE     Ankle, Right (Strength) DF: 5/5; INv/EV/PF max resist GE        Bed Mobility    [place], Roll Left close supervision; increased time to complete     [place], Roll Right close supervision; increased time to complete     [place], Supine to Sit close supervision; increased time to complete     [place], Sit to Supine close supervision; minimum assist (75% or more patient effort); increased time to complete     Assistive Device none     Comment (Bed Mobility) On flat hopsital bed without use of rails        Chair to Bed Transfer    [place], Chair to Bed minimum assist (75% or more patient effort)     Verbal Cues hand placement; preparatory posture; safety     Assistive Device none     Comment SPTx from w/c to bed without use of RW        Sit to Stand Transfer    [place], Sit to Stand Transfer touching/steadying assist     Verbal Cues hand placement; preparatory posture; safety     Assistive Device walker, front-wheeled     Comment W/C  and Bed to RW        Stand to Sit Transfer    [place], Stand to Sit Transfer touching/steadying assist     Verbal Cues hand placement; preparatory posture; safety     Assistive Device walker, front-wheeled     Comment RW to w/c        Stand Pivot Transfer    [place], Stand Pivot/Stand Step Transfer touching/steadying assist     Verbal Cues hand placement; preparatory posture; proper use of assistive device; safety     Assistive Device walker, front-wheeled     Comment Via ambulation and bed to w/c transfer. PT managed O2 tank, touching assist at hips for safety.        Car Transfer    Transfer Technique stand pivot     [place], Car Transfer touching/steadying assist     Verbal Cues hand placement; preparatory posture; proper use of assistive device; safety; technique     Assistive Device walker, front-wheeled      Comment PT managed O2 tank, touching assist at hips for safety.        Gait Training    [place], Gait touching/steadying assist     Assistive Device walker, front-wheeled     Distance in Feet 25 feet     Pattern (Gait) step-through     Deviations/Abnormal Patterns (Gait) gait speed decreased; step length decreased; stride length decreased; antalgic     [place], Picking Up Object not tested     Comment (Gait/Stairs) PT managed O2 tank, touching assist at hips for safety.        Curb Negotiation    Comment Unsafe to attempt due to SOB and fatigue        Rough/Uneven Surface Gait Skills    Comment Unsafe to attempt due to SOB and fatigue        Stairs Training    [place], Stairs minimum assist (75% or more patient effort)     Assistive Device railing     Handrail Location (Stairs) both sides     Number of Stairs 4     Stair Height 6 inches     Ascending Stairs Technique step-to-step     Descending Stairs Technique step-to-step     Comment Min A at pelvis for support and safety. PT managed O2 tank and tubing.        Wheelchair Mobility/Management    Comment, Wheelchair Mobility Not a goal        Balance    Static Sitting Balance WFL     Dynamic Sitting Balance WFL     Static Standing Balance mild impairment; supported; standing     Dynamic Standing Balance mild impairment; supported; standing     Comment, Balance Touching assist provided when pt standing for safety.        Motor Skills    Coordination WFL; bilateral; lower extremity   CHARLIE    Functional Endurance Poor due to SOB and need for supplemental O2        Postural Deviations    Comment, Postural Deviations Pt able to sit in midline with erect posture. In standing, pt flexed at hips and preferring to keep eye gaze at floor.        Lower Extremity (Therapeutic Exercise)    Exercise Position/Type supine     General Exercise bilateral; ankle pumps; quad sets; heel slides; gluteal sets     Reps and Sets 3     Comment Supine exercises reviewed with pt  for her to perform during down time from therapies        Gait/Walking Locomotion Goal 1    Distance 150 feet        Gait/Walking Locomotion Goal 2    Distance 300 feet        Patient/Family Goals    Patient's Goals For Discharge take care of myself at home        Therapy Assessment/Plan (PT)    Functional Level at Time of Evaluation (PT) Touching/min A with RW     PT Diagnosis (PT) Deconditioning after B TKA and PNA     Rehab Potential/Prognosis (PT) good, to achieve stated therapy goals     Frequency of Treatment (PT) 5-7 times per week; 60-90 minutes per day     Estimated Duration of Therapy (PT) 2 weeks     Problem List (PT) problems related to; balance; mobility; range of motion (ROM); strength; pain     Activity Limitations Related to Problem List unable to ambulate safely; unable to transfer safely; community activities not performed adequately or safely; home management activity not performed adequately or safely     Planned Therapy Interventions balance training; bed mobility training; gait training; home exercise program; patient/family education; ROM (range of motion); stair training; strengthening; transfer training     Comment, Therapy Assessment/Plan (PT) Pt is a 60 year old female who presented to [facility] on 9/20/21 for planned B TKA by Dr. Em. Pt suffered a seizure post op after 8 years of having no seizures. Pt d/c’d home on 9/23/21 with a RW for mobility. On 9/24/21 pt admitted to [facility] with Pneumonia. She was intubated and then extubated on 10/1/21. She presents to [facility] on 10[facility]21 with deficits to her Independent mobility due to B TKA and deconditioned state from Pna. Mrs. Amin has problems related to balance, mobility, range of motion (ROM), strength and pain. Recommneding intensive rehab program to address these limitations and progress her back to mod I level using RW and d/c to home with her .                      Education Documentation  Safety Techniques, taught by  Latasha Worthy, PT at 10/7/2021 12:46 PM.  Learner: Patient  Readiness: Acceptance  Method: Explanation  Response: Verbalizes Understanding, Needs Reinforcement  Comment: Educated pt on safety with RW, use of call bell and to wait for assistance from caregivers, and schedule for this coming weekend.          IRF PT Goals      Most Recent Value   Bed Mobility Goal 1    Activity/Assistive Device bed mobility activities, all,  no assistive device at 10/07/2021 1003   [place] modified independence at 10/07/2021 1003   Time Frame 1 week at 10/07/2021 1003   Transfer Goal 1    Activity/Assistive Device sit-to-stand/stand-to-sit,  stand pivot,  car transfer,  walker, front-wheeled at 10/07/2021 1003   [place] supervision required,  set-up required at 10/07/2021 1003   Time Frame 1 week at 10/07/2021 1003   Transfer Goal 2    Activity/Assistive Device sit-to-stand/stand-to-sit,  stand pivot,  car transfer,  walker, front-wheeled at 10/07/2021 1003   [place] modified independence at 10/07/2021 1003   Time Frame 14 days or less at 10/07/2021 1003   Gait/Walking Locomotion Goal 1    Activity/Assistive Device assistive device use,  backward stepping,  decrease fall risk,  diminish gait deviation,  forward stepping,  improve balance and speed,  increase endurance/gait distance,  increase energy conservation,  normalize weight shifts,  turning, left,  turning, right,  walker, front-wheeled at 10/07/2021 1003   Distance 150 feet at 10/07/2021 1003   [place] supervision required at 10/07/2021 1003   Time Frame 1 week at 10/07/2021 1003   Gait/Walking Locomotion Goal 2    Activity/Assistive Device gait (walking locomotion),  assistive device use,  backward stepping,  decrease fall risk,  diminish gait deviation,  forward stepping,  improve balance and speed,  increase endurance/gait distance,  increase energy conservation,  normalize weight shifts,  sidestepping,  turning, left,  turning, right,  walker,  front-wheeled at 10/07/2021 1003   Distance 300 feet at 10/07/2021 1003   [place] modified independence at 10/07/2021 1003   Time Frame 14 days or less at 10/07/2021 1003   Stairs Goal 1    Activity/Assistive Device ascending stairs,  descending stairs,  step-to-step,  using handrail, right,  using handrail, left,  decrease fall risk,  improve balance and speed at 10/07/2021 1003   Number of Stairs 8 at 10/07/2021 1003   [place] supervision required at 10/07/2021 1003   Time Frame 1 week at 10/07/2021 1003   Stairs Goal 2    Activity/Assistive Device using handrail, left,  using handrail, right,  step-to-step,  decrease fall risk,  ascending stairs,  descending stairs,  improve balance and speed,  no assistive device at 10/07/2021 1003   Number of Stairs 14 at 10/07/2021 1003   [place] modified independence at 10/07/2021 1003   Time Frame 14 days or less at 10/07/2021 1003   ROM Goal 1    ROM Goal 1 B knee ROM 0-90 at 10/07/2021 1003   Time Frame 14 days or less at 10/07/2021 1003   ROM Goal 2    ROM Goal 2 B ankle DF ROM +10 degrees at 10/07/2021 1003   Time Frame 14 days or less at 10/07/2021 1003   Caregiver Training Goal 1    Caregiver Training Goal 1 Pt's  safe and I providing any necessary supervision/Assist with mobility tasks at 10/07/2021 1003   Time Frame 14 days or less at 10/07/2021 1003   Problem Specific Goal 1    Problem-Specific Goal 1 Pt weaned from supplemental O2 and tolerating > 92% with activity. at 10/07/2021 1003   Time Frame 14 days or less at 10/07/2021 1003

## 2021-10-07 NOTE — PROGRESS NOTES
10/07/21 0900   Contact Information   Permission Granted to Share Info With family/designee   Advance Directives (for Healthcare)   Does patient have advance directive? No   Living Environment   Current Living Arrangements home   Home Accessibility stairs to enter home (Group); stairs within home (Group)  (2+1 STEPHANY, ff inside, [place] 1st floor)   People in Home spouse  (4 kids)   Name(s) of People in Home  [place]   Living Environment   Quality of Family Relationships supportive   Able to Return to Prior Arrangements yes   Employment/   Employment Status retired   Current or Previous Occupation education  ([employer]  college)   Employment/ Comments none   Financial/Legal   Source of Income pension/longterm; social security   Values/Beliefs   Spiritual, Cultural Beliefs, Scientology Practices, Values that Affect Care   (Muslim)   Discharge Needs Assessment   Concerns to be Addressed care coordination/care conferences; discharge planning   Patient/Family Anticipates Transition to home with family   Patient/Family Anticipated Services at Transition home health care   Anticipated Discharge Disposition home with home health   Patient's Choice of Community Agency(s) [facility]   Discharge Planning   Type of Home Care Services home OT; home PT; nursing   Discharge Transportation   Does the patient need discharge transport arranged? No   Team Conference   Next Team Conference Date 10/12/21   spoke with  to provide education on rehab process and role of cm. He is concerned that pt is homesick and wants to leave [place] and hopes we can encourae her to stay for a week. Support provided.

## 2021-10-07 NOTE — PLAN OF CARE
Initial OT eval completed. Pt was I PTA in BADLs and functional transfers. Pt currently unsafe to complete toilet and shower transfer s skilled OT intervention. Pt currently requiring assistance for safe performance of all BADLs. Pt will benefit from skilled OT intervention to maximize I and safety c BADLs and functional transfers and to address above stated problem areas. OT POC established

## 2021-10-08 ENCOUNTER — APPOINTMENT (INPATIENT)
Dept: PHYSICAL THERAPY | Facility: REHABILITATION | Age: 61
DRG: 948 | End: 2021-10-08
Payer: COMMERCIAL

## 2021-10-08 ENCOUNTER — APPOINTMENT (INPATIENT)
Dept: OCCUPATIONAL THERAPY | Facility: REHABILITATION | Age: 61
DRG: 948 | End: 2021-10-08
Payer: COMMERCIAL

## 2021-10-08 PROBLEM — D64.9 ANEMIA: Status: ACTIVE | Noted: 2021-10-08

## 2021-10-08 PROBLEM — J45.909 ASTHMA: Status: ACTIVE | Noted: 2021-10-08

## 2021-10-08 PROBLEM — J81.1 PULMONARY EDEMA: Status: ACTIVE | Noted: 2021-10-08

## 2021-10-08 LAB
25(OH)D3 SERPL-MCNC: 23 NG/ML (ref 30–100)
ALBUMIN SERPL-MCNC: 2.6 G/DL (ref 3.4–5)
ALP SERPL-CCNC: 111 IU/L (ref 35–126)
ALT SERPL-CCNC: 83 IU/L (ref 11–54)
ANION GAP SERPL CALC-SCNC: 9 MEQ/L (ref 3–15)
AST SERPL-CCNC: 87 IU/L (ref 15–41)
BILIRUB SERPL-MCNC: 0.5 MG/DL (ref 0.3–1.2)
BNP SERPL-MCNC: 183 PG/ML
BUN SERPL-MCNC: 7 MG/DL (ref 8–20)
CA-I BLD-SCNC: 1.11 MMOL/L (ref 1.15–1.27)
CALCIUM SERPL-MCNC: 8 MG/DL (ref 8.9–10.3)
CALCIUM SERPL-MCNC: 8 MG/DL (ref 8.9–10.3)
CALCIUM UR-MCNC: 13.4 MG/DL
CHLORIDE SERPL-SCNC: 101 MEQ/L (ref 98–109)
CO2 SERPL-SCNC: 27 MEQ/L (ref 22–32)
CREAT SERPL-MCNC: 0.5 MG/DL (ref 0.6–1.1)
CREAT UR-MCNC: 128.3 MG/DL
ERYTHROCYTE [DISTWIDTH] IN BLOOD BY AUTOMATED COUNT: 13.4 % (ref 11.7–14.4)
FERRITIN SERPL-MCNC: 570 NG/ML (ref 11–250)
FERRITIN SERPL-MCNC: 586 NG/ML (ref 11–250)
GFR SERPL CREATININE-BSD FRML MDRD: >60 ML/MIN/1.73M*2
GLUCOSE SERPL-MCNC: 86 MG/DL (ref 70–99)
HCT VFR BLDCO AUTO: 24.7 % (ref 35–45)
HGB BLD-MCNC: 7.7 G/DL (ref 11.8–15.7)
IRON SATN MFR SERPL: 7 % (ref 15–45)
IRON SATN MFR SERPL: 7 % (ref 15–45)
IRON SERPL-MCNC: 14 UG/DL (ref 35–150)
IRON SERPL-MCNC: 15 UG/DL (ref 35–150)
MAGNESIUM SERPL-MCNC: 2 MG/DL (ref 1.8–2.5)
MCH RBC QN AUTO: 29.8 PG (ref 28–33.2)
MCHC RBC AUTO-ENTMCNC: 31.2 G/DL (ref 32.2–35.5)
MCV RBC AUTO: 95.7 FL (ref 83–98)
PDW BLD AUTO: 9.6 FL (ref 9.4–12.3)
PLATELET # BLD AUTO: 643 K/UL (ref 150–369)
POTASSIUM SERPL-SCNC: 4.5 MEQ/L (ref 3.6–5.1)
PROT SERPL-MCNC: 5.4 G/DL (ref 6–8.2)
PTH-INTACT SERPL-MCNC: 16.9 PG/ML (ref 12–88)
RBC # BLD AUTO: 2.58 M/UL (ref 3.93–5.22)
SODIUM SERPL-SCNC: 137 MEQ/L (ref 136–144)
TIBC SERPL-MCNC: 210 UG/DL (ref 270–460)
TIBC SERPL-MCNC: 210 UG/DL (ref 270–460)
UIBC SERPL-MCNC: 195 UG/DL (ref 180–360)
UIBC SERPL-MCNC: 196 UG/DL (ref 180–360)
VIT B12 SERPL-MCNC: 811 PG/ML (ref 180–914)
WBC # BLD AUTO: 8.71 K/UL (ref 3.8–10.5)

## 2021-10-08 PROCEDURE — 82310 ASSAY OF CALCIUM: CPT | Performed by: INTERNAL MEDICINE

## 2021-10-08 PROCEDURE — 93005 ELECTROCARDIOGRAM TRACING: CPT | Performed by: INTERNAL MEDICINE

## 2021-10-08 PROCEDURE — 97530 THERAPEUTIC ACTIVITIES: CPT | Mod: GO

## 2021-10-08 PROCEDURE — 63700000 HC SELF-ADMINISTRABLE DRUG: Performed by: INTERNAL MEDICINE

## 2021-10-08 PROCEDURE — 83735 ASSAY OF MAGNESIUM: CPT | Performed by: INTERNAL MEDICINE

## 2021-10-08 PROCEDURE — 83550 IRON BINDING TEST: CPT | Performed by: INTERNAL MEDICINE

## 2021-10-08 PROCEDURE — 82330 ASSAY OF CALCIUM: CPT | Performed by: INTERNAL MEDICINE

## 2021-10-08 PROCEDURE — 36415 COLL VENOUS BLD VENIPUNCTURE: CPT | Performed by: INTERNAL MEDICINE

## 2021-10-08 PROCEDURE — 85027 COMPLETE CBC AUTOMATED: CPT | Performed by: INTERNAL MEDICINE

## 2021-10-08 PROCEDURE — 83880 ASSAY OF NATRIURETIC PEPTIDE: CPT | Performed by: INTERNAL MEDICINE

## 2021-10-08 PROCEDURE — 97116 GAIT TRAINING THERAPY: CPT | Mod: GP

## 2021-10-08 PROCEDURE — 82607 VITAMIN B-12: CPT | Performed by: INTERNAL MEDICINE

## 2021-10-08 PROCEDURE — 83970 ASSAY OF PARATHORMONE: CPT | Performed by: INTERNAL MEDICINE

## 2021-10-08 PROCEDURE — 97110 THERAPEUTIC EXERCISES: CPT | Mod: GO

## 2021-10-08 PROCEDURE — 80053 COMPREHEN METABOLIC PANEL: CPT | Performed by: INTERNAL MEDICINE

## 2021-10-08 PROCEDURE — 82728 ASSAY OF FERRITIN: CPT | Performed by: INTERNAL MEDICINE

## 2021-10-08 PROCEDURE — 97110 THERAPEUTIC EXERCISES: CPT | Mod: GP

## 2021-10-08 PROCEDURE — 63600000 HC DRUGS/DETAIL CODE: Performed by: INTERNAL MEDICINE

## 2021-10-08 PROCEDURE — 63700000 HC SELF-ADMINISTRABLE DRUG: Performed by: PHYSICAL MEDICINE & REHABILITATION

## 2021-10-08 PROCEDURE — 82306 VITAMIN D 25 HYDROXY: CPT | Performed by: INTERNAL MEDICINE

## 2021-10-08 PROCEDURE — 82570 ASSAY OF URINE CREATININE: CPT | Performed by: INTERNAL MEDICINE

## 2021-10-08 PROCEDURE — 12800000 HC ROOM AND CARE SEMIPRIVATE REHAB

## 2021-10-08 RX ORDER — FLUTICASONE FUROATE AND VILANTEROL 200; 25 UG/1; UG/1
1 POWDER RESPIRATORY (INHALATION) DAILY
Status: DISCONTINUED | OUTPATIENT
Start: 2021-10-08 | End: 2021-10-15 | Stop reason: HOSPADM

## 2021-10-08 RX ORDER — FUROSEMIDE 40 MG/1
40 TABLET ORAL
Status: DISCONTINUED | OUTPATIENT
Start: 2021-10-08 | End: 2021-10-15 | Stop reason: HOSPADM

## 2021-10-08 RX ORDER — CHOLECALCIFEROL (VITAMIN D3) 25 MCG
2500 TABLET ORAL DAILY
Status: DISCONTINUED | OUTPATIENT
Start: 2021-10-08 | End: 2021-10-15 | Stop reason: HOSPADM

## 2021-10-08 RX ORDER — ASCORBIC ACID 250 MG
250 TABLET ORAL
Status: DISCONTINUED | OUTPATIENT
Start: 2021-10-08 | End: 2021-10-15 | Stop reason: HOSPADM

## 2021-10-08 RX ORDER — FERROUS SULFATE 325(65) MG
325 TABLET ORAL 2 TIMES DAILY WITH MEALS
Status: DISCONTINUED | OUTPATIENT
Start: 2021-10-08 | End: 2021-10-15 | Stop reason: HOSPADM

## 2021-10-08 RX ADMIN — FLUTICASONE FUROATE AND VILANTEROL 1 PUFF: 200; 25 POWDER RESPIRATORY (INHALATION) at 14:12

## 2021-10-08 RX ADMIN — ASPIRIN 81 MG CHEWABLE TABLET 81 MG: 81 TABLET CHEWABLE at 08:05

## 2021-10-08 RX ADMIN — Medication 325 MG: at 17:36

## 2021-10-08 RX ADMIN — BUSPIRONE HYDROCHLORIDE 10 MG: 5 TABLET ORAL at 12:12

## 2021-10-08 RX ADMIN — ROPINIROLE HYDROCHLORIDE 1 MG: 1 TABLET, FILM COATED ORAL at 08:04

## 2021-10-08 RX ADMIN — ACETAMINOPHEN 650 MG: 325 TABLET, FILM COATED ORAL at 21:08

## 2021-10-08 RX ADMIN — Medication 1 TABLET: at 08:05

## 2021-10-08 RX ADMIN — SERTRALINE HYDROCHLORIDE 300 MG: 100 TABLET ORAL at 08:04

## 2021-10-08 RX ADMIN — ASPIRIN 81 MG CHEWABLE TABLET 81 MG: 81 TABLET CHEWABLE at 21:08

## 2021-10-08 RX ADMIN — ACETAMINOPHEN 650 MG: 325 TABLET, FILM COATED ORAL at 12:12

## 2021-10-08 RX ADMIN — BUSPIRONE HYDROCHLORIDE 10 MG: 5 TABLET ORAL at 21:08

## 2021-10-08 RX ADMIN — ENOXAPARIN SODIUM 40 MG: 100 INJECTION SUBCUTANEOUS at 17:35

## 2021-10-08 RX ADMIN — LIDOCAINE 2 PATCH: 246 PATCH TOPICAL at 08:03

## 2021-10-08 RX ADMIN — FAMOTIDINE 20 MG: 20 TABLET ORAL at 21:09

## 2021-10-08 RX ADMIN — NORTRIPTYLINE HYDROCHLORIDE 75 MG: 25 CAPSULE ORAL at 21:09

## 2021-10-08 RX ADMIN — ALBUTEROL SULFATE 2 PUFF: 90 AEROSOL, METERED RESPIRATORY (INHALATION) at 06:12

## 2021-10-08 RX ADMIN — ACETAMINOPHEN 650 MG: 325 TABLET, FILM COATED ORAL at 08:04

## 2021-10-08 RX ADMIN — GUAIFENESIN 600 MG: 600 TABLET ORAL at 08:04

## 2021-10-08 RX ADMIN — HYDROMORPHONE HYDROCHLORIDE 2 MG: 2 TABLET ORAL at 14:22

## 2021-10-08 RX ADMIN — Medication 250 MG: at 17:36

## 2021-10-08 RX ADMIN — ACETAMINOPHEN 650 MG: 325 TABLET, FILM COATED ORAL at 17:35

## 2021-10-08 RX ADMIN — ALBUTEROL SULFATE 2 PUFF: 90 AEROSOL, METERED RESPIRATORY (INHALATION) at 14:14

## 2021-10-08 RX ADMIN — Medication 2500 UNITS: at 14:12

## 2021-10-08 RX ADMIN — BUSPIRONE HYDROCHLORIDE 10 MG: 5 TABLET ORAL at 08:04

## 2021-10-08 RX ADMIN — GUAIFENESIN 600 MG: 600 TABLET ORAL at 21:09

## 2021-10-08 RX ADMIN — ATORVASTATIN CALCIUM 40 MG: 40 TABLET, FILM COATED ORAL at 17:36

## 2021-10-08 RX ADMIN — LAMOTRIGINE 400 MG: 200 TABLET, EXTENDED RELEASE ORAL at 08:08

## 2021-10-08 RX ADMIN — MONTELUKAST 10 MG: 10 TABLET, FILM COATED ORAL at 21:09

## 2021-10-08 RX ADMIN — ROPINIROLE HYDROCHLORIDE 1 MG: 1 TABLET, FILM COATED ORAL at 17:36

## 2021-10-08 RX ADMIN — BUSPIRONE HYDROCHLORIDE 10 MG: 5 TABLET ORAL at 17:36

## 2021-10-08 RX ADMIN — MELOXICAM 7.5 MG: 7.5 TABLET ORAL at 08:04

## 2021-10-08 RX ADMIN — ALBUTEROL SULFATE 2 PUFF: 90 AEROSOL, METERED RESPIRATORY (INHALATION) at 17:38

## 2021-10-08 RX ADMIN — ROPINIROLE HYDROCHLORIDE 0.5 MG: 0.5 TABLET, FILM COATED ORAL at 12:12

## 2021-10-08 RX ADMIN — ALBUTEROL SULFATE 2 PUFF: 90 AEROSOL, METERED RESPIRATORY (INHALATION) at 12:12

## 2021-10-08 RX ADMIN — FUROSEMIDE 40 MG: 40 TABLET ORAL at 12:12

## 2021-10-08 RX ADMIN — ROPINIROLE HYDROCHLORIDE 2 MG: 1 TABLET, FILM COATED ORAL at 21:08

## 2021-10-08 RX ADMIN — PANTOPRAZOLE SODIUM 40 MG: 40 TABLET, DELAYED RELEASE ORAL at 08:04

## 2021-10-08 ASSESSMENT — ENCOUNTER SYMPTOMS
ACTIVITY CHANGE: 1
WEAKNESS: 1

## 2021-10-08 NOTE — PROGRESS NOTES
Patient: Fide Amin  Location: [facility] [address]  MRN: [id number]  Today's date: 10/8/2021    History of Present Illness  Fide is a 60 y.o. female admitted on 10/6/2021 with Physical deconditioning [R53.81]. Principal problem is Physical deconditioning.   Mrs. Fide Amin is a 60 year old female who presented to [facility] on 9/20/21 for planned B TKA by Dr. Em. Pt suffered a seizure post op after 8 years of having no seizures. CT of chest completed 9/23 results reviewed with no CT evidence of pulmonary embolism.  Scattered bilateral airspace opacities noted and believed to be most likely representing multifocal pneumonia.  Covid 19 pneumonia was excluded. Patient was medically cleared for discharge 9/23/21 on ceftin 500mg BID and zithromax 500mg daily x 5 days and with instructions for f/u care.  Pt. Presented to [facility] on 9/24 for SOB and dx with aspiration PNA. Pt. Intubated on 9/25/21 and extubated on 10/1/21. Pt is now medically stable and admitted to [facility] for acute inpatient rehab.      Past Medical History  Fide has a past medical history of Arthritis, Asthma, Depression, Deviated septum, Epilepsy (CMS/HCC), GERD (gastroesophageal reflux disease), Hypertension, Lipid disorder, Motion sickness, and RLS (restless legs syndrome).      PT Vitals    Date/Time Pulse HR Source SpO2 Pt Activity O2 Therapy O2 Del Method O2 Flow Rate BP BP Location BP Method Pt Position Observations [facility]   10/08/21 1307 94 Monitor 93 % At rest Supplemental oxygen Nasal cannula 2 L/min 130/58 Right upper arm Automatic Sitting Pre-PT session [facility]   10/08/21 1328 92 Monitor 93 % At rest Supplemental oxygen Nasal cannula 2 L/min 110/57 Left upper arm Automatic Sitting Post PT session [facility]      PT Pain    Date/Time Pain Type Side/Orientation Location Rating: Rest Rating: Activity Description Interventions [facility]   10/08/21 1307 Pain Assessment left knee 7 7 aching premedicated for activity [facility]   10/08/21 1328 Pain  Reassessment; Post Activity left knee 8 8 aching prescribed exercises encouraged MIH          Prior Living Environment      Most Recent Value   People in Home spouse  [4 kids]   Current Living Arrangements home   Home Accessibility stairs to enter home (Group),  stairs within home (Group)  [2+1 STEPHANY, ff inside, DE 1st floor]   Living Environment Comment Pt lives in 2 . Pt reports two small steps to enter and FF to 2nd floor. Pt reports plans to have 1st floor set-up. Pt has guest room on 1st floor c 1/2 bath. Pt reports purchased commode and 2 RTS c handles.   Number of Stairs, Main Entrance 2   Surface of Stairs, Main Entrance concrete   Stair Railings, Main Entrance none   Location, Patient Bedroom second floor, must negotiate stairs to access   Patient Bedroom Access Comment Ability to have first floor set up   Location, Bathroom first (main) floor,  second floor, must negotiate stairs to access   Bathroom Access Comment 2nd floor FB c shower stall. (+) grab bar in shower stall. Pt reports has built in shower bench in stall.   Number of Stairs, Within Home, Primary other (see comments)  [7+7]   Surface of Stairs, Within Home, Primary hardwood   Stair Railings, Within Home, Primary railings on both sides of stairs   Landing, Stairs, Within Home, Primary railings present          Prior Level of Function      Most Recent Value   Dominant Hand right   Ambulation independent   Transferring independent   Toileting independent   Bathing independent   Dressing independent   Prior Level of Function Comment Pt was d/c'd home for one day after surgery using RW.   Assistive Device Currently Used at Home commode, 3-in-1,  raised toilet,  walker, front-wheeled  [RTS c handles]           IRF PT Evaluation and Treatment - 10/08/21 1309        PT Time Calculation    Start Time 1300     Stop Time 1330     Time Calculation (min) 30 min        Session Details    Document Type daily treatment/progress note     Mode of Treatment  physical therapy; individual therapy        General Information    General Observations of Patient Pt received in room from direct hand [place] [facility]        Lower Extremity (Therapeutic Exercise)    Exercise Position/Type seated; AROM (active range of motion)     General Exercise bilateral; ankle pumps; LAQ (long arc quad); heel slides; gluteal sets     Reps and Sets 3x10 reps, 5 sec holds for LAQ and glute sets     Comment Performed at start of PT session for warm up.        Daily Progress Summary (PT)    Symptoms Noted During/After Treatment increased pain     Daily Outcome Statement Pt required increased time to complete seated TE program on this date with increase in pain by end of PT session. NSG made aware at hand off at end of PT session.     Recommendations (PT) Continue with PT POC and progress as able.                           IRF PT Goals      Most Recent Value   Bed Mobility Goal 1    Activity/Assistive Device bed mobility activities, all,  no assistive device at 10/07/2021 1003   [place] modified independence at 10/07/2021 1003   Time Frame 1 week at 10/07/2021 1003   Transfer Goal 1    Activity/Assistive Device sit-to-stand/stand-to-sit,  stand pivot,  car transfer,  walker, front-wheeled at 10/07/2021 1003   [place] supervision required,  set-up required at 10/07/2021 1003   Time Frame 1 week at 10/07/2021 1003   Transfer Goal 2    Activity/Assistive Device sit-to-stand/stand-to-sit,  stand pivot,  car transfer,  walker, front-wheeled at 10/07/2021 1003   [place] modified independence at 10/07/2021 1003   Time Frame 14 days or less at 10/07/2021 1003   Gait/Walking Locomotion Goal 1    Activity/Assistive Device assistive device use,  backward stepping,  decrease fall risk,  diminish gait deviation,  forward stepping,  improve balance and speed,  increase endurance/gait distance,  increase energy conservation,  normalize weight shifts,  turning, left,  turning, right,  walker,  front-wheeled at 10/07/2021 1003   Distance 150 feet at 10/07/2021 1003   [place] supervision required at 10/07/2021 1003   Time Frame 1 week at 10/07/2021 1003   Gait/Walking Locomotion Goal 2    Activity/Assistive Device gait (walking locomotion),  assistive device use,  backward stepping,  decrease fall risk,  diminish gait deviation,  forward stepping,  improve balance and speed,  increase endurance/gait distance,  increase energy conservation,  normalize weight shifts,  sidestepping,  turning, left,  turning, right,  walker, front-wheeled at 10/07/2021 1003   Distance 300 feet at 10/07/2021 1003   [place] modified independence at 10/07/2021 1003   Time Frame 14 days or less at 10/07/2021 1003   Stairs Goal 1    Activity/Assistive Device ascending stairs,  descending stairs,  step-to-step,  using handrail, right,  using handrail, left,  decrease fall risk,  improve balance and speed at 10/07/2021 1003   Number of Stairs 8 at 10/07/2021 1003   [place] supervision required at 10/07/2021 1003   Time Frame 1 week at 10/07/2021 1003   Stairs Goal 2    Activity/Assistive Device using handrail, left,  using handrail, right,  step-to-step,  decrease fall risk,  ascending stairs,  descending stairs,  improve balance and speed,  no assistive device at 10/07/2021 1003   Number of Stairs 14 at 10/07/2021 1003   [place] modified independence at 10/07/2021 1003   Time Frame 14 days or less at 10/07/2021 1003   ROM Goal 1    ROM Goal 1 B knee ROM 0-90 at 10/07/2021 1003   Time Frame 14 days or less at 10/07/2021 1003   ROM Goal 2    ROM Goal 2 B ankle DF ROM +10 degrees at 10/07/2021 1003   Time Frame 14 days or less at 10/07/2021 1003   Caregiver Training Goal 1    Caregiver Training Goal 1 Pt's  safe and I providing any necessary supervision/Assist with mobility tasks at 10/07/2021 1003   Time Frame 14 days or less at 10/07/2021 1003   Problem Specific Goal 1    Problem-Specific Goal 1 Pt weaned  from supplemental O2 and tolerating > 92% with activity. at 10/07/2021 1003   Time Frame 14 days or less at 10/07/2021 1003

## 2021-10-08 NOTE — PROGRESS NOTES
[facility] Progress Note          Patient was seen and examined at bedside.    Subjective:   1. Severe anemia, Hb 9.5-8.5-7.7, multifactorial, blood loss from recent b/l TKA, inflammation and infection, GI loss? Check FOBT, iron profile, provide ferrous sulfate and consider PRBC if Hb further dropping to <=7;  2. Persistent hypocalcemia, her ionized ca low at 1.11, which contributes to her intermittent jerking movements, recent 25 OH VitD 42, checked PTH, urine random Calcium, consult nephrologist for an evaluation, her other electrolytes are balanced;  3. Her breathing status is fine,  SO2 is fine with NC O2  2 l/min, cont her ICS/LABA and rescue albuterol inhaler;  4. CXR 10/7/21: IMPRESSION:  1.  Persistent multifocal airspace disease, increased in the right lung and  mildly improved in the left since 9/23/2021.  2.  Low lung volumes.  3.  Small bilateral pleural effusions, mildly increased since 9/23/2021.    pulm edema, elevated BNP, add po lasix, ordered TTE and EKG, consulted cardiology of [place].     B/l LE venous compression U/S 10/7/21: Study negative for deep vein thrombosis  Visualized veins are normal in compressibility with normal color flow from groin to ankle bilaterally.      Objective   Vital signs in last 24 hours:  Temp:  [36.9 °C (98.4 °F)-37 °C (98.6 °F)] 37 °C (98.6 °F)  Heart Rate:  [75-93] 85  Resp:  [18-20] 20  BP: (112-157)/(55-69) 128/62    No intake or output data in the 24 hours ending 10/08/21 1059  Intake/Output this shift:  No intake/output data recorded.   Review of Systems:  All other systems reviewed and negative except as noted in the HPI.   Objective      Labs  reviewed her labs thoroughly   Lab Results   Component Value Date    WBC 8.71 10/08/2021    HGB 7.7 (L) 10/08/2021    HCT 24.7 (L) 10/08/2021    MCV 95.7 10/08/2021     (H) 10/08/2021     Lab Results   Component Value Date    GLUCOSE 86 10/08/2021    CALCIUM 8.0 (L) 10/08/2021    NA  137 10/08/2021    K 4.5 10/08/2021    CO2 27 10/08/2021     10/08/2021    BUN 7 (L) 10/08/2021    CREATININE 0.5 (L) 10/08/2021       Imaging  OSH imaging study reports reviewed   CXR 10/7/21: IMPRESSION:  1.  Persistent multifocal airspace disease, increased in the right lung and  mildly improved in the left since 9/23/2021.  2.  Low lung volumes.  3.  Small bilateral pleural effusions, mildly increased since 9/23/2021.    B/l LE venous compression U/S 10/7/21: Study negative for deep vein thrombosis  Visualized veins are normal in compressibility with normal color flow from groin to ankle bilaterally.    Full Code    Physical Exam:  Head/Ear/Nose/Throat: normocephalic; atraumatic; moisture mouth mm, no oropharyngeal thrush noted.   Eyes: anicteric sclera, EOMI; PERRL.   Neck : supple, no JVD, no carotid bruits appeciated.   Respiratory: no evidence of labored breathing, lung sounds CTA b/l, good aeration bibasilar area, no w/r/c.   Cardiovascular: RRR; normal S1, S2; no m/r/g; no S3 or S4.   Gastrointestinal: soft; NT; BS normal; mildly distended; no CVAT b/l.   Genitourinary: no vazquez.   Extremities : s/p b/l TKA, incisional site residual erythema with mild swelling .   Neurological: AO x 2-3, fluent speeches, following commands, CNS II-XII grossly intact; no focal neurologic deficits, noted intermittent clonic spasms.   Behavior/Emotional: in NAD, appropriate; cooperative.   Skin: clean, dry and intact.     Plan of care was discussed with patient, RN, and PMR attending     Assessment   CC:S/p b/l TKA, epilepsy with post op recurrence, aspiration multifocal PNA complicated by VDRF, physical deconditioning.      59 yo female, with PMH of epilepsy (last episodes 2011), depression/anxiety, RLS with intermittent clonic spasms, asthma on ICS/LABA and rescue albuterol inhaler, HTN, HLD, anemia, hypocalcemia, DJD with severe b/l knee OA for which she presented to [facility] on 9/20/21 and underwent an elective b/l TKA,  of note, following her surgery, she has a transient seizure episode, resolved by its self, further complicated by suspected aspiration, CT of chest on 9/23 reviewed no evidence of pulmonary embolism, scattered bilateral airspace opacities, believed to be most likely representing multifocal pneumonia. Covid 19 pneumonia was excluded. Patient was medically cleared for discahrge on ceftin 500mg BID and zithromax 500mg daily x 5 days and with instructions for f/u care. After discharge from [facility], she presented and was admitted to [facility] on 9/24 for dyspnea, diagnosed with aspiration PNA with acute respiratory failure, s/p intubation/ventilation support on 9/25/21 and extubated on 10/1/21. Completed 7-day course of ABX, MRSA negative, BCX NGTD, provided bronchodilator and steroid therapy, CXR with persistent bibasilar opacities, will need follow-up imaging in 4 to 6 weeks. Functionally, she has physical deconditioning, requiring inpt acute rehab, transferred to [facility] on 10/6/21.      1. S/p b/l TKA, epilepsy with post op recurrence, aspiration multifocal PNA complicated by VDRF, physical deconditioning: inpt acute rehab, gait and balancing training, fall precaution, medical management, DVT prophylaxis, dermal defense, f/u with surgeon and pulmonologist as scheduled.     2.Pulm-asthma AE, s/p aspiration multifocal PNA complicated by VDRF, residual air space diseases: monitor SO2, prn NC O2, keep SO2>92%, incentive spirometry, LABA/ICS, bronchodilator   nebulizer, mucolytic agent, pulm toileting. Repeat CXR, f/u with her pulmonologist.      3. Anemia: f/u H/H, no need PRBC based on current H/H level, check iron profile, po iron with Vit C as indicated.     4. GI-constipation, GERD: provide constipation bowel regimen, po hydration, fiber intake, timed toilet visits; PPI for GERD and GI prophy.     5. S/p b/l TKA, residual b/l knee swelling, risk of DVT, DVT prophy: SCD, early ambulation, SQ lovenox, check LE venous DUS to r/o  DVT.       6. HTN, HLD: not on HTN meds, monitor BP, add as indicated, on statin .      7. Neuro- epilepsy with post op recurrence, neuropathy, RLS with intermittent clonic spasms: consult neurologist, cont AED, on Requip and nortriptyline, iron supplement if has deficiency, regular neuro checks, f/u with her neurologist as outpt      8. Renal, electrolytes: monitor renal function and volume status,   monitor and keep electrolytes balance.     9. Psych-anxiety, depression: on Buspar and sertraline, consult psychiatrist for comanagement, psychology CBT, SW support.      10. - urinary retention/intontinence: timed voiding trial, monitor PVR with prn cic, check UA/UCX, UTI precaution.     Billing code: [id number]  Diagnoses:  Patient Active Problem List   Diagnosis   • Arthritis of knee   • Epilepsy (CMS/HCC)   • HTN (hypertension)   • HLD (hyperlipidemia)   • Hypocalcemia   • Depression   • History of total knee arthroplasty, bilateral   • Physical deconditioning   • Anemia   • Asthma   • Pulmonary edema   complicated case with multiple comorbidities as mentioned in subjective section, spent 35 min to manage the case, >50% of the time consulting the patient about current medical condition, existing comorbidities, new findings/concerns and care/management plan.                Noé Nunez MD  10/8/2021

## 2021-10-08 NOTE — PROGRESS NOTES
Patient: Fide Amin  Location: [facility] [address]  MRN: [id number]  Today's date: 10/8/2021    History of Present Illness  Fide is a 60 y.o. female admitted on 10/6/2021 with Physical deconditioning [R53.81]. Principal problem is Physical deconditioning.   Mrs. Fide Amin is a 60 year old female who presented to [facility] on 9/20/21 for planned B TKA by Dr. Em. Pt suffered a seizure post op after 8 years of having no seizures. CT of chest completed 9/23 results reviewed with no CT evidence of pulmonary embolism.  Scattered bilateral airspace opacities noted and believed to be most likely representing multifocal pneumonia.  Covid 19 pneumonia was excluded. Patient was medically cleared for discharge 9/23/21 on ceftin 500mg BID and zithromax 500mg daily x 5 days and with instructions for f/u care.  Pt. Presented to [facility] on 9/24 for SOB and dx with aspiration PNA. Pt. Intubated on 9/25/21 and extubated on 10/1/21. Pt is now medically stable and admitted to [facility] for acute inpatient rehab.      Past Medical History  Fide has a past medical history of Arthritis, Asthma, Depression, Deviated septum, Epilepsy (CMS/HCC), GERD (gastroesophageal reflux disease), Hypertension, Lipid disorder, Motion sickness, and RLS (restless legs syndrome).      OT Vitals    Date/Time Pulse SpO2 Pt Activity O2 Therapy O2 Del Method O2 Flow Rate BP BP Location BP Method Pt Position Who   10/08/21 1005 85 99 % At rest Supplemental oxygen Nasal cannula 2 L/min decreased to 1 128/62 Right upper arm Automatic Sitting JV      OT Pain    Date/Time Pain Type Side/Orientation Location Rating: Rest Interventions [facility]   10/08/21 1005 Pain Assessment left knee 5 premedicated for activity JV   10/08/21 1058 Pain Assessment left knee 5 premedicated for activity JV          Prior Living Environment      Most Recent Value   People in Home spouse  [4 kids]   Current Living Arrangements home   Home Accessibility stairs to  "enter home (Group),  stairs within home (Group)  [2+1 STEPHANY, ff inside, [place] 1st floor]   Living Environment Comment Pt lives in 2 . Pt reports two small steps to enter and FF to 2nd floor. Pt reports plans to have 1st floor set-up. Pt has guest room on 1st floor c 1/2 bath. Pt reports purchased commode and 2 RTS c handles.   Number of Stairs, Main Entrance 2   Surface of Stairs, Main Entrance concrete   Stair Railings, Main Entrance none   Location, Patient Bedroom second floor, must negotiate stairs to access   Patient Bedroom Access Comment Ability to have first floor set up   Location, Bathroom first (main) floor,  second floor, must negotiate stairs to access   Bathroom Access Comment 2nd floor FB c shower stall. (+) grab bar in shower stall. Pt reports has built in shower bench in stall.   Number of Stairs, Within Home, Primary other (see comments)  [7+7]   Surface of Stairs, Within Home, Primary hardwood   Stair Railings, Within Home, Primary railings on both sides of stairs   Landing, Stairs, Within Home, Primary railings present          Prior Level of Function      Most Recent Value   Dominant Hand right   Ambulation independent   Transferring independent   Toileting independent   Bathing independent   Dressing independent   Prior Level of Function Comment Pt was d/c'd home for one day after surgery using RW.   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   Occupational History/Life Experiences Pt lives at home in [facility] c  ([place]). Pt reports  also retired. Pt is a . Pt has epilepsy and hx of last seizure 2011 then had a seizure after BKA surgery 9/2021.   Patient Goals \"To get well enough to be able to go home and get PT there\"           IRF OT " Evaluation and Treatment - 10/08/21 1006        OT Time Calculation    Start Time 1000     Stop Time 1100     Time Calculation (min) 60 min        Session Details    Document Type daily treatment/progress note     Mode of Treatment occupational therapy; individual therapy        General Information    General Observations of Patient Pt pleasant and agreeable to therapy. Pt received on 2L supplemental air.        Strength (Manual Muscle Testing)    Shoulder, Left (Strength) 4+/5 flexion/abduction     Elbow, Left (Strength) 4+/5 flexion; 4-/5 extension     Shoulder, Right (Strength) 4/5 flexion and abduction     Elbow, Right (Strength) 4/5 flexion; 5/5 extension        Hand  Strength Testing    Left Hand, Setting 2 31, 24, 26     Right Hand, Setting 2 25, 25, 28     Left Hand: Tip (Pincer) Pinch Strength 6, 6, 6     Left Hand: Lateral (Key) Pinch Strength 10, 9, 9     Left Hand: Three Point (Sotero) Pinch Strength 7, 7, 6     Right Hand: Tip (Pincer) Pinch Strength 8, 6, 8     Right Hand: Lateral (Key) Pinch Strength 10, 10, 11     Right Hand: Three Point ([place]ero) Pinch Strength 9, 7, 7        Sit to Stand Transfer    [place], Sit to Stand Transfer touching/steadying assist     Verbal Cues preparatory posture     Assistive Device walker, front-wheeled        Stand to Sit Transfer    [place], Stand to Sit Transfer touching/steadying assist     Verbal Cues hand placement; technique     Assistive Device walker, front-wheeled        Balance    Comment, Balance Touching A standing balance c unilateral support on RW while engaged in reaching task.        Motor Skills    Results, 9 Hole Peg Test of Fine Motor Coordination Initial assessment; R hand (dominant) 20 sec. L hand 23 sec.     Comment, Motor Skills UE functional reaching c removing velcro blocks and placing around vertical dowel. Pt completes x 1 set c each UE. Pt maintains SpO2 >90% on 2L.        Therapeutic Interventions    Comment, Therapeutic  Intervention Standing tolerance x 8 mins while engaged in reaching task. Pt maintains SpO2 >90% on 2L.        Hand (Therapeutic Exercise)    Exercise Position/Type seated     Weight/Resistance therapy putty; hand gripper     Reps and Sets 3 sets of 10     Comment Pt issued hand gripper and assisted to apply appropriate resistance of 15#. Pt educated on performing 3 sets of 10, AM/PM as HEP. Pt issued pink theraputty and HEP. HEP reviewed c pt and pt educated on recommendation to complete 5 times through each hand 2 times a day. Pt completes HEP once through c good carryover and reports understanding.        Daily Progress Summary (OT)    Daily Outcome Statement Pt presents c impaired /pinch strength. Pt issued HEP to address these deficits c theraputty and hand gripper. Pt progressing c safety and balance during transfers.                           IRF OT Goals      Most Recent Value   Transfer Goal 1    Activity/Assistive Device toilet at 10/07/2021 0740   [place] supervision required at 10/07/2021 0740   Time Frame short-term goal (STG),  1 week at 10/07/2021 0740   Transfer Goal 2    Activity/Assistive Device toilet at 10/07/2021 0740   [place] modified independence at 10/07/2021 0740   Time Frame long-term goal (LTG),  14 days or less at 10/07/2021 0740   Transfer Goal 3    Activity/Assistive Device shower at 10/07/2021 0740   [place] supervision required at 10/07/2021 0740   Time Frame short-term goal (STG),  1 week at 10/07/2021 0740   Transfer Goal 4    Activity/Assistive Device shower at 10/07/2021 0740   [place] modified independence at 10/07/2021 0740   Time Frame long-term goal (LTG),  14 days or less at 10/07/2021 0740   Bathing Goal 1    Activity/Assistive Device bathing skills, all at 10/07/2021 0740   [place] supervision required at 10/07/2021 0740   Time Frame short-term goal (STG),  1 week at 10/07/2021 0740   Bathing Goal 2    Activity/Assistive Device bathing skills,  all at 10/07/2021 0740   [place] set-up required at 10/07/2021 0740   Time Frame long-term goal (LTG),  14 days or less at 10/07/2021 0740   UB Dressing Goal 1    Activity/Assistive Device upper body dressing at 10/07/2021 0740   [place] minimum assist (75% or more patient effort) at 10/07/2021 0740   Time Frame short-term goal (STG),  1 week at 10/07/2021 0740   Strategies/Barriers c clothing retrieval at 10/07/2021 0740   UB Dressing Goal 2    [place] modified independence at 10/07/2021 0740   Time Frame long-term goal (LTG),  14 days or less at 10/07/2021 0740   LB Dressing Goal 1    [place] minimum assist (75% or more patient effort) at 10/07/2021 0740   Time Frame short-term goal (STG),  1 week at 10/07/2021 0740   Strategies/Barriers c AE at 10/07/2021 0740   LB Dressing Goal 2    [place] modified independence at 10/07/2021 0740   Time Frame long-term goal (LTG),  14 days or less at 10/07/2021 0740   Grooming Goal 1    [place] supervision required at 10/07/2021 0740   Time Frame short-term goal (STG),  1 week at 10/07/2021 0740   Strategies/Barriers standing at 10/07/2021 0740   Grooming Goal 2    [place] modified independence at 10/07/2021 0740   Time Frame long-term goal (LTG),  14 days or less at 10/07/2021 0740   Toileting Goal 1    [place] minimum assist (75% or more patient effort) at 10/07/2021 0740   Time Frame short-term goal (STG),  1 week at 10/07/2021 0740   Toileting Goal 2    [place] modified independence at 10/07/2021 0740   Time Frame long-term goal (LTG),  14 days or less at 10/07/2021 0740

## 2021-10-08 NOTE — PROGRESS NOTES
Patient: Fide Amin  Location: [facility]  MRN: [id number]  Today's date: 10/8/2021    History of Present Illness  Fide is a 60 y.o. female admitted on 10/6/2021 with Physical deconditioning [R53.81]. Principal problem is Physical deconditioning.   Mrs. Fide Amin is a 60 year old female who presented to [facility] on 9/20/21 for planned B TKA by Dr. Em. Pt suffered a seizure post op after 8 years of having no seizures. CT of chest completed 9/23 results reviewed with no CT evidence of pulmonary embolism.  Scattered bilateral airspace opacities noted and believed to be most likely representing multifocal pneumonia.  Covid 19 pneumonia was excluded. Patient was medically cleared for discharge 9/23/21 on ceftin 500mg BID and zithromax 500mg daily x 5 days and with instructions for f/u care.  Pt. Presented to [facility] on 9/24 for SOB and dx with aspiration PNA. Pt. Intubated on 9/25/21 and extubated on 10/1/21. Pt is now medically stable and admitted to [facility] for acute inpatient rehab.      Past Medical History  Fide has a past medical history of Arthritis, Asthma, Depression, Deviated septum, Epilepsy (CMS/HCC), GERD (gastroesophageal reflux disease), Hypertension, Lipid disorder, Motion sickness, and RLS (restless legs syndrome).      PT Vitals    Date/Time Pulse HR Source SpO2 Pt Activity O2 Therapy O2 Del Method O2 Flow Rate BP BP Location BP Method Pt Position [facility]   10/08/21 0916 90 Monitor 95 % At rest Supplemental oxygen Nasal cannula 2 L/min 140/62 Left upper arm Automatic Sitting EBB   10/08/21 0954 87 Monitor 100 % Walking Supplemental oxygen Nasal cannula 2 L/min 157/69 Left upper arm Automatic Sitting EBB      PT Pain    Date/Time Pain Type Side/Orientation Location Rating: Rest Description Interventions [facility]   10/08/21 0916 Pain Assessment left knee 7 L knee: 7/10; R knee 4/10 aching premedicated for activity; prescribed exercises encouraged EBB   10/08/21 0954 Pain  "Reassessment; Post Activity left knee 6 L knee: 6/10; R knee 3/10 aching \"stiff\" prescribed exercises encouraged EBB          Prior Living Environment      Most Recent Value   People in Home spouse  [4 kids]   Current Living Arrangements home   Home Accessibility stairs to enter home (Group),  stairs within home (Group)  [2+1 STEPHANY, ff inside, [place] 1st floor]   Living Environment Comment Pt lives in 2 . Pt reports two small steps to enter and FF to 2nd floor. Pt reports plans to have 1st floor set-up. Pt has guest room on 1st floor c 1/2 bath. Pt reports purchased commode and 2 RTS c handles.   Number of Stairs, Main Entrance 2   Surface of Stairs, Main Entrance concrete   Stair Railings, Main Entrance none   Location, Patient Bedroom second floor, must negotiate stairs to access   Patient Bedroom Access Comment Ability to have first floor set up   Location, Bathroom first (main) floor,  second floor, must negotiate stairs to access   Bathroom Access Comment 2nd floor FB c shower stall. (+) grab bar in shower stall. Pt reports has built in shower bench in stall.   Number of Stairs, Within Home, Primary other (see comments)  [7+7]   Surface of Stairs, Within Home, Primary hardwood   Stair Railings, Within Home, Primary railings on both sides of stairs   Landing, Stairs, Within Home, Primary railings present          Prior Level of Function      Most Recent Value   Dominant Hand right   Ambulation independent   Transferring independent   Toileting independent   Bathing independent   Dressing independent   Prior Level of Function Comment Pt was d/c'd home for one day after surgery using RW.   Assistive Device Currently Used at Home commode, 3-in-1,  raised toilet,  walker, front-wheeled  [RTS c handles]           IRF PT Evaluation and Treatment - 10/08/21 0915        PT Time Calculation    Start Time 0900     Stop Time 1000     Time Calculation (min) 60 min        Session Details    Document Type daily treatment/progress " note     Mode of Treatment physical therapy; individual therapy        General Information    Patient Profile Reviewed yes     General Observations of Patient 2 LO2 via nc        Sit to Stand Transfer    [place], Sit to Stand Transfer touching/steadying assist; verbal cues     Verbal Cues preparatory posture; hand placement     Assistive Device walker, front-wheeled        Stand to Sit Transfer    [place], Stand to Sit Transfer touching/steadying assist; verbal cues     Verbal Cues preparatory posture; safety; hand placement     Assistive Device walker, front-wheeled        Stand Pivot Transfer    [place], Stand Pivot/Stand Step Transfer touching/steadying assist; verbal cues     Verbal Cues proper use of assistive device; preparatory posture; safety     Assistive Device walker, front-wheeled        Gait Training    [place], Gait touching/steadying assist; verbal cues     Assistive Device walker, front-wheeled     Distance in Feet 56 feet   38' 1st walk    Pattern (Gait) step-through     Deviations/Abnormal Patterns (Gait) bilateral deviations; tan decreased; gait speed decreased; step length decreased; stride length decreased   initally maintains stiff knee through swing    Advanced Gait Activity 10 Meter Walk Test (Self-Selected Velocity)     Comment (Gait/Stairs) PT managing O2 tank: SpO2 91-93% post-gait, HR 100bpm; 2nd walk: SpO2 90-92%        10 Meter Walk Test (Self-Selected Velocity)    Trial One: Ten Meter Walk Test (sec) 27.1 seconds     Trial Two: Ten Meter Walk Test (sec) 17.6 seconds     Assistive Device walker, front-wheeled   Min A and dependent on PT for O2 tank management    Trial One: Gait Speed (m/s) 0.22 m/s     Trial Two: Gait Speed (m/s) 0.34 m/s     Average Gait Speed (m/s): Two Trials 0.28 m/s        Lower Extremity (Therapeutic Exercise)    Exercise Position/Type seated     General Exercise bilateral; ankle pumps; LAQ (long arc quad); marching while seated     Reps  and Sets 15 x 2        Daily Progress Summary (PT)    Symptoms Noted During/After Treatment none     Progress Toward Functional Goals (PT) progressing toward functional goals as expected     Daily Outcome Statement Pt is able to complete sit- stand and SPT transfes w/ touching assist and vc's for prep postures, and amb w/ Min A-touching assist w/ PT managing O2 tank for her. Adjusted height of RW up 1 taller to promote more upright posture and mild flex in elbows and so far she indicates improved comfort and posture. 10-mwt as noted w/ speed decreased per age-gender norms althgouh variability between trials noted. She can be mildly anxious and restless but very motivated and cooperative, and was pleased w/ both reduction in resting knee painlevels and high levels of SpO2 w/ mobility.. Cont w/ POC and progress as joseph.                           IRF PT Goals      Most Recent Value   Bed Mobility Goal 1    Activity/Assistive Device bed mobility activities, all,  no assistive device at 10/07/2021 1003   [place] modified independence at 10/07/2021 1003   Time Frame 1 week at 10/07/2021 1003   Transfer Goal 1    Activity/Assistive Device sit-to-stand/stand-to-sit,  stand pivot,  car transfer,  walker, front-wheeled at 10/07/2021 1003   [place] supervision required,  set-up required at 10/07/2021 1003   Time Frame 1 week at 10/07/2021 1003   Transfer Goal 2    Activity/Assistive Device sit-to-stand/stand-to-sit,  stand pivot,  car transfer,  walker, front-wheeled at 10/07/2021 1003   [place] modified independence at 10/07/2021 1003   Time Frame 14 days or less at 10/07/2021 1003   Gait/Walking Locomotion Goal 1    Activity/Assistive Device assistive device use,  backward stepping,  decrease fall risk,  diminish gait deviation,  forward stepping,  improve balance and speed,  increase endurance/gait distance,  increase energy conservation,  normalize weight shifts,  turning, left,  turning, right,  walker,  front-wheeled at 10/07/2021 1003   Distance 150 feet at 10/07/2021 1003   [place] supervision required at 10/07/2021 1003   Time Frame 1 week at 10/07/2021 1003   Gait/Walking Locomotion Goal 2    Activity/Assistive Device gait (walking locomotion),  assistive device use,  backward stepping,  decrease fall risk,  diminish gait deviation,  forward stepping,  improve balance and speed,  increase endurance/gait distance,  increase energy conservation,  normalize weight shifts,  sidestepping,  turning, left,  turning, right,  walker, front-wheeled at 10/07/2021 1003   Distance 300 feet at 10/07/2021 1003   [place] modified independence at 10/07/2021 1003   Time Frame 14 days or less at 10/07/2021 1003   Stairs Goal 1    Activity/Assistive Device ascending stairs,  descending stairs,  step-to-step,  using handrail, right,  using handrail, left,  decrease fall risk,  improve balance and speed at 10/07/2021 1003   Number of Stairs 8 at 10/07/2021 1003   [place] supervision required at 10/07/2021 1003   Time Frame 1 week at 10/07/2021 1003   Stairs Goal 2    Activity/Assistive Device using handrail, left,  using handrail, right,  step-to-step,  decrease fall risk,  ascending stairs,  descending stairs,  improve balance and speed,  no assistive device at 10/07/2021 1003   Number of Stairs 14 at 10/07/2021 1003   [place] modified independence at 10/07/2021 1003   Time Frame 14 days or less at 10/07/2021 1003   ROM Goal 1    ROM Goal 1 B knee ROM 0-90 at 10/07/2021 1003   Time Frame 14 days or less at 10/07/2021 1003   ROM Goal 2    ROM Goal 2 B ankle DF ROM +10 degrees at 10/07/2021 1003   Time Frame 14 days or less at 10/07/2021 1003   Caregiver Training Goal 1    Caregiver Training Goal 1 Pt's  safe and I providing any necessary supervision/Assist with mobility tasks at 10/07/2021 1003   Time Frame 14 days or less at 10/07/2021 1003   Problem Specific Goal 1    Problem-Specific Goal 1 Pt weaned  from supplemental O2 and tolerating > 92% with activity. at 10/07/2021 1003   Time Frame 14 days or less at 10/07/2021 1003

## 2021-10-08 NOTE — CONSULTS
NEPHROLOGY CONSULTATION    Reason for consult: hypocalcemia    Consulting Physician: Alec    HPI: Thank you for this consultation on Fide Amin who is a 60 y.o. female with PMH of epilepsy (last episodes ), depression/anxiety, RLS with intermittent clonic spasms, asthma on ICS/LABA and rescue albuterol inhaler, HTN, HLD, anemia, hypocalcemia, DJD with severe b/l knee OA for which she presented to [facility] on 21 and underwent an elective b/l TKA, of note, following her surgery, she had a transient seizure episode further complicated by suspected aspiration event.  Was treated with Ceftin and azithromycin. Was discharged then readmitted on  for SOB, respiratory failure requiring mech ventilation from  to 10/1.  Completed 7 days abx for aspiration pneumonia. Was transferred to [facility] on 10/6.  Calcium noted to be low at 8.0 with low serum albumin 2.6.  She denies tetany, perioral numbness.  Has not had D3 supplements.      Review of Systems   Constitutional: Positive for activity change.   Neurological: Positive for weakness.   All other systems reviewed and are negative.      Past Medical History:   Diagnosis Date   • Arthritis     OA   • Asthma    • Depression    • Deviated septum    • Epilepsy (CMS/HCC)     Last seizure 8 years ago   • GERD (gastroesophageal reflux disease)    • Hypertension    • Lipid disorder    • Motion sickness    • RLS (restless legs syndrome)        Past Surgical History:   Procedure Laterality Date   •  SECTION      x1   • SINUS SURGERY         Social History     Tobacco Use   • Smoking status: Never Smoker   • Smokeless tobacco: Never Used   Vaping Use   • Vaping Use: Never used   Substance Use Topics   • Alcohol use: Yes     Alcohol/week: 2.0 standard drinks     Types: 2 Glasses of wine per week   • Drug use: Never       No family history on file.    Allergies   Allergen Reactions   • Penicillins Other (see comments)     Rash and Asthma issues   • Sulfa (Sulfonamide  Antibiotics) Hives       Home Medication List:  •  ascorbic acid, Take 500 mg by mouth daily.  •  atorvastatin, Take 40 mg by mouth nightly.  •  busPIRone, Take 10 mg by mouth 4 (four) times a day.  •  ECHINACEA ORAL, Take by mouth.  •  esomeprazole magnesium (NEXIUM ORAL), Take 20 mg by mouth nightly.  •  fluticasone furoate-vilanteroL, Inhale 1 puff daily.  •  lamoTRIgine, Take 400 mg by mouth daily. Patient takes two 200 mg once a day in the morning  •  montelukast, Take 10 mg by mouth daily.  •  multivitamin, Take 1 tablet by mouth daily.  •  nortriptyline, Take 75 mg by mouth nightly.  •  phytonadione (vitamin K1), Take 100 mcg by mouth daily.  •  rOPINIRole, Take 3 mg by mouth 3 (three) times a day. 0.5 mg in afternoon, 1 mg am and 1mg dinner and 2mg bedtime   •  sertraline, Take 300 mg by mouth daily. Patient takes three 100 mg tabs in AM./  •  acetaminophen, Take 3 tablets (975 mg total) by mouth 3 (three) times a day for 270 doses.  •  aspirin, Take 1 tablet (81 mg total) by mouth 2 (two) times a day for 178 doses.  •  lisinopriL, Take 1 tablet (5 mg total) by mouth daily. HOLD FOR 1 WEEK- PLEASE FOLLOW UP WITH YOUR PCP BEFORE RESTARTING  •  meloxicam, Take 1 tablet (7.5 mg total) by mouth daily.  •  polyethylene glycol, Take 17 g by mouth daily for 90 doses.  •  sennosides-docusate sodium, Take 1 tablet by mouth 2 (two) times a day for 178 doses.      Current Facility-Administered Medications:   •  acetaminophen (TYLENOL) tablet 650 mg, 650 mg, oral, With meals & nightly, Fito Michael MD, 650 mg at 10/08/21 1212  •  acetaminophen (TYLENOL) tablet 650 mg, 650 mg, oral, q4h PRN, Fito Michael MD  •  acetaminophen (TYLENOL) tablet 650 mg, 650 mg, oral, q4h PRN, Fito Michael MD  •  albuterol HFA (VENTOLIN HFA) 90 mcg/actuation inhaler 2 puff, 2 puff, inhalation, q4h PRN, Fito Michael MD  •  albuterol HFA (VENTOLIN HFA) 90 mcg/actuation inhaler 2 puff, 2 puff, inhalation, QID (6a,  10a, 2p, 6p), Noé Nunez MD, 2 puff at 10/08/21 1212  •  alum-mag hydroxide-simeth (MAALOX) 200-200-20 mg/5 mL suspension 30 mL, 30 mL, oral, q4h PRN, Fito Michael MD  •  ascorbic acid (VITAMIN C) tablet 250 mg, 250 mg, oral, BID AC, Noé Nunez MD  •  aspirin chewable tablet 81 mg, 81 mg, oral, BID, Fito Michael MD, 81 mg at 10/08/21 0805  •  atorvastatin (LIPITOR) tablet 40 mg, 40 mg, oral, Daily (6p), Noé Nunez MD  •  bisacodyL (DULCOLAX) 10 mg suppository 10 mg, 10 mg, rectal, Daily PRN, Fito Michael MD  •  busPIRone (BUSPAR) tablet 10 mg, 10 mg, oral, With meals & nightly, Fito Michael MD, 10 mg at 10/08/21 1212  •  cholecalciferol (vitamin D3) tablet 2,500 Units, 2,500 Units, oral, Daily, Kvng Francisco MD  •  enoxaparin (LOVENOX) syringe 40 mg, 40 mg, subcutaneous, Daily (6p), Noé Nunez MD, 40 mg at 10/07/21 1842  •  famotidine (PEPCID) tablet 20 mg, 20 mg, oral, Nightly, Fito Michael MD, 20 mg at 10/07/21 2111  •  ferrous sulfate tablet 325 mg, 325 mg, oral, BID with meals, Noé Nunez MD  •  fluticasone furoate-vilanteroL (BREO ELLIPTA) 200-25 mcg/dose powder for inhalation 1 puff, 1 puff, inhalation, Daily, Noé Nunez MD  •  furosemide (LASIX) tablet 40 mg, 40 mg, oral, Daily (1p), Noé Nunez MD, 40 mg at 10/08/21 1212  •  guaiFENesin (MUCINEX) 12 hr ER tablet 600 mg, 600 mg, oral, BID, Noé Nunez MD, 600 mg at 10/08/21 0804  •  HYDROmorphone (DILAUDID) tablet 2 mg, 2 mg, oral, q4h PRN, Fito Michael MD  •  lamoTRIgine (LAMICTAL XR) extended release tablet 400 mg, 400 mg, oral, Daily, Fito Michael MD, 400 mg at 10/08/21 0808  •  lidocaine (ASPERCREME) 4 % topical patch 2 patch, 2 patch, Topical, Daily, Fito Michael MD, 2 patch at 10/08/21 0803  •  meloxicam (MOBIC) tablet 7.5 mg, 7.5 mg, oral, Daily, Fito Michael MD, 7.5 mg at 10/08/21 0804  •  montelukast (SINGULAIR) tablet 10 mg, 10 mg, oral, Nightly, Alec  Fito APARICIO MD, 10 mg at 10/07/21 2111  •  multivit-min-iron-folic-vit K1 (CENTRUM) chewable tablet 1 tablet, 1 tablet, oral, Daily, Fito Michael MD, 1 tablet at 10/08/21 0805  •  nortriptyline (PAMELOR) capsule 75 mg, 75 mg, oral, Nightly, Fito Michael MD, 75 mg at 10/07/21 2112  •  pantoprazole (PROTONIX) tablet,delayed release (DR/EC) 40 mg, 40 mg, oral, Daily before breakfast, Fito Michael MD, 40 mg at 10/08/21 0804  •  polyethylene glycol (MIRALAX) 17 gram packet 17 g, 17 g, oral, Daily PRN, Fito Michael MD  •  rOPINIRole (REQUIP) tablet 0.5 mg, 0.5 mg, oral, Daily with lunch, Fito Michael MD, 0.5 mg at 10/08/21 1212  •  rOPINIRole (REQUIP) tablet 1 mg, 1 mg, oral, Daily with dinner, Fito Michael MD, 1 mg at 10/07/21 1628  •  rOPINIRole (REQUIP) tablet 1 mg, 1 mg, oral, Daily, Fito Michael MD, 1 mg at 10/08/21 0804  •  rOPINIRole (REQUIP) tablet 2 mg, 2 mg, oral, Nightly, Fito Michael MD, 2 mg at 10/07/21 2111  •  sennosides-docusate sodium (SENOKOT-S) 8.6-50 mg per tablet 2 tablet, 2 tablet, oral, BID, Noé Nunez MD  •  sertraline (ZOLOFT) tablet 300 mg, 300 mg, oral, Daily, Fito Michael MD, 300 mg at 10/08/21 0804    PHYSICAL EXAM:  Vitals:    10/08/21 1107 10/08/21 1119 10/08/21 1128 10/08/21 1307   BP: 134/63   (!) 130/58   BP Location: Right upper arm   Right upper arm   Patient Position: Sitting   Sitting   Pulse: 85  87 94   Resp:       Temp:       TempSrc:       SpO2:  95% 97% 93%   Weight:       Height:           No intake or output data in the 24 hours ending 10/08/21 1319    Physical Exam  Vitals reviewed.   HENT:      Head: Normocephalic.      Right Ear: External ear normal.      Left Ear: External ear normal.   Eyes:      General: No scleral icterus.  Cardiovascular:      Rate and Rhythm: Regular rhythm.      Heart sounds: No friction rub.   Pulmonary:      Effort: No respiratory distress.   Abdominal:      General: There is no  distension.   Musculoskeletal:         General: Deformity present.   Skin:     Coloration: Skin is not jaundiced.   Neurological:      Mental Status: She is alert.      Motor: Weakness present.   Psychiatric:         Mood and Affect: Mood normal.         Results from last 7 days   Lab Units 10/08/21  0619   SODIUM mEQ/L 137   POTASSIUM mEQ/L 4.5   CHLORIDE mEQ/L 101   CO2 mEQ/L 27   BUN mg/dL 7*   CREATININE mg/dL 0.5*   EGFR mL/min/1.73m*2 >60.0   GLUCOSE mg/dL 86   CALCIUM mg/dL 8.0*   ALBUMIN g/dL 2.6*   WBC K/uL 8.71   HEMOGLOBIN g/dL 7.7*   HEMATOCRIT % 24.7*   PLATELETS K/uL 643*       Pertinent radiology and labs reviewed    ASSESSMENT:  Principal Problem:    Physical deconditioning  Active Problems:    Epilepsy (CMS/HCC)    HTN (hypertension)    HLD (hyperlipidemia)    Hypocalcemia    Depression    History of total knee arthroplasty, bilateral    Anemia    Asthma    Pulmonary edema      PLAN:  1. Hypocalcemia likely due to vitamin D deficiency. Total calcium corrects to 9.1. Ionized Calcium 1.11 (minimally low).  Start D3 2500 IU daily. Await 25D level. I expect PTH to be appropriately elevated. Furosemide can also increase urinary calcium excretion.  Consider lower dose furosemide if appropriate.     Kvng Francisco MD

## 2021-10-08 NOTE — PLAN OF CARE
Problem: Rehabilitation (IRF) Plan of Care  Goal: Plan of Care Review  Outcome: Progressing  Flowsheets (Taken 10/8/2021 0320)  Progress: improving  Plan of Care Reviewed With: patient  Outcome Summary: Alert and oriented x4. Continent of bowel and bladder. Reported moderate bilateral knee pain at HS. Stated scheduled Tylenol has been effective in treating pain. Sleeping quietly in bed at this time. Fall and safety measures maintained. Seizure precautions per orders. Lab work scheduled for today.

## 2021-10-08 NOTE — PROGRESS NOTES
Patient: Fide Amin  Location: [facility] [address]  MRN: [id number]  Today's date: 10/8/2021    History of Present Illness  Fide is a 60 y.o. female admitted on 10/6/2021 with Physical deconditioning [R53.81]. Principal problem is Physical deconditioning.   Mrs. Fide Amin is a 60 year old female who presented to [facility] on 9/20/21 for planned B TKA by Dr. Em. Pt suffered a seizure post op after 8 years of having no seizures. CT of chest completed 9/23 results reviewed with no CT evidence of pulmonary embolism.  Scattered bilateral airspace opacities noted and believed to be most likely representing multifocal pneumonia.  Covid 19 pneumonia was excluded. Patient was medically cleared for discharge 9/23/21 on ceftin 500mg BID and zithromax 500mg daily x 5 days and with instructions for f/u care.  Pt. Presented to [facility] on 9/24 for SOB and dx with aspiration PNA. Pt. Intubated on 9/25/21 and extubated on 10/1/21. Pt is now medically stable and admitted to [facility] for acute inpatient rehab.      Past Medical History  Fide has a past medical history of Arthritis, Asthma, Depression, Deviated septum, Epilepsy (CMS/HCC), GERD (gastroesophageal reflux disease), Hypertension, Lipid disorder, Motion sickness, and RLS (restless legs syndrome).      OT Vitals    Date/Time Pulse HR Source SpO2 Pt Activity O2 Therapy O2 Del Method O2 Flow Rate BP BP Location BP Method Pt Position [facility]   10/08/21 1107 85 Monitor -- -- -- -- -- 134/63 Right upper arm Automatic Sitting [facility]   10/08/21 1119 -- -- 95 % At rest Supplemental oxygen Nasal cannula 2 L/min -- -- -- -- [facility]   10/08/21 1128 87 Monitor 97 % At rest Supplemental oxygen Nasal cannula 2 L/min -- -- -- -- [facility]      OT Pain    Date/Time Pain Type Side/Orientation Location Rating: Rest Rating: Activity Interventions [facility]   10/08/21 1107 Pain Assessment left knee 5 5 premedicated for activity; position adjusted; relaxation techniques promoted [facility]    10/08/21 1119 Post Activity left knee 5 5 premedicated for activity; position adjusted; relaxation techniques promoted [facility]   10/08/21 1128 Post Activity left knee 5 5 premedicated for activity; position adjusted; relaxation techniques promoted [facility]          Prior Living Environment      Most Recent Value   People in Home spouse  [4 kids]   Current Living Arrangements home   Home Accessibility stairs to enter home (Group),  stairs within home (Group)  [2+1 STEPHANY, ff inside, TN 1st floor]   Living Environment Comment Pt lives in 2 . Pt reports two small steps to enter and FF to 2nd floor. Pt reports plans to have 1st floor set-up. Pt has guest room on 1st floor c 1/2 bath. Pt reports purchased commode and 2 RTS c handles.   Number of Stairs, Main Entrance 2   Surface of Stairs, Main Entrance concrete   Stair Railings, Main Entrance none   Location, Patient Bedroom second floor, must negotiate stairs to access   Patient Bedroom Access Comment Ability to have first floor set up   Location, Bathroom first (main) floor,  second floor, must negotiate stairs to access   Bathroom Access Comment 2nd floor FB c shower stall. (+) grab bar in shower stall. Pt reports has built in shower bench in stall.   Number of Stairs, Within Home, Primary other (see comments)  [7+7]   Surface of Stairs, Within Home, Primary hardwood   Stair Railings, Within Home, Primary railings on both sides of stairs   Landing, Stairs, Within Home, Primary railings present          Prior Level of Function      Most Recent Value   Dominant Hand right   Ambulation independent   Transferring independent   Toileting independent   Bathing independent   Dressing independent   Prior Level of Function Comment Pt was d/c'd home for one day after surgery using RW.   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   Occupational History/Life Experiences Pt lives at home in [facility] c  ([place]). Pt reports  also retired. Pt is a . Pt has epilepsy and hx of last seizure 2011 then had a seizure after BKA surgery 9/2021.   Patient Goals \"To get well enough to be able to go home and get PT there\"           IRF OT Evaluation and Treatment - 10/08/21 1101        OT Time Calculation    Start Time 1100     Stop Time 1130     Time Calculation (min) 30 min        Session Details    Document Type daily treatment/progress note     Mode of Treatment occupational therapy; individual therapy        General Information    General Observations of Patient pt received seated in WC, direct handoff from previous OT session        Sit to Stand Transfer    [place], Sit to Stand Transfer touching/steadying assist     Verbal Cues safety; technique     Assistive Device other (see comments)     Comment from WC to tabletop        Stand to Sit Transfer    [place], Stand to Sit Transfer touching/steadying assist     Verbal Cues safety; technique     Assistive Device walker, front-wheeled     Comment to WC from tabletop        Balance    Comment, Balance in stance at tabletop x4 mins engages in [employer] game c treating OT. cues for upright posture as pt frequently leaning on tapletop via forearms. touching A for balance/safety        Lower Extremity (Therapeutic Exercise)    Exercise Position/Type seated; AAROM (active assistive range of motion)     General Exercise bilateral; heel raises; other (see comments)     Comment seated in WC completes AAROM towel slides into knee flex/ext holding end range x3 sec, 10 reps. completes heel and toe raises x15 reps        Daily Progress Summary (OT)    Symptoms Noted During/After Treatment shortness of breath     Daily Outcome Statement focus of session dedicated to standing tolerance, functional " endurance, and BLE ROM. pt tolerates fair c dyspnea noted in stance, SpO2 above 91% t/o. pt c/o L knee stiffness, benefits from ROM exercises                           IRF OT Goals      Most Recent Value   Transfer Goal 1    Activity/Assistive Device toilet at 10/07/2021 0740   [place] supervision required at 10/07/2021 0740   Time Frame short-term goal (STG),  1 week at 10/07/2021 0740   Transfer Goal 2    Activity/Assistive Device toilet at 10/07/2021 0740   [place] modified independence at 10/07/2021 0740   Time Frame long-term goal (LTG),  14 days or less at 10/07/2021 0740   Transfer Goal 3    Activity/Assistive Device shower at 10/07/2021 0740   [place] supervision required at 10/07/2021 0740   Time Frame short-term goal (STG),  1 week at 10/07/2021 0740   Transfer Goal 4    Activity/Assistive Device shower at 10/07/2021 0740   [place] modified independence at 10/07/2021 0740   Time Frame long-term goal (LTG),  14 days or less at 10/07/2021 0740   Bathing Goal 1    Activity/Assistive Device bathing skills, all at 10/07/2021 0740   [place]per supervision required at 10/07/2021 0740   Time Frame short-term goal (STG),  1 week at 10/07/2021 0740   Bathing Goal 2    Activity/Assistive Device bathing skills, all at 10/07/2021 0740   [place]per set-up required at 10/07/2021 0740   Time Frame long-term goal (LTG),  14 days or less at 10/07/2021 0740   UB Dressing Goal 1    Activity/Assistive Device upper body dressing at 10/07/2021 0740   [place] minimum assist (75% or more patient effort) at 10/07/2021 0740   Time Frame short-term goal (STG),  1 week at 10/07/2021 0740   Strategies/Barriers c clothing retrieval at 10/07/2021 0740   UB Dressing Goal 2    [place] modified independence at 10/07/2021 0740   Time Frame long-term goal (LTG),  14 days or less at 10/07/2021 0740   LB Dressing Goal 1    [place] minimum assist (75% or more patient effort) at 10/07/2021 0740   Time Frame  short-term goal (STG),  1 week at 10/07/2021 0740   Strategies/Barriers c AE at 10/07/2021 0740   LB Dressing Goal 2    [place] modified independence at 10/07/2021 0740   Time Frame long-term goal (LTG),  14 days or less at 10/07/2021 0740   Grooming Goal 1    [place] supervision required at 10/07/2021 0740   Time Frame short-term goal (STG),  1 week at 10/07/2021 0740   Strategies/Barriers standing at 10/07/2021 0740   Grooming Goal 2    [place] modified independence at 10/07/2021 0740   Time Frame long-term goal (LTG),  14 days or less at 10/07/2021 0740   Toileting Goal 1    [place] minimum assist (75% or more patient effort) at 10/07/2021 0740   Time Frame short-term goal (STG),  1 week at 10/07/2021 0740   Toileting Goal 2    [place]a modified independence at 10/07/2021 0740   Time Frame long-term goal (LTG),  14 days or less at 10/07/2021 0740

## 2021-10-08 NOTE — CONSULTS
Wound Ostomy Continence Note    Subjective    HPI Patient is a 60 y.o. female who was admitted on 10/6/2021 with a diagnosis of Physical deconditioning [R53.81].    Problem list Problem List:   Patient Active Problem List   Diagnosis   • Arthritis of knee   • Epilepsy (CMS/HCC)   • HTN (hypertension)   • HLD (hyperlipidemia)   • Hypocalcemia   • Depression   • History of total knee arthroplasty, bilateral   • Physical deconditioning      PMH/PSH Medical History:   Past Medical History:   Diagnosis Date   • Arthritis     OA   • Asthma    • Depression    • Deviated septum    • Epilepsy (CMS/HCC)     Last seizure 8 years ago   • GERD (gastroesophageal reflux disease)    • Hypertension    • Lipid disorder    • Motion sickness    • RLS (restless legs syndrome)      Surgical History:   Past Surgical History:   Procedure Laterality Date   •  SECTION      x1   • SINUS SURGERY        Assessment and Recommendation         Seen for skin assessment. PI prevention education given. Right medial heel, foot, ecchymosis, measures 6cm x 3.8cm. Left and right knee, incisions, WNL, scabbing, periwound skin multiple dried, tan blisters, bilateral knees, open to air. Left knee, lateral, inferior, tan blister, measures 4cm x 3.5cm. Right knee blister, tan, dry, measures 1.5cm x 2.5cm, Dry tan blister, right knee, measures 6cm x 7cm. Edema,1- 2+ bilateral feet. Elevated on a pillows. Erythema under glasses frame on nose, bridge bilateral , measures .7cm x .3cm on upper right side and upper left side. Suggest: monitor knee incisions bid, open to air, monitor blisters, bid, open to air, turn q 2 hours in bed, weight shift in wheelchair, q 30 minutes, Liquicel to nose under glasses frame. WOCN consult is completed. Please order a new consult if an additional skin assessment is indicated.     Date: 10/07/21  Signature: Kayleen Rodney RN

## 2021-10-08 NOTE — PROGRESS NOTES
Psychiatry Progress Note    History of Present Illness   See initial consult.  History pertaining to psychosis, depression and anxiety and other psychiatric and psychologic conditions as well as general medical history detailed in initial consult.      Interval History:     Still anxious despite Zoloft 300 mg.  She thought about our discussion about lowering the dose but declines to do so.  Sodium okay at 137.  Vital signs stable  Patient did okay overnight.  No nursing report of any impulsivity, agitation or behavioral disturbance.  Sleep pattern normalizing.  Adjusting to routine of the hospital.   Seems motivated to participate in therapies. No suicidal ideation, auditory or visual hallucinations or paranoid ideation. Reviewed progress and behavior with nursing    MENTAL STATUS EXAM  Appearance: well groomed  Gait and Motor: no abnormal movements  Speech: normal rate/rhythm/volume  Mood: depressed and anxious  Affect: constricted  Associations: coherent  Thought Process: goal-directed  Thought Content: no auditory or visual hallucinations.  Suicidality/Homicidality: denies  Judgement/Insight: minimizes severity level of illness  Orientation: situation  Memory: knows current president  Attention: distracted  Knowledge: normal  Language: normal    Vital Signs for the last 24 hours:  Temp:  [36.9 °C (98.4 °F)-37 °C (98.6 °F)] 37 °C (98.6 °F)  Heart Rate:  [75-93] 93  Resp:  [18-20] 20  BP: (112-146)/(55-67) 146/67    Labs:  Labs below reviewed for pertinence to psychiatric condition  Lab Results   Component Value Date    GLUCOSE 86 10/08/2021    CALCIUM 8.0 (L) 10/08/2021     10/08/2021    K 4.5 10/08/2021    CO2 27 10/08/2021     10/08/2021    BUN 7 (L) 10/08/2021    CREATININE 0.5 (L) 10/08/2021     Lab Results   Component Value Date    WBC 8.71 10/08/2021    HGB 7.7 (L) 10/08/2021    HCT 24.7 (L) 10/08/2021    MCV 95.7 10/08/2021     (H) 10/08/2021     Pain Management Panel    There is no  flowsheet data to display.           Current Facility-Administered Medications   Medication Dose Route Frequency Provider Last Rate Last Admin   • acetaminophen (TYLENOL) tablet 650 mg  650 mg oral With meals & nightly Fito Michael MD   650 mg at 10/08/21 0804   • acetaminophen (TYLENOL) tablet 650 mg  650 mg oral q4h PRN Fito Michael MD       • acetaminophen (TYLENOL) tablet 650 mg  650 mg oral q4h PRN Fito Michael MD       • albuterol HFA (VENTOLIN HFA) 90 mcg/actuation inhaler 2 puff  2 puff inhalation q4h PRN Fito Michael MD       • albuterol HFA (VENTOLIN HFA) 90 mcg/actuation inhaler 2 puff  2 puff inhalation QID (6a, 10a, 2p, 6p) Noé Nunez MD   2 puff at 10/08/21 0612   • alum-mag hydroxide-simeth (MAALOX) 200-200-20 mg/5 mL suspension 30 mL  30 mL oral q4h PRN Fito Michael MD       • aspirin chewable tablet 81 mg  81 mg oral BID Fito Michael MD   81 mg at 10/08/21 0805   • atorvastatin (LIPITOR) tablet 40 mg  40 mg oral Daily (6p) Noé Nunez MD       • bisacodyL (DULCOLAX) 10 mg suppository 10 mg  10 mg rectal Daily PRN Fito Michael MD       • busPIRone (BUSPAR) tablet 10 mg  10 mg oral With meals & nightly Fito Michael MD   10 mg at 10/08/21 0804   • enoxaparin (LOVENOX) syringe 40 mg  40 mg subcutaneous Daily (6p) Noé Nunez MD   40 mg at 10/07/21 1842   • famotidine (PEPCID) tablet 20 mg  20 mg oral Nightly Fito Michael MD   20 mg at 10/07/21 2111   • fluticasone furoate-vilanteroL (BREO ELLIPTA) 200-25 mcg/dose powder for inhalation 1 puff  1 puff inhalation Daily Noé Nunez MD   1 puff at 10/07/21 1630   • guaiFENesin (MUCINEX) 12 hr ER tablet 600 mg  600 mg oral BID Noé Nunez MD   600 mg at 10/08/21 0804   • HYDROmorphone (DILAUDID) tablet 2 mg  2 mg oral q4h PRN Fito Michael MD       • lamoTRIgine (LAMICTAL XR) extended release tablet 400 mg  400 mg oral Daily Fito Michael MD   400 mg at 10/08/21 0808    • lidocaine (ASPERCREME) 4 % topical patch 2 patch  2 patch Topical Daily Fito Michael MD   2 patch at 10/08/21 0803   • meloxicam (MOBIC) tablet 7.5 mg  7.5 mg oral Daily Fito Michael MD   7.5 mg at 10/08/21 0804   • montelukast (SINGULAIR) tablet 10 mg  10 mg oral Nightly Fito Michael MD   10 mg at 10/07/21 2111   • multivit-min-iron-folic-vit K1 (CENTRUM) chewable tablet 1 tablet  1 tablet oral Daily Fito Michael MD   1 tablet at 10/08/21 0805   • nortriptyline (PAMELOR) capsule 75 mg  75 mg oral Nightly Fito Michael MD   75 mg at 10/07/21 2112   • pantoprazole (PROTONIX) tablet,delayed release (DR/EC) 40 mg  40 mg oral Daily before breakfast Fito Michael MD   40 mg at 10/08/21 0804   • polyethylene glycol (MIRALAX) 17 gram packet 17 g  17 g oral Daily PRN Fito Michael MD       • rOPINIRole (REQUIP) tablet 0.5 mg  0.5 mg oral Daily with lunch Fito Michael MD   0.5 mg at 10/07/21 1238   • rOPINIRole (REQUIP) tablet 1 mg  1 mg oral Daily with dinner Fito Michael MD   1 mg at 10/07/21 1628   • rOPINIRole (REQUIP) tablet 1 mg  1 mg oral Daily Fito Michael MD   1 mg at 10/08/21 0804   • rOPINIRole (REQUIP) tablet 2 mg  2 mg oral Nightly Fito Michael MD   2 mg at 10/07/21 2111   • sennosides-docusate sodium (SENOKOT-S) 8.6-50 mg per tablet 2 tablet  2 tablet oral BID oNé Nunez MD       • sertraline (ZOLOFT) tablet 300 mg  300 mg oral Daily Fito Michael MD   300 mg at 10/08/21 0804        Patient Active Problem List   Diagnosis   • Arthritis of knee   • Epilepsy (CMS/HCC)   • HTN (hypertension)   • HLD (hyperlipidemia)   • Hypocalcemia   • Depression   • History of total knee arthroplasty, bilateral   • Physical deconditioning           Assessment/Plan    Depression: CBT automatic anxious and negative thoughts  Adjustment Disorder to Disability: supportive therapy  Sleep:  Insight into illness: insight  therapy/psychoeducation  Psychotherapy: supportive  Monitor: Mood, Speech, Appetite, Energy, Cognition, Behavioral, Impulsivity, Agitation  Family Support  Medications: monitor for side effects  - presently no gi, akathesia , ha or sedation  Hg 8.5  Sodium 137  Support atd  Promote insight  BuSpar 10 4 times a day  Zoloft 300  I discussed in detail with the patient and her  my concern about her current Zoloft dose and how it is likely contributing to her jumpiness and jitteriness though she feels this is related to her asthma medicines.  She was clear that she wants to stay on her current dose.  We agreed to do so for now but to continue to monitor for any potential side effects.  Sodium is okay and within normal limits.  She prefers not to lower the Zoloft.  CBT automatic anxious and negative thoughts  Cody Vaughn MD  10/8/2021

## 2021-10-09 ENCOUNTER — APPOINTMENT (INPATIENT)
Dept: PHYSICAL THERAPY | Facility: REHABILITATION | Age: 61
DRG: 948 | End: 2021-10-09
Payer: COMMERCIAL

## 2021-10-09 LAB
COLLECT DATE: NORMAL
HEMOCCULT SP1 STL QL: NEGATIVE

## 2021-10-09 PROCEDURE — 97116 GAIT TRAINING THERAPY: CPT | Mod: GP

## 2021-10-09 PROCEDURE — 97110 THERAPEUTIC EXERCISES: CPT | Mod: GP

## 2021-10-09 PROCEDURE — 63700000 HC SELF-ADMINISTRABLE DRUG: Performed by: INTERNAL MEDICINE

## 2021-10-09 PROCEDURE — 63600000 HC DRUGS/DETAIL CODE: Performed by: INTERNAL MEDICINE

## 2021-10-09 PROCEDURE — 63700000 HC SELF-ADMINISTRABLE DRUG: Performed by: PHYSICAL MEDICINE & REHABILITATION

## 2021-10-09 PROCEDURE — 12800000 HC ROOM AND CARE SEMIPRIVATE REHAB

## 2021-10-09 PROCEDURE — 82272 OCCULT BLD FECES 1-3 TESTS: CPT | Performed by: INTERNAL MEDICINE

## 2021-10-09 RX ADMIN — FAMOTIDINE 20 MG: 20 TABLET ORAL at 20:23

## 2021-10-09 RX ADMIN — GUAIFENESIN 600 MG: 600 TABLET ORAL at 20:23

## 2021-10-09 RX ADMIN — ACETAMINOPHEN 650 MG: 325 TABLET, FILM COATED ORAL at 09:12

## 2021-10-09 RX ADMIN — ACETAMINOPHEN 650 MG: 325 TABLET, FILM COATED ORAL at 17:39

## 2021-10-09 RX ADMIN — Medication 2500 UNITS: at 09:10

## 2021-10-09 RX ADMIN — NORTRIPTYLINE HYDROCHLORIDE 75 MG: 25 CAPSULE ORAL at 20:23

## 2021-10-09 RX ADMIN — BUSPIRONE HYDROCHLORIDE 10 MG: 5 TABLET ORAL at 20:23

## 2021-10-09 RX ADMIN — ALBUTEROL SULFATE 2 PUFF: 90 AEROSOL, METERED RESPIRATORY (INHALATION) at 17:44

## 2021-10-09 RX ADMIN — ROPINIROLE HYDROCHLORIDE 1 MG: 1 TABLET, FILM COATED ORAL at 09:10

## 2021-10-09 RX ADMIN — LAMOTRIGINE 400 MG: 200 TABLET, EXTENDED RELEASE ORAL at 09:19

## 2021-10-09 RX ADMIN — FUROSEMIDE 40 MG: 40 TABLET ORAL at 13:09

## 2021-10-09 RX ADMIN — ASPIRIN 81 MG CHEWABLE TABLET 81 MG: 81 TABLET CHEWABLE at 20:23

## 2021-10-09 RX ADMIN — Medication 325 MG: at 09:11

## 2021-10-09 RX ADMIN — GUAIFENESIN 600 MG: 600 TABLET ORAL at 09:11

## 2021-10-09 RX ADMIN — ROPINIROLE HYDROCHLORIDE 1 MG: 1 TABLET, FILM COATED ORAL at 17:39

## 2021-10-09 RX ADMIN — ENOXAPARIN SODIUM 40 MG: 100 INJECTION SUBCUTANEOUS at 17:41

## 2021-10-09 RX ADMIN — Medication 1 TABLET: at 09:11

## 2021-10-09 RX ADMIN — ACETAMINOPHEN 650 MG: 325 TABLET, FILM COATED ORAL at 13:09

## 2021-10-09 RX ADMIN — Medication 250 MG: at 17:39

## 2021-10-09 RX ADMIN — MELOXICAM 7.5 MG: 7.5 TABLET ORAL at 09:11

## 2021-10-09 RX ADMIN — BUSPIRONE HYDROCHLORIDE 10 MG: 5 TABLET ORAL at 13:10

## 2021-10-09 RX ADMIN — MONTELUKAST 10 MG: 10 TABLET, FILM COATED ORAL at 20:23

## 2021-10-09 RX ADMIN — FLUTICASONE FUROATE AND VILANTEROL 1 PUFF: 200; 25 POWDER RESPIRATORY (INHALATION) at 09:20

## 2021-10-09 RX ADMIN — ASPIRIN 81 MG CHEWABLE TABLET 81 MG: 81 TABLET CHEWABLE at 09:10

## 2021-10-09 RX ADMIN — ALBUTEROL SULFATE 2 PUFF: 90 AEROSOL, METERED RESPIRATORY (INHALATION) at 05:59

## 2021-10-09 RX ADMIN — Medication 250 MG: at 09:10

## 2021-10-09 RX ADMIN — ROPINIROLE HYDROCHLORIDE 0.5 MG: 0.5 TABLET, FILM COATED ORAL at 13:09

## 2021-10-09 RX ADMIN — ALBUTEROL SULFATE 2 PUFF: 90 AEROSOL, METERED RESPIRATORY (INHALATION) at 09:25

## 2021-10-09 RX ADMIN — LIDOCAINE 2 PATCH: 246 PATCH TOPICAL at 09:14

## 2021-10-09 RX ADMIN — BUSPIRONE HYDROCHLORIDE 10 MG: 5 TABLET ORAL at 17:39

## 2021-10-09 RX ADMIN — ROPINIROLE HYDROCHLORIDE 2 MG: 1 TABLET, FILM COATED ORAL at 20:23

## 2021-10-09 RX ADMIN — Medication 325 MG: at 17:39

## 2021-10-09 RX ADMIN — SERTRALINE HYDROCHLORIDE 300 MG: 100 TABLET ORAL at 09:11

## 2021-10-09 RX ADMIN — BUSPIRONE HYDROCHLORIDE 10 MG: 5 TABLET ORAL at 09:11

## 2021-10-09 RX ADMIN — PANTOPRAZOLE SODIUM 40 MG: 40 TABLET, DELAYED RELEASE ORAL at 09:10

## 2021-10-09 NOTE — CONSULTS
Fide Amin  [id number]  10/9/2021    PODIATRY CONSULT    Physical deconditioning [R53.81]    Reason for referral: bilateral ankle pain.    HPI : 60-year-old right handed white female with chronic conditions significant for polyarticular degenerative arthritis, hypertension, hyperlipidemia, seizure disorder, anxiety, depression, asthma, restless leg syndrome had elective bilateral total knee replacements secondary to degenerative arthritis of both knees by Dr. Bennett Em at [facility] on 9/20/21. Postoperatively, on the way from the PACU to the floor or soon after arriving on the floor, the patient did sustain a seizure and she does have a history of seizure disorder but had not had one in about a decade.  She was seen by neurologist at [facility] after her seizure.  Postoperative course was significant for hypotension which was felt to be secondary to narcotics, dehydration as well as blood loss.  She had hypoxia with exertion. CT of chest on 9/23/21 revealed no CT evidence of pulmonary embolism, but scattered bilateral airspace opacities were noted and believed to be most likely representing multifocal pneumonia.  Covid 19 pneumonia was excluded. She was allowed weightbearing as tolerated on both lower extremities and on Aspirin and SCDs for DVT prophylaxis. She was apparently offered SNF placement per her records but she declined it.  She was medically cleared for discharge on Ceftin 500mg BID and Zithromax 500mg daily x 5 days and with instructions for follow-up care and discharged to home on 9/23/21 with home care.  Subsequently, she presented to the ER at [facility] on 9/24/21 with increased shortness of breath and was diagnosed with aspiration pneumonia.  She was intubated on 9/25/21 and eventually extubated on 10/1/21. She reports she was on oxygen in the acute care hospital and will try to wean her off oxygen as possible.  She has had multiple blisters on both knees.   On 10/5/21, hemoglobin was 8.5, WBC count 6.9, platelets were 545, BUN was 13 and creatinine was 0.6.  She has been needing assistance for mobility, transfers, self-care. She is transferred to [facility] on 10/6/21 for further rehabilitation care.           Subjective:  Patient was referred by Dr. Michael for ankle pain. Patient is complaining of right > left posterior ankle pain.          Allergies:   Allergies   Allergen Reactions   • Penicillins Other (see comments)     Rash and Asthma issues   • Sulfa (Sulfonamide Antibiotics) Hives         Allergies List Reviewed:  yes      Medications:   Current Facility-Administered Medications:   •  acetaminophen (TYLENOL) tablet 650 mg, 650 mg, oral, With meals & nightly, Fito Michael MD, 650 mg at 10/08/21 2108  •  acetaminophen (TYLENOL) tablet 650 mg, 650 mg, oral, q4h PRN, Fito Michael MD  •  acetaminophen (TYLENOL) tablet 650 mg, 650 mg, oral, q4h PRN, Ftio Michael MD  •  albuterol HFA (VENTOLIN HFA) 90 mcg/actuation inhaler 2 puff, 2 puff, inhalation, q4h PRN, Fito Michael MD  •  albuterol HFA (VENTOLIN HFA) 90 mcg/actuation inhaler 2 puff, 2 puff, inhalation, QID (6a, 10a, 2p, 6p), Noé Nunez MD, 2 puff at 10/09/21 0559  •  alum-mag hydroxide-simeth (MAALOX) 200-200-20 mg/5 mL suspension 30 mL, 30 mL, oral, q4h PRN, Fito Michael MD  •  ascorbic acid (VITAMIN C) tablet 250 mg, 250 mg, oral, BID AC, Noé Nunez MD, 250 mg at 10/08/21 1736  •  aspirin chewable tablet 81 mg, 81 mg, oral, BID, Fito Michael MD, 81 mg at 10/08/21 2108  •  atorvastatin (LIPITOR) tablet 40 mg, 40 mg, oral, Daily (6p), Noé Nunez MD, 40 mg at 10/08/21 1736  •  bisacodyL (DULCOLAX) 10 mg suppository 10 mg, 10 mg, rectal, Daily PRN, Fito Michael MD  •  busPIRone (BUSPAR) tablet 10 mg, 10 mg, oral, With meals & nightly, Fito Michael MD, 10 mg at 10/08/21 2108  •  camphor-methyl salicyl-menthoL (MUSCLE RUB) cream, ,  Topical, 2x daily PRN, Gildardo Jacobsen DPM  •  cholecalciferol (vitamin D3) tablet 2,500 Units, 2,500 Units, oral, Daily, Kvng Francisco MD, 2,500 Units at 10/08/21 1412  •  enoxaparin (LOVENOX) syringe 40 mg, 40 mg, subcutaneous, Daily (6p), Noé Nunez MD, 40 mg at 10/08/21 1735  •  famotidine (PEPCID) tablet 20 mg, 20 mg, oral, Nightly, Fito Michael MD, 20 mg at 10/08/21 2109  •  ferrous sulfate tablet 325 mg, 325 mg, oral, BID with meals, Noé Nunez MD, 325 mg at 10/08/21 1736  •  fluticasone furoate-vilanteroL (BREO ELLIPTA) 200-25 mcg/dose powder for inhalation 1 puff, 1 puff, inhalation, Daily, Noé Nunez MD, 1 puff at 10/08/21 1412  •  furosemide (LASIX) tablet 40 mg, 40 mg, oral, Daily (1p), Noé Nunez MD, 40 mg at 10/08/21 1212  •  guaiFENesin (MUCINEX) 12 hr ER tablet 600 mg, 600 mg, oral, BID, Noé Nunez MD, 600 mg at 10/08/21 2109  •  HYDROmorphone (DILAUDID) tablet 2 mg, 2 mg, oral, q4h PRN, Fito Michael MD, 2 mg at 10/08/21 1422  •  lamoTRIgine (LAMICTAL XR) extended release tablet 400 mg, 400 mg, oral, Daily, Fito Michael MD, 400 mg at 10/08/21 0808  •  lidocaine (ASPERCREME) 4 % topical patch 2 patch, 2 patch, Topical, Daily, Fito Michael MD, 2 patch at 10/08/21 0803  •  meloxicam (MOBIC) tablet 7.5 mg, 7.5 mg, oral, Daily, Fito Michael MD, 7.5 mg at 10/08/21 0804  •  montelukast (SINGULAIR) tablet 10 mg, 10 mg, oral, Nightly, Fito Michael MD, 10 mg at 10/08/21 2109  •  multivit-min-iron-folic-vit K1 (CENTRUM) chewable tablet 1 tablet, 1 tablet, oral, Daily, Fito Michael MD, 1 tablet at 10/08/21 0805  •  nortriptyline (PAMELOR) capsule 75 mg, 75 mg, oral, Nightly, Fito Michael MD, 75 mg at 10/08/21 2109  •  pantoprazole (PROTONIX) tablet,delayed release (DR/EC) 40 mg, 40 mg, oral, Daily before breakfast, Fito Michael MD, 40 mg at 10/08/21 0804  •  polyethylene glycol (MIRALAX) 17 gram packet 17 g, 17 g, oral, Daily  PRN, Fito Michael MD  •  rOPINIRole (REQUIP) tablet 0.5 mg, 0.5 mg, oral, Daily with lunch, Fito Michael MD, 0.5 mg at 10/08/21 1212  •  rOPINIRole (REQUIP) tablet 1 mg, 1 mg, oral, Daily with dinner, Fito Michael MD, 1 mg at 10/08/21 1736  •  rOPINIRole (REQUIP) tablet 1 mg, 1 mg, oral, Daily, Fito Michael MD, 1 mg at 10/08/21 0804  •  rOPINIRole (REQUIP) tablet 2 mg, 2 mg, oral, Nightly, Fito Michael MD, 2 mg at 10/08/21 2108  •  sennosides-docusate sodium (SENOKOT-S) 8.6-50 mg per tablet 2 tablet, 2 tablet, oral, BID, Noé Nunez MD  •  sertraline (ZOLOFT) tablet 300 mg, 300 mg, oral, Daily, Fito Michael MD, 300 mg at 10/08/21 0804      Home Medications Reconcile: yes      Current Medication orders reviewed: yes      Medical History:    Active Ambulatory Problems     Diagnosis Date Noted   • Arthritis of knee 09/20/2021   • Epilepsy (CMS/HCC) 09/20/2021   • HTN (hypertension) 09/20/2021   • HLD (hyperlipidemia) 09/20/2021   • Hypocalcemia 09/21/2021   • Depression 10/05/2021   • History of total knee arthroplasty, bilateral 10/05/2021     Resolved Ambulatory Problems     Diagnosis Date Noted   • No Resolved Ambulatory Problems     Past Medical History:   Diagnosis Date   • Arthritis    • Asthma    • Deviated septum    • GERD (gastroesophageal reflux disease)    • Hypertension    • Lipid disorder    • Motion sickness    • RLS (restless legs syndrome)        Social History     Socioeconomic History   • Marital status:      Spouse name: Not on file   • Number of children: Not on file   • Years of education: Not on file   • Highest education level: Not on file   Occupational History   • Not on file   Tobacco Use   • Smoking status: Never Smoker   • Smokeless tobacco: Never Used   Vaping Use   • Vaping Use: Never used   Substance and Sexual Activity   • Alcohol use: Yes     Alcohol/week: 2.0 standard drinks     Types: 2 Glasses of wine per week   • Drug use: Never    • Sexual activity: Not on file   Other Topics Concern   • Not on file   Social History Narrative    Lives with , independent in everything, drives but now won't be able  to drive for 6th months, retired teacher, 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  Has 1 daughter and 4 step children.  Miranda has a 1 year old son and denys is expecting twin girls.     Social Determinants of Health     Financial Resource Strain:    • Difficulty of Paying Living Expenses: Not on file   Food Insecurity: No Food Insecurity   • Worried About Running Out of Food in the Last Year: Never true   • Ran Out of Food in the Last Year: Never true   Transportation Needs:    • Lack of Transportation (Medical): Not on file   • Lack of Transportation (Non-Medical): Not on file   Physical Activity:    • Days of Exercise per Week: Not on file   • Minutes of Exercise per Session: Not on file   Stress:    • Feeling of Stress : Not on file   Social Connections:    • Frequency of Communication with Friends and Family: Not on file   • Frequency of Social Gatherings with Friends and Family: Not on file   • Attends [employer]ism Services: Not on file   • Active Member of Clubs or Organizations: Not on file   • Attends Club or Organization Meetings: Not on file   • Marital Status: Not on file   Intimate Partner Violence:    • Fear of Current or Ex-Partner: Not on file   • Emotionally Abused: Not on file   • Physically Abused: Not on file   • Sexually Abused: Not on file   Housing Stability:    • Unable to Pay for Housing in the Last Year: Not on file   • Number of Places Lived in the Last Year: Not on file   • Unstable Housing in the Last Year: Not on file       Past Surgical History:   Procedure Laterality Date   •  SECTION      x1   • SINUS SURGERY           Review of Systems - Negative except noted above.               Physical Exam: palpable DP nonpalpable PT  bilateral feet.  Decreased texture, temperature, and turgor bilateral feet.  Decreased digital hair bilateral feet.   Edema bilateral lower extremity.  Xerotic skin bilateral feet.  No open lesions bilateral.  Elongated, thickened, yellow nails with subungual debris  <6.  Tenderness with palpation of affected toenails.   Digital contracture noted toe 2-5 bilateral.   Hallux valgus deformity bilateral.  Epicritic sensation intact  bilateral feet. Tenderness bilateral achilles tendon right > left.  No gapping in tendon.  No crepitus.  Negative campbell test.  Some ecchymosis noted.  Cavus foot type.  Equinus bilateral.      Lab Results   Component Value Date    WBC 8.71 10/08/2021    HGB 7.7 (L) 10/08/2021    HCT 24.7 (L) 10/08/2021    MCV 95.7 10/08/2021     (H) 10/08/2021       Lab Results   Component Value Date    GLUCOSE 86 10/08/2021    CALCIUM 8.0 (L) 10/08/2021    CALCIUM 8.0 (L) 10/08/2021     10/08/2021    K 4.5 10/08/2021    CO2 27 10/08/2021     10/08/2021    BUN 7 (L) 10/08/2021    CREATININE 0.5 (L) 10/08/2021       X-RAY CHEST 2 VIEWS    Result Date: 10/7/2021  IMPRESSION: 1.  Persistent multifocal airspace disease, increased in the right lung and mildly improved in the left since 9/23/2021. 2.  Low lung volumes. 3.  Small bilateral pleural effusions, mildly increased since 9/23/2021.    CT CHEST PULMONARY EMBOLISM WITH IV CONTRAST    Addendum Date: 9/23/2021    Results were called to Dr Elgin Navarro at 3:55 pm on 9/23/21    Result Date: 9/23/2021  IMPRESSION: 1.  No CT evidence of pulmonary embolism. 2.  Scattered bilateral airspace opacities most likely representing multifocal pneumonia.  Covid 19 pneumonia should be excluded.    X-RAY KNEE BILATERAL 1 OR 2 VIEWS    Result Date: 9/20/2021  IMPRESSION: Satisfactory postoperative appearance status post right and left total knee arthroplasty.      Pathology Results     ** No results found for the last 720 hours. **        Microbiology  Results     Procedure Component Value Units Date/Time    SARS-CoV-2 (COVID-19), PCR Nasopharynx [[id number]]  (Normal) Collected: 09/23/21 1553    Specimen: Nasopharyngeal Swab from Nasopharynx Updated: 09/23/21 1631    Narrative:      The following orders were created for panel order SARS-CoV-2 (COVID-19), PCR Nasopharynx.  Procedure                               Abnormality         Status                     ---------                               -----------         ------                     SARS-CoV-2 (COVID-19), P...[[id number]]  Normal              Final result                 Please view results for these tests on the individual orders.    SARS-CoV-2 (COVID-19), PCR Nasopharynx [[id number]]  (Normal) Collected: 09/23/21 1553    Specimen: Nasopharyngeal Swab from Nasopharynx Updated: 09/23/21 1631     SARS-CoV-2 (COVID-19) Negative    COVID-19 PAT/Pre-procedural [[id number]]  (Normal) Collected: 09/16/21 1025    Specimen: Nasopharyngeal Swab from Nasopharynx Updated: 09/16/21 2028    Narrative:      The following orders were created for panel order COVID-19 PAT/Pre-procedural.  Procedure                               Abnormality         Status                     ---------                               -----------         ------                     SARS-CoV-2 (COVID-19), PCR[[id number]]   Normal              Final result                 Please view results for these tests on the individual orders.    SARS-CoV-2 (COVID-19), PCR [[id number]]  (Normal) Collected: 09/16/21 1025    Specimen: Nasopharyngeal Swab from Nasopharynx Updated: 09/16/21 2028     SARS-CoV-2 (COVID-19) Negative          Temp:  [36.9 °C (98.5 °F)-37.4 °C (99.4 °F)] 37.2 °C (99 °F)  Heart Rate:  [81-94] 86  Resp:  [18-20] 20  BP: (100-157)/(53-69) 136/60      Pertinent radiology and labs reviewed      Impression: 60-year-old right handed white female with chronic conditions significant for polyarticular degenerative arthritis, hypertension,  hyperlipidemia, seizure disorder, anxiety, depression, asthma, restless leg syndrome had elective bilateral total knee replacements admitted for rehab now with achilles tendonitis.      Plan: Topical bengay ordered.  Patient would benefit from heel lifts and sneakers for PT.  Ice at night.  Follow-up evaluation 5-7 days.            Gildardo Jacobsen DPM    10/9/2021      9:07 AM

## 2021-10-09 NOTE — PLAN OF CARE
Plan of Care Review  Plan of Care Reviewed With: patient  Progress: improving  Outcome Summary: Alert and oriented x4. Continent of bowel and bladder. No BM this shift, awaiting sample for occult stool sample testing. Reported moderate pain to bilateral knees, scheduled Tylenol effective. Sleeping quietly in bed at this time. Fall and safety measures maintained, seizure precautions continue.

## 2021-10-09 NOTE — CONSULTS
Cardiology Consult Note    Reason for consult: Pulm edema, s/p aspiration PNA, s/p VDRF, asthma, I ordered TTE and EKG for her    HPI:  Fide Amin is a 60 y.o. female w/ a PMH of HTN, HLD, Anxiety/Depression Disorder, RLS (w/ intermittent clonic spasms), Epilepsy (last episodes ), Anemia, OA, Arthritis, GERD, Asthma and DJD who presented to [facility] on 21 and underwent an elective b/l TKA. Post-op had a transient seizure episode, resolved by its self, but further complicated by suspected aspiration. CT chest on  no evidence of pulmonary embolism, scattered bilateral airspace opacities, believed to be most likely representing multifocal pneumonia. Covid 19 pneumonia was excluded. Patient was medically cleared for discahrge on ceftin 500mg BID and zithromax 500mg daily x 5 days and with instructions for f/u care. After discharge from [facility], she presented and was admitted to [facility] on  for dyspnea, diagnosed with aspiration PNA with acute respiratory failure. Pt was intubated and on ventilation support until 10/1/21. Completed 7-day course of ABX, MRSA negative, BCX NGTD, provided bronchodilator and steroid therapy, CXR with persistent bibasilar opacities, will need follow-up imaging in 4 to 6 weeks. Transferred to [facility] on 10/06/21 for acute inpatient rehab.    Medical History:   Past Medical History:   Diagnosis Date   • Arthritis     OA   • Asthma    • Depression    • Deviated septum    • Epilepsy (CMS/HCC)     Last seizure 8 years ago   • GERD (gastroesophageal reflux disease)    • Hypertension    • Lipid disorder    • Motion sickness    • RLS (restless legs syndrome)      Surgical History:   Past Surgical History:   Procedure Laterality Date   •  SECTION      x1   • SINUS SURGERY       Social History:   Social History     Social History Narrative    Lives with , independent in everything, drives but now won't be able  to drive for 6th months, retired teacher, 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  Has 1 daughter and 4 step children.  Miranda has a 1 year old son and denys is expecting twin girls.     Family History:   No family history on file.  Allergies: Penicillins and Sulfa (sulfonamide antibiotics)    ROS:  Negative except those listed in HPI and A&P     Current Facility-Administered Medications   Medication Dose Route Frequency   • acetaminophen  650 mg oral With meals & nightly   • acetaminophen  650 mg oral q4h PRN   • acetaminophen  650 mg oral q4h PRN   • albuterol HFA  2 puff inhalation q4h PRN   • albuterol HFA  2 puff inhalation QID (6a, 10a, 2p, 6p)   • alum-mag hydroxide-simeth  30 mL oral q4h PRN   • ascorbic acid  250 mg oral BID AC   • aspirin  81 mg oral BID   • atorvastatin  40 mg oral Daily (6p)   • bisacodyL  10 mg rectal Daily PRN   • busPIRone  10 mg oral With meals & nightly   • camphor-methyl salicyl-menthoL   Topical 2x daily PRN   • cholecalciferol (vitamin D3)  2,500 Units oral Daily   • enoxaparin  40 mg subcutaneous Daily (6p)   • famotidine  20 mg oral Nightly   • ferrous sulfate  325 mg oral BID with meals   • fluticasone furoate-vilanteroL  1 puff inhalation Daily   • furosemide  40 mg oral Daily (1p)   • guaiFENesin  600 mg oral BID   • HYDROmorphone  2 mg oral q4h PRN   • lamoTRIgine  400 mg oral Daily   • lidocaine  2 patch Topical Daily   • meloxicam  7.5 mg oral Daily   • montelukast  10 mg oral Nightly   • multivit-min-iron-folic-vit K1  1 tablet oral Daily   • nortriptyline  75 mg oral Nightly   • pantoprazole  40 mg oral Daily before breakfast   • polyethylene glycol  17 g oral Daily PRN   • rOPINIRole  0.5 mg oral Daily with lunch   • rOPINIRole  1 mg oral Daily with dinner   • rOPINIRole  1 mg oral Daily   • rOPINIRole  2 mg oral Nightly   • sennosides-docusate sodium  2 tablet oral BID   • sertraline  300 mg oral Daily      Physical Exam:  Constitutional: Appears  "well-developed and well-nourished. No distress.    Cardiovascular: RRR, no murmur noted   Pulmonary/Chest: Dec bs t/o, poor repiratory effort.  Abdominal: Soft. Bowel sounds are normal. No bruits  Musculoskeletal: Trace b/l LE edema, weak pulses  Neurological: AAOx3    VS:  Patient Vitals for the past 72 hrs:   BP Temp Temp src Pulse Resp SpO2 Height Weight   10/09/21 0942 (!) 148/67 -- -- 96 -- 94 % -- --   10/09/21 0727 136/60 37.2 °C (99 °F) Oral 86 20 92 % -- --   10/09/21 0610 -- -- -- 85 -- 93 % -- --   10/08/21 2000 (!) 100/53 37.4 °C (99.4 °F) Oral 81 18 93 % -- --   10/08/21 1602 (!) 112/57 36.9 °C (98.5 °F) Oral 81 18 93 % -- --   10/08/21 1328 (!) 110/57 -- -- 92 -- 93 % -- --   10/08/21 1307 (!) 130/58 -- -- 94 -- 93 % -- --   10/08/21 1128 -- -- -- 87 -- 97 % -- --   10/08/21 1119 -- -- -- -- -- 95 % -- --   10/08/21 1107 134/63 -- -- 85 -- -- -- --   10/08/21 1005 128/62 -- -- 85 -- 99 % -- --   10/08/21 0954 (!) 157/69 -- -- 87 -- 100 % -- --   10/08/21 0916 140/62 -- -- 90 -- 95 % -- --   10/08/21 0726 (!) 146/67 37 °C (98.6 °F) Oral 93 20 (!) 91 % -- --   10/07/21 2000 (!) 112/55 36.9 °C (98.4 °F) Oral -- 18 92 % -- --   10/07/21 1612 (!) 119/57 37 °C (98.6 °F) Oral 75 20 92 % -- --   10/07/21 1113 130/63 -- -- 82 -- 97 % -- --   10/07/21 1022 140/66 -- -- 85 -- 93 % -- --   10/07/21 0953 134/60 -- -- 86 -- (!) 91 % -- --   10/07/21 0858 (!) 149/66 -- -- 91 -- 95 % -- --   10/07/21 0823 (!) 143/65 -- -- 92 -- (!) 90 % -- --   10/07/21 0817 (!) 163/64 -- -- 94 -- 94 % -- --   10/07/21 0815 -- -- -- -- -- (!) 90 % -- --   10/07/21 0735 (!) 143/65 -- -- 88 -- (!) 90 % -- --   10/07/21 0717 140/66 38 °C (100.4 °F) Oral 86 20 93 % -- --   10/06/21 2344 -- -- -- -- -- 93 % -- --   10/06/21 2200 -- -- -- -- -- 93 % -- --   10/06/21 2000 (!) 126/58 36.9 °C (98.4 °F) Oral 79 18 93 % -- --   10/06/21 1906 -- -- -- -- -- 92 % -- --   10/06/21 1656 (!) 147/60 36.7 °C (98.1 °F) Oral 83 20 92 % 1.575 m (5' 2.01\") " 63.9 kg (140 lb 14 oz)     Labs:  Recent labs reviewed  Results from last 7 days   Lab Units 10/08/21  0619 10/05/21  0000   WBC K/uL 8.71  --    HEMOGLOBIN g/dL 7.7*  --    HEMATOCRIT % 24.7*  --    PLATELETS K/uL 643*  --    SODIUM mEQ/L 137 139   POTASSIUM mEQ/L 4.5 3.7   CHLORIDE mEQ/L 101 105   CO2 mEQ/L 27 25   GLUCOSE mg/dL 86 95   BUN mg/dL 7* 13   CREATININE mg/dL 0.5* 0.6   CALCIUM mg/dL 8.0*  8.0*  --    ANION GAP mEQ/L 9  --    AST IU/L 87*  --    ALT IU/L 83*  --    ALBUMIN g/dL 2.6*  --    EGFR mL/min/1.73m*2 >60.0  --    MAGNESIUM mg/dL 2.0  --    BNP pg/mL 183*  --      No intake or output data in the 24 hours ending 10/09/21 1646     Tests:  EKG 09/20/21:  Sinus tachycardia 103 BPM  Left anterior fascicular block  Poor R wave progression is more prominent than that that is seen with LAHB alone  Possible Lateral infarct, age undetermined  HI interval 154 ms  QRS duration 86 ms  QT/QTc 356/466 ms  P-R-T axes 50 -57 46    EKG 09/20/21:  Sinus [id number] BPM  RSR' or QR pattern in V1 suggests right ventricular conduction delay  Left anterior fascicular block  Possible Lateral infarct  HI interval 156 ms  QRS duration 90 ms  QT/QTc 366/477 ms  P-R-T axes 46 -57 59    EKG 10/08/21:  Sinus rhythm 90 BPM  Cannot rule out Anterior infarct, age undetermined  HI interval 120 ms  QRS duration 76 ms  QT/QTcB 384/469 ms  P-R-T axes 55 -24 39    US venous b/l LE 10/07/21:  Study negative for deep vein thrombosis    TTE 10/08/21:  pending    Current CV Medications:  •  aspirin chewable tablet 81 mg, 81 mg, oral, BID  •  atorvastatin (LIPITOR) tablet 40 mg, 40 mg, oral, Daily->hold 10/09  •  enoxaparin (LOVENOX) syringe 40 mg, 40 mg, subcutaneous, Daily  •  furosemide (LASIX) tablet 40 mg, 40 mg, oral, Daily    Assessment and Plan:  Fide Amin is a 60 y.o. female w/ a PMH of HTN, HLD, Anxiety/Depression Disorder, RLS (w/ intermittent clonic spasms), Epilepsy (last episodes 2011), Anemia, OA, Arthritis, GERD,  Asthma and DJD who presented to [facility] on 9/20/21 and underwent an elective b/l TKA. Post-op had a transient seizure episode, resolved by its self, but further complicated by suspected aspiration. CT chest on 9/23 no evidence of pulmonary embolism, scattered bilateral airspace opacities, believed to be most likely representing multifocal pneumonia. Covid 19 pneumonia was excluded. Patient was medically cleared for discahrge on ceftin 500mg BID and zithromax 500mg daily x 5 days and with instructions for f/u care. After discharge from [facility], she presented and was admitted to [facility] on 9/24 for dyspnea, diagnosed with aspiration PNA with acute respiratory failure. Pt was intubated and on ventilation support until 10/1/21. Completed 7-day course of ABX, MRSA negative, BCX NGTD, provided bronchodilator and steroid therapy, CXR with persistent bibasilar opacities, will need follow-up imaging in 4 to 6 weeks. Transferred to [facility] on 10/06/21 for acute inpatient rehab. Cardiology consulted at [facility] to eval for CHF.    1) Surgery:  - S/p bilat TKA on 9/20  - Per IM    2) PNA:  - Post-op had a transient seizure episode, complicated by suspected aspiration.  - CT chest showed no evidence of PE showed scattered bilateral airspace opacities most likely representing multifocal pneumonia COVID-19 should be ruled out. Repeat COVID-19 test was negative    3) Questionable CHF:  - TTE ordered and pending  - Last EKG at [facility] showed NSR  -  minimally elevated on 10/08  - Pt appears euvolemic, even though trace b/l LE edema and lungs dec t/o w/ scattered crackles and rhonchi. Pt is s/p recent b/l TKA and aspiration PNA.  - On diuretics w/ Lasix 40mg QD by IM  - Currently renal function acceptable  - Will await for echo results for review, before addressing plan of care    4) HTN:  - BP appears stable at present  - Monitor    5) HLD:  - On Statin therapy  - Recent LFTs mildly elevated, possible Transaminitis  - Will place Lipitor 40 on  hold for now    6) Anemia/Thrombocytosis:  - Hgb 7.7 and Platelets 643 on 10/8  - On ASA 81mg BID  - Per IM      Thank you.  [facility]  AYE Thomas    ATTG NOTE  Pt seen and examined and agree with NP Note.    VDRF  PNA, aspiration.  Per IM    ? CHF  Elev BNP noted.  Check echo to assess EF.  On lasix 40mg daily    HTN  Stable    DLD  On statin.--- on hold due to abn lfts.    Abn lfts   Per IM    Anemia  Per IM.  On Fe supplements.  Stool hemeoccult neg    S/ P bilateral TKA 9-20-21  Per IM

## 2021-10-09 NOTE — PROGRESS NOTES
Patient: Fide Amin  Location: [facility] [address]  MRN: [id number]  Today's date: 10/9/2021    History of Present Illness  Fide is a 60 y.o. female admitted on 10/6/2021 with Physical deconditioning [R53.81]. Principal problem is Physical deconditioning.   Mrs. Fide Amin is a 60 year old female who presented to [facility] on 9/20/21 for planned B TKA by Dr. Em. Pt suffered a seizure post op after 8 years of having no seizures. CT of chest completed 9/23 results reviewed with no CT evidence of pulmonary embolism.  Scattered bilateral airspace opacities noted and believed to be most likely representing multifocal pneumonia.  Covid 19 pneumonia was excluded. Patient was medically cleared for discharge 9/23/21 on ceftin 500mg BID and zithromax 500mg daily x 5 days and with instructions for f/u care.  Pt. Presented to [facility] on 9/24 for SOB and dx with aspiration PNA. Pt. Intubated on 9/25/21 and extubated on 10/1/21. Pt is now medically stable and admitted to [facility] for acute inpatient rehab.      Past Medical History  Fide has a past medical history of Arthritis, Asthma, Depression, Deviated septum, Epilepsy (CMS/HCC), GERD (gastroesophageal reflux disease), Hypertension, Lipid disorder, Motion sickness, and RLS (restless legs syndrome).      PT Vitals    Date/Time Pulse HR Source SpO2 Pt Activity O2 Therapy O2 Del Method O2 Flow Rate BP BP Location BP Method Pt Position [facility]   10/09/21 0942 96 Monitor 94 % At rest Supplemental oxygen Nasal cannula 2 L/min 148/67 Right upper arm Automatic Sitting EBB      PT Pain    Date/Time Pain Type Side/Orientation Location Rating: Rest Description [facility]   10/09/21 0942 Pain Assessment left Right knee 4/10 knee 7 aching EBB          Prior Living Environment      Most Recent Value   People in Home spouse  [4 kids]   Current Living Arrangements home   Home Accessibility stairs to enter home (Group),  stairs within home (Group)  [2+1 STEPHANY, ff inside, ID 1st  floor]   Living Environment Comment Pt lives in 2 . Pt reports two small steps to enter and FF to 2nd floor. Pt reports plans to have 1st floor set-up. Pt has guest room on 1st floor c 1/2 bath. Pt reports purchased commode and 2 RTS c handles.   Number of Stairs, Main Entrance 2   Surface of Stairs, Main Entrance concrete   Stair Railings, Main Entrance none   Location, Patient Bedroom second floor, must negotiate stairs to access   Patient Bedroom Access Comment Ability to have first floor set up   Location, Bathroom first (main) floor,  second floor, must negotiate stairs to access   Bathroom Access Comment 2nd floor FB c shower stall. (+) grab bar in shower stall. Pt reports has built in shower bench in stall.   Number of Stairs, Within Home, Primary other (see comments)  [7+7]   Surface of Stairs, Within Home, Primary hardwood   Stair Railings, Within Home, Primary railings on both sides of stairs   Landing, Stairs, Within Home, Primary railings present          Prior Level of Function      Most Recent Value   Dominant Hand right   Ambulation independent   Transferring independent   Toileting independent   Bathing independent   Dressing independent   Prior Level of Function Comment Pt was d/c'd home for one day after surgery using RW.   Assistive Device Currently Used at Home commode, 3-in-1,  raised toilet,  walker, front-wheeled  [RTS c handles]           IRF PT Evaluation and Treatment - 10/09/21 0941        PT Time Calculation    Start Time 0930     Stop Time 1000     Time Calculation (min) 30 min        Session Details    Document Type daily treatment/progress note     Mode of Treatment physical therapy; individual therapy        General Information    General Observations of Patient 2 LO2 via nc        Sit to Stand Transfer    [place], Sit to Stand Transfer touching/steadying assist; verbal cues     Verbal Cues preparatory posture; hand placement; safety     Assistive Device walker, front-wheeled         Stand to Sit Transfer    [place], Stand to Sit Transfer touching/steadying assist; verbal cues     Verbal Cues preparatory posture; safety; hand placement     Assistive Device walker, front-wheeled        Stand Pivot Transfer    [place], Stand Pivot/Stand Step Transfer minimum assist (75% or more patient effort); verbal cues     Verbal Cues proper use of assistive device; preparatory posture; safety     Assistive Device walker, front-wheeled        Gait Training    [place], Gait touching/steadying assist; verbal cues     Assistive Device walker, front-wheeled     Distance in Feet 80 feet     Pattern (Gait) step-through     Comment (Gait/Stairs) SpO2 89-88%, revcovers to 92% w/in 50 seconds: PT managing O2 tank; cues to relax shoulders, slow pace        Lower Extremity (Therapeutic Exercise)    Exercise Position/Type seated; AROM (active range of motion)     General Exercise bilateral; ankle pumps; LAQ (long arc quad); marching while seated     Reps and Sets 20     Comment stand alt march w/ RW x 10 B LE's        Daily Progress Summary (PT)    Symptoms Noted During/After Treatment fatigue     Progress Toward Functional Goals (PT) progressing toward functional goals as expected     Daily Outcome Statement Pt cont to be w/ supplimental O2 and presented w/drop in SpO2 levels to 88-89% w/ moiblity. She remains anxious and presents w/ rapid movement efforts and tense, elevated shoulders w/ gait and benefits from cueing to relax and breath t/o mobility. SpO2 recovery w/in 50 sec to > /= 92%. She also cont to benefit from cueig for optimal positioning to approach and turn to seat w/ SPT transfers. Cont w/ POC and progress as joseph.                           IRF PT Goals      Most Recent Value   Bed Mobility Goal 1    Activity/Assistive Device bed mobility activities, all,  no assistive device at 10/07/2021 1003   [place] modified independence at 10/07/2021 1003   Time Frame 1 week at 10/07/2021 1003    Transfer Goal 1    Activity/Assistive Device sit-to-stand/stand-to-sit,  stand pivot,  car transfer,  walker, front-wheeled at 10/07/2021 1003   [place] supervision required,  set-up required at 10/07/2021 1003   Time Frame 1 week at 10/07/2021 1003   Transfer Goal 2    Activity/Assistive Device sit-to-stand/stand-to-sit,  stand pivot,  car transfer,  walker, front-wheeled at 10/07/2021 1003   [place] modified independence at 10/07/2021 1003   Time Frame 14 days or less at 10/07/2021 1003   Gait/Walking Locomotion Goal 1    Activity/Assistive Device assistive device use,  backward stepping,  decrease fall risk,  diminish gait deviation,  forward stepping,  improve balance and speed,  increase endurance/gait distance,  increase energy conservation,  normalize weight shifts,  turning, left,  turning, right,  walker, front-wheeled at 10/07/2021 1003   Distance 150 feet at 10/07/2021 1003   [place] supervision required at 10/07/2021 1003   Time Frame 1 week at 10/07/2021 1003   Gait/Walking Locomotion Goal 2    Activity/Assistive Device gait (walking locomotion),  assistive device use,  backward stepping,  decrease fall risk,  diminish gait deviation,  forward stepping,  improve balance and speed,  increase endurance/gait distance,  increase energy conservation,  normalize weight shifts,  sidestepping,  turning, left,  turning, right,  walker, front-wheeled at 10/07/2021 1003   Distance 300 feet at 10/07/2021 1003   [place] modified independence at 10/07/2021 1003   Time Frame 14 days or less at 10/07/2021 1003   Stairs Goal 1    Activity/Assistive Device ascending stairs,  descending stairs,  step-to-step,  using handrail, right,  using handrail, left,  decrease fall risk,  improve balance and speed at 10/07/2021 1003   Number of Stairs 8 at 10/07/2021 1003   [place] supervision required at 10/07/2021 1003   Time Frame 1 week at 10/07/2021 1003   Stairs Goal 2    Activity/Assistive Device  using handrail, left,  using handrail, right,  step-to-step,  decrease fall risk,  ascending stairs,  descending stairs,  improve balance and speed,  no assistive device at 10/07/2021 1003   Number of Stairs 14 at 10/07/2021 1003   [place] modified independence at 10/07/2021 1003   Time Frame 14 days or less at 10/07/2021 1003   ROM Goal 1    ROM Goal 1 B knee ROM 0-90 at 10/07/2021 1003   Time Frame 14 days or less at 10/07/2021 1003   ROM Goal 2    ROM Goal 2 B ankle DF ROM +10 degrees at 10/07/2021 1003   Time Frame 14 days or less at 10/07/2021 1003   Caregiver Training Goal 1    Caregiver Training Goal 1 Pt's  safe and I providing any necessary supervision/Assist with mobility tasks at 10/07/2021 1003   Time Frame 14 days or less at 10/07/2021 1003   Problem Specific Goal 1    Problem-Specific Goal 1 Pt weaned from supplemental O2 and tolerating > 92% with activity. at 10/07/2021 1003   Time Frame 14 days or less at 10/07/2021 1003

## 2021-10-10 ENCOUNTER — APPOINTMENT (INPATIENT)
Dept: PHYSICAL THERAPY | Facility: REHABILITATION | Age: 61
DRG: 948 | End: 2021-10-10
Payer: COMMERCIAL

## 2021-10-10 PROCEDURE — 97116 GAIT TRAINING THERAPY: CPT | Mod: GP

## 2021-10-10 PROCEDURE — 12800000 HC ROOM AND CARE SEMIPRIVATE REHAB

## 2021-10-10 PROCEDURE — 97110 THERAPEUTIC EXERCISES: CPT | Mod: GP

## 2021-10-10 PROCEDURE — 97530 THERAPEUTIC ACTIVITIES: CPT | Mod: GP

## 2021-10-10 PROCEDURE — 63700000 HC SELF-ADMINISTRABLE DRUG: Performed by: INTERNAL MEDICINE

## 2021-10-10 PROCEDURE — 63600000 HC DRUGS/DETAIL CODE: Performed by: INTERNAL MEDICINE

## 2021-10-10 PROCEDURE — 63700000 HC SELF-ADMINISTRABLE DRUG: Performed by: PHYSICAL MEDICINE & REHABILITATION

## 2021-10-10 RX ADMIN — Medication 2500 UNITS: at 08:50

## 2021-10-10 RX ADMIN — SERTRALINE HYDROCHLORIDE 300 MG: 100 TABLET ORAL at 08:49

## 2021-10-10 RX ADMIN — Medication 1 TABLET: at 08:52

## 2021-10-10 RX ADMIN — Medication 325 MG: at 08:51

## 2021-10-10 RX ADMIN — ALBUTEROL SULFATE 2 PUFF: 90 AEROSOL, METERED RESPIRATORY (INHALATION) at 13:06

## 2021-10-10 RX ADMIN — ROPINIROLE HYDROCHLORIDE 1 MG: 1 TABLET, FILM COATED ORAL at 08:51

## 2021-10-10 RX ADMIN — ALBUTEROL SULFATE 2 PUFF: 90 AEROSOL, METERED RESPIRATORY (INHALATION) at 17:23

## 2021-10-10 RX ADMIN — BUSPIRONE HYDROCHLORIDE 10 MG: 5 TABLET ORAL at 13:04

## 2021-10-10 RX ADMIN — BUSPIRONE HYDROCHLORIDE 10 MG: 5 TABLET ORAL at 08:50

## 2021-10-10 RX ADMIN — FUROSEMIDE 40 MG: 40 TABLET ORAL at 13:03

## 2021-10-10 RX ADMIN — SENNOSIDES AND DOCUSATE SODIUM 2 TABLET: 50; 8.6 TABLET ORAL at 08:52

## 2021-10-10 RX ADMIN — ENOXAPARIN SODIUM 40 MG: 100 INJECTION SUBCUTANEOUS at 17:15

## 2021-10-10 RX ADMIN — FLUTICASONE FUROATE AND VILANTEROL 1 PUFF: 200; 25 POWDER RESPIRATORY (INHALATION) at 08:58

## 2021-10-10 RX ADMIN — LAMOTRIGINE 400 MG: 200 TABLET, EXTENDED RELEASE ORAL at 08:59

## 2021-10-10 RX ADMIN — ALBUTEROL SULFATE 2 PUFF: 90 AEROSOL, METERED RESPIRATORY (INHALATION) at 06:08

## 2021-10-10 RX ADMIN — Medication 325 MG: at 17:14

## 2021-10-10 RX ADMIN — ASPIRIN 81 MG CHEWABLE TABLET 81 MG: 81 TABLET CHEWABLE at 20:51

## 2021-10-10 RX ADMIN — Medication 250 MG: at 08:51

## 2021-10-10 RX ADMIN — GUAIFENESIN 600 MG: 600 TABLET ORAL at 08:51

## 2021-10-10 RX ADMIN — BUSPIRONE HYDROCHLORIDE 10 MG: 5 TABLET ORAL at 17:14

## 2021-10-10 RX ADMIN — ACETAMINOPHEN 650 MG: 325 TABLET, FILM COATED ORAL at 17:14

## 2021-10-10 RX ADMIN — ROPINIROLE HYDROCHLORIDE 2 MG: 1 TABLET, FILM COATED ORAL at 20:50

## 2021-10-10 RX ADMIN — PANTOPRAZOLE SODIUM 40 MG: 40 TABLET, DELAYED RELEASE ORAL at 08:51

## 2021-10-10 RX ADMIN — ROPINIROLE HYDROCHLORIDE 1 MG: 1 TABLET, FILM COATED ORAL at 17:14

## 2021-10-10 RX ADMIN — GUAIFENESIN 600 MG: 600 TABLET ORAL at 20:51

## 2021-10-10 RX ADMIN — Medication 250 MG: at 17:14

## 2021-10-10 RX ADMIN — NORTRIPTYLINE HYDROCHLORIDE 75 MG: 25 CAPSULE ORAL at 20:49

## 2021-10-10 RX ADMIN — BUSPIRONE HYDROCHLORIDE 10 MG: 5 TABLET ORAL at 20:49

## 2021-10-10 RX ADMIN — MELOXICAM 7.5 MG: 7.5 TABLET ORAL at 08:52

## 2021-10-10 RX ADMIN — ROPINIROLE HYDROCHLORIDE 0.5 MG: 0.5 TABLET, FILM COATED ORAL at 13:03

## 2021-10-10 RX ADMIN — LIDOCAINE 2 PATCH: 246 PATCH TOPICAL at 08:54

## 2021-10-10 RX ADMIN — ACETAMINOPHEN 650 MG: 325 TABLET, FILM COATED ORAL at 13:04

## 2021-10-10 RX ADMIN — ACETAMINOPHEN 650 MG: 325 TABLET, FILM COATED ORAL at 08:52

## 2021-10-10 RX ADMIN — FAMOTIDINE 20 MG: 20 TABLET ORAL at 20:51

## 2021-10-10 RX ADMIN — ASPIRIN 81 MG CHEWABLE TABLET 81 MG: 81 TABLET CHEWABLE at 08:51

## 2021-10-10 RX ADMIN — MONTELUKAST 10 MG: 10 TABLET, FILM COATED ORAL at 20:48

## 2021-10-10 RX ADMIN — ACETAMINOPHEN 650 MG: 325 TABLET, FILM COATED ORAL at 20:50

## 2021-10-10 NOTE — PROGRESS NOTES
Patient: Fide Amin  Location: [facility] [address]  MRN: [id number]  Today's date: 10/10/2021    History of Present Illness  Fide is a 60 y.o. female admitted on 10/6/2021 with Physical deconditioning [R53.81]. Principal problem is Physical deconditioning.   Mrs. Fide Amin is a 60 year old female who presented to [facility] on 9/20/21 for planned B TKA by Dr. Em. Pt suffered a seizure post op after 8 years of having no seizures. CT of chest completed 9/23 results reviewed with no CT evidence of pulmonary embolism.  Scattered bilateral airspace opacities noted and believed to be most likely representing multifocal pneumonia.  Covid 19 pneumonia was excluded. Patient was medically cleared for discharge 9/23/21 on ceftin 500mg BID and zithromax 500mg daily x 5 days and with instructions for f/u care.  Pt. Presented to [facility] on 9/24 for SOB and dx with aspiration PNA. Pt. Intubated on 9/25/21 and extubated on 10/1/21. Pt is now medically stable and admitted to [facility] for acute inpatient rehab.      Past Medical History  Fide has a past medical history of Arthritis, Asthma, Depression, Deviated septum, Epilepsy (CMS/HCC), GERD (gastroesophageal reflux disease), Hypertension, Lipid disorder, Motion sickness, and RLS (restless legs syndrome).      PT Vitals    Date/Time SpO2 Pt Activity O2 Therapy O2 Del Method O2 Flow Rate BP BP Location BP Method Pt Position [facility]   10/10/21 1110 96 % At rest Supplemental oxygen Nasal cannula 2 L/min 122/56 Left upper arm Automatic Sitting [facility]   10/10/21 1120 85 % Walking Supplemental oxygen Nasal cannula 2 L/min -- -- -- -- [facility]   10/10/21 1156 94 % At rest Supplemental oxygen Nasal cannula 2 L/min 118/60 Left upper arm Automatic Sitting [facility]      PT Pain    Date/Time Pain Type Side/Orientation Location Rating: Rest Rating: Activity Interventions [facility]   10/10/21 1110 Pain Assessment bilateral knee 5 5 pain management plan reviewed with patient/caregiver [facility]    10/10/21 1156 Post Activity bilateral knee 5 5 position adjusted LPM          Prior Living Environment      Most Recent Value   People in Home spouse  [4 kids]   Current Living Arrangements home   Home Accessibility stairs to enter home (Group),  stairs within home (Group)  [2+1 STEPHANY, ff inside, [place] 1st floor]   Living Environment Comment Pt lives in 2 . Pt reports two small steps to enter and FF to 2nd floor. Pt reports plans to have 1st floor set-up. Pt has guest room on 1st floor c 1/2 bath. Pt reports purchased commode and 2 RTS c handles.   Number of Stairs, Main Entrance 2   Surface of Stairs, Main Entrance concrete   Stair Railings, Main Entrance none   Location, Patient Bedroom second floor, must negotiate stairs to access   Patient Bedroom Access Comment Ability to have first floor set up   Location, Bathroom first (main) floor,  second floor, must negotiate stairs to access   Bathroom Access Comment 2nd floor FB c shower stall. (+) grab bar in shower stall. Pt reports has built in shower bench in stall.   Number of Stairs, Within Home, Primary other (see comments)  [7+7]   Surface of Stairs, Within Home, Primary hardwood   Stair Railings, Within Home, Primary railings on both sides of stairs   Landing, Stairs, Within Home, Primary railings present          Prior Level of Function      Most Recent Value   Dominant Hand right   Ambulation independent   Transferring independent   Toileting independent   Bathing independent   Dressing independent   Prior Level of Function Comment Pt was d/c'd home for one day after surgery using RW.   Assistive Device Currently Used at Home commode, 3-in-1,  raised toilet,  walker, front-wheeled  [RTS c handles]           IRF PT Evaluation and Treatment - 10/10/21 1105        PT Time Calculation    Start Time 1100     Stop Time 1200     Time Calculation (min) 60 min        Session Details    Document Type daily treatment/progress note     Mode of Treatment physical therapy;  individual therapy        General Information    Patient Profile Reviewed yes     General Observations of Patient received in WC, eager to participate in knee therex        Range of Motion (ROM)    Knee, Left (ROM) 2-95     Knee, Right (ROM) 2-96        Bed Mobility    [place], Supine to Sit minimum assist (75% or more patient effort)     [place], Sit to Supine minimum assist (75% or more patient effort)     Comment (Bed Mobility) Min A for safety and balance, LE management        Bed to Chair Transfer    [place], Bed to Chair minimum assist (75% or more patient effort)     Verbal Cues safety; technique     Assistive Device walker, front-wheeled        Chair to Bed Transfer    [place], Chair to Bed minimum assist (75% or more patient effort)     Verbal Cues technique; safety     Assistive Device walker, front-wheeled        Sit to Stand Transfer    [place], Sit to Stand Transfer minimum assist (75% or more patient effort); touching/steadying assist     Verbal Cues safety; technique     Assistive Device walker, front-wheeled     Comment from WC, EOB, EOM        Stand to Sit Transfer    [place], Stand to Sit Transfer minimum assist (75% or more patient effort)     Verbal Cues safety     Assistive Device walker, front-wheeled     Comment use of BUE for posterior reaching for controlled descent        Stand Pivot Transfer    [place], Stand Pivot/Stand Step Transfer minimum assist (75% or more patient effort)     Verbal Cues safety; technique     Assistive Device walker, front-wheeled     Comment DEP for O2 tank management< verbal cues for RW use complete turning        Gait Training    [place], Gait minimum assist (75% or more patient effort)     Assistive Device walker, front-wheeled     Distance in Feet 100 feet     Pattern (Gait) step-through     Comment (Gait/Stairs) DEP for O2 tank management, on 2L O2, SaO2 85-92 with ambulation with verbal cues for trunk ext, respiration   "      Curb Negotiation    [place] dependent (less than 25% patient effort)     Assistive Device walker, front-wheeled     Curb Height 4 inches     Comment 4\" 6\" curb with Mod/Min Ax1 and Min A for DEP O2 tank mnaagement        Stairs Training    [place], Stairs minimum assist (75% or more patient effort)     Assistive Device railing     Handrail Location (Stairs) both sides     Number of Stairs 4     Stair Height 6 inches     Ascending Stairs Technique step-to-step     Descending Stairs Technique step-to-step        Lower Extremity (Therapeutic Exercise)    Exercise Position/Type supine     General Exercise bilateral; ankle pumps; quad sets; knee flexion; SAQ (short arc quad); heel slides; hip aBduction; gluteal sets     Reps and Sets 3/10     Comment 5-10s holds for therex, quad sets over bolster x10, briging over bolster x10, AAROM heel slides with strap and AROM without strap performed        Daily Progress Summary (PT)    Progress Toward Functional Goals (PT) progressing toward functional goals as expected     Daily Outcome Statement Pt progressing safety, endurance and balance for functional transfers and mobility. Pt is limited by SaO2 with activity requiring O2 use, and education provided for respiartion and trunk positioning. Pt with improved knee ROM >90 degrees.                           IRF PT Goals      Most Recent Value   Bed Mobility Goal 1    Activity/Assistive Device bed mobility activities, all,  no assistive device at 10/07/2021 1003   [place] modified independence at 10/07/2021 1003   Time Frame 1 week at 10/07/2021 1003   Transfer Goal 1    Activity/Assistive Device sit-to-stand/stand-to-sit,  stand pivot,  car transfer,  walker, front-wheeled at 10/07/2021 1003   [place] supervision required,  set-up required at 10/07/2021 1003   Time Frame 1 week at 10/07/2021 1003   Transfer Goal 2    Activity/Assistive Device sit-to-stand/stand-to-sit,  stand pivot,  car transfer,  walker, " front-wheeled at 10/07/2021 1003   [place] modified independence at 10/07/2021 1003   Time Frame 14 days or less at 10/07/2021 1003   Gait/Walking Locomotion Goal 1    Activity/Assistive Device assistive device use,  backward stepping,  decrease fall risk,  diminish gait deviation,  forward stepping,  improve balance and speed,  increase endurance/gait distance,  increase energy conservation,  normalize weight shifts,  turning, left,  turning, right,  walker, front-wheeled at 10/07/2021 1003   Distance 150 feet at 10/07/2021 1003   [place] supervision required at 10/07/2021 1003   Time Frame 1 week at 10/07/2021 1003   Gait/Walking Locomotion Goal 2    Activity/Assistive Device gait (walking locomotion),  assistive device use,  backward stepping,  decrease fall risk,  diminish gait deviation,  forward stepping,  improve balance and speed,  increase endurance/gait distance,  increase energy conservation,  normalize weight shifts,  sidestepping,  turning, left,  turning, right,  walker, front-wheeled at 10/07/2021 1003   Distance 300 feet at 10/07/2021 1003   [place] modified independence at 10/07/2021 1003   Time Frame 14 days or less at 10/07/2021 1003   Stairs Goal 1    Activity/Assistive Device ascending stairs,  descending stairs,  step-to-step,  using handrail, right,  using handrail, left,  decrease fall risk,  improve balance and speed at 10/07/2021 1003   Number of Stairs 8 at 10/07/2021 1003   [place] supervision required at 10/07/2021 1003   Time Frame 1 week at 10/07/2021 1003   Stairs Goal 2    Activity/Assistive Device using handrail, left,  using handrail, right,  step-to-step,  decrease fall risk,  ascending stairs,  descending stairs,  improve balance and speed,  no assistive device at 10/07/2021 1003   Number of Stairs 14 at 10/07/2021 1003   [place] modified independence at 10/07/2021 1003   Time Frame 14 days or less at 10/07/2021 1003   ROM Goal 1    ROM Goal 1 B knee ROM  0-90 at 10/07/2021 1003   Time Frame 14 days or less at 10/07/2021 1003   ROM Goal 2    ROM Goal 2 B ankle DF ROM +10 degrees at 10/07/2021 1003   Time Frame 14 days or less at 10/07/2021 1003   Caregiver Training Goal 1    Caregiver Training Goal 1 Pt's  safe and I providing any necessary supervision/Assist with mobility tasks at 10/07/2021 1003   Time Frame 14 days or less at 10/07/2021 1003   Problem Specific Goal 1    Problem-Specific Goal 1 Pt weaned from supplemental O2 and tolerating > 92% with activity. at 10/07/2021 1003   Time Frame 14 days or less at 10/07/2021 1003

## 2021-10-10 NOTE — PROGRESS NOTES
Patient: Fide Amin  Location: [facility] [address]  MRN: [id number]  Today's date: 10/10/2021    History of Present Illness  Fide is a 60 y.o. female admitted on 10/6/2021 with Physical deconditioning [R53.81]. Principal problem is Physical deconditioning.   Mrs. Fide Amin is a 60 year old female who presented to [facility] on 9/20/21 for planned B TKA by Dr. Em. Pt suffered a seizure post op after 8 years of having no seizures. CT of chest completed 9/23 results reviewed with no CT evidence of pulmonary embolism.  Scattered bilateral airspace opacities noted and believed to be most likely representing multifocal pneumonia.  Covid 19 pneumonia was excluded. Patient was medically cleared for discharge 9/23/21 on ceftin 500mg BID and zithromax 500mg daily x 5 days and with instructions for f/u care.  Pt. Presented to [facility] on 9/24 for SOB and dx with aspiration PNA. Pt. Intubated on 9/25/21 and extubated on 10/1/21. Pt is now medically stable and admitted to [facility] for acute inpatient rehab.      Past Medical History  Fide has a past medical history of Arthritis, Asthma, Depression, Deviated septum, Epilepsy (CMS/HCC), GERD (gastroesophageal reflux disease), Hypertension, Lipid disorder, Motion sickness, and RLS (restless legs syndrome).      PT Vitals    Date/Time SpO2 Pt Activity O2 Therapy O2 Del Method O2 Flow Rate Observations [facility]   10/10/21 0920 85 % Walking Supplemental oxygen Nasal cannula 2 L/min +SOB/GARRETT [facility]   10/10/21 0923 90 % At rest Supplemental oxygen Nasal cannula 2 L/min -- [facility]   10/10/21 0924 95 % At rest Supplemental oxygen Nasal cannula 2 L/min -- [facility]   10/10/21 0930 94 % At rest Supplemental oxygen Nasal cannula 2 L/min -- [facility]      PT Pain    Date/Time Pain Type Side/Orientation Location Rating: Rest Rating: Activity Rating: Rest Rating: Activity Interventions [facility]   10/10/21 0905 Pain Assessment bilateral knee 8 8 -- -- pain management plan reviewed with  patient/caregiver; premedicated for activity LPM   10/10/21 0930 Post Activity bilateral knee -- -- 2 - mild pain 4 - moderate pain position adjusted LPM          Prior Living Environment      Most Recent Value   People in Home spouse  [4 kids]   Current Living Arrangements home   Home Accessibility stairs to enter home (Group),  stairs within home (Group)  [2+1 STEPHANY, ff inside, [place] 1st floor]   Living Environment Comment Pt lives in 2 . Pt reports two small steps to enter and FF to 2nd floor. Pt reports plans to have 1st floor set-up. Pt has guest room on 1st floor c 1/2 bath. Pt reports purchased commode and 2 RTS c handles.   Number of Stairs, Main Entrance 2   Surface of Stairs, Main Entrance concrete   Stair Railings, Main Entrance none   Location, Patient Bedroom second floor, must negotiate stairs to access   Patient Bedroom Access Comment Ability to have first floor set up   Location, Bathroom first (main) floor,  second floor, must negotiate stairs to access   Bathroom Access Comment 2nd floor FB c shower stall. (+) grab bar in shower stall. Pt reports has built in shower bench in stall.   Number of Stairs, Within Home, Primary other (see comments)  [7+7]   Surface of Stairs, Within Home, Primary hardwood   Stair Railings, Within Home, Primary railings on both sides of stairs   Landing, Stairs, Within Home, Primary railings present          Prior Level of Function      Most Recent Value   Dominant Hand right   Ambulation independent   Transferring independent   Toileting independent   Bathing independent   Dressing independent   Prior Level of Function Comment Pt was d/c'd home for one day after surgery using RW.   Assistive Device Currently Used at Home commode, 3-in-1,  raised toilet,  walker, front-wheeled  [RTS c handles]           IRF PT Evaluation and Treatment - 10/10/21 0905        PT Time Calculation    Start Time 0900     Stop Time 0930     Time Calculation (min) 30 min        Session Details     Document Type daily treatment/progress note     Mode of Treatment physical therapy; individual therapy        General Information    Patient Profile Reviewed yes     General Observations of Patient received in , O2 tank on 2L        Sit to Stand Transfer    [place], Sit to Stand Transfer minimum assist (75% or more patient effort); touching/steadying assist     Verbal Cues proper use of assistive device     Assistive Device walker, front-wheeled     Comment performed from , multiple times during session, verbal cues for respiration        Stand to Sit Transfer    [place], Stand to Sit Transfer minimum assist (75% or more patient effort); touching/steadying assist     Verbal Cues hand placement; safety     Assistive Device walker, front-wheeled        Stand Pivot Transfer    [place], Stand Pivot/Stand Step Transfer minimum assist (75% or more patient effort)     Verbal Cues preparatory posture; safety; technique     Assistive Device walker, front-wheeled     Comment DEP for O2 tank management, verbal cues to maintain with RW        Car Transfer    [place], Car Transfer minimum assist (75% or more patient effort)     Verbal Cues safety; technique     Assistive Device walker, front-wheeled     Comment DEP for O2 tank management, verbal cues for sequencing SPT with O2 and hand placements        Gait Training    [place], Gait minimum assist (75% or more patient effort); touching/steadying assist     Assistive Device walker, front-wheeled     Distance in Feet 75 feet     Comment (Gait/Stairs) 75, 100ft with SaO2 84/85 to 94% on 2 L; DEP for O2 tank management, max cues for respiration        Lower Extremity (Therapeutic Exercise)    Exercise Position/Type seated     General Exercise bilateral; ankle pumps; LAQ (long arc quad); marching while seated     Reps and Sets 3/10     Comment knee flexion with bolster rolls 10s hold x15        Daily Progress Summary (PT)    Symptoms Noted During/After  Treatment shortness of breath     Progress Toward Functional Goals (PT) progressing toward functional goals as expected     Daily Outcome Statement Pt with GARRETT for functional mobility requiring verbal cues for respiration and pursed lip breathing, increased seated rest break for recovery.                           IRF PT Goals      Most Recent Value   Bed Mobility Goal 1    Activity/Assistive Device bed mobility activities, all,  no assistive device at 10/07/2021 1003   [place] modified independence at 10/07/2021 1003   Time Frame 1 week at 10/07/2021 1003   Transfer Goal 1    Activity/Assistive Device sit-to-stand/stand-to-sit,  stand pivot,  car transfer,  walker, front-wheeled at 10/07/2021 1003   [place] supervision required,  set-up required at 10/07/2021 1003   Time Frame 1 week at 10/07/2021 1003   Transfer Goal 2    Activity/Assistive Device sit-to-stand/stand-to-sit,  stand pivot,  car transfer,  walker, front-wheeled at 10/07/2021 1003   [place] modified independence at 10/07/2021 1003   Time Frame 14 days or less at 10/07/2021 1003   Gait/Walking Locomotion Goal 1    Activity/Assistive Device assistive device use,  backward stepping,  decrease fall risk,  diminish gait deviation,  forward stepping,  improve balance and speed,  increase endurance/gait distance,  increase energy conservation,  normalize weight shifts,  turning, left,  turning, right,  walker, front-wheeled at 10/07/2021 1003   Distance 150 feet at 10/07/2021 1003   [place] supervision required at 10/07/2021 1003   Time Frame 1 week at 10/07/2021 1003   Gait/Walking Locomotion Goal 2    Activity/Assistive Device gait (walking locomotion),  assistive device use,  backward stepping,  decrease fall risk,  diminish gait deviation,  forward stepping,  improve balance and speed,  increase endurance/gait distance,  increase energy conservation,  normalize weight shifts,  sidestepping,  turning, left,  turning, right,  walker,  front-wheeled at 10/07/2021 1003   Distance 300 feet at 10/07/2021 1003   [place] modified independence at 10/07/2021 1003   Time Frame 14 days or less at 10/07/2021 1003   Stairs Goal 1    Activity/Assistive Device ascending stairs,  descending stairs,  step-to-step,  using handrail, right,  using handrail, left,  decrease fall risk,  improve balance and speed at 10/07/2021 1003   Number of Stairs 8 at 10/07/2021 1003   [place] supervision required at 10/07/2021 1003   Time Frame 1 week at 10/07/2021 1003   Stairs Goal 2    Activity/Assistive Device using handrail, left,  using handrail, right,  step-to-step,  decrease fall risk,  ascending stairs,  descending stairs,  improve balance and speed,  no assistive device at 10/07/2021 1003   Number of Stairs 14 at 10/07/2021 1003   [place] modified independence at 10/07/2021 1003   Time Frame 14 days or less at 10/07/2021 1003   ROM Goal 1    ROM Goal 1 B knee ROM 0-90 at 10/07/2021 1003   Time Frame 14 days or less at 10/07/2021 1003   ROM Goal 2    ROM Goal 2 B ankle DF ROM +10 degrees at 10/07/2021 1003   Time Frame 14 days or less at 10/07/2021 1003   Caregiver Training Goal 1    Caregiver Training Goal 1 Pt's  safe and I providing any necessary supervision/Assist with mobility tasks at 10/07/2021 1003   Time Frame 14 days or less at 10/07/2021 1003   Problem Specific Goal 1    Problem-Specific Goal 1 Pt weaned from supplemental O2 and tolerating > 92% with activity. at 10/07/2021 1003   Time Frame 14 days or less at 10/07/2021 1003

## 2021-10-11 ENCOUNTER — APPOINTMENT (OUTPATIENT)
Dept: PSYCHOLOGY | Facility: CLINIC | Age: 61
End: 2021-10-11
Payer: COMMERCIAL

## 2021-10-11 ENCOUNTER — APPOINTMENT (INPATIENT)
Dept: PHYSICAL THERAPY | Facility: REHABILITATION | Age: 61
DRG: 948 | End: 2021-10-11
Payer: COMMERCIAL

## 2021-10-11 ENCOUNTER — APPOINTMENT (INPATIENT)
Dept: OCCUPATIONAL THERAPY | Facility: REHABILITATION | Age: 61
DRG: 948 | End: 2021-10-11
Payer: COMMERCIAL

## 2021-10-11 PROCEDURE — 63700000 HC SELF-ADMINISTRABLE DRUG: Performed by: INTERNAL MEDICINE

## 2021-10-11 PROCEDURE — 97110 THERAPEUTIC EXERCISES: CPT | Mod: GP

## 2021-10-11 PROCEDURE — 97535 SELF CARE MNGMENT TRAINING: CPT | Mod: GO

## 2021-10-11 PROCEDURE — 97116 GAIT TRAINING THERAPY: CPT | Mod: GP

## 2021-10-11 PROCEDURE — 63700000 HC SELF-ADMINISTRABLE DRUG: Performed by: PHYSICAL MEDICINE & REHABILITATION

## 2021-10-11 PROCEDURE — 63600000 HC DRUGS/DETAIL CODE: Performed by: INTERNAL MEDICINE

## 2021-10-11 PROCEDURE — 63700000 HC SELF-ADMINISTRABLE DRUG: Performed by: PODIATRIST

## 2021-10-11 PROCEDURE — 12800000 HC ROOM AND CARE SEMIPRIVATE REHAB

## 2021-10-11 RX ADMIN — ENOXAPARIN SODIUM 40 MG: 100 INJECTION SUBCUTANEOUS at 18:06

## 2021-10-11 RX ADMIN — ROPINIROLE HYDROCHLORIDE 0.5 MG: 0.5 TABLET, FILM COATED ORAL at 12:19

## 2021-10-11 RX ADMIN — ALBUTEROL SULFATE 2 PUFF: 90 AEROSOL, METERED RESPIRATORY (INHALATION) at 18:09

## 2021-10-11 RX ADMIN — FUROSEMIDE 40 MG: 40 TABLET ORAL at 12:19

## 2021-10-11 RX ADMIN — LIDOCAINE 2 PATCH: 246 PATCH TOPICAL at 07:34

## 2021-10-11 RX ADMIN — BUSPIRONE HYDROCHLORIDE 10 MG: 5 TABLET ORAL at 18:07

## 2021-10-11 RX ADMIN — MELOXICAM 7.5 MG: 7.5 TABLET ORAL at 07:35

## 2021-10-11 RX ADMIN — Medication 325 MG: at 07:35

## 2021-10-11 RX ADMIN — Medication: at 08:27

## 2021-10-11 RX ADMIN — Medication 1 TABLET: at 07:35

## 2021-10-11 RX ADMIN — BUSPIRONE HYDROCHLORIDE 10 MG: 5 TABLET ORAL at 07:36

## 2021-10-11 RX ADMIN — NORTRIPTYLINE HYDROCHLORIDE 75 MG: 25 CAPSULE ORAL at 20:40

## 2021-10-11 RX ADMIN — MONTELUKAST 10 MG: 10 TABLET, FILM COATED ORAL at 20:41

## 2021-10-11 RX ADMIN — BUSPIRONE HYDROCHLORIDE 10 MG: 5 TABLET ORAL at 12:19

## 2021-10-11 RX ADMIN — Medication 2500 UNITS: at 07:36

## 2021-10-11 RX ADMIN — GUAIFENESIN 600 MG: 600 TABLET ORAL at 07:35

## 2021-10-11 RX ADMIN — ACETAMINOPHEN 650 MG: 325 TABLET, FILM COATED ORAL at 07:35

## 2021-10-11 RX ADMIN — SERTRALINE HYDROCHLORIDE 300 MG: 100 TABLET ORAL at 07:34

## 2021-10-11 RX ADMIN — LAMOTRIGINE 400 MG: 200 TABLET, EXTENDED RELEASE ORAL at 07:38

## 2021-10-11 RX ADMIN — ALBUTEROL SULFATE 2 PUFF: 90 AEROSOL, METERED RESPIRATORY (INHALATION) at 11:31

## 2021-10-11 RX ADMIN — GUAIFENESIN 600 MG: 600 TABLET ORAL at 20:41

## 2021-10-11 RX ADMIN — ALBUTEROL SULFATE 2 PUFF: 90 AEROSOL, METERED RESPIRATORY (INHALATION) at 05:30

## 2021-10-11 RX ADMIN — BUSPIRONE HYDROCHLORIDE 10 MG: 5 TABLET ORAL at 20:40

## 2021-10-11 RX ADMIN — ACETAMINOPHEN 650 MG: 325 TABLET, FILM COATED ORAL at 18:06

## 2021-10-11 RX ADMIN — PANTOPRAZOLE SODIUM 40 MG: 40 TABLET, DELAYED RELEASE ORAL at 07:36

## 2021-10-11 RX ADMIN — FLUTICASONE FUROATE AND VILANTEROL 1 PUFF: 200; 25 POWDER RESPIRATORY (INHALATION) at 07:37

## 2021-10-11 RX ADMIN — ASPIRIN 81 MG CHEWABLE TABLET 81 MG: 81 TABLET CHEWABLE at 20:40

## 2021-10-11 RX ADMIN — Medication 325 MG: at 18:07

## 2021-10-11 RX ADMIN — Medication 250 MG: at 07:35

## 2021-10-11 RX ADMIN — ROPINIROLE HYDROCHLORIDE 2 MG: 1 TABLET, FILM COATED ORAL at 20:40

## 2021-10-11 RX ADMIN — ROPINIROLE HYDROCHLORIDE 1 MG: 1 TABLET, FILM COATED ORAL at 18:06

## 2021-10-11 RX ADMIN — ASPIRIN 81 MG CHEWABLE TABLET 81 MG: 81 TABLET CHEWABLE at 07:36

## 2021-10-11 RX ADMIN — FAMOTIDINE 20 MG: 20 TABLET ORAL at 20:40

## 2021-10-11 RX ADMIN — Medication 250 MG: at 18:06

## 2021-10-11 RX ADMIN — ACETAMINOPHEN 650 MG: 325 TABLET, FILM COATED ORAL at 12:19

## 2021-10-11 RX ADMIN — ROPINIROLE HYDROCHLORIDE 1 MG: 1 TABLET, FILM COATED ORAL at 07:35

## 2021-10-11 ASSESSMENT — COGNITIVE AND FUNCTIONAL STATUS - GENERAL
AFFECT: TEARFUL
AROUSAL LEVEL: ALERT
SPEECH: REGULAR
INSIGHT: INTACT
APPETITE: NO CHANGE
DELUSIONS: NONE OR AGE APPROPRIATE
THOUGHT_PROCESS: WNL
REMOTE MEMORY: WNL
ORIENTATION: FULLY ORIENTED
APPEARANCE: WELL GROOMED
CONCENTRATION: IMPAIRED, MILD
IMPULSE CONTROL: INTACT
LIBIDO: NON-CONTRIBUTORY
RECENT MEMORY: WNL
PSYCHOMOTOR FUNCTIONING: DECREASED
SLEEP_WAKE_CYCLE: DECREASED
EYE_CONTACT: WNL
PERCEPTUAL FUNCTION: NORMAL
THOUGHT_CONTENT: APPROPRIATE
ATTENTION: WNL
EST. PREMORBID INTELLIGENCE: ABOVE AVERAGE

## 2021-10-11 NOTE — PLAN OF CARE
Problem: Rehabilitation (IRF) Plan of Care  Goal: Plan of Care Review  Flowsheets (Taken 10/11/2021 1202)  Plan of Care Reviewed With:   patient   spouse  Outcome Summary: spoke with pt first per her request. pt wants to go home asap. cm explained team was in am and we would discuss but from looking at notes she probably will need till end of week. pt persisted and cm tried to provide education as to her needs and current status. cm eventually suggested today or tommorrow dc could be ama. pt ended the call. cm called  to alert him and provide education. he said he will try to talk to pt but cannot promise it will work. he hopes she can leave sooner rather than later. cm to update them tomorrow after team.

## 2021-10-11 NOTE — PROGRESS NOTES
Inpatient Psychology Initial Intake    Duration:  30 minutes    Fide Amin, : 1960, a 60 y.o. female, for initial evaluation visit to discuss Adjustment to Disability.    HPI:  60-year-old right handed white female with chronic conditions significant for polyarticular degenerative arthritis, hypertension, hyperlipidemia, seizure disorder, anxiety, depression, asthma, restless leg syndrome had elective bilateral total knee replacements secondary to degenerative arthritis of both knees by Dr. Bennett Em at [facility] on 21. Postoperatively, on the way from the PACU to the floor or soon after arriving on the floor, the patient did sustain a seizure and she does have a history of seizure disorder but had not had one in about a decade.  She was seen by neurologist at [facility] after her seizure.  Postoperative course was significant for hypotension which was felt to be secondary to narcotics, dehydration as well as blood loss.  She had hypoxia with exertion. CT of chest on 21 revealed no CT evidence of pulmonary embolism, but scattered bilateral airspace opacities were noted and believed to be most likely representing multifocal pneumonia.  Covid 19 pneumonia was excluded. She was allowed weightbearing as tolerated on both lower extremities and on Aspirin and SCDs for DVT prophylaxis. She was apparently offered SNF placement per her records but she declined it.  She was medically cleared for discharge on Ceftin 500mg BID and Zithromax 500mg daily x 5 days and with instructions for follow-up care and discharged to home on 21 with home care.  Subsequently, she presented to the ER at [facility] on 21 with increased shortness of breath and was diagnosed with aspiration pneumonia.  She was intubated on 21 and eventually extubated on 10/1/21. She reports she was on oxygen in the acute care hospital and will try to wean her off oxygen as possible.  She has  had multiple blisters on both knees.  On 10/5/21, hemoglobin was 8.5, WBC count 6.9, platelets were 545, BUN was 13 and creatinine was 0.6.  She has been needing assistance for mobility, transfers, self-care. I have reviewed the preadmission screening and concur with that information and there are no significant changes from patient's preadmission screening. She is transferred to [facility] on 10/6/21 for further rehabilitation care.          Past Medical History:   Diagnosis Date   • Arthritis     OA   • Asthma    • Depression    • Deviated septum    • Epilepsy (CMS/HCC)     Last seizure 8 years ago   • GERD (gastroesophageal reflux disease)    • Hypertension    • Lipid disorder    • Motion sickness    • RLS (restless legs syndrome)      Past Surgical History:   Procedure Laterality Date   •  SECTION      x1   • SINUS SURGERY       No family history on file.  Social History     Socioeconomic History   • Marital status:      Spouse name: Not on file   • Number of children: Not on file   • Years of education: Not on file   • Highest education level: Not on file   Occupational History   • Not on file   Tobacco Use   • Smoking status: Never Smoker   • Smokeless tobacco: Never Used   Vaping Use   • Vaping Use: Never used   Substance and Sexual Activity   • Alcohol use: Yes     Alcohol/week: 2.0 standard drinks     Types: 2 Glasses of wine per week   • Drug use: Never   • Sexual activity: Not on file   Other Topics Concern   • Not on file   Social History Narrative    Lives with , independent in everything, drives but now won't be able  to drive for 6th months, retired teacher, 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  Has 1 daughter and 4 step children.  Daughter has a 1 year old son and stepson is expecting twin girls.     Social Determinants of Health     Financial Resource Strain:    • Difficulty of Paying  Living Expenses: Not on file   Food Insecurity: No Food Insecurity   • Worried About Running Out of Food in the Last Year: Never true   • Ran Out of Food in the Last Year: Never true   Transportation Needs:    • Lack of Transportation (Medical): Not on file   • Lack of Transportation (Non-Medical): Not on file   Physical Activity:    • Days of Exercise per Week: Not on file   • Minutes of Exercise per Session: Not on file   Stress:    • Feeling of Stress : Not on file   Social Connections:    • Frequency of Communication with Friends and Family: Not on file   • Frequency of Social Gatherings with Friends and Family: Not on file   • Attends Yazdanism Services: Not on file   • Active Member of Clubs or Organizations: Not on file   • Attends Club or Organization Meetings: Not on file   • Marital Status: Not on file   Intimate Partner Violence:    • Fear of Current or Ex-Partner: Not on file   • Emotionally Abused: Not on file   • Physically Abused: Not on file   • Sexually Abused: Not on file   Housing Stability:    • Unable to Pay for Housing in the Last Year: Not on file   • Number of Places Lived in the Last Year: Not on file   • Unstable Housing in the Last Year: Not on file       Previous Mental Health History:   Previous Mental Health Treatment:  Outpatient and Psychotropic Medications  Previous Suicidal Behavior:  N/A  Previous Self-Injurious Behavior:  N/A  Previous Homicidal Behavior:  N/A  Previous Substance Abuse Treatment: N/A    Current Facility-Administered Medications   Medication Dose Route Frequency Provider Last Rate Last Admin   • acetaminophen (TYLENOL) tablet 650 mg  650 mg oral With meals & nightly Fito Michael MD   650 mg at 10/10/21 2050   • acetaminophen (TYLENOL) tablet 650 mg  650 mg oral q4h PRN Fito Michael MD       • acetaminophen (TYLENOL) tablet 650 mg  650 mg oral q4h PRN Fito Michael MD       • albuterol HFA (VENTOLIN HFA) 90 mcg/actuation inhaler 2 puff  2 puff  inhalation q4h PRN Fito Michael MD       • albuterol HFA (VENTOLIN HFA) 90 mcg/actuation inhaler 2 puff  2 puff inhalation QID (6a, 10a, 2p, 6p) Noé Nunez MD   2 puff at 10/10/21 1723   • alum-mag hydroxide-simeth (MAALOX) 200-200-20 mg/5 mL suspension 30 mL  30 mL oral q4h PRN Fito Michael MD       • ascorbic acid (VITAMIN C) tablet 250 mg  250 mg oral BID AC Noé Nunez MD   250 mg at 10/10/21 1714   • aspirin chewable tablet 81 mg  81 mg oral BID Fito Michael MD   81 mg at 10/10/21 2051   • [Provider Managed Hold] atorvastatin (LIPITOR) tablet 40 mg  40 mg oral Daily (6p) Noé Nunez MD   40 mg at 10/08/21 1736   • bisacodyL (DULCOLAX) 10 mg suppository 10 mg  10 mg rectal Daily PRN Fito Michael MD       • busPIRone (BUSPAR) tablet 10 mg  10 mg oral With meals & nightly Fito Michael MD   10 mg at 10/10/21 2049   • camphor-methyl salicyl-menthoL (MUSCLE RUB) cream   Topical 2x daily PRN Gildardo Jacobsen DPM       • cholecalciferol (vitamin D3) tablet 2,500 Units  2,500 Units oral Daily Kvng Francisco MD   2,500 Units at 10/10/21 0850   • enoxaparin (LOVENOX) syringe 40 mg  40 mg subcutaneous Daily (6p) Noé Nunez MD   40 mg at 10/10/21 1715   • famotidine (PEPCID) tablet 20 mg  20 mg oral Nightly Fito Michael MD   20 mg at 10/10/21 2051   • ferrous sulfate tablet 325 mg  325 mg oral BID with meals Noé Nunez MD   325 mg at 10/10/21 1714   • fluticasone furoate-vilanteroL (BREO ELLIPTA) 200-25 mcg/dose powder for inhalation 1 puff  1 puff inhalation Daily Noé Nunez MD   1 puff at 10/10/21 0858   • furosemide (LASIX) tablet 40 mg  40 mg oral Daily (1p) Noé Nunez MD   40 mg at 10/10/21 1303   • guaiFENesin (MUCINEX) 12 hr ER tablet 600 mg  600 mg oral BID Noé Nunez MD   600 mg at 10/10/21 2051   • HYDROmorphone (DILAUDID) tablet 2 mg  2 mg oral q4h PRN Fito Michael MD   2 mg at 10/08/21 1422   • lamoTRIgine (LAMICTAL XR) extended  release tablet 400 mg  400 mg oral Daily Fito Michael MD   400 mg at 10/10/21 0859   • lidocaine (ASPERCREME) 4 % topical patch 2 patch  2 patch Topical Daily Fito Michael MD   2 patch at 10/10/21 0854   • meloxicam (MOBIC) tablet 7.5 mg  7.5 mg oral Daily Fito Michael MD   7.5 mg at 10/10/21 0852   • montelukast (SINGULAIR) tablet 10 mg  10 mg oral Nightly Fito Michael MD   10 mg at 10/10/21 2048   • multivit-min-iron-folic-vit K1 (CENTRUM) chewable tablet 1 tablet  1 tablet oral Daily Fito Michael MD   1 tablet at 10/10/21 0852   • nortriptyline (PAMELOR) capsule 75 mg  75 mg oral Nightly Fito Michael MD   75 mg at 10/10/21 2049   • pantoprazole (PROTONIX) tablet,delayed release (DR/EC) 40 mg  40 mg oral Daily before breakfast Fito Michael MD   40 mg at 10/10/21 0851   • polyethylene glycol (MIRALAX) 17 gram packet 17 g  17 g oral Daily PRN Fito Michael MD       • rOPINIRole (REQUIP) tablet 0.5 mg  0.5 mg oral Daily with lunch Fito Michael MD   0.5 mg at 10/10/21 1303   • rOPINIRole (REQUIP) tablet 1 mg  1 mg oral Daily with dinner Fito Michael MD   1 mg at 10/10/21 1714   • rOPINIRole (REQUIP) tablet 1 mg  1 mg oral Daily Fito Michael MD   1 mg at 10/10/21 0851   • rOPINIRole (REQUIP) tablet 2 mg  2 mg oral Nightly Fito Michael MD   2 mg at 10/10/21 2050   • sennosides-docusate sodium (SENOKOT-S) 8.6-50 mg per tablet 2 tablet  2 tablet oral BID Noé Nunez MD   2 tablet at 10/10/21 0852   • sertraline (ZOLOFT) tablet 300 mg  300 mg oral Daily Fito Michael MD   300 mg at 10/10/21 0896       Current Evaluation:   Mental Status Exam:  Arousal Level: Alert  Appearance: Well Groomed  Speech: Regular, Unable to assess  Psychomotor Functioning: Decreased  Eye Contact: WNL  Est. Premorbid Intelligence: Above average  Orientation: Fully oriented  Attention: WNL  Concentration: Impaired, Mild  Recent Memory: WNL  Remote Memory:  WNL  Thought Content: Appropriate  Thought Process: WNL  Insight: Intact (improving)  Perceptual Function: Normal  Delusions: None or age appropriate  Sleeping: Early Morning Waking (improving  better last night)  Appetite: No Change  Libido: Non-Contributory  Affect: Full Range  Mood: Frustrated, Hopeful    Assessments done this visit:   MMSE=26/28      [place] Suicide Severity Rating Scale:  Not done today          Safe-T Assessment:  Not done today       Risk Assessment:   Suicidal Ideation: Suicide Risk Assessment Not Applicable for this patient  Self Injurious Behavior:  Not Present  Irritability:  Not Present  Homicidal Behavior: Not Present  Estimate of Risk:  None  If risk identified have suicide precautions been implemented? No    Goals Addressed    Maximize Adjustment and Acceptance of Limitations         Interventions  Acceptance & Adjustment  Cognitive Behavior Training  Monitoring of Symptoms  Psychoeducation    Psychoeducation provided on:  Orthopedic Recovery and Other: Deconditioning     Recommendations:      Individual Therapy  25 minutes1 times weekly      Visit Diagnosis:     Adjustment disorder with Anxiety    Diagnostic Impression: Mrs. Ballard is a 60-year-old woman who is status post bilateral TKR, seizure and pneumonia requiring intubation.  She reports she has a history of a seizure disorder and these were well controlled.  She provides an accurate history.  Affect is full range.  She does endorse some fatigue and frustration a reported surprising how fatigued she was.  She is hopeful and motivated for recovery.  She is alert and oriented correctly completes attention tasks the only difficulty she had on screening was on recall recall was 1 out of 3 and cues helped.  Is felt that fatigue contributed to this difficulty.  Psychology to follow for support during this inpatient admission bout her current medical situation.  She reports she wanted to go home but she has increasing insight into  her fatigue and deconditioning.  She denied more significant emotional distress.  She reports she does have a history of depression and anxiety and has seen both a psychiatrist and a therapist in the past.  Currently she sees a therapist on a monthly basis.  She reports she is very hopeful and motivated for recovery.  She reports she did well in her initial therapy sessions but was surprised  At how fatigued she was.  She is alert and oriented correctly completes attention tasks.  Recall was 1 out of 3 and cues did help.  Difficulty in this task was thought to be related to fatigue.  Psychology to follow for support during this inpatient admission      [email or web address] [email or web address].D

## 2021-10-11 NOTE — PROGRESS NOTES
Patient: Fide Amin  Location: [facility] [address]  MRN: [id number]  Today's date: 10/11/2021    History of Present Illness  Fide is a 60 y.o. female admitted on 10/6/2021 with Physical deconditioning [R53.81]. Principal problem is Physical deconditioning.   Mrs. Fide Amin is a 60 year old female who presented to [facility] on 9/20/21 for planned B TKA by Dr. Em. Pt suffered a seizure post op after 8 years of having no seizures. CT of chest completed 9/23 results reviewed with no CT evidence of pulmonary embolism.  Scattered bilateral airspace opacities noted and believed to be most likely representing multifocal pneumonia.  Covid 19 pneumonia was excluded. Patient was medically cleared for discharge 9/23/21 on ceftin 500mg BID and zithromax 500mg daily x 5 days and with instructions for f/u care.  Pt. Presented to [facility] on 9/24 for SOB and dx with aspiration PNA. Pt. Intubated on 9/25/21 and extubated on 10/1/21. Pt is now medically stable and admitted to [facility] for acute inpatient rehab.      Past Medical History  Fide has a past medical history of Arthritis, Asthma, Depression, Deviated septum, Epilepsy (CMS/HCC), GERD (gastroesophageal reflux disease), Hypertension, Lipid disorder, Motion sickness, and RLS (restless legs syndrome).      OT Vitals    Date/Time Pulse Pt Activity O2 Therapy O2 Del Method O2 Flow Rate BP BP Location BP Method Pt Position [facility]   10/11/21 0734 86 At rest Supplemental oxygen Nasal cannula 2 L/min 140/63 Right upper arm Automatic Lying JV      OT Pain    Date/Time Pain Type Side/Orientation Location Rating: Rest Interventions [facility]   10/11/21 0734 Pain Assessment bilateral knee 8 premedicated for activity JV   10/11/21 0735 Pain Assessment -- -- 8 -- [facility]   10/11/21 0820 Pain Reassessment -- knee 0 -- N   10/11/21 0858 Pain Assessment -- -- 0 -- JV          Prior Living Environment      Most Recent Value   People in Home spouse  [4 kids]   Current Living  "Arrangements home   Home Accessibility stairs to enter home (Group),  stairs within home (Group)  [2+1 STEPHANY, ff inside, SD 1st floor]   Living Environment Comment Pt lives in 2 . Pt reports two small steps to enter and FF to 2nd floor. Pt reports plans to have 1st floor set-up. Pt has guest room on 1st floor c 1/2 bath. Pt reports purchased commode and 2 RTS c handles.   Number of Stairs, Main Entrance 2   Surface of Stairs, Main Entrance concrete   Stair Railings, Main Entrance none   Location, Patient Bedroom second floor, must negotiate stairs to access   Patient Bedroom Access Comment Ability to have first floor set up   Location, Bathroom first (main) floor,  second floor, must negotiate stairs to access   Bathroom Access Comment 2nd floor FB c shower stall. (+) grab bar in shower stall. Pt reports has built in shower bench in stall.   Number of Stairs, Within Home, Primary other (see comments)  [7+7]   Surface of Stairs, Within Home, Primary hardwood   Stair Railings, Within Home, Primary railings on both sides of stairs   Landing, Stairs, Within Home, Primary railings present          Prior Level of Function      Most Recent Value   Dominant Hand right   Ambulation independent   Transferring independent   Toileting independent   Bathing independent   Dressing independent   Prior Level of Function Comment Pt was d/c'd home for one day after surgery using RW.   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   Occupational History/Life Experiences Pt lives at home in [facility] c  ([place]). Pt reports  also retired. Pt is a . Pt has epilepsy and hx of last seizure 2011 then had a seizure after BKA surgery 9/2021.   Patient Goals \"To get well enough to be able to " "go home and get PT there\"           [facility] OT Evaluation and Treatment - 10/11/21 0735        OT Time Calculation    Start Time 0730     Stop Time 0900     Time Calculation (min) 90 min        Session Details    Document Type daily treatment/progress note     Mode of Treatment occupational therapy; individual therapy        General Information    General Observations of Patient Pt pleasant; received in bed c nursing present to administer medications.        Bed Mobility    Comment (Bed Mobility) S supine to sit EOB        Sit to Stand Transfer    [place], Sit to Stand Transfer close supervision     Verbal Cues safety     Assistive Device walker, front-wheeled        Stand to Sit Transfer    [place], Stand to Sit Transfer close supervision     Verbal Cues safety     Assistive Device walker, front-wheeled        Stand Pivot Transfer    [place], Stand Pivot/Stand Step Transfer close supervision     Verbal Cues technique; safety     Assistive Device walker, front-wheeled     Comment OT managing O2 tank.        Toilet Transfer    Transfer Technique stand pivot     [place], Toilet Transfer close supervision     Verbal Cues safety     Assistive Device bariatric; commode, 3-in-1; walker, front-wheeled     Comment OT assisting c managing O2 tank.        Shower Transfer    Transfer Technique stand pivot     [place], Shower Transfer close supervision     Verbal Cues safety     Assistive Device grab bars/tub rail; shower chair; walker, front-wheeled     Comment OT assisting c managing O2 tank.        Safety Issues, Functional Mobility    Comment, Safety Issues/Impairments (Mobility) Cl S ambulating c RW HHD in room for BADL routine. OT assisting c O2 tank management.        Motor Skills    Comment, Motor Skills [facility] c removing laces and threading elastic laces into sneakers. MIN increased time required.        Bathing    Self-Performance chest; left arm; abdomen; right arm; front perineal area; buttocks; " left upper leg; right upper leg; left lower leg, including foot; right lower leg, including foot     [place] set up; supervision     Position supported sitting     Setup Assistance adaptive equipment setup; obtain supplies     Adaptive Equipment grab bar/tub rail; hand-held shower spray hose; long-handled sponge; shower chair     Comment Pt performs bathing seted on shower chair c back. OT issues long handled sponge and educating pt on use for increased safety c bathing distal legs/feet. OT educating on use of towel around long handled sponge to assist c drying distal legs/feet. SpO2 90-93% on 2 L        Upper Body Dressing    Self-Performance obtains clothes; threads left arm, shirt; threads right arm, shirt; pulls shirt over head/around back; pulls shirt down/adjusts     [place] close supervision     Position supported standing; edge of bed sitting     Adaptive Equipment none     Comment Pt performs clothing retrieval c RW and S.        Lower Body Dressing    Self-Performance obtains clothes; threads left leg, underpants; threads right leg, underpants; pulls underpants up or down; threads right leg, pants/shorts; threads left leg, pants/shorts; pulls pants/shorts up or down; dons/doffs left sock; dons/doffs right sock; dons/doffs right shoe; dons/doffs left shoe     [place] close supervision     Position supported standing; edge of bed sitting     Adaptive Equipment dressing stick; long-handled shoe horn; reacher; sock aid     [place], Footwear close supervision     Comment Pt performs clothing retrieval c RW. Pt educated on use of AE for increased safety and energy conservation to prevent bending. Pt utilizing c S. Cl S for clothing retrieval and standing clothing management.        Grooming    [place] Assistance washes, rinses and dries hands; brushes/jacobs hair; oral care (brushing teeth, cleaning dentures); applies deodorant     [place]odd supervision     Position sink side; unsupported  standing     Setup Assistance obtain supplies     Adaptive Equipment none     [place], Oral Hygiene supervision     Comment Standing at sink. SpO2 90-93% on 2L.        Toileting    [place] close supervision     Position unsupported sitting; unsupported standing     Setup Assistance adaptive equipment setup     Adaptive Equipment bariatric; commode, 3-in-1     Comment Cl S for clothing management. Pt continent of bladder and performs hygiene independently.        Daily Progress Summary (OT)    Daily Outcome Statement Pt benefiting from education and cuing throughout BADL session about energy conservation techniques. Pt benefiting from AE training for energy conservation and increased safety c LB dressing and bathing tasks. Pt tolerating completion of BADL on 2L of supplemental air. Pt does require VCs to take rest breaks and for improved breathing techniques.                           IRF OT Goals      Most Recent Value   Transfer Goal 1    Activity/Assistive Device toilet at 10/07/2021 0740   [place] supervision required at 10/07/2021 0740   Time Frame short-term goal (STG),  1 week at 10/07/2021 0740   Transfer Goal 2    Activity/Assistive Device toilet at 10/07/2021 0740   [place] modified independence at 10/07/2021 0740   Time Frame long-term goal (LTG),  14 days or less at 10/07/2021 0740   Transfer Goal 3    Activity/Assistive Device shower at 10/07/2021 0740   [place] supervision required at 10/07/2021 0740   Time Frame short-term goal (STG),  1 week at 10/07/2021 0740   Transfer Goal 4    Activity/Assistive Device shower at 10/07/2021 0740   [place] modified independence at 10/07/2021 0740   Time Frame long-term goal (LTG),  14 days or less at 10/07/2021 0740   Bathing Goal 1    Activity/Assistive Device bathing skills, all at 10/07/2021 0740   [place] supervision required at 10/07/2021 0740   Time Frame short-term goal (STG),  1 week at 10/07/2021 0740   Bathing Goal 2     Activity/Assistive Device bathing skills, all at 10/07/2021 0740   [place] set-up required at 10/07/2021 0740   Time Frame long-term goal (LTG),  14 days or less at 10/07/2021 0740   UB Dressing Goal 1    Activity/Assistive Device upper body dressing at 10/07/2021 0740   [place] minimum assist (75% or more patient effort) at 10/07/2021 0740   Time Frame short-term goal (STG),  1 week at 10/07/2021 0740   Strategies/Barriers c clothing retrieval at 10/07/2021 0740   UB Dressing Goal 2    [place] modified independence at 10/07/2021 0740   Time Frame long-term goal (LTG),  14 days or less at 10/07/2021 0740   LB Dressing Goal 1    [place] minimum assist (75% or more patient effort) at 10/07/2021 0740   Time Frame short-term goal (STG),  1 week at 10/07/2021 0740   Strategies/Barriers c AE at 10/07/2021 0740   LB Dressing Goal 2    [place] modified independence at 10/07/2021 0740   Time Frame long-term goal (LTG),  14 days or less at 10/07/2021 0740   Grooming Goal 1    [place] supervision required at 10/07/2021 0740   Time Frame short-term goal (STG),  1 week at 10/07/2021 0740   Strategies/Barriers standing at 10/07/2021 0740   Grooming Goal 2    [place] modified independence at 10/07/2021 0740   Time Frame long-term goal (LTG),  14 days or less at 10/07/2021 0740   Toileting Goal 1    [place] minimum assist (75% or more patient effort) at 10/07/2021 0740   Time Frame short-term goal (STG),  1 week at 10/07/2021 0740   Toileting Goal 2    [place] modified independence at 10/07/2021 0740   Time Frame long-term goal (LTG),  14 days or less at 10/07/2021 0740

## 2021-10-11 NOTE — PLAN OF CARE
Plan of Care Review  Plan of Care Reviewed With: patient  Outcome Summary: Patient alert and oriented cont b/b. Medications, fall and safety precautions reviewed. Patient appeared to sleep well overnight 2 liters of supplemental oxygen in place spox 94%.

## 2021-10-11 NOTE — PROGRESS NOTES
Cardiology Progress Note    Subjective:  - No CV complaints  - No lab work on 10/11, last on 10/8. Will order for 10/12    Medical History:   Past Medical History:   Diagnosis Date   • Arthritis     OA   • Asthma    • Depression    • Deviated septum    • Epilepsy (CMS/HCC)     Last seizure 8 years ago   • GERD (gastroesophageal reflux disease)    • Hypertension    • Lipid disorder    • Motion sickness    • RLS (restless legs syndrome)      Surgical History:   Past Surgical History:   Procedure Laterality Date   •  SECTION      x1   • SINUS SURGERY       Social History:   Social History     Social History Narrative    Lives with , independent in everything, drives but now won't be able  to drive for 6th months, retired teacher, 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  Has 1 daughter and 4 step children.  Daughter has a 1 year old son and stepson is expecting twin girls.     Family History:   No family history on file.  Allergies: Penicillins and Sulfa (sulfonamide antibiotics)    ROS:  Negative except those listed in HPI and A&P     Current Facility-Administered Medications   Medication Dose Route Frequency   • acetaminophen  650 mg oral With meals & nightly   • acetaminophen  650 mg oral q4h PRN   • acetaminophen  650 mg oral q4h PRN   • albuterol HFA  2 puff inhalation q4h PRN   • albuterol HFA  2 puff inhalation QID (6a, 10a, 2p, 6p)   • alum-mag hydroxide-simeth  30 mL oral q4h PRN   • ascorbic acid  250 mg oral BID AC   • aspirin  81 mg oral BID   • [Provider Managed Hold] atorvastatin  40 mg oral Daily (6p)   • bisacodyL  10 mg rectal Daily PRN   • busPIRone  10 mg oral With meals & nightly   • camphor-methyl salicyl-menthoL   Topical 2x daily PRN   • cholecalciferol (vitamin D3)  2,500 Units oral Daily   • enoxaparin  40 mg subcutaneous Daily (6p)   • famotidine  20 mg oral Nightly   • ferrous sulfate  325 mg oral BID  with meals   • fluticasone furoate-vilanteroL  1 puff inhalation Daily   • furosemide  40 mg oral Daily (1p)   • guaiFENesin  600 mg oral BID   • HYDROmorphone  2 mg oral q4h PRN   • lamoTRIgine  400 mg oral Daily   • lidocaine  2 patch Topical Daily   • meloxicam  7.5 mg oral Daily   • montelukast  10 mg oral Nightly   • multivit-min-iron-folic-vit K1  1 tablet oral Daily   • nortriptyline  75 mg oral Nightly   • pantoprazole  40 mg oral Daily before breakfast   • polyethylene glycol  17 g oral Daily PRN   • rOPINIRole  0.5 mg oral Daily with lunch   • rOPINIRole  1 mg oral Daily with dinner   • rOPINIRole  1 mg oral Daily   • rOPINIRole  2 mg oral Nightly   • sennosides-docusate sodium  2 tablet oral BID   • sertraline  300 mg oral Daily      Physical Exam:  Constitutional: Appears well-developed and well-nourished. No distress.    Cardiovascular: RRR, no murmur noted   Pulmonary/Chest: Dec bs t/o  Abdominal: Soft. Bowel sounds are normal. No bruits  Musculoskeletal: Trace b/l LE edema, weak pulses. S/p bilateral TKA, inc MALIA  Neurological: AAOx3    VS:  Patient Vitals for the past 72 hrs:   BP Temp Temp src Pulse Resp SpO2   10/11/21 0734 140/63 -- -- 86 -- --   10/11/21 0706 140/63 36.9 °C (98.5 °F) Oral 86 18 95 %   10/10/21 2300 -- -- -- 82 18 94 %   10/10/21 1948 (!) 123/55 37 °C (98.6 °F) Oral 83 20 94 %   10/10/21 1600 (!) 109/56 36.9 °C (98.4 °F) -- 85 18 (!) 85 %   10/10/21 1156 118/60 -- -- -- -- 94 %   10/10/21 1120 -- -- -- -- -- (!) 85 %   10/10/21 1110 (!) 122/56 -- -- -- -- 96 %   10/10/21 0930 -- -- -- -- -- 94 %   10/10/21 0924 -- -- -- -- -- 95 %   10/10/21 0923 -- -- -- -- -- (!) 90 %   10/10/21 0920 -- -- -- -- -- (!) 85 %   10/10/21 0651 128/60 37.3 °C (99.2 °F) Oral 87 20 93 %   10/09/21 1900 (!) 114/55 36.9 °C (98.4 °F) Oral -- 18 94 %   10/09/21 1706 (!) 109/53 36.9 °C (98.4 °F) Oral 82 20 93 %   10/09/21 0942 (!) 148/67 -- -- 96 -- 94 %   10/09/21 0727 136/60 37.2 °C (99 °F) Oral 86 20 92  %   10/09/21 0610 -- -- -- 85 -- 93 %   10/08/21 2000 (!) 100/53 37.4 °C (99.4 °F) Oral 81 18 93 %   10/08/21 1602 (!) 112/57 36.9 °C (98.5 °F) Oral 81 18 93 %   10/08/21 1328 (!) 110/57 -- -- 92 -- 93 %   10/08/21 1307 (!) 130/58 -- -- 94 -- 93 %   10/08/21 1128 -- -- -- 87 -- 97 %   10/08/21 1119 -- -- -- -- -- 95 %   10/08/21 1107 134/63 -- -- 85 -- --   10/08/21 1005 128/62 -- -- 85 -- 99 %   10/08/21 0954 (!) 157/69 -- -- 87 -- 100 %   10/08/21 0916 140/62 -- -- 90 -- 95 %     Labs:  Recent labs reviewed  Results from last 7 days   Lab Units 10/08/21  0619 10/05/21  0000   WBC K/uL 8.71  --    HEMOGLOBIN g/dL 7.7*  --    HEMATOCRIT % 24.7*  --    PLATELETS K/uL 643*  --    SODIUM mEQ/L 137 139   POTASSIUM mEQ/L 4.5 3.7   CHLORIDE mEQ/L 101 105   CO2 mEQ/L 27 25   GLUCOSE mg/dL 86 95   BUN mg/dL 7* 13   CREATININE mg/dL 0.5* 0.6   CALCIUM mg/dL 8.0*  8.0*  --    ANION GAP mEQ/L 9  --    AST IU/L 87*  --    ALT IU/L 83*  --    ALBUMIN g/dL 2.6*  --    EGFR mL/min/1.73m*2 >60.0  --    MAGNESIUM mg/dL 2.0  --    BNP pg/mL 183*  --      No intake or output data in the 24 hours ending 10/11/21 0910     Tests:  EKG 09/20/21:  Sinus tachycardia 103 BPM  Left anterior fascicular block  Poor R wave progression is more prominent than that that is seen with LAHB alone  Possible Lateral infarct, age undetermined  OK interval 154 ms  QRS duration 86 ms  QT/QTc 356/466 ms  P-R-T axes 50 -57 46    EKG 09/20/21:  Sinus [id number] BPM  RSR' or QR pattern in V1 suggests right ventricular conduction delay  Left anterior fascicular block  Possible Lateral infarct  OK interval 156 ms  QRS duration 90 ms  QT/QTc 366/477 ms  P-R-T axes 46 -57 59    EKG 10/08/21:  Sinus rhythm 90 BPM  Cannot rule out Anterior infarct, age undetermined  OK interval 120 ms  QRS duration 76 ms  QT/QTcB 384/469 ms  P-R-T axes 55 -24 39    US venous b/l LE 10/07/21:  Study negative for deep vein thrombosis    TTE 10/08/21:  pending    Current CV  Medications:  •  aspirin chewable tablet 81 mg, 81 mg, oral, BID  •  atorvastatin (LIPITOR) tablet 40 mg, 40 mg, oral, Daily->hold 10/09  •  enoxaparin (LOVENOX) syringe 40 mg, 40 mg, subcutaneous, Daily  •  furosemide (LASIX) tablet 40 mg, 40 mg, oral, Daily    Assessment and Plan:  Fide Amin is a 60 y.o. female w/ a PMH of HTN, HLD, Anxiety/Depression Disorder, RLS (w/ intermittent clonic spasms), Epilepsy (last episodes 2011), Anemia, OA, Arthritis, GERD, Asthma and DJD who presented to [facility] on 9/20/21 and underwent an elective b/l TKA. Post-op had a transient seizure episode, resolved by its self, but further complicated by suspected aspiration. CT chest on 9/23 no evidence of pulmonary embolism, scattered bilateral airspace opacities, believed to be most likely representing multifocal pneumonia. Covid 19 pneumonia was excluded. Patient was medically cleared for discahrge on ceftin 500mg BID and zithromax 500mg daily x 5 days and with instructions for f/u care. After discharge from [facility], she presented and was admitted to [facility] on 9/24 for dyspnea, diagnosed with aspiration PNA with acute respiratory failure. Pt was intubated and on ventilation support until 10/1/21. Completed 7-day course of ABX, MRSA negative, BCX NGTD, provided bronchodilator and steroid therapy, CXR with persistent bibasilar opacities, will need follow-up imaging in 4 to 6 weeks. Transferred to [facility] on 10/06/21 for acute inpatient rehab. Cardiology consulted at [facility] to eval for CHF.    1) Surgery:  - S/p bilat TKA on 9/20  - Per IM    2) PNA:  - Post-op had a transient seizure episode, complicated by suspected aspiration.  - CT chest showed no evidence of PE showed scattered bilateral airspace opacities most likely representing multifocal pneumonia COVID-19 should be ruled out. Repeat COVID-19 test was negative    3) Questionable CHF:  - TTE ordered by IM and pending   - Last EKG at [facility] showed NSR  -  minimally elevated on 10/08-->will  recheck for 10/12  - Pt appears euvolemic, even though trace b/l LE edema and lungs dec t/o w/ scattered crackles and rhonchi. Pt is s/p recent b/l TKA and aspiration PNA.  - On diuretics w/ Lasix 40mg QD by IM  - Currently renal function acceptable as of 10/8  - Will await for echo results for review, before addressing plan of care  - Will order TSH, CBC, CMP and Mg for 10/12    4) HTN:  - BP appears stable at present  - Monitor    5) HLD:  - On Statin therapy  - Recent LFTs mildly elevated, possible Transaminitis  - Will place Lipitor 40 on hold for now  - Will order FLP and CMP for 10/12    6) Anemia/Thrombocytosis:  - Hgb 7.7 and Platelets 643 on 10/8  - Will order CBC for 10/12  - On ASA 81mg BID  - Per IM      [facility]  AYE Thomas

## 2021-10-11 NOTE — PROGRESS NOTES
Psychiatry Progress Note    History of Present Illness   See initial consult.  History pertaining to psychosis, depression and anxiety and other psychiatric and psychologic conditions as well as general medical history detailed in initial consult.      Interval History:   Reviewed medications and options  progressing well in therapy  No nursing issues  Continues to sleep better.  Mood improving.  Anxiety and despondency in context of medical conditions manageable.   Sodium stable.  Vital signs stable.  Nursing reports no behavioral concerns.  No sundowning agitation.  Continues to make progress with respect to gaining insight into emotional state and medical conditions and the relationship between them.  Participatory in therapies  Still anxious despite Zoloft 300 mg.  She thought about our discussion about lowering the dose but declines to do so.  Sodium okay at 137.  Vital signs stable    MENTAL STATUS EXAM  Appearance: well groomed  Gait and Motor: no abnormal movementsit is usually a pretty simple straightforward state  Speech: normal rate/rhythm/volume  Mood: depressed and anxious  Affect: constricted  Associations: coherent  Thought Process: goal-directed  Thought Content: no auditory or visual hallucinations.  Suicidality/Homicidality: denies  Judgement/Insight: minimizes severity level of illness  Orientation: situation  Memory: knows current president  Attention: distracted  Knowledge: normal  Language: normal    Vital Signs for the last 24 hours:  Temp:  [36.9 °C (98.4 °F)-37 °C (98.6 °F)] 36.9 °C (98.5 °F)  Heart Rate:  [82-86] 86  Resp:  [18-20] 18  BP: (109-140)/(55-63) 140/63    Labs:  Labs below reviewed for pertinence to psychiatric condition  Lab Results   Component Value Date    GLUCOSE 86 10/08/2021    CALCIUM 8.0 (L) 10/08/2021    CALCIUM 8.0 (L) 10/08/2021     10/08/2021    K 4.5 10/08/2021    CO2 27 10/08/2021     10/08/2021    BUN 7 (L) 10/08/2021    CREATININE 0.5 (L) 10/08/2021      Lab Results   Component Value Date    WBC 8.71 10/08/2021    HGB 7.7 (L) 10/08/2021    HCT 24.7 (L) 10/08/2021    MCV 95.7 10/08/2021     (H) 10/08/2021     Pain Management Panel    There is no flowsheet data to display.           Current Facility-Administered Medications   Medication Dose Route Frequency Provider Last Rate Last Admin   • acetaminophen (TYLENOL) tablet 650 mg  650 mg oral With meals & nightly Fito Michael MD   650 mg at 10/11/21 0735   • acetaminophen (TYLENOL) tablet 650 mg  650 mg oral q4h PRN Fito Michael MD       • acetaminophen (TYLENOL) tablet 650 mg  650 mg oral q4h PRN Fito Michael MD       • albuterol HFA (VENTOLIN HFA) 90 mcg/actuation inhaler 2 puff  2 puff inhalation q4h PRN Fito Michael MD       • albuterol HFA (VENTOLIN HFA) 90 mcg/actuation inhaler 2 puff  2 puff inhalation QID (6a, 10a, 2p, 6p) Noé Nunez MD   2 puff at 10/11/21 0530   • alum-mag hydroxide-simeth (MAALOX) 200-200-20 mg/5 mL suspension 30 mL  30 mL oral q4h PRN Fito Michael MD       • ascorbic acid (VITAMIN C) tablet 250 mg  250 mg oral BID AC Noé Nunez MD   250 mg at 10/11/21 0735   • aspirin chewable tablet 81 mg  81 mg oral BID Fito Michael MD   81 mg at 10/11/21 0736   • [Provider Managed Hold] atorvastatin (LIPITOR) tablet 40 mg  40 mg oral Daily (6p) Noé Nunez MD   40 mg at 10/08/21 1736   • bisacodyL (DULCOLAX) 10 mg suppository 10 mg  10 mg rectal Daily PRN Fito Michael MD       • busPIRone (BUSPAR) tablet 10 mg  10 mg oral With meals & nightly Fito Michael MD   10 mg at 10/11/21 0736   • camphor-methyl salicyl-menthoL (MUSCLE RUB) cream   Topical 2x daily PRN Gildardo Jacobsen DPM   Given at 10/11/21 0827   • cholecalciferol (vitamin D3) tablet 2,500 Units  2,500 Units oral Daily Kvng Francisco MD   2,500 Units at 10/11/21 0736   • enoxaparin (LOVENOX) syringe 40 mg  40 mg subcutaneous Daily (6p) Noé Nunez MD   40 mg at  10/10/21 1715   • famotidine (PEPCID) tablet 20 mg  20 mg oral Nightly Fito Michael MD   20 mg at 10/10/21 2051   • ferrous sulfate tablet 325 mg  325 mg oral BID with meals Noé Nunez MD   325 mg at 10/11/21 0735   • fluticasone furoate-vilanteroL (BREO ELLIPTA) 200-25 mcg/dose powder for inhalation 1 puff  1 puff inhalation Daily Noé Nunez MD   1 puff at 10/11/21 0737   • furosemide (LASIX) tablet 40 mg  40 mg oral Daily (1p) Noé Nunez MD   40 mg at 10/10/21 1303   • guaiFENesin (MUCINEX) 12 hr ER tablet 600 mg  600 mg oral BID Noé Nunez MD   600 mg at 10/11/21 0735   • HYDROmorphone (DILAUDID) tablet 2 mg  2 mg oral q4h PRN Fito Michael MD   2 mg at 10/08/21 1422   • lamoTRIgine (LAMICTAL XR) extended release tablet 400 mg  400 mg oral Daily Fito Michael MD   400 mg at 10/11/21 0738   • lidocaine (ASPERCREME) 4 % topical patch 2 patch  2 patch Topical Daily Fito Michael MD   2 patch at 10/11/21 0734   • meloxicam (MOBIC) tablet 7.5 mg  7.5 mg oral Daily Fito Michael MD   7.5 mg at 10/11/21 0735   • montelukast (SINGULAIR) tablet 10 mg  10 mg oral Nightly Fito Michael MD   10 mg at 10/10/21 2048   • multivit-min-iron-folic-vit K1 (CENTRUM) chewable tablet 1 tablet  1 tablet oral Daily Fito Michael MD   1 tablet at 10/11/21 0735   • nortriptyline (PAMELOR) capsule 75 mg  75 mg oral Nightly Fito Michael MD   75 mg at 10/10/21 2049   • pantoprazole (PROTONIX) tablet,delayed release (DR/EC) 40 mg  40 mg oral Daily before breakfast Fito Michael MD   40 mg at 10/11/21 0736   • polyethylene glycol (MIRALAX) 17 gram packet 17 g  17 g oral Daily PRN Fito Michael MD       • rOPINIRole (REQUIP) tablet 0.5 mg  0.5 mg oral Daily with lunch Fito Michael MD   0.5 mg at 10/10/21 1303   • rOPINIRole (REQUIP) tablet 1 mg  1 mg oral Daily with dinner Fito Michael MD   1 mg at 10/10/21 1714   • rOPINIRole (REQUIP) tablet 1 mg  1 mg  oral Daily Fito Michael MD   1 mg at 10/11/21 0735   • rOPINIRole (REQUIP) tablet 2 mg  2 mg oral Nightly Fito Michael MD   2 mg at 10/10/21 2050   • sennosides-docusate sodium (SENOKOT-S) 8.6-50 mg per tablet 2 tablet  2 tablet oral BID Noé Nunez MD   2 tablet at 10/10/21 0852   • sertraline (ZOLOFT) tablet 300 mg  300 mg oral Daily Fito Michael MD   300 mg at 10/11/21 0734        Patient Active Problem List   Diagnosis   • Arthritis of knee   • Epilepsy (CMS/HCC)   • HTN (hypertension)   • HLD (hyperlipidemia)   • Hypocalcemia   • Depression   • History of total knee arthroplasty, bilateral   • Physical deconditioning   • Anemia   • Asthma   • Pulmonary edema           Assessment/Plan    Depression: CBT automatic anxious and negative thoughts  Adjustment Disorder to Disability: supportive therapy  Sleep:  Insight into illness: insight therapy/psychoeducation  Psychotherapy: supportive  Monitor: Mood, Speech, Appetite, Energy, Cognition, Behavioral, Impulsivity, Agitation  Family Support  Medications: monitor for side effects  - presently no gi, akathesia , ha or sedation  Hg 8.5  Sodium 137  Support atd  Promote insight  BuSpar 10 4 times a day  Zoloft 300  I discussed in detail with the patient and her  my concern about her current Zoloft dose and how it is likely contributing to her jumpiness and jitteriness though she feels this is related to her asthma medicines.  She was clear that she wants to stay on her current dose.  We agreed to do so for now but to continue to monitor for any potential side effects.  Sodium is okay and within normal limits.  She prefers not to lower the Zoloft.  CBT automatic anxious and negative thoughts  Monitor sleep and behavior - d/w nursing  No side effects from meds except possible increase in involuntary movement (not EPS) d/w pt and   Cody Vaughn MD  10/11/2021

## 2021-10-11 NOTE — PROGRESS NOTES
Patient: Fide Amin  Location: [facility] [address]  MRN: [id number]  Today's date: 10/11/2021    History of Present Illness  Fide is a 60 y.o. female admitted on 10/6/2021 with Physical deconditioning [R53.81]. Principal problem is Physical deconditioning.   Mrs. Fide Amin is a 60 year old female who presented to [facility] on 9/20/21 for planned B TKA by Dr. Em. Pt suffered a seizure post op after 8 years of having no seizures. CT of chest completed 9/23 results reviewed with no CT evidence of pulmonary embolism.  Scattered bilateral airspace opacities noted and believed to be most likely representing multifocal pneumonia.  Covid 19 pneumonia was excluded. Patient was medically cleared for discharge 9/23/21 on ceftin 500mg BID and zithromax 500mg daily x 5 days and with instructions for f/u care.  Pt. Presented to [facility] on 9/24 for SOB and dx with aspiration PNA. Pt. Intubated on 9/25/21 and extubated on 10/1/21. Pt is now medically stable and admitted to [facility] for acute inpatient rehab.      Past Medical History  Fide has a past medical history of Arthritis, Asthma, Depression, Deviated septum, Epilepsy (CMS/HCC), GERD (gastroesophageal reflux disease), Hypertension, Lipid disorder, Motion sickness, and RLS (restless legs syndrome).      PT Vitals    Date/Time Pulse HR Source SpO2 Pt Activity O2 Therapy O2 Del Method O2 Flow Rate BP BP Location BP Method Pt Position [facility]   10/11/21 1437 88 Monitor 94 % At rest Supplemental oxygen Nasal cannula 2 L/min 119/54 Left upper arm Automatic Sitting EBB      PT Pain    Date/Time Pain Type Rating: Rest Interventions [facility]   10/11/21 1437 Pain Assessment 0 prescribed exercises encouraged EBB          Prior Living Environment      Most Recent Value   People in Home spouse  [4 kids]   Current Living Arrangements home   Home Accessibility stairs to enter home (Group),  stairs within home (Group)  [2+1 STEPHANY, ff inside, [place] 1st floor]   Living  Environment Comment Pt lives in 2 . Pt reports two small steps to enter and FF to 2nd floor. Pt reports plans to have 1st floor set-up. Pt has guest room on 1st floor c 1/2 bath. Pt reports purchased commode and 2 RTS c handles.   Number of Stairs, Main Entrance 2   Surface of Stairs, Main Entrance concrete   Stair Railings, Main Entrance none   Location, Patient Bedroom second floor, must negotiate stairs to access   Patient Bedroom Access Comment Ability to have first floor set up   Location, Bathroom first (main) floor,  second floor, must negotiate stairs to access   Bathroom Access Comment 2nd floor FB c shower stall. (+) grab bar in shower stall. Pt reports has built in shower bench in stall.   Number of Stairs, Within Home, Primary other (see comments)  [7+7]   Surface of Stairs, Within Home, Primary hardwood   Stair Railings, Within Home, Primary railings on both sides of stairs   Landing, Stairs, Within Home, Primary railings present          Prior Level of Function      Most Recent Value   Dominant Hand right   Ambulation independent   Transferring independent   Toileting independent   Bathing independent   Dressing independent   Prior Level of Function Comment Pt was d/c'd home for one day after surgery using RW.   Assistive Device Currently Used at Home commode, 3-in-1,  raised toilet,  walker, front-wheeled  [RTS c handles]           IRF PT Evaluation and Treatment - 10/11/21 1436        PT Time Calculation    Start Time 1430     Stop Time 1500     Time Calculation (min) 30 min        Session Details    Document Type daily treatment/progress note     Mode of Treatment physical therapy; individual therapy        General Information    General Observations of Patient 2 LO2 via nc        Sit to Stand Transfer    [place], Sit to Stand Transfer touching/steadying assist; verbal cues     Verbal Cues safety; preparatory posture; hand placement     Assistive Device walker, front-wheeled     Comment PT  "manages O2 tank        Stand to Sit Transfer    [place], Stand to Sit Transfer touching/steadying assist; verbal cues     Verbal Cues safety; preparatory posture; hand placement     Assistive Device walker, front-wheeled     Comment PT manages O2 tank        Stand Pivot Transfer    [place], Stand Pivot/Stand Step Transfer minimum assist (75% or more patient effort); verbal cues     Verbal Cues proper use of assistive device; safety; preparatory posture     Assistive Device walker, front-wheeled     Comment PT manages O2 tank        Gait Training    [place], Gait minimum assist (75% or more patient effort); verbal cues     Assistive Device walker, front-wheeled     Distance in Feet 105 feet   x 2    Pattern (Gait) step-through     Comment (Gait/Stairs) PT manages O2 tank; 1st trail: 1 LO2 SpO2 87%, returns to 90 in 20 sec, 92% in 30 sec; 2nd trial: 1 LO2 SpO2 88% x 40 sec; 92% at 50 sec        Lower Extremity (Therapeutic Exercise)    Exercise Position/Type seated     Comment B knee flex-ext: 6\" bolster rolls x 5        Daily Progress Summary (PT)    Symptoms Noted During/After Treatment shortness of breath     Progress Toward Functional Goals (PT) progressing toward functional goals as expected     Daily Outcome Statement Pt cont w/ trials to wean down from 2 LO2 to 1 LO2 via nc during therapy activity. Spso2 w/ amb as noted above w/ consistent drop into mid-high 80's w/ recovery to >/= 92% w/in 1 minute as noted above. She remains anxious and w/ rapid movement initiation and benefits from cueing to relax, even w/ BP and SpO2 monitoring positions. Manual and verbal cueing for safe RW management w/ gait and turns. Returned pt to 2 LO2 at end of session and discussed her desire to leave wednesday, reiterating Team recommendation to complete rehab duration as recommended by Team because levels of therapy intensities decrease once discharged to homecare-outpatient. She is aware that team conferences are " tomorrow and all factors will be considered w/ establishing d/c receommendations. Cont w/ POC as established.                           IRF PT Goals      Most Recent Value   Bed Mobility Goal 1    Activity/Assistive Device bed mobility activities, all,  no assistive device at 10/07/2021 1003   [place] modified independence at 10/07/2021 1003   Time Frame 1 week at 10/07/2021 1003   Transfer Goal 1    Activity/Assistive Device sit-to-stand/stand-to-sit,  stand pivot,  car transfer,  walker, front-wheeled at 10/07/2021 1003   [place] supervision required,  set-up required at 10/07/2021 1003   Time Frame 1 week at 10/07/2021 1003   Transfer Goal 2    Activity/Assistive Device sit-to-stand/stand-to-sit,  stand pivot,  car transfer,  walker, front-wheeled at 10/07/2021 1003   [place] modified independence at 10/07/2021 1003   Time Frame 14 days or less at 10/07/2021 1003   Gait/Walking Locomotion Goal 1    Activity/Assistive Device assistive device use,  backward stepping,  decrease fall risk,  diminish gait deviation,  forward stepping,  improve balance and speed,  increase endurance/gait distance,  increase energy conservation,  normalize weight shifts,  turning, left,  turning, right,  walker, front-wheeled at 10/07/2021 1003   Distance 150 feet at 10/07/2021 1003   [place] supervision required at 10/07/2021 1003   Time Frame 1 week at 10/07/2021 1003   Gait/Walking Locomotion Goal 2    Activity/Assistive Device gait (walking locomotion),  assistive device use,  backward stepping,  decrease fall risk,  diminish gait deviation,  forward stepping,  improve balance and speed,  increase endurance/gait distance,  increase energy conservation,  normalize weight shifts,  sidestepping,  turning, left,  turning, right,  walker, front-wheeled at 10/07/2021 1003   Distance 300 feet at 10/07/2021 1003   [place] modified independence at 10/07/2021 1003   Time Frame 14 days or less at 10/07/2021 1003    Stairs Goal 1    Activity/Assistive Device ascending stairs,  descending stairs,  step-to-step,  using handrail, right,  using handrail, left,  decrease fall risk,  improve balance and speed at 10/07/2021 1003   Number of Stairs 8 at 10/07/2021 1003   [place] supervision required at 10/07/2021 1003   Time Frame 1 week at 10/07/2021 1003   Stairs Goal 2    Activity/Assistive Device using handrail, left,  using handrail, right,  step-to-step,  decrease fall risk,  ascending stairs,  descending stairs,  improve balance and speed,  no assistive device at 10/07/2021 1003   Number of Stairs 14 at 10/07/2021 1003   [place] modified independence at 10/07/2021 1003   Time Frame 14 days or less at 10/07/2021 1003   ROM Goal 1    ROM Goal 1 B knee ROM 0-90 at 10/07/2021 1003   Time Frame 14 days or less at 10/07/2021 1003   ROM Goal 2    ROM Goal 2 B ankle DF ROM +10 degrees at 10/07/2021 1003   Time Frame 14 days or less at 10/07/2021 1003   Caregiver Training Goal 1    Caregiver Training Goal 1 Pt's  safe and I providing any necessary supervision/Assist with mobility tasks at 10/07/2021 1003   Time Frame 14 days or less at 10/07/2021 1003   Problem Specific Goal 1    Problem-Specific Goal 1 Pt weaned from supplemental O2 and tolerating > 92% with activity. at 10/07/2021 1003   Time Frame 14 days or less at 10/07/2021 1003

## 2021-10-11 NOTE — PROGRESS NOTES
Patient: Fide Amin  Location: [facility] [address]  MRN: [id number]  Today's date: 10/11/2021    History of Present Illness  Fide is a 60 y.o. female admitted on 10/6/2021 with Physical deconditioning [R53.81]. Principal problem is Physical deconditioning.   Mrs. Fide Amin is a 60 year old female who presented to [facility] on 9/20/21 for planned B TKA by Dr. Em. Pt suffered a seizure post op after 8 years of having no seizures. CT of chest completed 9/23 results reviewed with no CT evidence of pulmonary embolism.  Scattered bilateral airspace opacities noted and believed to be most likely representing multifocal pneumonia.  Covid 19 pneumonia was excluded. Patient was medically cleared for discharge 9/23/21 on ceftin 500mg BID and zithromax 500mg daily x 5 days and with instructions for f/u care.  Pt. Presented to [facility] on 9/24 for SOB and dx with aspiration PNA. Pt. Intubated on 9/25/21 and extubated on 10/1/21. Pt is now medically stable and admitted to [facility] for acute inpatient rehab.      Past Medical History  Fide has a past medical history of Arthritis, Asthma, Depression, Deviated septum, Epilepsy (CMS/HCC), GERD (gastroesophageal reflux disease), Hypertension, Lipid disorder, Motion sickness, and RLS (restless legs syndrome).      PT Vitals    Date/Time Pulse HR Source SpO2 Pt Activity O2 Therapy O2 Del Method O2 Flow Rate BP BP Location BP Method Pt Position [facility]   10/11/21 1006 83 Monitor 95 % At rest Supplemental oxygen Nasal cannula 2 L/min reduced to 1 LO2 122/59 Left upper arm Automatic Sitting EBB   10/11/21 1024 94 Monitor 85 % 85-88% x 20 sec; return to 91% 40 sec Walking Supplemental oxygen Nasal cannula 1 L/min -- -- -- -- EBB   10/11/21 1027 90 Monitor 94 % At rest Supplemental oxygen Nasal cannula 1 L/min -- -- -- -- EBB   10/11/21 1057 88 Monitor 83 % recovered to 90% in 50 sec; 94% in 65 sec Walking Supplemental oxygen Nasal cannula 1 L/min -- -- -- -- EBB       PT Pain    Date/Time Pain Type Side/Orientation Location Rating: Rest Rating: Activity Description Interventions [facility]   10/11/21 1006 Pain Assessment left bilat w/ L > R knee 2 -- tightness; aching prescribed exercises encouraged; premedicated for activity plus lidocain patches EBB   10/11/21 1024 Pain Reassessment; Post Activity -- -- -- -- -- -- EBB   10/11/21 1057 Pain Reassessment; Post Activity left knee -- 2 aching -- EBB          Prior Living Environment      Most Recent Value   People in Home spouse  [4 kids]   Current Living Arrangements home   Home Accessibility stairs to enter home (Group),  stairs within home (Group)  [2+1 STEPHANY, ff inside, [place] 1st floor]   Living Environment Comment Pt lives in 2 . Pt reports two small steps to enter and FF to 2nd floor. Pt reports plans to have 1st floor set-up. Pt has guest room on 1st floor c 1/2 bath. Pt reports purchased commode and 2 RTS c handles.   Number of Stairs, Main Entrance 2   Surface of Stairs, Main Entrance concrete   Stair Railings, Main Entrance none   Location, Patient Bedroom second floor, must negotiate stairs to access   Patient Bedroom Access Comment Ability to have first floor set up   Location, Bathroom first (main) floor,  second floor, must negotiate stairs to access   Bathroom Access Comment 2nd floor FB c shower stall. (+) grab bar in shower stall. Pt reports has built in shower bench in stall.   Number of Stairs, Within Home, Primary other (see comments)  [7+7]   Surface of Stairs, Within Home, Primary hardwood   Stair Railings, Within Home, Primary railings on both sides of stairs   Landing, Stairs, Within Home, Primary railings present          Prior Level of Function      Most Recent Value   Dominant Hand right   Ambulation independent   Transferring independent   Toileting independent   Bathing independent   Dressing independent   Prior Level of Function Comment Pt was d/c'd home for one day after surgery using RW.   Assistive Device  Currently Used at Home commode, 3-in-1,  raised toilet,  walker, front-wheeled  [RTS c handles]           IRF PT Evaluation and Treatment - 10/11/21 1006        PT Time Calculation    Start Time 1000     Stop Time 1100     Time Calculation (min) 60 min        Session Details    Document Type daily treatment/progress note     Mode of Treatment physical therapy; individual therapy        General Information    General Observations of Patient 2 LO2 nc; reduced to 1 LO2 prior to activity; knees exposed and incision visible-pain patches present        Sit to Stand Transfer    [place], Sit to Stand Transfer close supervision; verbal cues     Verbal Cues safety; hand placement     Assistive Device walker, front-wheeled        Stand to Sit Transfer    [place], Stand to Sit Transfer close supervision; verbal cues     Verbal Cues safety; preparatory posture; hand placement     Assistive Device walker, front-wheeled        Stand Pivot Transfer    [place], Stand Pivot/Stand Step Transfer touching/steadying assist; verbal cues     Verbal Cues proper use of assistive device; preparatory posture; safety     Assistive Device walker, front-wheeled     Comment PT to manage O2 tank        Gait Training    [place], Gait minimum assist (75% or more patient effort); verbal cues     Assistive Device walker, front-wheeled     Distance in Feet 105 feet   85 x 1    Pattern (Gait) step-through     Advanced Gait Activity sloped surfaces     Comment (Gait/Stairs) PT managing O2 tank during mobility; SpO2 85-88% on 1 LO2        Curb Negotiation    [place] minimum assist (75% or more patient effort); verbal cues     Assistive Device walker, front-wheeled     Curb Height 6 inches     Comment PT manages O2 tank to curb/ramp, O2 tank left to  between and she manuevers around tank.        Sloped Surface Gait Skills    [place] minimum assist (75% or more patient effort); verbal cues     Assistive Device walker,  front-wheeled     Distance in Feet 5 feet     Comment PT manages O2 tank to curb/ramp, O2 tank left to  between and she manuevers around tank.        Stairs Training    [place], Stairs minimum assist (75% or more patient effort); verbal cues     Assistive Device railing     Handrail Location (Stairs) both sides   prefers lower rail height    Number of Stairs 8   4 x 2    Stair Height 6 inches     Ascending Stairs Technique step-to-step     Descending Stairs Technique step-to-step     Comment O2 tank left at bottom steps 92% on 2 LO2, HR 90        Safety Issues, Functional Mobility    Safety Issues Affecting Function (Mobility) impulsivity; safety precautions follow-through/compliance     Comment, Safety Issues/Impairments (Mobility) anxiousness w/ impulsivity of speed w/ mobility        Lower Extremity (Therapeutic Exercise)    Exercise Position/Type seated     General Exercise bilateral; ankle pumps; LAQ (long arc quad); marching while seated     Reps and Sets 20     Comment setaed warm-ups at 1 LO2: SpO2= 92%        Daily Progress Summary (PT)    Symptoms Noted During/After Treatment shortness of breath     Progress Toward Functional Goals (PT) progressing toward functional goals as expected     Daily Outcome Statement Pt cont w/ reducing pain levels and is wearing pain patches. Residual damage from prior skin blisters remains but no current open wounds to bilat knees. Progressing w/ titration trials from 2 LO2 to 1 LO2 w/ seated ther ex, seated rest and basic mobility but maintaining 2 LO2 w/ stairs d/t increased energy demands and previous drops in SpO2 w/ amb. SpO2 does drop w/ gait and 1 LO2 to mid-80's and she initiates recovery breathing strategies and receovers to >/= 92% generally w/in 1 minute w/ mild Short of breath but no other adverse signs or sx's. Cont w/ POC and progress as joseph.                           IRF PT Goals      Most Recent Value   Bed Mobility Goal 1    Activity/Assistive  Device bed mobility activities, all,  no assistive device at 10/07/2021 1003   [place] modified independence at 10/07/2021 1003   Time Frame 1 week at 10/07/2021 1003   Transfer Goal 1    Activity/Assistive Device sit-to-stand/stand-to-sit,  stand pivot,  car transfer,  walker, front-wheeled at 10/07/2021 1003   [place] supervision required,  set-up required at 10/07/2021 1003   Time Frame 1 week at 10/07/2021 1003   Transfer Goal 2    Activity/Assistive Device sit-to-stand/stand-to-sit,  stand pivot,  car transfer,  walker, front-wheeled at 10/07/2021 1003   [place] modified independence at 10/07/2021 1003   Time Frame 14 days or less at 10/07/2021 1003   Gait/Walking Locomotion Goal 1    Activity/Assistive Device assistive device use,  backward stepping,  decrease fall risk,  diminish gait deviation,  forward stepping,  improve balance and speed,  increase endurance/gait distance,  increase energy conservation,  normalize weight shifts,  turning, left,  turning, right,  walker, front-wheeled at 10/07/2021 1003   Distance 150 feet at 10/07/2021 1003   [place] supervision required at 10/07/2021 1003   Time Frame 1 week at 10/07/2021 1003   Gait/Walking Locomotion Goal 2    Activity/Assistive Device gait (walking locomotion),  assistive device use,  backward stepping,  decrease fall risk,  diminish gait deviation,  forward stepping,  improve balance and speed,  increase endurance/gait distance,  increase energy conservation,  normalize weight shifts,  sidestepping,  turning, left,  turning, right,  walker, front-wheeled at 10/07/2021 1003   Distance 300 feet at 10/07/2021 1003   [place] modified independence at 10/07/2021 1003   Time Frame 14 days or less at 10/07/2021 1003   Stairs Goal 1    Activity/Assistive Device ascending stairs,  descending stairs,  step-to-step,  using handrail, right,  using handrail, left,  decrease fall risk,  improve balance and speed at 10/07/2021 1003   Number  of Stairs 8 at 10/07/2021 1003   [place] supervision required at 10/07/2021 1003   Time Frame 1 week at 10/07/2021 1003   Stairs Goal 2    Activity/Assistive Device using handrail, left,  using handrail, right,  step-to-step,  decrease fall risk,  ascending stairs,  descending stairs,  improve balance and speed,  no assistive device at 10/07/2021 1003   Number of Stairs 14 at 10/07/2021 1003   [place] modified independence at 10/07/2021 1003   Time Frame 14 days or less at 10/07/2021 1003   ROM Goal 1    ROM Goal 1 B knee ROM 0-90 at 10/07/2021 1003   Time Frame 14 days or less at 10/07/2021 1003   ROM Goal 2    ROM Goal 2 B ankle DF ROM +10 degrees at 10/07/2021 1003   Time Frame 14 days or less at 10/07/2021 1003   Caregiver Training Goal 1    Caregiver Training Goal 1 Pt's  safe and I providing any necessary supervision/Assist with mobility tasks at 10/07/2021 1003   Time Frame 14 days or less at 10/07/2021 1003   Problem Specific Goal 1    Problem-Specific Goal 1 Pt weaned from supplemental O2 and tolerating > 92% with activity. at 10/07/2021 1003   Time Frame 14 days or less at 10/07/2021 1003

## 2021-10-11 NOTE — CONSULTS
Neurology Consult Note    Subjective     Fide Amin is a 60 y.o. woman with history of seizures on Lamictal last seizure 2011 who was admitted to [facility] on 9/20/21 and underwent an elective b/l TKA. Following her surgery, she had a transient seizure episode, 2-minute generalized seizure.  She was given lorazepam.  She had some myoclonic jerking following the seizure. Complicated by suspected aspiration pneumonia.  After discharge from [facility] 9/24, she presented and was admitted to [facility] on 9/24 for dyspnea, diagnosed with aspiration PNA with acute respiratory failure, s/p intubation/ventilation support on 9/25/21 and extubated on 10/1/21.  Transferred to [facility] 10/6/21 on Lamictal 400 mg daily.       Review of Systems  No headache, no further seizures.    Allergies: Penicillins and Sulfa (sulfonamide antibiotics)    Current Inpatient Medications   Medication Dose Route Frequency Provider Last Rate Last Admin   • acetaminophen (TYLENOL) tablet 650 mg  650 mg oral With meals & nightly Fito Michael MD   650 mg at 10/11/21 1806   • acetaminophen (TYLENOL) tablet 650 mg  650 mg oral q4h PRN Fito Michael MD       • acetaminophen (TYLENOL) tablet 650 mg  650 mg oral q4h PRN Fito Michael MD   650 mg at 10/11/21 1219   • albuterol HFA (VENTOLIN HFA) 90 mcg/actuation inhaler 2 puff  2 puff inhalation q4h PRN Fito Michael MD       • albuterol HFA (VENTOLIN HFA) 90 mcg/actuation inhaler 2 puff  2 puff inhalation QID (6a, 10a, 2p, 6p) Noé Nunez MD   2 puff at 10/11/21 1809   • alum-mag hydroxide-simeth (MAALOX) 200-200-20 mg/5 mL suspension 30 mL  30 mL oral q4h PRN Fito Michael MD       • ascorbic acid (VITAMIN C) tablet 250 mg  250 mg oral BID AC Noé Nunez MD   250 mg at 10/11/21 1806   • aspirin chewable tablet 81 mg  81 mg oral BID Fito Michael MD   81 mg at 10/11/21 0736   • [Provider Managed Hold] atorvastatin (LIPITOR) tablet 40 mg  40 mg oral Daily (6p) Noé Nunez MD   40  mg at 10/08/21 1736   • bisacodyL (DULCOLAX) 10 mg suppository 10 mg  10 mg rectal Daily PRN Fito Michael MD       • busPIRone (BUSPAR) tablet 10 mg  10 mg oral With meals & nightly Fito Michael MD   10 mg at 10/11/21 1807   • camphor-methyl salicyl-menthoL (MUSCLE RUB) cream   Topical 2x daily PRN Gildardo Jacobsen DPM   Given at 10/11/21 0827   • cholecalciferol (vitamin D3) tablet 2,500 Units  2,500 Units oral Daily Kvng Francisco MD   2,500 Units at 10/11/21 0736   • enoxaparin (LOVENOX) syringe 40 mg  40 mg subcutaneous Daily (6p) Noé Nunez MD   40 mg at 10/11/21 1806   • famotidine (PEPCID) tablet 20 mg  20 mg oral Nightly Fito Michael MD   20 mg at 10/10/21 2051   • ferrous sulfate tablet 325 mg  325 mg oral BID with meals Noé Nunez MD   325 mg at 10/11/21 1807   • fluticasone furoate-vilanteroL (BREO ELLIPTA) 200-25 mcg/dose powder for inhalation 1 puff  1 puff inhalation Daily Noé Nunez MD   1 puff at 10/11/21 0737   • furosemide (LASIX) tablet 40 mg  40 mg oral Daily (1p) Noé Nunez MD   40 mg at 10/11/21 1219   • guaiFENesin (MUCINEX) 12 hr ER tablet 600 mg  600 mg oral BID Noé Nunez MD   600 mg at 10/11/21 0735   • HYDROmorphone (DILAUDID) tablet 2 mg  2 mg oral q4h PRN Fito Michael MD   2 mg at 10/08/21 1422   • lamoTRIgine (LAMICTAL XR) extended release tablet 400 mg  400 mg oral Daily Fito Michael MD   400 mg at 10/11/21 0738   • lidocaine (ASPERCREME) 4 % topical patch 2 patch  2 patch Topical Daily Fito Michael MD   2 patch at 10/11/21 0734   • meloxicam (MOBIC) tablet 7.5 mg  7.5 mg oral Daily Fito Michael MD   7.5 mg at 10/11/21 0735   • montelukast (SINGULAIR) tablet 10 mg  10 mg oral Nightly Fito Michael MD   10 mg at 10/10/21 2048   • multivit-min-iron-folic-vit K1 (CENTRUM) chewable tablet 1 tablet  1 tablet oral Daily Fito Michael MD   1 tablet at 10/11/21 0735   • nortriptyline (PAMELOR) capsule 75 mg  75  mg oral Nightly Fito Michael MD   75 mg at 10/10/21 2049   • pantoprazole (PROTONIX) tablet,delayed release (DR/EC) 40 mg  40 mg oral Daily before breakfast Fito Michael MD   40 mg at 10/11/21 0736   • polyethylene glycol (MIRALAX) 17 gram packet 17 g  17 g oral Daily PRN Fito Michael MD       • rOPINIRole (REQUIP) tablet 0.5 mg  0.5 mg oral Daily with lunch Fito Michael MD   0.5 mg at 10/11/21 1219   • rOPINIRole (REQUIP) tablet 1 mg  1 mg oral Daily with dinner Fito Michael MD   1 mg at 10/11/21 1806   • rOPINIRole (REQUIP) tablet 1 mg  1 mg oral Daily Fito Michael MD   1 mg at 10/11/21 0735   • rOPINIRole (REQUIP) tablet 2 mg  2 mg oral Nightly Fito Michael MD   2 mg at 10/10/21 2050   • sennosides-docusate sodium (SENOKOT-S) 8.6-50 mg per tablet 2 tablet  2 tablet oral BID Noé Nunez MD   2 tablet at 10/10/21 0852   • sertraline (ZOLOFT) tablet 300 mg  300 mg oral Daily Fito Michael MD   300 mg at 10/11/21 0734     Medical History:   Past Medical History:   Diagnosis Date   • Arthritis     OA   • Asthma    • Depression    • Deviated septum    • Epilepsy (CMS/HCC)     Last seizure 8 years ago   • GERD (gastroesophageal reflux disease)    • Hypertension    • Lipid disorder    • Motion sickness    • RLS (restless legs syndrome)      Surgical History:   Past Surgical History:   Procedure Laterality Date   •  SECTION      x1   • SINUS SURGERY       Social History:   Social History     Socioeconomic History   • Marital status:      Spouse name: Not on file   • Number of children: Not on file   • Years of education: Not on file   • Highest education level: Not on file   Occupational History   • Not on file   Tobacco Use   • Smoking status: Never Smoker   • Smokeless tobacco: Never Used   Vaping Use   • Vaping Use: Never used   Substance and Sexual Activity   • Alcohol use: Yes     Alcohol/week: 2.0 standard drinks     Types: 2 Glasses of wine  "per week   • Drug use: Never   • Sexual activity: Not on file   Other Topics Concern   • Not on file   Social History Narrative    Lives with , independent in everything, drives but now won't be able  to drive for 6th months, retired teacher, 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  Has 1 daughter and 4 step children.  Daughter has a 1 year old son and stepson is expecting twin girls.     Social Determinants of Health     Financial Resource Strain:    • Difficulty of Paying Living Expenses: Not on file   Food Insecurity: No Food Insecurity   • Worried About Running Out of Food in the Last Year: Never true   • Ran Out of Food in the Last Year: Never true   Transportation Needs:    • Lack of Transportation (Medical): Not on file   • Lack of Transportation (Non-Medical): Not on file   Physical Activity:    • Days of Exercise per Week: Not on file   • Minutes of Exercise per Session: Not on file   Stress:    • Feeling of Stress : Not on file   Social Connections:    • Frequency of Communication with Friends and Family: Not on file   • Frequency of Social Gatherings with Friends and Family: Not on file   • Attends Quaker Services: Not on file   • Active Member of Clubs or Organizations: Not on file   • Attends Club or Organization Meetings: Not on file   • Marital Status: Not on file   Intimate Partner Violence:    • Fear of Current or Ex-Partner: Not on file   • Emotionally Abused: Not on file   • Physically Abused: Not on file   • Sexually Abused: Not on file   Housing Stability:    • Unable to Pay for Housing in the Last Year: Not on file   • Number of Places Lived in the Last Year: Not on file   • Unstable Housing in the Last Year: Not on file     Objective   Visit Vitals  BP (!) 108/55 (BP Location: Right upper arm, Patient Position: Lying)   Pulse 85   Temp 37.1 °C (98.8 °F) (Oral)   Resp 19   Ht 1.575 m (5' 2.01\")   Wt 63.9 kg " (140 lb 14 oz)   SpO2 100%   BMI 25.76 kg/m²     Physical Exam  No acute distress awake and alert, oriented ×3, speech fluent, follows commands. Cranial nerves-visual fields full, facial sensation intact, no facial weakness, hearing intact, tongue midline. Motor-5 over 5 strength arms, didn't test legs proximally but 5/5 dorsi and plantar flexion bilaterally. Reflexes-symmetrical. Sensory-intact pin sensation. Cerebellar-good finger to nose coordination.    Assessment    History of seizure disorder well controlled on Lamictal with breakthrough seizure following knee surgery 9/2021.  Neurologically stable with a non focal exam.  Seizures remain controlled on Lamictal.    Plan   Continue with current therapy.

## 2021-10-11 NOTE — PLAN OF CARE
Pt completes toilet and shower transfer ambulatory level c RW c Cl S. Pt currently S for grooming in stance, Cl S UB/LB dressing including ambulatory level clothing retrieval. Pt completes toileting c Cl S and bathing c S/set-up. Pt benefiting from education/training on energy conservation techniques and use of AE. Pt maintaining SpO2 90-93% on 2 L during ADL.

## 2021-10-12 ENCOUNTER — APPOINTMENT (INPATIENT)
Dept: PHYSICAL THERAPY | Facility: REHABILITATION | Age: 61
DRG: 948 | End: 2021-10-12
Payer: COMMERCIAL

## 2021-10-12 ENCOUNTER — APPOINTMENT (INPATIENT)
Dept: OCCUPATIONAL THERAPY | Facility: REHABILITATION | Age: 61
DRG: 948 | End: 2021-10-12
Payer: COMMERCIAL

## 2021-10-12 LAB
ALBUMIN SERPL-MCNC: 2.6 G/DL (ref 3.4–5)
ALP SERPL-CCNC: 109 IU/L (ref 35–126)
ALT SERPL-CCNC: 47 IU/L (ref 11–54)
ANION GAP SERPL CALC-SCNC: 9 MEQ/L (ref 3–15)
AST SERPL-CCNC: 44 IU/L (ref 15–41)
ATRIAL RATE: 90
BILIRUB SERPL-MCNC: 0.8 MG/DL (ref 0.3–1.2)
BNP SERPL-MCNC: 145 PG/ML
BUN SERPL-MCNC: 8 MG/DL (ref 8–20)
CALCIUM SERPL-MCNC: 8.3 MG/DL (ref 8.9–10.3)
CHLORIDE SERPL-SCNC: 102 MEQ/L (ref 98–109)
CHOLEST SERPL-MCNC: 132 MG/DL
CO2 SERPL-SCNC: 29 MEQ/L (ref 22–32)
CREAT SERPL-MCNC: 0.5 MG/DL (ref 0.6–1.1)
ERYTHROCYTE [DISTWIDTH] IN BLOOD BY AUTOMATED COUNT: 13.7 % (ref 11.7–14.4)
GFR SERPL CREATININE-BSD FRML MDRD: >60 ML/MIN/1.73M*2
GLUCOSE SERPL-MCNC: 90 MG/DL (ref 70–99)
HCT VFR BLDCO AUTO: 25.5 % (ref 35–45)
HDLC SERPL-MCNC: 33 MG/DL
HDLC SERPL: 4 {RATIO}
HGB BLD-MCNC: 7.9 G/DL (ref 11.8–15.7)
LDLC SERPL CALC-MCNC: 79 MG/DL
MAGNESIUM SERPL-MCNC: 2.1 MG/DL (ref 1.8–2.5)
MCH RBC QN AUTO: 29.4 PG (ref 28–33.2)
MCHC RBC AUTO-ENTMCNC: 31 G/DL (ref 32.2–35.5)
MCV RBC AUTO: 94.8 FL (ref 83–98)
NONHDLC SERPL-MCNC: 99 MG/DL
P AXIS: 55
PDW BLD AUTO: 8.9 FL (ref 9.4–12.3)
PLATELET # BLD AUTO: 693 K/UL (ref 150–369)
POTASSIUM SERPL-SCNC: 4.4 MEQ/L (ref 3.6–5.1)
PR INTERVAL: 120
PROT SERPL-MCNC: 5.6 G/DL (ref 6–8.2)
QRS DURATION: 76
QT INTERVAL: 384
QTC CALCULATION(BAZETT): 469
R AXIS: -24
RBC # BLD AUTO: 2.69 M/UL (ref 3.93–5.22)
SODIUM SERPL-SCNC: 140 MEQ/L (ref 136–144)
T WAVE AXIS: 39
TRIGL SERPL-MCNC: 101 MG/DL (ref 30–149)
TSH SERPL DL<=0.05 MIU/L-ACNC: 1.79 MIU/L (ref 0.34–5.6)
VENTRICULAR RATE: 90
WBC # BLD AUTO: 5.82 K/UL (ref 3.8–10.5)

## 2021-10-12 PROCEDURE — 83880 ASSAY OF NATRIURETIC PEPTIDE: CPT | Performed by: PHYSICAL MEDICINE & REHABILITATION

## 2021-10-12 PROCEDURE — 97110 THERAPEUTIC EXERCISES: CPT | Mod: GP

## 2021-10-12 PROCEDURE — 97530 THERAPEUTIC ACTIVITIES: CPT | Mod: GO

## 2021-10-12 PROCEDURE — 36415 COLL VENOUS BLD VENIPUNCTURE: CPT | Performed by: PHYSICAL MEDICINE & REHABILITATION

## 2021-10-12 PROCEDURE — 80061 LIPID PANEL: CPT | Performed by: PHYSICAL MEDICINE & REHABILITATION

## 2021-10-12 PROCEDURE — 63600000 HC DRUGS/DETAIL CODE: Performed by: INTERNAL MEDICINE

## 2021-10-12 PROCEDURE — 63700000 HC SELF-ADMINISTRABLE DRUG: Performed by: INTERNAL MEDICINE

## 2021-10-12 PROCEDURE — 83735 ASSAY OF MAGNESIUM: CPT | Performed by: PHYSICAL MEDICINE & REHABILITATION

## 2021-10-12 PROCEDURE — 97116 GAIT TRAINING THERAPY: CPT | Mod: GP

## 2021-10-12 PROCEDURE — 84443 ASSAY THYROID STIM HORMONE: CPT | Performed by: PHYSICAL MEDICINE & REHABILITATION

## 2021-10-12 PROCEDURE — 85027 COMPLETE CBC AUTOMATED: CPT | Performed by: PHYSICAL MEDICINE & REHABILITATION

## 2021-10-12 PROCEDURE — 12800000 HC ROOM AND CARE SEMIPRIVATE REHAB

## 2021-10-12 PROCEDURE — 63700000 HC SELF-ADMINISTRABLE DRUG: Performed by: PHYSICAL MEDICINE & REHABILITATION

## 2021-10-12 PROCEDURE — 82565 ASSAY OF CREATININE: CPT | Performed by: PHYSICAL MEDICINE & REHABILITATION

## 2021-10-12 RX ADMIN — GUAIFENESIN 600 MG: 600 TABLET ORAL at 07:58

## 2021-10-12 RX ADMIN — FAMOTIDINE 20 MG: 20 TABLET ORAL at 20:26

## 2021-10-12 RX ADMIN — ROPINIROLE HYDROCHLORIDE 2 MG: 1 TABLET, FILM COATED ORAL at 20:26

## 2021-10-12 RX ADMIN — ROPINIROLE HYDROCHLORIDE 0.5 MG: 0.5 TABLET, FILM COATED ORAL at 11:42

## 2021-10-12 RX ADMIN — ASPIRIN 81 MG CHEWABLE TABLET 81 MG: 81 TABLET CHEWABLE at 20:27

## 2021-10-12 RX ADMIN — BUSPIRONE HYDROCHLORIDE 10 MG: 5 TABLET ORAL at 07:58

## 2021-10-12 RX ADMIN — MELOXICAM 7.5 MG: 7.5 TABLET ORAL at 07:59

## 2021-10-12 RX ADMIN — ALBUTEROL SULFATE 2 PUFF: 90 AEROSOL, METERED RESPIRATORY (INHALATION) at 11:42

## 2021-10-12 RX ADMIN — BUSPIRONE HYDROCHLORIDE 10 MG: 5 TABLET ORAL at 16:52

## 2021-10-12 RX ADMIN — FLUTICASONE FUROATE AND VILANTEROL 1 PUFF: 200; 25 POWDER RESPIRATORY (INHALATION) at 08:07

## 2021-10-12 RX ADMIN — Medication 1 TABLET: at 07:58

## 2021-10-12 RX ADMIN — Medication 250 MG: at 07:58

## 2021-10-12 RX ADMIN — ROPINIROLE HYDROCHLORIDE 1 MG: 1 TABLET, FILM COATED ORAL at 16:52

## 2021-10-12 RX ADMIN — ROPINIROLE HYDROCHLORIDE 1 MG: 1 TABLET, FILM COATED ORAL at 08:01

## 2021-10-12 RX ADMIN — BUSPIRONE HYDROCHLORIDE 10 MG: 5 TABLET ORAL at 20:26

## 2021-10-12 RX ADMIN — Medication 325 MG: at 16:52

## 2021-10-12 RX ADMIN — ACETAMINOPHEN 650 MG: 325 TABLET, FILM COATED ORAL at 07:59

## 2021-10-12 RX ADMIN — MONTELUKAST 10 MG: 10 TABLET, FILM COATED ORAL at 20:26

## 2021-10-12 RX ADMIN — Medication 250 MG: at 16:52

## 2021-10-12 RX ADMIN — LAMOTRIGINE 400 MG: 200 TABLET, EXTENDED RELEASE ORAL at 08:06

## 2021-10-12 RX ADMIN — ACETAMINOPHEN 650 MG: 325 TABLET, FILM COATED ORAL at 20:27

## 2021-10-12 RX ADMIN — ASPIRIN 81 MG CHEWABLE TABLET 81 MG: 81 TABLET CHEWABLE at 07:58

## 2021-10-12 RX ADMIN — GUAIFENESIN 600 MG: 600 TABLET ORAL at 20:27

## 2021-10-12 RX ADMIN — BUSPIRONE HYDROCHLORIDE 10 MG: 5 TABLET ORAL at 11:42

## 2021-10-12 RX ADMIN — ACETAMINOPHEN 650 MG: 325 TABLET, FILM COATED ORAL at 16:52

## 2021-10-12 RX ADMIN — SERTRALINE HYDROCHLORIDE 300 MG: 100 TABLET ORAL at 07:58

## 2021-10-12 RX ADMIN — ALBUTEROL SULFATE 2 PUFF: 90 AEROSOL, METERED RESPIRATORY (INHALATION) at 16:54

## 2021-10-12 RX ADMIN — FUROSEMIDE 40 MG: 40 TABLET ORAL at 12:29

## 2021-10-12 RX ADMIN — Medication 325 MG: at 07:59

## 2021-10-12 RX ADMIN — LIDOCAINE 2 PATCH: 246 PATCH TOPICAL at 08:00

## 2021-10-12 RX ADMIN — Medication 2500 UNITS: at 07:58

## 2021-10-12 RX ADMIN — PANTOPRAZOLE SODIUM 40 MG: 40 TABLET, DELAYED RELEASE ORAL at 07:59

## 2021-10-12 RX ADMIN — ENOXAPARIN SODIUM 40 MG: 100 INJECTION SUBCUTANEOUS at 16:55

## 2021-10-12 RX ADMIN — ALBUTEROL SULFATE 2 PUFF: 90 AEROSOL, METERED RESPIRATORY (INHALATION) at 05:11

## 2021-10-12 RX ADMIN — ACETAMINOPHEN 650 MG: 325 TABLET, FILM COATED ORAL at 11:46

## 2021-10-12 NOTE — PROGRESS NOTES
Patient: Fide Amin  Location: [facility] [address]  MRN: [id number]  Today's date: 10/12/2021    History of Present Illness  Fide is a 60 y.o. female admitted on 10/6/2021 with Physical deconditioning [R53.81]. Principal problem is Physical deconditioning.   Mrs. Fide Amin is a 60 year old female who presented to [facility] on 9/20/21 for planned B TKA by Dr. Em. Pt suffered a seizure post op after 8 years of having no seizures. CT of chest completed 9/23 results reviewed with no CT evidence of pulmonary embolism.  Scattered bilateral airspace opacities noted and believed to be most likely representing multifocal pneumonia.  Covid 19 pneumonia was excluded. Patient was medically cleared for discharge 9/23/21 on ceftin 500mg BID and zithromax 500mg daily x 5 days and with instructions for f/u care.  Pt. Presented to [facility] on 9/24 for SOB and dx with aspiration PNA. Pt. Intubated on 9/25/21 and extubated on 10/1/21. Pt is now medically stable and admitted to [facility] for acute inpatient rehab.      Past Medical History  Fide has a past medical history of Arthritis, Asthma, Depression, Deviated septum, Epilepsy (CMS/HCC), GERD (gastroesophageal reflux disease), Hypertension, Lipid disorder, Motion sickness, and RLS (restless legs syndrome).      PT Vitals    Date/Time Pulse HR Source SpO2 Pt Activity O2 Flow Rate BP BP Location BP Method Pt Position [facility]   10/12/21 1008 89 Monitor 95 % At rest 1 L/min 159/71 Right upper arm Automatic Sitting PF   10/12/21 1019 -- -- 90 % Walking 1 L/min -- -- -- -- PF   10/12/21 1026 -- -- 96 % At rest 1 L/min -- -- -- -- PF      PT Pain    Date/Time Pain Type Location Rating: Rest Description [facility]   10/12/21 1008 Pain Assessment knee 2 - mild pain sore PF   10/12/21 1026 Pain Reassessment knee 2 - mild pain sore PF          Prior Living Environment      Most Recent Value   People in Home spouse  [4 kids]   Current Living Arrangements home   Home  Accessibility stairs to enter home (Group),  stairs within home (Group)  [2+1 STEPHANY, ff inside, ID 1st floor]   Living Environment Comment Pt lives in 2 . Pt reports two small steps to enter and FF to 2nd floor. Pt reports plans to have 1st floor set-up. Pt has guest room on 1st floor c 1/2 bath. Pt reports purchased commode and 2 RTS c handles.   Number of Stairs, Main Entrance 2   Surface of Stairs, Main Entrance concrete   Stair Railings, Main Entrance none   Location, Patient Bedroom second floor, must negotiate stairs to access   Patient Bedroom Access Comment Ability to have first floor set up   Location, Bathroom first (main) floor,  second floor, must negotiate stairs to access   Bathroom Access Comment 2nd floor FB c shower stall. (+) grab bar in shower stall. Pt reports has built in shower bench in stall.   Number of Stairs, Within Home, Primary other (see comments)  [7+7]   Surface of Stairs, Within Home, Primary hardwood   Stair Railings, Within Home, Primary railings on both sides of stairs   Landing, Stairs, Within Home, Primary railings present          Prior Level of Function      Most Recent Value   Dominant Hand right   Ambulation independent   Transferring independent   Toileting independent   Bathing independent   Dressing independent   Prior Level of Function Comment Pt was d/c'd home for one day after surgery using RW.   Assistive Device Currently Used at Home commode, 3-in-1,  raised toilet,  walker, front-wheeled  [RTS c handles]           IRF PT Evaluation and Treatment - 10/12/21 1006        PT Time Calculation    Start Time 1000     Stop Time 1030     Time Calculation (min) 30 min        Session Details    Document Type daily treatment/progress note     Mode of Treatment individual therapy; physical therapy        General Information    Patient Profile Reviewed yes     General Observations of Patient received seated in wc in gym and agreeable to treatment        Transfers    Transfers  "stand pivot transfer        Sit to Stand Transfer    [place], Sit to Stand Transfer touching/steadying assist     Verbal Cues safety     Assistive Device walker, front-wheeled     Comment touching assist from wc and EOM        Stand to Sit Transfer    [place], Stand to Sit Transfer touching/steadying assist     Verbal Cues safety     Assistive Device walker, front-wheeled     Comment steadying assist to wc and EOM        Stand Pivot Transfer    [place], Stand Pivot/Stand Step Transfer minimum assist (75% or more patient effort)     Verbal Cues proper use of assistive device; safety     Assistive Device walker, front-wheeled     Comment via ambulatory approach to EOM and wc with RW therapist manages O2 tank        Gait Training    [place], Gait minimum assist (75% or more patient effort)     Assistive Device walker, front-wheeled     Distance in Feet 108 feet     Pattern (Gait) step-through     Deviations/Abnormal Patterns (Gait) tan decreased; gait speed decreased; step length decreased; weight shifting decreased     Comment (Gait/Stairs) 108 ft + 45 ft with RW therapist pushing O2 tank, req rest breaks due to fatigue desatted to 90% on 1 L recovering to 96% with seated rest approx 3 min        Lower Extremity (Therapeutic Exercise)    Exercise Position/Type seated; standing     General Exercise bilateral     Reps and Sets 2 x 10     Comment 1) AP 2) LAQ 3) knee raises 4) knee flex stretch with 6\" bolster 5) standing heel raises BUE support        Daily Progress Summary (PT)    Daily Outcome Statement Session focused on general LE strengthening, knee ROM and ambulation. Pt desatted to 90% with gait on 1 L but recovered to 96% with seated rest in approx 3 min. Remained around 96% with seated exercise without any complaints or difficulty noted. Desatted to 91% with standing exericse but quickly recovered to 95% with seated rest within 1 min. Continue to progress as able.                       "     IRF PT Goals      Most Recent Value   Bed Mobility Goal 1    Activity/Assistive Device bed mobility activities, all,  no assistive device at 10/07/2021 1003   [place] modified independence at 10/07/2021 1003   Time Frame 1 week at 10/07/2021 1003   Strategies/Barriers anxiousness, decreased safety awareness and carry-over, supplimental O2 at 10/11/2021 1754   Progress/Outcome good progress toward goal,  goal partially met at 10/11/2021 1754   Transfer Goal 1    Activity/Assistive Device sit-to-stand/stand-to-sit,  stand pivot,  car transfer,  walker, front-wheeled at 10/07/2021 1003   [place] supervision required,  set-up required at 10/07/2021 1003   Time Frame 1 week at 10/07/2021 1003   Strategies/Barriers anxiousness, decreased safety awareness and carry-over, supplimental O2, deconditioning at 10/11/2021 1754   Progress/Outcome continuing progress toward goal,  goal ongoing at 10/11/2021 1754   Transfer Goal 2    Activity/Assistive Device sit-to-stand/stand-to-sit,  stand pivot,  car transfer,  walker, front-wheeled at 10/07/2021 1003   [place] modified independence at 10/07/2021 1003   Time Frame 14 days or less at 10/07/2021 1003   Strategies/Barriers anxiousness, decreased safety awareness and carry-over, supplimental O2, deconditioning at 10/11/2021 1754   Progress/Outcome goal ongoing at 10/11/2021 1754   Gait/Walking Locomotion Goal 1    Activity/Assistive Device assistive device use,  backward stepping,  decrease fall risk,  diminish gait deviation,  forward stepping,  improve balance and speed,  increase endurance/gait distance,  increase energy conservation,  normalize weight shifts,  turning, left,  turning, right,  walker, front-wheeled at 10/07/2021 1003   Distance 150 feet at 10/07/2021 1003   [place] supervision required at 10/07/2021 1003   Time Frame 1 week at 10/07/2021 1003   Strategies/Barriers anxiousness, decreased safety awareness and carry-over, supplimental O2,  deconditioning at 10/11/2021 1754   Progress/Outcome continuing progress toward goal,  goal ongoing at 10/11/2021 1754   Gait/Walking Locomotion Goal 2    Activity/Assistive Device gait (walking locomotion),  assistive device use,  backward stepping,  decrease fall risk,  diminish gait deviation,  forward stepping,  improve balance and speed,  increase endurance/gait distance,  increase energy conservation,  normalize weight shifts,  sidestepping,  turning, left,  turning, right,  walker, front-wheeled at 10/07/2021 1003   Distance 300 feet at 10/07/2021 1003   [place] modified independence at 10/07/2021 1003   Time Frame 14 days or less at 10/07/2021 1003   Strategies/Barriers anxiousness, decreased safety awareness and carry-over, supplimental O2, deconditioning at 10/11/2021 1754   Progress/Outcome goal ongoing at 10/11/2021 1754   Stairs Goal 1    Activity/Assistive Device ascending stairs,  descending stairs,  step-to-step,  using handrail, right,  using handrail, left,  decrease fall risk,  improve balance and speed at 10/07/2021 1003   Number of Stairs 8 at 10/07/2021 1003   [place] supervision required at 10/07/2021 1003   Time Frame 1 week at 10/07/2021 1003   Strategies/Barriers anxiousness, decreased safety awareness and carry-over, supplimental O2 at 10/11/2021 1754   Progress/Outcome good progress toward goal,  goal partially met,  goal ongoing at 10/11/2021 1754   Stairs Goal 2    Activity/Assistive Device using handrail, left,  using handrail, right,  step-to-step,  decrease fall risk,  ascending stairs,  descending stairs,  improve balance and speed,  no assistive device at 10/07/2021 1003   Number of Stairs 14 at 10/07/2021 1003   [place] modified independence at 10/07/2021 1003   Time Frame 14 days or less at 10/07/2021 1003   Strategies/Barriers anxiousness, decreased safety awareness and carry-over, supplimental O2, deconditioning at 10/11/2021 1754   Progress/Outcome goal ongoing  at 10/11/2021 1754   ROM Goal 1    ROM Goal 1 B knee ROM 0-90 at 10/07/2021 1003   Time Frame 14 days or less at 10/07/2021 1003   Strategies/Barriers anxiousness, decreased safety awareness and carry-over, supplimental O2 at 10/11/2021 1754   Progress/Outcome good progress toward goal,  goal ongoing at 10/11/2021 1754   ROM Goal 2    ROM Goal 2 B ankle DF ROM +10 degrees at 10/07/2021 1003   Time Frame 14 days or less at 10/07/2021 1003   Caregiver Training Goal 1    Caregiver Training Goal 1 Pt's  safe and I providing any necessary supervision/Assist with mobility tasks at 10/07/2021 1003   Time Frame 14 days or less at 10/07/2021 1003   Problem Specific Goal 1    Problem-Specific Goal 1 Pt weaned from supplemental O2 and tolerating > 92% with activity. at 10/07/2021 1003   Time Frame 14 days or less at 10/07/2021 1003   Strategies/Barriers anxiousness, decreased safety awareness and carry-over, supplimental O2 at 10/11/2021 1754   Progress/Outcome continuing progress toward goal,  goal ongoing at 10/11/2021 1754

## 2021-10-12 NOTE — PROGRESS NOTES
Psychiatry Progress Note    History of Present Illness   See initial consult.  History pertaining to psychosis, depression and anxiety and other psychiatric and psychologic conditions as well as general medical history detailed in initial consult.      Interval History:     Feels good about her progress.  Has some end expiratory anxiety about disposition were no weight.  CBT helpful.  continued effort in therapy  no signs of metabolic disturbance - cognition stable  energy adequate for therapy  developing coping strategies for ATD and mood fluctuations  continues without any safety issues  responsive to supportive intervention  Discussed with nursing - no behavioral disturbance  Reviewed medications and options  progressing well in therapy  No nursing issues  Still anxious despite Zoloft 300 mg.  She thought about our discussion about lowering the dose but declines to do so.  Sodium okay at 140.  Vital signs stable    MENTAL STATUS EXAM  Appearance: well groomed  Gait and Motor: no abnormal movementsit is usually a pretty simple straightforward state  Speech: normal rate/rhythm/volume  Mood: depressed and anxious  Affect: constricted  Associations: coherent  Thought Process: goal-directed  Thought Content: no auditory or visual hallucinations.  Suicidality/Homicidality: denies  Judgement/Insight: minimizes severity level of illness  Orientation: situation  Memory: knows current president  Attention: distracted  Knowledge: normal  Language: normal    Vital Signs for the last 24 hours:  Temp:  [36.7 °C (98.1 °F)-37.1 °C (98.8 °F)] 36.7 °C (98.1 °F)  Heart Rate:  [82-94] 82  Resp:  [18-19] 18  BP: (108-137)/(54-65) 137/65    Labs:  Labs below reviewed for pertinence to psychiatric condition  Lab Results   Component Value Date    GLUCOSE 90 10/12/2021    CALCIUM 8.3 (L) 10/12/2021     10/12/2021    K 4.4 10/12/2021    CO2 29 10/12/2021     10/12/2021    BUN 8 10/12/2021    CREATININE 0.5 (L) 10/12/2021      Lab Results   Component Value Date    WBC 5.82 10/12/2021    HGB 7.9 (L) 10/12/2021    HCT 25.5 (L) 10/12/2021    MCV 94.8 10/12/2021     (H) 10/12/2021     Pain Management Panel    There is no flowsheet data to display.           Current Facility-Administered Medications   Medication Dose Route Frequency Provider Last Rate Last Admin   • acetaminophen (TYLENOL) tablet 650 mg  650 mg oral With meals & nightly Fito Michael MD   650 mg at 10/12/21 0759   • acetaminophen (TYLENOL) tablet 650 mg  650 mg oral q4h PRN Fito Michael MD       • acetaminophen (TYLENOL) tablet 650 mg  650 mg oral q4h PRN Fito Michael MD   650 mg at 10/11/21 1219   • albuterol HFA (VENTOLIN HFA) 90 mcg/actuation inhaler 2 puff  2 puff inhalation q4h PRN Fito Michael MD       • albuterol HFA (VENTOLIN HFA) 90 mcg/actuation inhaler 2 puff  2 puff inhalation QID (6a, 10a, 2p, 6p) Noé Nunez MD   2 puff at 10/12/21 0511   • alum-mag hydroxide-simeth (MAALOX) 200-200-20 mg/5 mL suspension 30 mL  30 mL oral q4h PRN Fito Michael MD       • ascorbic acid (VITAMIN C) tablet 250 mg  250 mg oral BID AC Noé Nunez MD   250 mg at 10/12/21 0758   • aspirin chewable tablet 81 mg  81 mg oral BID Fito Michael MD   81 mg at 10/12/21 0758   • [Provider Managed Hold] atorvastatin (LIPITOR) tablet 40 mg  40 mg oral Daily (6p) Noé Nunez MD   40 mg at 10/08/21 1736   • bisacodyL (DULCOLAX) 10 mg suppository 10 mg  10 mg rectal Daily PRN Fito Michael MD       • busPIRone (BUSPAR) tablet 10 mg  10 mg oral With meals & nightly Fito Michael MD   10 mg at 10/12/21 0758   • camphor-methyl salicyl-menthoL (MUSCLE RUB) cream   Topical 2x daily PRN Gildardo Jacobsen DPM   Given at 10/11/21 0827   • cholecalciferol (vitamin D3) tablet 2,500 Units  2,500 Units oral Daily Kvng Francisco MD   2,500 Units at 10/12/21 0758   • enoxaparin (LOVENOX) syringe 40 mg  40 mg subcutaneous Daily (6p) Enrique  MD Noé   40 mg at 10/11/21 1806   • famotidine (PEPCID) tablet 20 mg  20 mg oral Nightly Fito Michael MD   20 mg at 10/11/21 2040   • ferrous sulfate tablet 325 mg  325 mg oral BID with meals Noé Nunez MD   325 mg at 10/12/21 0759   • fluticasone furoate-vilanteroL (BREO ELLIPTA) 200-25 mcg/dose powder for inhalation 1 puff  1 puff inhalation Daily Noé Nunez MD   1 puff at 10/12/21 0807   • furosemide (LASIX) tablet 40 mg  40 mg oral Daily (1p) Noé Nunez MD   40 mg at 10/11/21 1219   • guaiFENesin (MUCINEX) 12 hr ER tablet 600 mg  600 mg oral BID Noé Nunez MD   600 mg at 10/12/21 0758   • HYDROmorphone (DILAUDID) tablet 2 mg  2 mg oral q4h PRN Fito Michael MD   2 mg at 10/08/21 1422   • lamoTRIgine (LAMICTAL XR) extended release tablet 400 mg  400 mg oral Daily Fito Michael MD   400 mg at 10/12/21 0806   • lidocaine (ASPERCREME) 4 % topical patch 2 patch  2 patch Topical Daily Fito Michael MD   2 patch at 10/12/21 0800   • meloxicam (MOBIC) tablet 7.5 mg  7.5 mg oral Daily Fito Michael MD   7.5 mg at 10/12/21 0759   • montelukast (SINGULAIR) tablet 10 mg  10 mg oral Nightly Fito Michael MD   10 mg at 10/11/21 2041   • multivit-min-iron-folic-vit K1 (CENTRUM) chewable tablet 1 tablet  1 tablet oral Daily Fito Michael MD   1 tablet at 10/12/21 0758   • nortriptyline (PAMELOR) capsule 75 mg  75 mg oral Nightly Fito Michael MD   75 mg at 10/11/21 2040   • pantoprazole (PROTONIX) tablet,delayed release (DR/EC) 40 mg  40 mg oral Daily before breakfast Fito Michael MD   40 mg at 10/12/21 0759   • polyethylene glycol (MIRALAX) 17 gram packet 17 g  17 g oral Daily PRN Fito Michael MD       • rOPINIRole (REQUIP) tablet 0.5 mg  0.5 mg oral Daily with lunch Fito Michael MD   0.5 mg at 10/11/21 1219   • rOPINIRole (REQUIP) tablet 1 mg  1 mg oral Daily with dinner Fito Michael MD   1 mg at 10/11/21 1806   • rOPINIRole  (REQUIP) tablet 1 mg  1 mg oral Daily Fito Michael MD   1 mg at 10/12/21 0801   • rOPINIRole (REQUIP) tablet 2 mg  2 mg oral Nightly Fito Michael MD   2 mg at 10/11/21 2040   • sennosides-docusate sodium (SENOKOT-S) 8.6-50 mg per tablet 2 tablet  2 tablet oral BID Noé Nunez MD   2 tablet at 10/10/21 0852   • sertraline (ZOLOFT) tablet 300 mg  300 mg oral Daily Fito Michael MD   300 mg at 10/12/21 0758        Patient Active Problem List   Diagnosis   • Arthritis of knee   • Epilepsy (CMS/HCC)   • HTN (hypertension)   • HLD (hyperlipidemia)   • Hypocalcemia   • Depression   • History of total knee arthroplasty, bilateral   • Physical deconditioning   • Anemia   • Asthma   • Pulmonary edema           Assessment/Plan    Depression: CBT automatic anxious and negative thoughts  Adjustment Disorder to Disability: supportive therapy  Sleep:  Insight into illness: insight therapy/psychoeducation  Psychotherapy: supportive  Monitor: Mood, Speech, Appetite, Energy, Cognition, Behavioral, Impulsivity, Agitation  Family Support  Medications: monitor for side effects  - presently no gi, akathesia , ha or sedation  Hg 8.5  Sodium 140  Support atd  Promote insight  BuSpar 10 4 times a day  Zoloft 300  I discussed in detail with the patient and her  my concern about her current Zoloft dose and how it is likely contributing to her jumpiness and jitteriness though she feels this is related to her asthma medicines.  She was clear that she wants to stay on her current dose.  We agreed to do so for now but to continue to monitor for any potential side effects.  Sodium is okay and within normal limits.  She prefers not to lower the Zoloft.  CBT automatic anxious and negative thoughts  Monitor sleep and behavior - d/w nursing  No side effects from meds except possible increase in involuntary movement (not EPS) d/w pt and   CBT related to anticipatory anxiety about this position  Coyd Vaughn,  MD  10/12/2021

## 2021-10-12 NOTE — PATIENT CARE CONFERENCE
Inpatient Rehabilitation Team Conference Note  Date: 10/12/2021  Time: 9:56 AM       Patient Name: Fide Amin     Medical Record Number: [id number]   YOB: 1960  Sex: Female      Room/Bed: 114/114W  Payor Info: Payor: AET[employer] / Plan: [employer]ET[employer] POS / Product Type: Managed Care /     Admitting Diagnosis: Physical deconditioning [R53.81]   Admit Date/Time: 10/6/2021  4:52 PM  Admission Comments: No comment available     Primary Diagnosis: Physical deconditioning  Principal Problem: Physical deconditioning    Patient Active Problem List    Diagnosis Date Noted   • Anemia 10/08/2021   • Asthma 10/08/2021   • Pulmonary edema 10/08/2021   • Physical deconditioning 10/06/2021   • Depression 10/05/2021   • History of total knee arthroplasty, bilateral 10/05/2021   • Hypocalcemia 09/21/2021   • Arthritis of knee 09/20/2021   • Epilepsy (CMS/HCC) 09/20/2021   • HTN (hypertension) 09/20/2021   • HLD (hyperlipidemia) 09/20/2021       Premorbid Status:  Dominant Hand: right  Ambulation: independent  Transferring: independent  Toileting: independent  Bathing: independent  Dressing: independent  Eating: independent  Communication: understands/communicates without difficulty  Swallowing: swallows foods/liquids without difficulty  Baseline Diet/Method of Nutritional Intake: no diet restrictions  Assistive Device/Animal Currently Used at Home: commode, 3-in-1; raised toilet; walker, front-wheeled (RTS c handles)  Prior Level of Function Comment: Pt was d/c'd home for one day after surgery using RW.      Current Functional Status:  Mobility  Gait  [place], Gait: minimum assist (75% or more patient effort); verbal cues  Assistive Device: walker, front-wheeled  Comment (Gait/Stairs): PT manages O2 tank; 1st trail: 1 LO2 SpO2 87%, returns to 90 in 20 sec, 92% in 30 sec; 2nd trial: 1 LO2 SpO2 88% x 40 sec; 92% at 50 sec    Stairs  [place], Stairs: minimum assist (75% or more patient effort); verbal cues  Assistive  Device: railing  Handrail Location (Stairs): both sides (prefers lower rail height)  Number of Stairs: 8 (4 x 2)  Stair Height: 6 inches  Comment: O2 tank left at bottom steps 92% on 2 LO2, HR 90    Wheelchair  Comment, Wheelchair Mobility: Not a goal    Transfers  [place], Roll Left: close supervision; increased time to complete  [place], Roll Right: close supervision; increased time to complete  [place], Supine to Sit: minimum assist (75% or more patient effort)  [place], Sit to Supine: minimum assist (75% or more patient effort)  Assistive Device: none  Comment (Bed Mobility): S supine to sit EOB  Maintains Weight-Bearing Status (Transfers): able to maintain  [place], Bed to Chair: minimum assist (75% or more patient effort)  Verbal Cues: safety; technique  Assistive Device: walker, front-wheeled  Comment: Deferred d/t orthostatic BP  [place], Chair to Bed: minimum assist (75% or more patient effort)  Verbal Cues: technique; safety  Assistive Device: walker, front-wheeled  Comment: SPTx from w/c to bed without use of RW  [place], Sit to Stand Transfer: touching/steadying assist; verbal cues  Verbal Cues: safety; preparatory posture; hand placement  Assistive Device: walker, front-wheeled  Comment: PT manages O2 tank  [place], Stand to Sit Transfer: touching/steadying assist; verbal cues  Verbal Cues: safety; preparatory posture; hand placement  Assistive Device: walker, front-wheeled  Comment: PT manages O2 tank  [place], Stand Pivot/Stand Step Transfer: minimum assist (75% or more patient effort); verbal cues  Verbal Cues: proper use of assistive device; safety; preparatory posture  Assistive Device: walker, front-wheeled  Comment: PT manages O2 tank    Transfer Technique: stand pivot  [place], Toilet Transfer: close supervision  Verbal Cues: safety  Assistive Device: bariatric; commode, 3-in-1; walker, front-wheeled  Comment: OT assisting c managing O2  tank.  Transfer Technique: stand pivot  [place], Shower Transfer: close supervision  Verbal Cues: safety  Assistive Device: grab bars/tub rail; shower chair; walker, front-wheeled  Comment: OT assisting c managing O2 tank.      Self Care  Self-Performance: obtains clothes; threads left arm, shirt; threads right arm, shirt; pulls shirt over head/around back; pulls shirt down/adjusts  Adaptive Equipment: none  Comment: Pt performs clothing retrieval c RW and S.  Tasks: doff; don; socks  Self-Performance: obtains clothes; threads left leg, underpants; threads right leg, underpants; pulls underpants up or down; threads right leg, pants/shorts; threads left leg, pants/shorts; pulls pants/shorts up or down; dons/doffs left sock; dons/doffs right sock; dons/doffs right shoe; dons/doffs left shoe  [place] Assistance: obtains clothes; pulls underpants up or down; threads left leg, pants/shorts; threads right leg, pants/shorts; pulls pants/shorts up or down; dons/doffs left sock; dons/doffs right sock  Adaptive Equipment: dressing stick; long-handled shoe horn; reacher; sock aid  [place]: close supervision  Comment: Pt performs clothing retrieval c RW. Pt educated on use of AE for increased safety and energy conservation to prevent bending. Pt utilizing c S. Cl S for clothing retrieval and standing clothing management.  Self-Performance: chest; left arm; abdomen; right arm; front perineal area; buttocks; left upper leg; right upper leg; left lower leg, including foot; right lower leg, including foot  Adaptive Equipment: grab bar/tub rail; hand-held shower spray hose; long-handled sponge; shower chair  Setup Assistance: adaptive equipment setup; obtain supplies  Comment: Pt performs bathing seted on shower chair c back. OT issues long handled sponge and educating pt on use for increased safety c bathing distal legs/feet. OT educating on use of towel around long handled sponge to assist c drying distal legs/feet. SpO2  90-93% on 2 L  [place]: close supervision  Adaptive Equipment: commode, 3-in-1  Setup Assistance: obtain supplies  Comment: Cl S for clothing management. Pt continent of bladder and performs hygiene independently.  Self-Performance: washes, rinses and dries hands  [place] Assistance: washes, rinses and dries hands; brushes/jacobs hair; oral care (brushing teeth, cleaning dentures); applies deodorant  Adaptive Equipment: none  Setup Assistance: obtain supplies  [place]: supervision  Comment: Standing at sink. SpO2 90-93% on 2L.    Cognition  Affect/Mental Status (Cognition): WFL  Orientation Status (Cognition): oriented x 3  Follows Commands (Cognition): follows multi-step commands  Cognitive Function: memory deficit  Comment, Short Term Memory: 3/3 immediate and 3/3 delayed recall c category clue required to ID 1/3 words. 14/15 BIMS.  Comment, Cognition: pleasant/cooperaive/receptive. Req'd cues for safety during fxnl mobility    Communication       Frequency of Treatment (OT): 5-7 times per week, 60-90 minutes per day  Frequency of Treatment (PT): 5-7 times per week, 60-90 minutes per day    Weekly Outcome Summaries:  Weekly Progress Summary (PT)  Progress Toward Functional Goals (PT): progressing toward functional goals as expected  Weekly Outcome Statement: Pt is a 60 y.o. female who presented to [facility] on 9/20/21 for planned B TKA. Post-op CT of chest completed 9/23/21 w/ no CT evidence of pulmonary embolism. Scattered bilateral airspace opacities noted and believed to be most likely representing multifocal pneumonia. Covid 19 pneumonia was excluded. She was cleared for d/c home on ABX instructions for f/u care. However, pt returned to [facility] 9/24/21 for increased shortness of breath and diagnosed w/ aspiration PNA. She was Intubated on 9/25/21 and extubated on 10/1/21. She admitted to [facility] 10/6/21 and currently presents w/ grossly Min A/touching assist for sit-stand SPT and amb  skills but presents w/ increased anxiousness and impulsivity of speed w/ mobility, as well as consistent cueing for safety strategies. She remains on 2 LO2 via NC and requires staff member to manage O2 tank w/ mobility. She recently began weaning trials from 2 LO2 to 1 LO2 w/ mobility but does desaturate into mid-high 80’s on 1 LO2, and requires ~ 1 minute to return to >/= 92% on 1 LO2. She completes 8 steps w/ Min A and B rails w/ O2 tank remaining at bottom of steps, but trials have only been conducted on 2 LO2 d/t increased energy demands of elevations and current performance on 1 LO2 w/ mobility. Her knee strength and ROM are progressing well and she demo ~ 95 degrees flex Bilat, and pain levels typically 2/10. It is recommended that her  attend FT prior to discharge to ensure safety w/ transitions to home environment.  Barriers to Overall Progress (PT): anxiousness, decreased safety awareness and carry-over, supplimental O2, deconditioning  Impairments Continuing to Limit Function: decreased strength, impaired balance, impaired safety awareness, other (see comments) (decreased SpO2 levels w/ mobility; anxiousness)  Recommendations: Cont skilled inpatient Rehab 60-90  min/day, 5-7 days per week to address current functional and endurance deficits in order to achieve established goals of Mod I w/ RW for transfers, amb and safety w/ approp levels of supplimentat O2  Weekly Progress Summary (OT)  Progress Toward Functional Goals (OT): progressing toward functional goals as expected  Weekly Outcome Statement: Pt completes toilet and shower transfer ambulatory level c RW c Cl S. Pt currently S for grooming in stance, Cl S UB/LB dressing including ambulatory level clothing retrieval. Pt completes toileting c Cl S and bathing c S/set-up. Pt benefiting from education/training on energy conservation techniques and use of AE. Pt maintaining SpO2 90-93% on 2 L during ADL.  Impairments Continuing to Limit Function:  decreased strength, impaired balance (activity tolerance)  Recommendations: Cont skilled OT to maximize I and safety c BADLs and functional transfers.  Weekly Outcome Summary (Interdisciplinary)  Weekly Progress Summary: progressing toward goals as expected  Weekly Outcome Statement: Seen with her .  She is distressed and tearful because she wants to go home.  She reports that  In order to discharge she needs oxygen set up and  is concerned this will lengthen her stay.  Her  supports her in her desire to go home.  Discuss deconditioning and she feels she is at the level she was before she returned to acute care.  She  believes she will be able to do more at home. Discuss team process and provide support. Will continue to follow.    Problem Resolution:  IRF Key Information  Additional Info Related to Length of Stay and Coverage: nrd 10/12 Self-Care Promotion: independence encouraged  Identified Problems & Impairments: household mobility impairment, self care performance impairment, medical instability (10/12/21 0946)  Comment, Identified Problems & Impairments: (not recorded)  Resolved Problems and Impairments: (not recorded)  Comment, Resolved Problems & Impairments: (not recorded) Team Weekly Outcome Statement: reg diet, O2 1 liter, lovenox/asa, cont b&b, tylenol/lidocaine patches, reg labs this am, skin intact (10/12/21 0946)Facilitators for Discharge: family support  Comment, Facilitators for Discharge: pt wants to leave asap-homesick       Goals/Support System:  Patient/Family Goals  Patient's Goals For Discharge: take care of myself at home  Family/Support System  Caregiver Engagement: takes an active role with patient  Family/Support System Care: self-care encouraged    IRF PT Goals      Most Recent Value   Bed Mobility Goal 1    Activity/Assistive Device bed mobility activities, all,  no assistive device at 10/07/2021 1003   [place] modified independence at 10/07/2021 1003   Time Frame 1  week at 10/07/2021 1003   Strategies/Barriers anxiousness, decreased safety awareness and carry-over, supplimental O2 at 10/11/2021 1754   Progress/Outcome good progress toward goal,  goal partially met at 10/11/2021 1754   Transfer Goal 1    Activity/Assistive Device sit-to-stand/stand-to-sit,  stand pivot,  car transfer,  walker, front-wheeled at 10/07/2021 1003   [place] supervision required,  set-up required at 10/07/2021 1003   Time Frame 1 week at 10/07/2021 1003   Strategies/Barriers anxiousness, decreased safety awareness and carry-over, supplimental O2, deconditioning at 10/11/2021 1754   Progress/Outcome continuing progress toward goal,  goal ongoing at 10/11/2021 1754   Transfer Goal 2    Activity/Assistive Device sit-to-stand/stand-to-sit,  stand pivot,  car transfer,  walker, front-wheeled at 10/07/2021 1003   [place] modified independence at 10/07/2021 1003   Time Frame 14 days or less at 10/07/2021 1003   Strategies/Barriers anxiousness, decreased safety awareness and carry-over, supplimental O2, deconditioning at 10/11/2021 1754   Progress/Outcome goal ongoing at 10/11/2021 1754   Gait/Walking Locomotion Goal 1    Activity/Assistive Device assistive device use,  backward stepping,  decrease fall risk,  diminish gait deviation,  forward stepping,  improve balance and speed,  increase endurance/gait distance,  increase energy conservation,  normalize weight shifts,  turning, left,  turning, right,  walker, front-wheeled at 10/07/2021 1003   Distance 150 feet at 10/07/2021 1003   [place] supervision required at 10/07/2021 1003   Time Frame 1 week at 10/07/2021 1003   Strategies/Barriers anxiousness, decreased safety awareness and carry-over, supplimental O2, deconditioning at 10/11/2021 1754   Progress/Outcome continuing progress toward goal,  goal ongoing at 10/11/2021 1754   Gait/Walking Locomotion Goal 2    Activity/Assistive Device gait (walking locomotion),  assistive device use,   backward stepping,  decrease fall risk,  diminish gait deviation,  forward stepping,  improve balance and speed,  increase endurance/gait distance,  increase energy conservation,  normalize weight shifts,  sidestepping,  turning, left,  turning, right,  walker, front-wheeled at 10/07/2021 1003   Distance 300 feet at 10/07/2021 1003   [place] modified independence at 10/07/2021 1003   Time Frame 14 days or less at 10/07/2021 1003   Strategies/Barriers anxiousness, decreased safety awareness and carry-over, supplimental O2, deconditioning at 10/11/2021 1754   Progress/Outcome goal ongoing at 10/11/2021 1754   Stairs Goal 1    Activity/Assistive Device ascending stairs,  descending stairs,  step-to-step,  using handrail, right,  using handrail, left,  decrease fall risk,  improve balance and speed at 10/07/2021 1003   Number of Stairs 8 at 10/07/2021 1003   [place] supervision required at 10/07/2021 1003   Time Frame 1 week at 10/07/2021 1003   Strategies/Barriers anxiousness, decreased safety awareness and carry-over, supplimental O2 at 10/11/2021 1754   Progress/Outcome good progress toward goal,  goal partially met,  goal ongoing at 10/11/2021 1754   Stairs Goal 2    Activity/Assistive Device using handrail, left,  using handrail, right,  step-to-step,  decrease fall risk,  ascending stairs,  descending stairs,  improve balance and speed,  no assistive device at 10/07/2021 1003   Number of Stairs 14 at 10/07/2021 1003   [place] modified independence at 10/07/2021 1003   Time Frame 14 days or less at 10/07/2021 1003   Strategies/Barriers anxiousness, decreased safety awareness and carry-over, supplimental O2, deconditioning at 10/11/2021 1754   Progress/Outcome goal ongoing at 10/11/2021 1754   ROM Goal 1    ROM Goal 1 B knee ROM 0-90 at 10/07/2021 1003   Time Frame 14 days or less at 10/07/2021 1003   Strategies/Barriers anxiousness, decreased safety awareness and carry-over, supplimental O2 at  10/11/2021 1754   Progress/Outcome good progress toward goal,  goal ongoing at 10/11/2021 1754   ROM Goal 2    ROM Goal 2 B ankle DF ROM +10 degrees at 10/07/2021 1003   Time Frame 14 days or less at 10/07/2021 1003   Caregiver Training Goal 1    Caregiver Training Goal 1 Pt's  safe and I providing any necessary supervision/Assist with mobility tasks at 10/07/2021 1003   Time Frame 14 days or less at 10/07/2021 1003   Problem Specific Goal 1    Problem-Specific Goal 1 Pt weaned from supplemental O2 and tolerating > 92% with activity. at 10/07/2021 1003   Time Frame 14 days or less at 10/07/2021 1003   Strategies/Barriers anxiousness, decreased safety awareness and carry-over, supplimental O2 at 10/11/2021 1754   Progress/Outcome continuing progress toward goal,  goal ongoing at 10/11/2021 1754        IRF OT Goals      Most Recent Value   Transfer Goal 1    Activity/Assistive Device toilet at 10/07/2021 0740   [place] supervision required at 10/07/2021 0740   Time Frame short-term goal (STG),  1 week at 10/11/2021 1511   Progress/Outcome goal ongoing at 10/11/2021 1511   Transfer Goal 2    Activity/Assistive Device toilet at 10/07/2021 0740   [place] modified independence at 10/07/2021 0740   Time Frame long-term goal (LTG),  14 days or less at 10/07/2021 0740   Progress/Outcome goal ongoing at 10/11/2021 1511   Transfer Goal 3    Activity/Assistive Device shower at 10/07/2021 0740   [place] supervision required at 10/07/2021 0740   Time Frame short-term goal (STG),  1 week at 10/11/2021 1511   Progress/Outcome goal ongoing at 10/11/2021 1511   Transfer Goal 4    Activity/Assistive Device shower at 10/07/2021 0740   [place] modified independence at 10/07/2021 0740   Time Frame long-term goal (LTG),  14 days or less at 10/07/2021 0740   Progress/Outcome goal ongoing at 10/11/2021 1511   Bathing Goal 1    Activity/Assistive Device bathing skills, all at 10/11/2021 1511   [place] set-up  required at 10/11/2021 1511   Time Frame short-term goal (STG),  3 - 5 days at 10/11/2021 1511   Progress/Outcome goal met,  goal revised this date at 10/11/2021 1511   Bathing Goal 2    Activity/Assistive Device bathing skills, all at 10/07/2021 0740   [place] set-up required at 10/07/2021 0740   Time Frame long-term goal (LTG),  14 days or less at 10/07/2021 0740   Progress/Outcome goal ongoing at 10/11/2021 1511   UB Dressing Goal 1    Activity/Assistive Device upper body dressing at 10/07/2021 0740   [place] supervision required at 10/11/2021 1511   Time Frame short-term goal (STG),  3 - 5 days at 10/11/2021 1511   Strategies/Barriers c clothing retrieval at 10/07/2021 0740   Progress/Outcome goal met,  goal revised this date at 10/11/2021 1511   UB Dressing Goal 2    [place] modified independence at 10/07/2021 0740   Time Frame long-term goal (LTG),  14 days or less at 10/07/2021 0740   Progress/Outcome goal ongoing at 10/11/2021 1511   LB Dressing Goal 1    [place] supervision required at 10/11/2021 1511   Time Frame short-term goal (STG),  3 - 5 days at 10/11/2021 1511   Strategies/Barriers c AE at 10/07/2021 0740   Progress/Outcome goal met,  goal revised this date at 10/11/2021 1511   LB Dressing Goal 2    [place] modified independence at 10/07/2021 0740   Time Frame long-term goal (LTG),  14 days or less at 10/07/2021 0740   Progress/Outcome goal ongoing at 10/11/2021 1511   Grooming Goal 1    [place] supervision required at 10/07/2021 0740   Time Frame short-term goal (STG),  1 week at 10/07/2021 0740   Strategies/Barriers standing at 10/07/2021 0740   Progress/Outcome goal met at 10/11/2021 1511   Grooming Goal 2    [place] modified independence at 10/07/2021 0740   Time Frame long-term goal (LTG),  14 days or less at 10/07/2021 0740   Progress/Outcome goal ongoing at 10/11/2021 1511   Toileting Goal 1    [place] minimum assist (75% or more patient effort) at  10/07/2021 0740   Time Frame short-term goal (STG),  1 week at 10/07/2021 0740   Progress/Outcome goal met at 10/11/2021 1511   Toileting Goal 2    [place] modified independence at 10/07/2021 0740   Time Frame long-term goal (LTG),  14 days or less at 10/07/2021 0740   Progress/Outcome goal ongoing at 10/11/2021 1511          Risk for Complications  Falls: Moderate      The patient's medical prognosis is fair to achieve the stated goals below.    Expected Level of Function  Expected Functional Improvement: cognitive function; mobility; safety; self-care  Self-Care: Supervision or touching assistance  [place] Control: Supervision or touching assistance  Transfers: Supervision or touching assistance  Locomotion: Supervision or touching assistance  Communication: Independent  Social Cognition: Independent  Comment, Expected Level of Function at Discharge: supervision       Discharge Planning:  Anticipated Discharge Disposition: home with home health  Type of Home Care Services: home OT; home PT; nursing    Equipment/Device Needs at Discharge  Assistive Device/Animal Currently Used at Home: commode, 3-in-1, raised toilet, walker, front-wheeled (RTS c handles)  Discharge Planning  Does the patient need discharge transport arranged?: No  Additional Info Related to Length of Stay and Coverage: nrd 10/12    Anticipated Discharge Date: 10/16/2021    Needs Identified:         Team Members Present     Rehab Attending Present:  Fito Michael MD    Care Coordinator Present:  Cassandra Siddiqi LSW    Nurse Present:  Lani Guadalupe RN    OT Present:  Vangie Cordova OT    PT Present:  Robert Zelaya PT    Psychologist Present:  Fide Brower PSY.D        Next Team Conference Date: 10/12/21

## 2021-10-12 NOTE — PROGRESS NOTES
Patient: Fide Amin  Location: [facility] [address]  MRN: [id number]  Today's date: 10/12/2021    History of Present Illness  Fide is a 60 y.o. female admitted on 10/6/2021 with Physical deconditioning [R53.81]. Principal problem is Physical deconditioning.   Mrs. Fide Amin is a 60 year old female who presented to [facility] on 9/20/21 for planned B TKA by Dr. Em. Pt suffered a seizure post op after 8 years of having no seizures. CT of chest completed 9/23 results reviewed with no CT evidence of pulmonary embolism.  Scattered bilateral airspace opacities noted and believed to be most likely representing multifocal pneumonia.  Covid 19 pneumonia was excluded. Patient was medically cleared for discharge 9/23/21 on ceftin 500mg BID and zithromax 500mg daily x 5 days and with instructions for f/u care.  Pt. Presented to [facility] on 9/24 for SOB and dx with aspiration PNA. Pt. Intubated on 9/25/21 and extubated on 10/1/21. Pt is now medically stable and admitted to [facility] for acute inpatient rehab.      Past Medical History  Fide has a past medical history of Arthritis, Asthma, Depression, Deviated septum, Epilepsy (CMS/HCC), GERD (gastroesophageal reflux disease), Hypertension, Lipid disorder, Motion sickness, and RLS (restless legs syndrome).      OT Vitals    Date/Time Pulse SpO2 O2 Flow Rate BP BP Location BP Method Pt Position [facility]   10/12/21 1035 90 97 % 1 L/min 153/70 Right upper arm Automatic Sitting JV      OT Pain    Date/Time Pain Type Side/Orientation Location Rating: Rest Rating: Activity Description Interventions [facility]   10/12/21 1035 Pain Assessment -- knee 2 - mild pain -- sore premedicated for activity JV   10/12/21 1100 Pain Reassessment left knee -- 4 - moderate pain -- premedicated for activity JV          Prior Living Environment      Most Recent Value   People in Home spouse  [4 kids]   Current Living Arrangements home   Home Accessibility stairs to enter home (Group),   "stairs within home (Group)  [2+1 STEPHANY, ff inside, IA 1st floor]   Living Environment Comment Pt lives in 2 . Pt reports two small steps to enter and FF to 2nd floor. Pt reports plans to have 1st floor set-up. Pt has guest room on 1st floor c 1/2 bath. Pt reports purchased commode and 2 RTS c handles.   Number of Stairs, Main Entrance 2   Surface of Stairs, Main Entrance concrete   Stair Railings, Main Entrance none   Location, Patient Bedroom second floor, must negotiate stairs to access   Patient Bedroom Access Comment Ability to have first floor set up   Location, Bathroom first (main) floor,  second floor, must negotiate stairs to access   Bathroom Access Comment 2nd floor FB c shower stall. (+) grab bar in shower stall. Pt reports has built in shower bench in stall.   Number of Stairs, Within Home, Primary other (see comments)  [7+7]   Surface of Stairs, Within Home, Primary hardwood   Stair Railings, Within Home, Primary railings on both sides of stairs   Landing, Stairs, Within Home, Primary railings present          Prior Level of Function      Most Recent Value   Dominant Hand right   Ambulation independent   Transferring independent   Toileting independent   Bathing independent   Dressing independent   Prior Level of Function Comment Pt was d/c'd home for one day after surgery using RW.   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   Occupational History/Life Experiences Pt lives at home in [facility] c  ([place]). Pt reports  also retired. Pt is a . Pt has epilepsy and hx of last seizure 2011 then had a seizure after BKA surgery 9/2021.   Patient Goals \"To get well enough to be able to go home and get PT there\"           IRF OT Evaluation and Treatment - " "10/12/21 1048        OT Time Calculation    Start Time 1030     Stop Time 1100     Time Calculation (min) 30 min        Session Details    Document Type daily treatment/progress note     Mode of Treatment occupational therapy; individual therapy        General Information    General Observations of Patient Pt pleasant and agreeable to therapy        Sit to Stand Transfer    Jones, Sit to Stand Transfer close supervision     Verbal Cues safety     Assistive Device walker, front-wheeled        Stand Pivot Transfer    Jones, Stand Pivot/Stand Step Transfer close supervision     Verbal Cues safety     Assistive Device walker, front-wheeled     Comment Ambulatory approach        Safety Issues, Functional Mobility    Comment, Safety Issues/Impairments (Mobility) Cl S ambulating c RW for item retrieval activity. OT managing O2 tank. Pt educated on safe item retrieval using recommended AE of reacher and RW bag. Pt completes item retrieval activity x 6 items c good carryover of education. Touching A ambulating c RW while managing O2 tank short distances.        Balance    Comment, Balance S standing balance c unilateral support at anterior table. Weight shifting L/R while engaged in retrieving and moving targets along double arm arc. No LOB.        Motor Skills    Functional Endurance UE functional endurance training c completion of double arm arc. Pt completes x 2 sets each UE s rest break required. Pt reports no fatigue.        Therapeutic Interventions    Therapeutic Interventions Therapeutic Standing Program (Group)     Comment, Therapeutic Intervention Pt educated on energy conservation techniques in relation to ADLs/IADLs. Issued handout reviewing 4 \"P's\" of energy conservation. Pt reports understanding.        Therapeutic Standing Program    Comments S standing tolerance at anterior table while engaged in double arm arc. Pt tolerating standing x 6 mins s rest break required. Maintains SpO2 >93% on 1 L.  "       Daily Progress Summary (OT)    Daily Outcome Statement Pt tolerating 1L of supplemental oxygen maintaining SpO2 >90% throughout interventions. Pt benefiting from education/training on safe item retrieval and demo's good carryover of techniques. Recommend ongoing training on IADL process adaptation.                           IRF OT Goals      Most Recent Value   Transfer Goal 1    Activity/Assistive Device toilet at 10/07/2021 0740   [place] supervision required at 10/07/2021 0740   Time Frame short-term goal (STG),  1 week at 10/11/2021 1511   Progress/Outcome goal ongoing at 10/11/2021 1511   Transfer Goal 2    Activity/Assistive Device toilet at 10/07/2021 0740   [place] modified independence at 10/07/2021 0740   Time Frame long-term goal (LTG),  14 days or less at 10/07/2021 0740   Progress/Outcome goal ongoing at 10/11/2021 1511   Transfer Goal 3    Activity/Assistive Device shower at 10/07/2021 0740   [place] supervision required at 10/07/2021 0740   Time Frame short-term goal (STG),  1 week at 10/11/2021 1511   Progress/Outcome goal ongoing at 10/11/2021 1511   Transfer Goal 4    Activity/Assistive Device shower at 10/07/2021 0740   [place] modified independence at 10/07/2021 0740   Time Frame long-term goal (LTG),  14 days or less at 10/07/2021 0740   Progress/Outcome goal ongoing at 10/11/2021 1511   Bathing Goal 1    Activity/Assistive Device bathing skills, all at 10/11/2021 1511   [place] set-up required at 10/11/2021 1511   Time Frame short-term goal (STG),  3 - 5 days at 10/11/2021 1511   Progress/Outcome goal met,  goal revised this date at 10/11/2021 1511   Bathing Goal 2    Activity/Assistive Device bathing skills, all at 10/07/2021 0740   [place] set-up required at 10/07/2021 0740   Time Frame long-term goal (LTG),  14 days or less at 10/07/2021 0740   Progress/Outcome goal ongoing at 10/11/2021 1511   UB Dressing Goal 1    Activity/Assistive Device upper body  dressing at 10/07/2021 0740   [place] supervision required at 10/11/2021 1511   Time Frame short-term goal (STG),  3 - 5 days at 10/11/2021 1511   Strategies/Barriers c clothing retrieval at 10/07/2021 0740   Progress/Outcome goal met,  goal revised this date at 10/11/2021 1511   UB Dressing Goal 2    [place] modified independence at 10/07/2021 0740   Time Frame long-term goal (LTG),  14 days or less at 10/07/2021 0740   Progress/Outcome goal ongoing at 10/11/2021 1511   LB Dressing Goal 1    [place] supervision required at 10/11/2021 1511   Time Frame short-term goal (STG),  3 - 5 days at 10/11/2021 1511   Strategies/Barriers c AE at 10/07/2021 0740   Progress/Outcome goal met,  goal revised this date at 10/11/2021 1511   LB Dressing Goal 2    [place] modified independence at 10/07/2021 0740   Time Frame long-term goal (LTG),  14 days or less at 10/07/2021 0740   Progress/Outcome goal ongoing at 10/11/2021 1511   Grooming Goal 1    [place] supervision required at 10/07/2021 0740   Time Frame short-term goal (STG),  1 week at 10/07/2021 0740   Strategies/Barriers standing at 10/07/2021 0740   Progress/Outcome goal met at 10/11/2021 1511   Grooming Goal 2    [place] modified independence at 10/07/2021 0740   Time Frame long-term goal (LTG),  14 days or less at 10/07/2021 0740   Progress/Outcome goal ongoing at 10/11/2021 1511   Toileting Goal 1    [place] minimum assist (75% or more patient effort) at 10/07/2021 0740   Time Frame short-term goal (STG),  1 week at 10/07/2021 0740   Progress/Outcome goal met at 10/11/2021 1511   Toileting Goal 2    [place] modified independence at 10/07/2021 0740   Time Frame long-term goal (LTG),  14 days or less at 10/07/2021 0740   Progress/Outcome goal ongoing at 10/11/2021 1511

## 2021-10-12 NOTE — PROGRESS NOTES
Cardiology Progress Note    Subjective:  - No CV complaints    Medical History:   Past Medical History:   Diagnosis Date    Arthritis     OA    Asthma     Depression     Deviated septum     Epilepsy (CMS/HCC)     Last seizure 8 years ago    GERD (gastroesophageal reflux disease)     Hypertension     Lipid disorder     Motion sickness     RLS (restless legs syndrome)      Surgical History:   Past Surgical History:   Procedure Laterality Date     SECTION      x1    SINUS SURGERY       Social History:   Social History     Social History Narrative    Lives with , independent in everything, drives but now won't be able  to drive for 6th months, retired teacher, 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  Has 1 daughter and 4 step children.  Daughter has a 1 year old son and stepson is expecting twin girls.     Family History:   No family history on file.  Allergies: Penicillins and Sulfa (sulfonamide antibiotics)    ROS:  Negative except those listed in HPI and A&P     Current Facility-Administered Medications   Medication Dose Route Frequency    acetaminophen  650 mg oral With meals & nightly    acetaminophen  650 mg oral q4h PRN    acetaminophen  650 mg oral q4h PRN    albuterol HFA  2 puff inhalation q4h PRN    albuterol HFA  2 puff inhalation QID (6a, 10a, 2p, 6p)    alum-mag hydroxide-simeth  30 mL oral q4h PRN    ascorbic acid  250 mg oral BID AC    aspirin  81 mg oral BID    [Provider Managed Hold] atorvastatin  40 mg oral Daily (6p)    bisacodyL  10 mg rectal Daily PRN    busPIRone  10 mg oral With meals & nightly    camphor-methyl salicyl-menthoL   Topical 2x daily PRN    cholecalciferol (vitamin D3)  2,500 Units oral Daily    enoxaparin  40 mg subcutaneous Daily (6p)    famotidine  20 mg oral Nightly    ferrous sulfate  325 mg oral BID with meals    fluticasone furoate-vilanteroL  1 puff inhalation Daily    furosemide  40 mg  oral Daily (1p)    guaiFENesin  600 mg oral BID    HYDROmorphone  2 mg oral q4h PRN    lamoTRIgine  400 mg oral Daily    lidocaine  2 patch Topical Daily    meloxicam  7.5 mg oral Daily    montelukast  10 mg oral Nightly    multivit-min-iron-folic-vit K1  1 tablet oral Daily    nortriptyline  75 mg oral Nightly    pantoprazole  40 mg oral Daily before breakfast    polyethylene glycol  17 g oral Daily PRN    rOPINIRole  0.5 mg oral Daily with lunch    rOPINIRole  1 mg oral Daily with dinner    rOPINIRole  1 mg oral Daily    rOPINIRole  2 mg oral Nightly    sennosides-docusate sodium  2 tablet oral BID    sertraline  300 mg oral Daily      Physical Exam:  Constitutional: Appears well-developed and well-nourished. No distress.    Cardiovascular: RRR, no murmur noted   Pulmonary/Chest: CTA w/ dec bs at bases  Abdominal: Soft. Bowel sounds are normal. No bruits  Musculoskeletal: Trace b/l LE edema, weak pulses. S/p bilateral TKA, inc MALIA  Neurological: AAOx3    VS:  Patient Vitals for the past 72 hrs:   BP Temp Temp src Pulse Resp SpO2   10/12/21 1035 (!) 153/70 -- -- 90 -- 97 %   10/12/21 1026 -- -- -- -- -- 96 %   10/12/21 1019 -- -- -- -- -- (!) 90 %   10/12/21 1008 (!) 159/71 -- -- 89 -- 95 %   10/12/21 0641 137/65 36.7 °C (98.1 °F) Oral 82 18 95 %   10/11/21 2000 135/60 36.7 °C (98.1 °F) Oral -- 18 95 %   10/11/21 1616 (!) 108/55 37.1 °C (98.8 °F) Oral 85 19 100 %   10/11/21 1437 (!) 119/54 -- -- 88 -- 94 %   10/11/21 1057 -- -- -- 88 -- (!) 83 %   10/11/21 1027 -- -- -- 90 -- 94 %   10/11/21 1024 -- -- -- 94 -- (!) 85 %   10/11/21 1006 (!) 122/59 -- -- 83 -- 95 %   10/11/21 0734 140/63 -- -- 86 -- --   10/11/21 0706 140/63 36.9 °C (98.5 °F) Oral 86 18 95 %   10/10/21 2300 -- -- -- 82 18 94 %   10/10/21 1948 (!) 123/55 37 °C (98.6 °F) Oral 83 20 94 %   10/10/21 1600 (!) 109/56 36.9 °C (98.4 °F) -- 85 18 (!) 85 %   10/10/21 1156 118/60 -- -- -- -- 94 %   10/10/21 1120 -- -- -- -- -- (!) 85 %   10/10/21 1110 (!)  122/56 -- -- -- -- 96 %   10/10/21 0930 -- -- -- -- -- 94 %   10/10/21 0924 -- -- -- -- -- 95 %   10/10/21 0923 -- -- -- -- -- (!) 90 %   10/10/21 0920 -- -- -- -- -- (!) 85 %   10/10/21 0651 128/60 37.3 °C (99.2 °F) Oral 87 20 93 %   10/09/21 1900 (!) 114/55 36.9 °C (98.4 °F) Oral -- 18 94 %   10/09/21 1706 (!) 109/53 36.9 °C (98.4 °F) Oral 82 20 93 %     Labs:  Recent labs reviewed  Results from last 7 days   Lab Units 10/12/21  0553 10/08/21  0619   WBC K/uL 5.82 8.71   HEMOGLOBIN g/dL 7.9* 7.7*   HEMATOCRIT % 25.5* 24.7*   PLATELETS K/uL 693* 643*   SODIUM mEQ/L 140 137   POTASSIUM mEQ/L 4.4 4.5   CHLORIDE mEQ/L 102 101   CO2 mEQ/L 29 27   GLUCOSE mg/dL 90 86   BUN mg/dL 8 7*   CREATININE mg/dL 0.5* 0.5*   CALCIUM mg/dL 8.3* 8.0*  8.0*   ANION GAP mEQ/L 9 9   AST IU/L 44* 87*   ALT IU/L 47 83*   ALBUMIN g/dL 2.6* 2.6*   EGFR mL/min/1.73m*2 >60.0 >60.0   MAGNESIUM mg/dL 2.1 2.0   TSH mIU/L 1.79  --    BNP pg/mL 145* 183*     Component      Latest Ref Rng & Units 10/12/2021   Triglycerides      30 - 149 mg/dL 101   Cholesterol      <=200 mg/dL 132   HDL      >=55 mg/dL 33 (L)   LDL Calculated      <=100 mg/dL 79       No intake or output data in the 24 hours ending 10/12/21 1054     Tests:  EKG 09/20/21:  Sinus tachycardia 103 BPM  Left anterior fascicular block  Poor R wave progression is more prominent than that that is seen with LAHB alone  Possible Lateral infarct, age undetermined  MI interval 154 ms  QRS duration 86 ms  QT/QTc 356/466 ms  P-R-T axes 50 -57 46    EKG 09/20/21:  Sinus [id number] BPM  RSR' or QR pattern in V1 suggests right ventricular conduction delay  Left anterior fascicular block  Possible Lateral infarct  MI interval 156 ms  QRS duration 90 ms  QT/QTc 366/477 ms  P-R-T axes 46 -57 59    EKG 10/08/21:  Sinus rhythm 90 BPM  Cannot rule out Anterior infarct, age undetermined  MI interval 120 ms  QRS duration 76 ms  QT/QTcB 384/469 ms  P-R-T axes 55 -24 39    US venous b/l LE  10/07/21:  Study negative for deep vein thrombosis    TTE 10/08/21:  pending    Current CV Medications:    aspirin chewable tablet 81 mg, 81 mg, oral, BID    atorvastatin (LIPITOR) tablet 40 mg, 40 mg, oral, Daily->hold 10/09    enoxaparin (LOVENOX) syringe 40 mg, 40 mg, subcutaneous, Daily    furosemide (LASIX) tablet 40 mg, 40 mg, oral, Daily    Assessment and Plan:  Fide Amin is a 60 y.o. female w/ a PMH of HTN, HLD, Anxiety/Depression Disorder, RLS (w/ intermittent clonic spasms), Epilepsy (last episodes 2011), Anemia, OA, Arthritis, GERD, Asthma and DJD who presented to [facility] on 9/20/21 and underwent an elective b/l TKA. Post-op had a transient seizure episode, resolved by its self, but further complicated by suspected aspiration. CT chest on 9/23 no evidence of pulmonary embolism, scattered bilateral airspace opacities, believed to be most likely representing multifocal pneumonia. Covid 19 pneumonia was excluded. Patient was medically cleared for discahrge on ceftin 500mg BID and zithromax 500mg daily x 5 days and with instructions for f/u care. After discharge from [facility], she presented and was admitted to [facility] on 9/24 for dyspnea, diagnosed with aspiration PNA with acute respiratory failure. Pt was intubated and on ventilation support until 10/1/21. Completed 7-day course of ABX, MRSA negative, BCX NGTD, provided bronchodilator and steroid therapy, CXR with persistent bibasilar opacities, will need follow-up imaging in 4 to 6 weeks. Transferred to [facility] on 10/06/21 for acute inpatient rehab. Cardiology consulted at [facility] to eval for CHF.    1) Surgery:  - S/p bilat TKA on 9/20  - Per IM    2) PNA:  - Post-op had a transient seizure episode, complicated by suspected aspiration.  - CT chest showed no evidence of PE showed scattered bilateral airspace opacities most likely representing multifocal pneumonia COVID-19 should be ruled out. Repeat COVID-19 test was negative    3) Questionable CHF:  - TTE ordered by IM  and pending   - [facility] E[facility] showed NSR  -  minimally elevated on 10/08-->recheck on 10/12 showed   - Pt appears euvolemic, even though trace b/l LE edema and lungs now CTA w/ dec at bases. Pt is s/p recent b/l TKA and aspiration PNA.  - On diuretics w/ Lasix 40mg QD by IM  - Renal function remains stable  - TSH wnl  - Will await for echo results for review, before addressing plan of care    4) HTN:  - BP appears mostly stable w/ occasional spikes to SBP 150s w/ therapy  - Solely on diuretics  - May need antihypertensives added if remains consistently elevated  - Closely monitor    5) HLD:  - On Statin therapy  - Recent LFTs mildly elevated, possible Transaminitis--> improving per 10/12 CMP  - Will place Lipitor 40 on hold for now  - LDL 79 and Trig 101 on 10/12    6) Anemia/Thrombocytosis:  - Hgb 7.7 and Platelets 643 on 10/8-->7.9 & 693 respectively on 10/12  - On ASA 81mg BID  - Per IM      C  AYE Thomas  Patient seen and examined, chart reviewed and case discussed, plan in place.  adelina murray

## 2021-10-12 NOTE — PROGRESS NOTES
Subjective    Patient seen and examined on rounds.  Chart reviewed.  Events overnight noted.  History reviewed briefly with patient.    CC:  Deficits in mobility, transfers, self-care status post recent bilateral total knee replacements, recent pneumonia, ventilator-dependent respiratory failure, seizure disorder, deconditioning, multiple medical problems    HPI:  Ms. Fide Amin is a 60-year-old right handed white female with chronic conditions significant for polyarticular degenerative arthritis, hypertension, hyperlipidemia, seizure disorder, anxiety, depression, asthma, restless leg syndrome had elective bilateral total knee replacements secondary to degenerative arthritis of both knees by Dr. Bennett Em at [facility] on 9/20/21. Postoperatively, on the way from the PACU to the floor or soon after arriving on the floor, the patient did sustain a seizure and she does have a history of seizure disorder but had not had one in about a decade.  She was seen by neurologist at [facility] after her seizure.  Postoperative course was significant for hypotension which was felt to be secondary to narcotics, dehydration as well as blood loss.  She had hypoxia with exertion. CT of chest on 9/23/21 revealed no CT evidence of pulmonary embolism, but scattered bilateral airspace opacities were noted and believed to be most likely representing multifocal pneumonia.  Covid 19 pneumonia was excluded. She was allowed weightbearing as tolerated on both lower extremities and on Aspirin and SCDs for DVT prophylaxis. She was apparently offered SNF placement per her records but she declined it.  She was medically cleared for discharge on Ceftin 500mg BID and Zithromax 500mg daily x 5 days and with instructions for follow-up care and discharged to home on 9/23/21 with home care.  Subsequently, she presented to the ER at [facility] on 9/24/21 with increased shortness of breath and was diagnosed with  "aspiration pneumonia.  She was intubated on 9/25/21 and eventually extubated on 10/1/21. She reports she was on oxygen in the acute care hospital and will try to wean her off oxygen as possible.  She has had multiple blisters on both knees.  On 10/5/21, hemoglobin was 8.5, WBC count 6.9, platelets were 545, BUN was 13 and creatinine was 0.6.  She has been needing assistance for mobility, transfers, self-care. She is transferred to [facility] on 10/6/21 for further rehabilitation care.     SUBJ: She indicates she wants to go home sooner sometime this week. Will discuss in team tomorrow. Discussed with patient. Inspected knee incisions which are stable. Blisters around both incisions are drying up.    ROS: Denies chest pain or shortness of breath. Other ROS negative. Past, family, social history is unchanged.    Physical Exam      Blood pressure 135/60, pulse 85, temperature 36.7 °C (98.1 °F), temperature source Oral, resp. rate 18, height 1.575 m (5' 2.01\"), weight 63.9 kg (140 lb 14 oz), SpO2 95 %, not currently breastfeeding.  Body mass index is 25.76 kg/m².    General Appearance: Not in acute distress  Head/Ear/Nose/Throat: Normocephalic; Atraumatic.   Eye: EOMI; PERRL.   Neck: No JVD; No Bruits.   Respiratory: Decreased breath sounds at bases.   Cardiovascular: RRR; Normal S1, S2.   Gastrointestinal: Soft; NT; +BS.   Extremities: Bilateral lower extremity edema noted.  Healing incisions noted on anterior aspect of both knees.  She has multiple large blisters present on both knees, with the ones on the right knee drying out.  Dressing is present on the blisters on left knee. She is able to do active straight leg raise more easily on the right and with some difficulty on the left.  Musculoskeletal: Functional active range of motion in both upper extremities.  Some limitation of active range of motion in both hips and knees, limited by pain.    Neurological: AAO ×3. Speech is fluent. Cranial nerve " examination does not reveal any gross facial asymmetry. Strength testing about 4+/5 strength in both upper extremities.  Bilateral hip flexion is 3/5.  Right quadriceps is 3+/5, left quadriceps is 3/5.  Bilateral ankle dorsi and plantar flexion are 4/5.  She is grossly able to localize touch and position sense in her toes.  Deep tendon reflexes are hypoactive and symmetric bilaterally.  Plantars are flexor.  Coordination is functional upper extremities.  Both knee jerks were not tested.  Behavior/Emotional: Appropriate; Cooperative.   Skin: No obvious rashes noted.  Bilateral knee incisions healing.  She has multiple large blisters present on both knees, with the ones on the right knee drying out.  Dressing is present on the blisters on left knee.  She has bruising noted in both lower extremities including her feet after recent bilateral knee replacements.      Current Facility-Administered Medications:   •  acetaminophen (TYLENOL) tablet 650 mg, 650 mg, oral, With meals & nightly, Fito Michael MD, 650 mg at 10/11/21 1806  •  acetaminophen (TYLENOL) tablet 650 mg, 650 mg, oral, q4h PRN, Fito Michael MD  •  acetaminophen (TYLENOL) tablet 650 mg, 650 mg, oral, q4h PRN, Fito Michael MD, 650 mg at 10/11/21 1219  •  albuterol HFA (VENTOLIN HFA) 90 mcg/actuation inhaler 2 puff, 2 puff, inhalation, q4h PRN, Fito Michael MD  •  albuterol HFA (VENTOLIN HFA) 90 mcg/actuation inhaler 2 puff, 2 puff, inhalation, QID (6a, 10a, 2p, 6p), Noé Nunez MD, 2 puff at 10/11/21 1809  •  alum-mag hydroxide-simeth (MAALOX) 200-200-20 mg/5 mL suspension 30 mL, 30 mL, oral, q4h PRN, Fito Michael MD  •  ascorbic acid (VITAMIN C) tablet 250 mg, 250 mg, oral, BID AC, Noé Nunez MD, 250 mg at 10/11/21 1806  •  aspirin chewable tablet 81 mg, 81 mg, oral, BID, Fito Michael MD, 81 mg at 10/11/21 2040  •  [Provider Managed Hold] atorvastatin (LIPITOR) tablet 40 mg, 40 mg, oral, Daily (6p), Enrique  MD Noé, 40 mg at 10/08/21 1736  •  bisacodyL (DULCOLAX) 10 mg suppository 10 mg, 10 mg, rectal, Daily PRN, Fito Michael MD  •  busPIRone (BUSPAR) tablet 10 mg, 10 mg, oral, With meals & nightly, Fito Michael MD, 10 mg at 10/11/21 2040  •  camphor-methyl salicyl-menthoL (MUSCLE RUB) cream, , Topical, 2x daily PRN, Gildardo Jacobsen DPM, Given at 10/11/21 0827  •  cholecalciferol (vitamin D3) tablet 2,500 Units, 2,500 Units, oral, Daily, Kvng Francisco MD, 2,500 Units at 10/11/21 0736  •  enoxaparin (LOVENOX) syringe 40 mg, 40 mg, subcutaneous, Daily (6p), Noé Nunez MD, 40 mg at 10/11/21 1806  •  famotidine (PEPCID) tablet 20 mg, 20 mg, oral, Nightly, Fito Michael MD, 20 mg at 10/11/21 2040  •  ferrous sulfate tablet 325 mg, 325 mg, oral, BID with meals, Noé Nunez MD, 325 mg at 10/11/21 1807  •  fluticasone furoate-vilanteroL (BREO ELLIPTA) 200-25 mcg/dose powder for inhalation 1 puff, 1 puff, inhalation, Daily, Noé Nunez MD, 1 puff at 10/11/21 0737  •  furosemide (LASIX) tablet 40 mg, 40 mg, oral, Daily (1p), Noé Nunez MD, 40 mg at 10/11/21 1219  •  guaiFENesin (MUCINEX) 12 hr ER tablet 600 mg, 600 mg, oral, BID, Noé Nunez MD, 600 mg at 10/11/21 2041  •  HYDROmorphone (DILAUDID) tablet 2 mg, 2 mg, oral, q4h PRN, Fito Michael MD, 2 mg at 10/08/21 1422  •  lamoTRIgine (LAMICTAL XR) extended release tablet 400 mg, 400 mg, oral, Daily, Fito Michael MD, 400 mg at 10/11/21 0738  •  lidocaine (ASPERCREME) 4 % topical patch 2 patch, 2 patch, Topical, Daily, Fito Michael MD, 2 patch at 10/11/21 0734  •  meloxicam (MOBIC) tablet 7.5 mg, 7.5 mg, oral, Daily, Fito Michael MD, 7.5 mg at 10/11/21 0735  •  montelukast (SINGULAIR) tablet 10 mg, 10 mg, oral, Nightly, Fito Michael MD, 10 mg at 10/11/21 2041  •  multivit-min-iron-folic-vit K1 (CENTRUM) chewable tablet 1 tablet, 1 tablet, oral, Daily, Fito Michael MD, 1 tablet at 10/11/21 0735  •   nortriptyline (PAMELOR) capsule 75 mg, 75 mg, oral, Nightly, Fito Michael MD, 75 mg at 10/11/21 2040  •  pantoprazole (PROTONIX) tablet,delayed release (DR/EC) 40 mg, 40 mg, oral, Daily before breakfast, Fito Michael MD, 40 mg at 10/11/21 0736  •  polyethylene glycol (MIRALAX) 17 gram packet 17 g, 17 g, oral, Daily PRN, Fito Michael MD  •  rOPINIRole (REQUIP) tablet 0.5 mg, 0.5 mg, oral, Daily with lunch, Fito Michael MD, 0.5 mg at 10/11/21 1219  •  rOPINIRole (REQUIP) tablet 1 mg, 1 mg, oral, Daily with dinner, Fito Michael MD, 1 mg at 10/11/21 1806  •  rOPINIRole (REQUIP) tablet 1 mg, 1 mg, oral, Daily, Fito Michael MD, 1 mg at 10/11/21 0735  •  rOPINIRole (REQUIP) tablet 2 mg, 2 mg, oral, Nightly, Fito Michael MD, 2 mg at 10/11/21 2040  •  sennosides-docusate sodium (SENOKOT-S) 8.6-50 mg per tablet 2 tablet, 2 tablet, oral, BID, oNé Nunez MD, 2 tablet at 10/10/21 0852  •  sertraline (ZOLOFT) tablet 300 mg, 300 mg, oral, Daily, Fito Michael MD, 300 mg at 10/11/21 0734       Labs / Radiology    Lab Results   Component Value Date    WBC 8.71 10/08/2021    HGB 7.7 (L) 10/08/2021    HCT 24.7 (L) 10/08/2021    MCV 95.7 10/08/2021     (H) 10/08/2021     Lab Results   Component Value Date    GLUCOSE 86 10/08/2021    CALCIUM 8.0 (L) 10/08/2021    CALCIUM 8.0 (L) 10/08/2021     10/08/2021    K 4.5 10/08/2021    CO2 27 10/08/2021     10/08/2021    BUN 7 (L) 10/08/2021    CREATININE 0.5 (L) 10/08/2021     Assessment and Plan    ASSESSMENT PLAN:  1. 60-year-old right handed white female with chronic conditions significant for polyarticular degenerative arthritis, hypertension, hyperlipidemia, seizure disorder, anxiety, depression, asthma, restless leg syndrome had elective bilateral total knee replacements secondary to degenerative arthritis of both knees by Dr. Bennett Em at [facility] on 9/20/21. Postoperatively, on the way from the  PACU to the floor or soon after arriving on the floor, the patient did sustain a seizure and she does have a history of seizure disorder but had not had one in about a decade.  She was seen by neurologist at [facility] after her seizure.  Postoperative course was significant for hypotension which was felt to be secondary to narcotics, dehydration as well as blood loss.  She had hypoxia with exertion. CT of chest on 9/23/21 revealed no CT evidence of pulmonary embolism, but scattered bilateral airspace opacities were noted and believed to be most likely representing multifocal pneumonia.  Covid 19 pneumonia was excluded. She was allowed weightbearing as tolerated on both lower extremities and on Aspirin and SCDs for DVT prophylaxis. She was apparently offered SNF placement per her records but she declined it.  She was medically cleared for discharge on Ceftin 500mg BID and Zithromax 500mg daily x 5 days and with instructions for follow-up care and discharged to home on 9/23/21 with home care.  Subsequently, she presented to the ER at [facility] on 9/24/21 with increased shortness of breath and was diagnosed with aspiration pneumonia.  She was intubated on 9/25/21 and eventually extubated on 10/1/21. She reports she was on oxygen in the acute care hospital and will try to wean her off oxygen as possible.  She has had multiple blisters on both knees.  On 10/5/21, hemoglobin was 8.5, WBC count 6.9, platelets were 545, BUN was 13 and creatinine was 0.6.  She has been needing assistance for mobility, transfers, self-care. She is transferred to [facility] on 10/6/21 for further rehabilitation care.     2. DVT prophylaxis - on Aspirin.  On SCDs.  Check doppler.  Platelets 213 on 9/22/2021. Platelets 643 on 10/8/2021.     3.  Deconditioning - status post recent bilateral total knee replacements, recent pneumonia, ventilator-dependent respiratory failure, seizure disorder,  deconditioning, multiple medical problems - continue PT, OT, psychology.  Follow falls precautions, cardiac precautions, monitor pulse oximetry in therapy.     4. GI - On Pepcid for GI prophylaxis.  On Protonix.  Continue Colace, Senokot.  On PRN MiraLAX.  On PRN Dulcolax suppository. On PRN Maalox.       5.  - voiding.  Denies dysuria.  Monitor post residuals.     6.  Seizure disorder -on Lamictal XR.  Follow seizure precautions.     7. Pain - on Mobic.  On Tylenol.  On PRN Dilaudid.  Ice to knees.  On topical Lidoderm patches.     8. Hematology -  Anemia - on MVI.  Hemoglobin 8.5, WBC 10.36 on 9/22/2021. Hemoglobin 7.7, WBC 8.71 on 10/8/2021.     9.  Psychiatry - history of anxiety and depression - on BuSpar.  On Zoloft.  On Pamelor.     10.  Pulmonary - H/O asthma, recent pneumonia, ventilator-dependent respiratory failure.  On Singulair.  On Breo Ellipta.  On PRN Ventolin HFA.     11.  Restless leg syndrome - on Requip.     12. Skin - bilateral knee incisions stable.  Multiple blisters noted on both incisions. Blisters bilateral knee incisions are drying up.     13.  Hyperlipidemia - on Lipitor.     14. F/E/N - On MVI. On vitamin C.       15.  Rehabilitation medicine - Continue comprehensive rehabilitation care. Continue PT, OT, psychology.  We will follow falls precautions, cardiac precautions, monitor pulse oximetry in therapy and follow aspiration precautions.Slept well last night.  Tolerating therapies per endurance.  Denies increased pain.  Had a bowel movement.  Voiding well.  Inspected bilateral knee incisions which are stable.  She did not have lab work today, labs will be drawn tomorrow per nursing.   Feels better today. Denies increased pain. Inspected knee incisions are stable. Encourage incentive spirometry and deep breathing exercises.She indicates she wants to go home sooner sometime this week. Will discuss in team tomorrow. Discussed with patient. Inspected knee incisions which are stable.  Blisters around both incisions are drying up.     16. Reviewed labs today.  BUN 13, creatinine 0.6 on 10/5/2021. BUN 7, creatinine 0.5 on 10/8/2021.        Fito Michael MD  10/11/2021

## 2021-10-12 NOTE — PROGRESS NOTES
Patient: Fide Amin  Location: [facility] [address]  MRN: [id number]  Today's date: 10/12/2021    History of Present Illness  Fide is a 60 y.o. female admitted on 10/6/2021 with Physical deconditioning [R53.81]. Principal problem is Physical deconditioning.   Mrs. Fide Amin is a 60 year old female who presented to [facility] on 9/20/21 for planned B TKA by Dr. Em. Pt suffered a seizure post op after 8 years of having no seizures. CT of chest completed 9/23 results reviewed with no CT evidence of pulmonary embolism.  Scattered bilateral airspace opacities noted and believed to be most likely representing multifocal pneumonia.  Covid 19 pneumonia was excluded. Patient was medically cleared for discharge 9/23/21 on ceftin 500mg BID and zithromax 500mg daily x 5 days and with instructions for f/u care.  Pt. Presented to [facility] on 9/24 for SOB and dx with aspiration PNA. Pt. Intubated on 9/25/21 and extubated on 10/1/21. Pt is now medically stable and admitted to [facility] for acute inpatient rehab.      Past Medical History  Fide has a past medical history of Arthritis, Asthma, Depression, Deviated septum, Epilepsy (CMS/HCC), GERD (gastroesophageal reflux disease), Hypertension, Lipid disorder, Motion sickness, and RLS (restless legs syndrome).      OT Vitals    Date/Time Pulse SpO2 Pt Activity O2 Therapy O2 Del Method O2 Flow Rate BP BP Location BP Method Pt Position [facility]   10/12/21 1342 91 99 % At rest Supplemental oxygen Nasal cannula 1 L/min 101/54 Right upper arm Automatic Sitting JV      OT Pain    Date/Time Pain Type Side/Orientation Location Rating: Rest Interventions [facility]   10/12/21 1342 Pain Assessment -- -- 0 -- JV   10/12/21 1434 Pain Reassessment bilateral knee 3 position adjusted JV          Prior Living Environment      Most Recent Value   People in Home spouse  [4 kids]   Current Living Arrangements home   Home Accessibility stairs to enter home (Group),  stairs within home  "(Group)  [2+1 STEPHANY, ff inside, NE 1st floor]   Living Environment Comment Pt lives in 2 . Pt reports two small steps to enter and FF to 2nd floor. Pt reports plans to have 1st floor set-up. Pt has guest room on 1st floor c 1/2 bath. Pt reports purchased commode and 2 RTS c handles.   Number of Stairs, Main Entrance 2   Surface of Stairs, Main Entrance concrete   Stair Railings, Main Entrance none   Location, Patient Bedroom second floor, must negotiate stairs to access   Patient Bedroom Access Comment Ability to have first floor set up   Location, Bathroom first (main) floor,  second floor, must negotiate stairs to access   Bathroom Access Comment 2nd floor FB c shower stall. (+) grab bar in shower stall. Pt reports has built in shower bench in stall.   Number of Stairs, Within Home, Primary other (see comments)  [7+7]   Surface of Stairs, Within Home, Primary hardwood   Stair Railings, Within Home, Primary railings on both sides of stairs   Landing, Stairs, Within Home, Primary railings present          Prior Level of Function      Most Recent Value   Dominant Hand right   Ambulation independent   Transferring independent   Toileting independent   Bathing independent   Dressing independent   Prior Level of Function Comment Pt was d/c'd home for one day after surgery using RW.   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   Occupational History/Life Experiences Pt lives at home in [facility] c  ([place]). Pt reports  also retired. Pt is a . Pt has epilepsy and hx of last seizure 2011 then had a seizure after BKA surgery 9/2021.   Patient Goals \"To get well enough to be able to go home and get PT there\"           IRF OT Evaluation and Treatment - 10/12/21 8394        " OT Time Calculation    Start Time 1330     Stop Time 1435     Time Calculation (min) 65 min        Session Details    Document Type daily treatment/progress note     Mode of Treatment occupational therapy; individual therapy        General Information    General Observations of Patient Pt pleasant and agreeable to therapy        Sit to Stand Transfer    [place], Sit to Stand Transfer close supervision     Verbal Cues safety     Assistive Device walker, front-wheeled        Stand to Sit Transfer    [place], Stand to Sit Transfer close supervision     Verbal Cues safety     Assistive Device walker, front-wheeled        Stand Pivot Transfer    [place], Stand Pivot/Stand Step Transfer close supervision     Verbal Cues safety     Assistive Device walker, front-wheeled     Comment Ambulatory approach to w/c        Safety Issues, Functional Mobility    Comment, Safety Issues/Impairments (Mobility) Cl S ambulating throughout greenhouse c RW. OT assisting c oxygen tank management. MIN VCs for safety and RW mangement. Pt scanning extrapersonal space s LOB. OT educating on use of RW basket for safe item transport using RW. Pt utilizing to transport plants from [facility] to [facility].        Balance    Comment, Balance Cl S standing balance c touching A required x 1 c LOB while pt reaching slightly outside [place] to interact c and retrieve plants in greenhouse. Pt educated on safe body and RW positioning for improved balance.        Motor Skills    Comment, Motor Skills Pt completes painting pot for dish garden activity in [facility]. Pt requiring increased time to complete 2* tremoring in B hands/arms. Pt reports tremoring 2* asthma medication and was occuring PTA.        Therapeutic Standing Program    Comments S Standing tolerance x 20 mins while engaged in painting activity in [facility].        Daily Progress Summary (OT)    Daily Outcome Statement Session focus on activity tolerance while engaged in  painting project in greenhouse. Pt requiring seated rest break following 20 mins of standing. Pt benefiting from rest breaks following functional mobility tasks. Pt c LOB this session, able to self correct c touching A for safety. Pt benefiting from education on improved body and RW positioning to maximize safety. Pt tolerating 1L of supplemental O2 maintaining SpO2 >91% throughout.                           IRF OT Goals      Most Recent Value   Transfer Goal 1    Activity/Assistive Device toilet at 10/07/2021 0740   [place] supervision required at 10/07/2021 0740   Time Frame short-term goal (STG),  1 week at 10/11/2021 1511   Progress/Outcome goal ongoing at 10/11/2021 1511   Transfer Goal 2    Activity/Assistive Device toilet at 10/07/2021 0740   [place] modified independence at 10/07/2021 0740   Time Frame long-term goal (LTG),  14 days or less at 10/07/2021 0740   Progress/Outcome goal ongoing at 10/11/2021 1511   Transfer Goal 3    Activity/Assistive Device shower at 10/07/2021 0740   [place] supervision required at 10/07/2021 0740   Time Frame short-term goal (STG),  1 week at 10/11/2021 1511   Progress/Outcome goal ongoing at 10/11/2021 1511   Transfer Goal 4    Activity/Assistive Device shower at 10/07/2021 0740   [place] modified independence at 10/07/2021 0740   Time Frame long-term goal (LTG),  14 days or less at 10/07/2021 0740   Progress/Outcome goal ongoing at 10/11/2021 1511   Bathing Goal 1    Activity/Assistive Device bathing skills, all at 10/11/2021 1511   [place] set-up required at 10/11/2021 1511   Time Frame short-term goal (STG),  3 - 5 days at 10/11/2021 1511   Progress/Outcome goal met,  goal revised this date at 10/11/2021 1511   Bathing Goal 2    Activity/Assistive Device bathing skills, all at 10/07/2021 0740   [place] set-up required at 10/07/2021 0740   Time Frame long-term goal (LTG),  14 days or less at 10/07/2021 0740   Progress/Outcome goal ongoing at  10/11/2021 1511   UB Dressing Goal 1    Activity/Assistive Device upper body dressing at 10/07/2021 0740   [place] supervision required at 10/11/2021 1511   Time Frame short-term goal (STG),  3 - 5 days at 10/11/2021 1511   Strategies/Barriers c clothing retrieval at 10/07/2021 0740   Progress/Outcome goal met,  goal revised this date at 10/11/2021 1511   UB Dressing Goal 2    [place] modified independence at 10/07/2021 0740   Time Frame long-term goal (LTG),  14 days or less at 10/07/2021 0740   Progress/Outcome goal ongoing at 10/11/2021 1511   LB Dressing Goal 1    [place] supervision required at 10/11/2021 1511   Time Frame short-term goal (STG),  3 - 5 days at 10/11/2021 1511   Strategies/Barriers c AE at 10/07/2021 0740   Progress/Outcome goal met,  goal revised this date at 10/11/2021 1511   LB Dressing Goal 2    [place] modified independence at 10/07/2021 0740   Time Frame long-term goal (LTG),  14 days or less at 10/07/2021 0740   Progress/Outcome goal ongoing at 10/11/2021 1511   Grooming Goal 1    [place] supervision required at 10/07/2021 0740   Time Frame short-term goal (STG),  1 week at 10/07/2021 0740   Strategies/Barriers standing at 10/07/2021 0740   Progress/Outcome goal met at 10/11/2021 1511   Grooming Goal 2    [place] modified independence at 10/07/2021 0740   Time Frame long-term goal (LTG),  14 days or less at 10/07/2021 0740   Progress/Outcome goal ongoing at 10/11/2021 1511   Toileting Goal 1    [place] minimum assist (75% or more patient effort) at 10/07/2021 0740   Time Frame short-term goal (STG),  1 week at 10/07/2021 0740   Progress/Outcome goal met at 10/11/2021 1511   Toileting Goal 2    [place] modified independence at 10/07/2021 0740   Time Frame long-term goal (LTG),  14 days or less at 10/07/2021 0740   Progress/Outcome goal ongoing at 10/11/2021 1511

## 2021-10-12 NOTE — PLAN OF CARE
Plan of Care Review  Plan of Care Reviewed With: patient  Progress: improving  Outcome Summary: Alert and oriented x4. Continent of bowel and bladder. Continues on scheduled Tylenol, no reported breakthrough pain this shift. No SOB this shift, pulse ox remains in the mid 90's on O2@1L/min via n/c. Sleeping quietly in bed at this time. Fall and safety measures maintained.

## 2021-10-12 NOTE — PLAN OF CARE
Problem: Rehabilitation (IRF) Plan of Care  Goal: Plan of Care Review  Flowsheets (Taken 10/12/2021 1121)  Plan of Care Reviewed With:  • patient  • spouse  Outcome Summary: spoke with  this am prior to team as he wanted to ensure we come up with a dc date and thinks pt will respond to this in positive manner. post team cm spoke with  and presented report-elos of 10/16 with hc.  felt pt would respond better to fri dc and wnats [facility] as they were just out to house. we set up trng for tomorrow at 830am. met with pt to give report and she supports and appreciates all of this.cm faxed update to ins. email referal made to [facility]. cm to follow. Will work on setting up O2. hc received by Kanwal WARD

## 2021-10-12 NOTE — PROGRESS NOTES
Patient: Fide Amin  Location: [facility] [address]  MRN: [id number]  Today's date: 10/12/2021    History of Present Illness  Fide is a 60 y.o. female admitted on 10/6/2021 with Physical deconditioning [R53.81]. Principal problem is Physical deconditioning.   Mrs. Fide Amin is a 60 year old female who presented to [facility] on 9/20/21 for planned B TKA by Dr. Em. Pt suffered a seizure post op after 8 years of having no seizures. CT of chest completed 9/23 results reviewed with no CT evidence of pulmonary embolism.  Scattered bilateral airspace opacities noted and believed to be most likely representing multifocal pneumonia.  Covid 19 pneumonia was excluded. Patient was medically cleared for discharge 9/23/21 on ceftin 500mg BID and zithromax 500mg daily x 5 days and with instructions for f/u care.  Pt. Presented to [facility] on 9/24 for SOB and dx with aspiration PNA. Pt. Intubated on 9/25/21 and extubated on 10/1/21. Pt is now medically stable and admitted to [facility] for acute inpatient rehab.      Past Medical History  Fide has a past medical history of Arthritis, Asthma, Depression, Deviated septum, Epilepsy (CMS/HCC), GERD (gastroesophageal reflux disease), Hypertension, Lipid disorder, Motion sickness, and RLS (restless legs syndrome).     10/12/21 1437   Pain/Comfort/Sleep   Pain Charting Type Pain Assessment   Preferred Pain Scale number (Numeric Rating Pain Scale)   (0-10) Pain Rating: Rest 3   (0-10) Pain Rating: Activity 3   Pain Side/Orientation bilateral   Pain Body Location knee   Pain Description sore   Vital Signs   Heart Rate 92   Heart Rate Source Monitor   SpO2 96 %   Patient Activity At rest   Oxygen Therapy Supplemental oxygen   O2 Delivery Method Nasal cannula   O2 Flow Rate (L/min) 1 L/min   BP (!) 118/58   BP Location Right upper arm   BP Method Automatic   Patient Position Sitting        10/12/21 1448   Vital Signs   SpO2 (!) 82 %   Patient Activity Walking   Oxygen  Therapy Supplemental oxygen   O2 Delivery Method Nasal cannula   Patient Observation   Observations immediately after ambulating      10/12/21 1450   Vital Signs   SpO2 94 %   Patient Activity At rest   Oxygen Therapy Supplemental oxygen   O2 Delivery Method Nasal cannula   O2 Flow Rate (L/min) 1 L/min        10/12/21 1529   Pain/Comfort/Sleep   Pain Charting Type Pain Reassessment   Preferred Pain Scale number (Numeric Rating Pain Scale)   (0-10) Pain Rating: Rest 4   Pain Side/Orientation bilateral   Pain Body Location knee   Pain Management Interventions position adjusted   Vital Signs   Heart Rate 87   Heart Rate Source Monitor   SpO2 97 %   Patient Activity At rest   Oxygen Therapy Supplemental oxygen   O2 Delivery Method Nasal cannula   O2 Flow Rate (L/min) 1 L/min   BP (!) 112/59   BP Location Right upper arm   BP Method Automatic   Patient Position Sitting     Prior Living Environment      Most Recent Value   People in Home spouse  [4 kids]   Current Living Arrangements home   Home Accessibility stairs to enter home (Group),  stairs within home (Group)  [2+1 STEPHANY, ff inside, ME 1st floor]   Living Environment Comment Pt lives in 2 . Pt reports two small steps to enter and FF to 2nd floor. Pt reports plans to have 1st floor set-up. Pt has guest room on 1st floor c 1/2 bath. Pt reports purchased commode and 2 RTS c handles.   Number of Stairs, Main Entrance 2   Surface of Stairs, Main Entrance concrete   Stair Railings, Main Entrance none   Location, Patient Bedroom second floor, must negotiate stairs to access   Patient Bedroom Access Comment Ability to have first floor set up   Location, Bathroom first (main) floor,  second floor, must negotiate stairs to access   Bathroom Access Comment 2nd floor FB c shower stall. (+) grab bar in shower stall. Pt reports has built in shower bench in stall.   Number of Stairs, Within Home, Primary other (see comments)  [7+7]   Surface of Stairs, Within Home, Primary  hardwood   Stair Railings, Within Home, Primary railings on both sides of stairs   Landing, Stairs, Within Home, Primary railings present          Prior Level of Function      Most Recent Value   Dominant Hand right   Ambulation independent   Transferring independent   Toileting independent   Bathing independent   Dressing independent   Prior Level of Function Comment Pt was d/c'd home for one day after surgery using RW.   Assistive Device Currently Used at Home commode, 3-in-1,  raised toilet,  walker, front-wheeled  [RTS c handles]           IRF PT Evaluation and Treatment - 10/12/21 1440        PT Time Calculation    Start Time 1335     Stop Time 1430     Time Calculation (min) 55 min        Session Details    Document Type daily treatment/progress note     Mode of Treatment individual therapy; physical therapy        General Information    Patient Profile Reviewed yes     General Observations of Patient direct hand-off with OT; 1L O2        Range of Motion (ROM)    Range of Motion bilateral lower extremities     Knee, Left (ROM) AROM 100; PROM 110     Knee, Right (ROM) AROM 100; PROM 112        Bed Mobility    [place], Supine to Sit close supervision     [place], Sit to Supine close supervision     Assistive Device none     Comment (Bed Mobility) clS for safety on therapy mat        Sit to Stand Transfer    [place], Sit to Stand Transfer close supervision     Verbal Cues safety     Assistive Device walker, front-wheeled        Stand to Sit Transfer    [place], Stand to Sit Transfer close supervision     Verbal Cues safety     Assistive Device walker, front-wheeled        Stand Pivot Transfer    [place], Stand Pivot/Stand Step Transfer close supervision     Verbal Cues safety     Assistive Device walker, front-wheeled     Comment 1) via amb approach with RW 2) wc <> EOM using RW        Gait Training    [place], Gait minimum assist (75% or more patient effort)     Assistive Device  "walker, front-wheeled     Distance in Feet 170 feet   170'x1, 25'x2    Pattern (Gait) step-through     Deviations/Abnormal Patterns (Gait) tan decreased; gait speed decreased; stride length decreased; step length decreased     Comment (Gait/Stairs) 170' with RW, therapist pushing O2 tank and steadying assist, several standing rest breaks and VC reminders to take deep breaths. Upon sitting, pt was 82% SpO2 on 1L O2, recovered to 94% in 35 seconds.        Curb Negotiation    [place] minimum assist (75% or more patient effort)     Assistive Device walker, front-wheeled     Curb Height 8 inches   4\"+4\"    Comment O2 tank left at bottom of curb step; Pt ascended 4\"+4\" curb step by placing RW up on top/bottom of second step; Nanda at posterior pelvis for stability; completed to practice option for navigating pt's 2 [facility] home        Stairs Training    [place], Stairs touching/steadying assist; minimum assist (75% or more patient effort)     Assistive Device railing     Handrail Location (Stairs) both sides     Number of Stairs 8     Stair Height 6 inches     Ascending Stairs Technique step-to-step   leading with R LE    Descending Stairs Technique step-to-step   leading with L LE    Comment O2 tank left at bottom of steps; steady assist with occ Nanda at pelvis for safety; SpO2 91% on 1L O2 (1) Ascended/descended 8x 6” steps with B HR (2) Ascended/descended 4x 6” steps with L HR and HHA on the opposite side to mimic the 2 [facility] pt’s home.        Lower Extremity (Therapeutic Exercise)    Exercise Position/Type supine; AAROM (active assistive range of motion)     General Exercise bilateral; heel slides     Range of Motion Exercises bilateral; knee flexion/extension     Reps and Sets 3x10 3\" iso hold in flexion     Comment using leg  for pt to complete AAROM        Daily Progress Summary (PT)    Daily Outcome Statement Pt desatted to 82% on first ambulation trial of 170' (on 1L O2), but was able to recover to " 90% quickly within 15 seconds, and 94% in 35 seconds. Pt reported she was SOB during ambulation but did not feel concerned or in distress. For remainder of sessin pt was >92% for all functional activities. Pt practiced two different options for navigating the 2 STEPHANY her home and plan to practice both with pt's  at family training tomorrow.     Recommendations (PT) Plan to obtain long O2 tubing to practice with  during famil training tomorrow.                      Education Documentation  Mobility Aids/Assistive Devices, taught by Miriam Burton, PT at 10/12/2021  4:21 PM.  Learner: Patient  Readiness: Eager  Method: Explanation, Demonstration  Response: Verbalizes Understanding, Needs Reinforcement  Comment: Pt edu on 2 options for navigating 2 STEPHANY her home. Pt also edu on energy conservation and option to place chair at landing of her stairs in order to rest.          IRF PT Goals      Most Recent Value   Bed Mobility Goal 1    Activity/Assistive Device bed mobility activities, all,  no assistive device at 10/07/2021 1003   [place] modified independence at 10/07/2021 1003   Time Frame 1 week at 10/07/2021 1003   Strategies/Barriers anxiousness, decreased safety awareness and carry-over, supplimental O2 at 10/11/2021 1754   Progress/Outcome good progress toward goal,  goal partially met at 10/11/2021 1754   Transfer Goal 1    Activity/Assistive Device sit-to-stand/stand-to-sit,  stand pivot,  car transfer,  walker, front-wheeled at 10/07/2021 1003   [place] supervision required,  set-up required at 10/07/2021 1003   Time Frame 1 week at 10/07/2021 1003   Strategies/Barriers anxiousness, decreased safety awareness and carry-over, supplimental O2, deconditioning at 10/11/2021 1754   Progress/Outcome continuing progress toward goal,  goal ongoing at 10/11/2021 1754   Transfer Goal 2    Activity/Assistive Device sit-to-stand/stand-to-sit,  stand pivot,  car transfer,  walker, front-wheeled at  10/07/2021 1003   [place] modified independence at 10/07/2021 1003   Time Frame 14 days or less at 10/07/2021 1003   Strategies/Barriers anxiousness, decreased safety awareness and carry-over, supplimental O2, deconditioning at 10/11/2021 1754   Progress/Outcome goal ongoing at 10/11/2021 1754   Gait/Walking Locomotion Goal 1    Activity/Assistive Device assistive device use,  backward stepping,  decrease fall risk,  diminish gait deviation,  forward stepping,  improve balance and speed,  increase endurance/gait distance,  increase energy conservation,  normalize weight shifts,  turning, left,  turning, right,  walker, front-wheeled at 10/07/2021 1003   Distance 150 feet at 10/07/2021 1003   [place] supervision required at 10/07/2021 1003   Time Frame 1 week at 10/07/2021 1003   Strategies/Barriers anxiousness, decreased safety awareness and carry-over, supplimental O2, deconditioning at 10/11/2021 1754   Progress/Outcome continuing progress toward goal,  goal ongoing at 10/11/2021 1754   Gait/Walking Locomotion Goal 2    Activity/Assistive Device gait (walking locomotion),  assistive device use,  backward stepping,  decrease fall risk,  diminish gait deviation,  forward stepping,  improve balance and speed,  increase endurance/gait distance,  increase energy conservation,  normalize weight shifts,  sidestepping,  turning, left,  turning, right,  walker, front-wheeled at 10/07/2021 1003   Distance 300 feet at 10/07/2021 1003   [place] modified independence at 10/07/2021 1003   Time Frame 14 days or less at 10/07/2021 1003   Strategies/Barriers anxiousness, decreased safety awareness and carry-over, supplimental O2, deconditioning at 10/11/2021 1754   Progress/Outcome goal ongoing at 10/11/2021 1754   Stairs Goal 1    Activity/Assistive Device ascending stairs,  descending stairs,  step-to-step,  using handrail, right,  using handrail, left,  decrease fall risk,  improve balance and speed at 10/07/2021  1003   Number of Stairs 8 at 10/07/2021 1003   [place] supervision required at 10/07/2021 1003   Time Frame 1 week at 10/07/2021 1003   Strategies/Barriers anxiousness, decreased safety awareness and carry-over, supplimental O2 at 10/11/2021 1754   Progress/Outcome good progress toward goal,  goal partially met,  goal ongoing at 10/11/2021 1754   Stairs Goal 2    Activity/Assistive Device using handrail, left,  using handrail, right,  step-to-step,  decrease fall risk,  ascending stairs,  descending stairs,  improve balance and speed,  no assistive device at 10/07/2021 1003   Number of Stairs 14 at 10/07/2021 1003   [place] modified independence at 10/07/2021 1003   Time Frame 14 days or less at 10/07/2021 1003   Strategies/Barriers anxiousness, decreased safety awareness and carry-over, supplimental O2, deconditioning at 10/11/2021 1754   Progress/Outcome goal ongoing at 10/11/2021 1754   ROM Goal 1    ROM Goal 1 B knee ROM 0-90 at 10/07/2021 1003   Time Frame 14 days or less at 10/07/2021 1003   Strategies/Barriers anxiousness, decreased safety awareness and carry-over, supplimental O2 at 10/11/2021 1754   Progress/Outcome good progress toward goal,  goal ongoing at 10/11/2021 1754   ROM Goal 2    ROM Goal 2 B ankle DF ROM +10 degrees at 10/07/2021 1003   Time Frame 14 days or less at 10/07/2021 1003   Caregiver Training Goal 1    Caregiver Training Goal 1 Pt's  safe and I providing any necessary supervision/Assist with mobility tasks at 10/07/2021 1003   Time Frame 14 days or less at 10/07/2021 1003   Problem Specific Goal 1    Problem-Specific Goal 1 Pt weaned from supplemental O2 and tolerating > 92% with activity. at 10/07/2021 1003   Time Frame 14 days or less at 10/07/2021 1003   Strategies/Barriers anxiousness, decreased safety awareness and carry-over, supplimental O2 at 10/11/2021 1754   Progress/Outcome continuing progress toward goal,  goal ongoing at 10/11/2021 1754

## 2021-10-12 NOTE — PLAN OF CARE
Plan of Care Review  Plan of Care Reviewed With: patient  Progress: improving  Outcome Summary: alert an oriented x 4.    Continent of bowel and bladder.    Pain managed using multimodal approach to control.     Incision healing without evidence of infection.     O2 @ 1-2 L/min.     P/O 90-94%.    Free from falls.

## 2021-10-12 NOTE — PROGRESS NOTES
Inpatient Psychology Progress Note    Duration: 45 minutes  Fide Amin : 1960, a 60 y.o. female, for follow up visit.  Reason:  She has been experiencing Adjustment Issues.    HPI: status post recent bilateral total knee replacements, recent pneumonia, ventilator-dependent respiratory failure, seizure disorder, deconditioning, multiple medical problems. (Patient was admitted to [facility] on 10/6/21    Current Evaluation:     Mental Status Evaluation:  Arousal Level: Alert  Appearance: Well Groomed  Speech: Regular  Psychomotor Functioning: Decreased  Eye Contact: WNL  Est. Premorbid Intelligence: Above average  Orientation: Fully oriented  Attention: WNL  Concentration: Impaired, Mild  Recent Memory: WNL  Remote Memory: WNL  Thought Content: Appropriate  Thought Process: WNL  Insight: Intact  Perceptual Function: Normal  Delusions: None or age appropriate  Sleeping: Decreased  Appetite: No Change  Libido: Non-Contributory  Affect: Tearful  Mood: Frustrated, Hopeful, Motivated, Anxious, Depressed         [place] Suicide Severity Rating Scale:  Not done today        Safe-T Assessment:  Not done today           Goals Addressed    Maximize Adjustment and Acceptance of Limitations         Interventions  Acceptance & Adjustment  Cognitive Behavior Training  Monitoring of Symptoms  Psychoeducation    Psychoeducation provided on:  Orthopedic Recovery and Other: Deconditioning      Recommendations:      Individual Therapy  25 minutes1 times weekly      Visit Diagnosis:     1 Adjustment disorder with anxiety        Diagnostic Impression: Seen with her .  She is distressed and tearful because she wants to go home.  She reports that  In order to discharge she needs oxygen set up and  is concerned this will lengthen her stay.  Her  supports her in her desire to go home.  Discuss deconditioning and she feels she is at the level she was before she returned to acute care.  She  believes she  will be able to do more at home. Discuss team process and provide support. Will continue to follow.    Psychological condition is generally improving       MIKAYLA [email or web address].D

## 2021-10-13 ENCOUNTER — APPOINTMENT (INPATIENT)
Dept: PHYSICAL THERAPY | Facility: REHABILITATION | Age: 61
DRG: 948 | End: 2021-10-13
Payer: COMMERCIAL

## 2021-10-13 ENCOUNTER — APPOINTMENT (INPATIENT)
Dept: OCCUPATIONAL THERAPY | Facility: REHABILITATION | Age: 61
DRG: 948 | End: 2021-10-13
Payer: COMMERCIAL

## 2021-10-13 PROCEDURE — 97530 THERAPEUTIC ACTIVITIES: CPT | Mod: GP

## 2021-10-13 PROCEDURE — 63700000 HC SELF-ADMINISTRABLE DRUG: Performed by: INTERNAL MEDICINE

## 2021-10-13 PROCEDURE — 97110 THERAPEUTIC EXERCISES: CPT | Mod: GP

## 2021-10-13 PROCEDURE — 63700000 HC SELF-ADMINISTRABLE DRUG: Performed by: PHYSICAL MEDICINE & REHABILITATION

## 2021-10-13 PROCEDURE — 97116 GAIT TRAINING THERAPY: CPT | Mod: GP,CQ

## 2021-10-13 PROCEDURE — 97530 THERAPEUTIC ACTIVITIES: CPT | Mod: GO

## 2021-10-13 PROCEDURE — 12800000 HC ROOM AND CARE SEMIPRIVATE REHAB

## 2021-10-13 PROCEDURE — 63600000 HC DRUGS/DETAIL CODE: Performed by: INTERNAL MEDICINE

## 2021-10-13 PROCEDURE — 97535 SELF CARE MNGMENT TRAINING: CPT | Mod: GO

## 2021-10-13 RX ADMIN — BUSPIRONE HYDROCHLORIDE 10 MG: 5 TABLET ORAL at 07:59

## 2021-10-13 RX ADMIN — ROPINIROLE HYDROCHLORIDE 2 MG: 1 TABLET, FILM COATED ORAL at 20:42

## 2021-10-13 RX ADMIN — ENOXAPARIN SODIUM 40 MG: 100 INJECTION SUBCUTANEOUS at 17:04

## 2021-10-13 RX ADMIN — ROPINIROLE HYDROCHLORIDE 0.5 MG: 0.5 TABLET, FILM COATED ORAL at 12:43

## 2021-10-13 RX ADMIN — Medication 2500 UNITS: at 08:01

## 2021-10-13 RX ADMIN — ACETAMINOPHEN 650 MG: 325 TABLET, FILM COATED ORAL at 20:43

## 2021-10-13 RX ADMIN — MELOXICAM 7.5 MG: 7.5 TABLET ORAL at 08:03

## 2021-10-13 RX ADMIN — PANTOPRAZOLE SODIUM 40 MG: 40 TABLET, DELAYED RELEASE ORAL at 08:09

## 2021-10-13 RX ADMIN — Medication 325 MG: at 07:59

## 2021-10-13 RX ADMIN — ALBUTEROL SULFATE 2 PUFF: 90 AEROSOL, METERED RESPIRATORY (INHALATION) at 10:35

## 2021-10-13 RX ADMIN — FAMOTIDINE 20 MG: 20 TABLET ORAL at 20:42

## 2021-10-13 RX ADMIN — ASPIRIN 81 MG CHEWABLE TABLET 81 MG: 81 TABLET CHEWABLE at 08:02

## 2021-10-13 RX ADMIN — ROPINIROLE HYDROCHLORIDE 1 MG: 1 TABLET, FILM COATED ORAL at 07:58

## 2021-10-13 RX ADMIN — ALBUTEROL SULFATE 2 PUFF: 90 AEROSOL, METERED RESPIRATORY (INHALATION) at 17:09

## 2021-10-13 RX ADMIN — ACETAMINOPHEN 650 MG: 325 TABLET, FILM COATED ORAL at 08:00

## 2021-10-13 RX ADMIN — GUAIFENESIN 600 MG: 600 TABLET ORAL at 07:58

## 2021-10-13 RX ADMIN — ASPIRIN 81 MG CHEWABLE TABLET 81 MG: 81 TABLET CHEWABLE at 20:42

## 2021-10-13 RX ADMIN — BUSPIRONE HYDROCHLORIDE 10 MG: 5 TABLET ORAL at 17:04

## 2021-10-13 RX ADMIN — NORTRIPTYLINE HYDROCHLORIDE 75 MG: 25 CAPSULE ORAL at 20:43

## 2021-10-13 RX ADMIN — Medication 1 TABLET: at 07:58

## 2021-10-13 RX ADMIN — Medication 325 MG: at 17:05

## 2021-10-13 RX ADMIN — ACETAMINOPHEN 650 MG: 325 TABLET, FILM COATED ORAL at 12:43

## 2021-10-13 RX ADMIN — ALBUTEROL SULFATE 2 PUFF: 90 AEROSOL, METERED RESPIRATORY (INHALATION) at 05:45

## 2021-10-13 RX ADMIN — BUSPIRONE HYDROCHLORIDE 10 MG: 5 TABLET ORAL at 20:42

## 2021-10-13 RX ADMIN — FUROSEMIDE 40 MG: 40 TABLET ORAL at 12:44

## 2021-10-13 RX ADMIN — SERTRALINE HYDROCHLORIDE 300 MG: 100 TABLET ORAL at 07:59

## 2021-10-13 RX ADMIN — LAMOTRIGINE 400 MG: 200 TABLET, EXTENDED RELEASE ORAL at 08:06

## 2021-10-13 RX ADMIN — LIDOCAINE 2 PATCH: 246 PATCH TOPICAL at 08:02

## 2021-10-13 RX ADMIN — ACETAMINOPHEN 650 MG: 325 TABLET, FILM COATED ORAL at 17:02

## 2021-10-13 RX ADMIN — FLUTICASONE FUROATE AND VILANTEROL 1 PUFF: 200; 25 POWDER RESPIRATORY (INHALATION) at 08:05

## 2021-10-13 RX ADMIN — Medication 250 MG: at 08:02

## 2021-10-13 RX ADMIN — BUSPIRONE HYDROCHLORIDE 10 MG: 5 TABLET ORAL at 12:43

## 2021-10-13 RX ADMIN — MONTELUKAST 10 MG: 10 TABLET, FILM COATED ORAL at 20:42

## 2021-10-13 RX ADMIN — ROPINIROLE HYDROCHLORIDE 1 MG: 1 TABLET, FILM COATED ORAL at 17:03

## 2021-10-13 RX ADMIN — GUAIFENESIN 600 MG: 600 TABLET ORAL at 20:44

## 2021-10-13 RX ADMIN — Medication 250 MG: at 17:04

## 2021-10-13 ASSESSMENT — ENCOUNTER SYMPTOMS
CONFUSION: 0
FEVER: 0
WOUND: 1
DIARRHEA: 0
TREMORS: 0
DIFFICULTY URINATING: 0
WEAKNESS: 1
SHORTNESS OF BREATH: 1
DIZZINESS: 0

## 2021-10-13 NOTE — PROGRESS NOTES
Subjective    Patient seen and examined on rounds.  Chart reviewed.  Events overnight noted.  History reviewed briefly with patient.    CC:  Deficits in mobility, transfers, self-care status post recent bilateral total knee replacements, recent pneumonia, ventilator-dependent respiratory failure, seizure disorder, deconditioning, multiple medical problems    HPI:  Ms. Fide Amin is a 60-year-old right handed white female with chronic conditions significant for polyarticular degenerative arthritis, hypertension, hyperlipidemia, seizure disorder, anxiety, depression, asthma, restless leg syndrome had elective bilateral total knee replacements secondary to degenerative arthritis of both knees by Dr. Bennett Em at [facility] on 9/20/21. Postoperatively, on the way from the PACU to the floor or soon after arriving on the floor, the patient did sustain a seizure and she does have a history of seizure disorder but had not had one in about a decade.  She was seen by neurologist at [facility] after her seizure.  Postoperative course was significant for hypotension which was felt to be secondary to narcotics, dehydration as well as blood loss.  She had hypoxia with exertion. CT of chest on 9/23/21 revealed no CT evidence of pulmonary embolism, but scattered bilateral airspace opacities were noted and believed to be most likely representing multifocal pneumonia.  Covid 19 pneumonia was excluded. She was allowed weightbearing as tolerated on both lower extremities and on Aspirin and SCDs for DVT prophylaxis. She was apparently offered SNF placement per her records but she declined it.  She was medically cleared for discharge on Ceftin 500mg BID and Zithromax 500mg daily x 5 days and with instructions for follow-up care and discharged to home on 9/23/21 with home care.  Subsequently, she presented to the ER at [facility] on 9/24/21 with increased shortness of breath and was diagnosed with  "aspiration pneumonia.  She was intubated on 9/25/21 and eventually extubated on 10/1/21. She reports she was on oxygen in the acute care hospital and will try to wean her off oxygen as possible.  She has had multiple blisters on both knees.  On 10/5/21, hemoglobin was 8.5, WBC count 6.9, platelets were 545, BUN was 13 and creatinine was 0.6.  She has been needing assistance for mobility, transfers, self-care. She is transferred to [facility] on 10/6/21 for further rehabilitation care.     SUBJ: Discussed her progress in therapies with therapists in team meeting today. She will be discharged home later this week per her request. Family training to be done with her  prior to discharge.    ROS: Denies chest pain or shortness of breath. Other ROS negative. Past, family, social history is unchanged.    Physical Exam      Blood pressure (!) 157/66, pulse 86, temperature 37 °C (98.6 °F), temperature source Oral, resp. rate 18, height 1.575 m (5' 2.01\"), weight 63.9 kg (140 lb 14 oz), SpO2 100 %, not currently breastfeeding.  Body mass index is 25.76 kg/m².    General Appearance: Not in acute distress  Head/Ear/Nose/Throat: Normocephalic; Atraumatic.   Eye: EOMI; PERRL.   Neck: No JVD; No Bruits.   Respiratory: Decreased breath sounds at bases.   Cardiovascular: RRR; Normal S1, S2.   Gastrointestinal: Soft; NT; +BS.   Extremities: Bilateral lower extremity edema noted.  Healing incisions noted on anterior aspect of both knees.  She has multiple large blisters present on both knees, with the ones on the right knee drying out.  Dressing is present on the blisters on left knee. She is able to do active straight leg raise more easily on the right and with some difficulty on the left.  Musculoskeletal: Functional active range of motion in both upper extremities.  Some limitation of active range of motion in both hips and knees, limited by pain.    Neurological: AAO ×3. Speech is fluent. Cranial nerve " examination does not reveal any gross facial asymmetry. Strength testing about 4+/5 strength in both upper extremities.  Bilateral hip flexion is 3/5.  Right quadriceps is 3+/5, left quadriceps is 3/5.  Bilateral ankle dorsi and plantar flexion are 4/5.  She is grossly able to localize touch and position sense in her toes.  Deep tendon reflexes are hypoactive and symmetric bilaterally.  Plantars are flexor.  Coordination is functional upper extremities.  Both knee jerks were not tested.  Behavior/Emotional: Appropriate; Cooperative.   Skin: No obvious rashes noted.  Bilateral knee incisions healing.  She has multiple large blisters present on both knees, with the ones on the right knee drying out.  Dressing is present on the blisters on left knee.  She has bruising noted in both lower extremities including her feet after recent bilateral knee replacements.      Current Facility-Administered Medications:   •  acetaminophen (TYLENOL) tablet 650 mg, 650 mg, oral, With meals & nightly, Fito Michael MD, 650 mg at 10/12/21 2027  •  acetaminophen (TYLENOL) tablet 650 mg, 650 mg, oral, q4h PRN, Fito Michael MD  •  acetaminophen (TYLENOL) tablet 650 mg, 650 mg, oral, q4h PRN, Fito Michael MD, 650 mg at 10/11/21 1219  •  albuterol HFA (VENTOLIN HFA) 90 mcg/actuation inhaler 2 puff, 2 puff, inhalation, q4h PRN, Fito Michael MD  •  albuterol HFA (VENTOLIN HFA) 90 mcg/actuation inhaler 2 puff, 2 puff, inhalation, QID (6a, 10a, 2p, 6p), Noé Nunez MD, 2 puff at 10/12/21 1654  •  alum-mag hydroxide-simeth (MAALOX) 200-200-20 mg/5 mL suspension 30 mL, 30 mL, oral, q4h PRN, Fito Michael MD  •  ascorbic acid (VITAMIN C) tablet 250 mg, 250 mg, oral, BID AC, Noé Nunez MD, 250 mg at 10/12/21 1652  •  aspirin chewable tablet 81 mg, 81 mg, oral, BID, Fito Michael MD, 81 mg at 10/12/21 2027  •  [Provider Managed Hold] atorvastatin (LIPITOR) tablet 40 mg, 40 mg, oral, Daily (6p), Enrique  MD Noé, 40 mg at 10/08/21 1736  •  bisacodyL (DULCOLAX) 10 mg suppository 10 mg, 10 mg, rectal, Daily PRN, Fito Michael MD  •  busPIRone (BUSPAR) tablet 10 mg, 10 mg, oral, With meals & nightly, Fito Michael MD, 10 mg at 10/12/21 2026  •  camphor-methyl salicyl-menthoL (MUSCLE RUB) cream, , Topical, 2x daily PRN, Gildardo Jacobsen DPM, Given at 10/11/21 0827  •  cholecalciferol (vitamin D3) tablet 2,500 Units, 2,500 Units, oral, Daily, Kvng Francisco MD, 2,500 Units at 10/12/21 0758  •  enoxaparin (LOVENOX) syringe 40 mg, 40 mg, subcutaneous, Daily (6p), Noé Nunez MD, 40 mg at 10/12/21 1655  •  famotidine (PEPCID) tablet 20 mg, 20 mg, oral, Nightly, Fito Michael MD, 20 mg at 10/12/21 2026  •  ferrous sulfate tablet 325 mg, 325 mg, oral, BID with meals, Noé Nunez MD, 325 mg at 10/12/21 1652  •  fluticasone furoate-vilanteroL (BREO ELLIPTA) 200-25 mcg/dose powder for inhalation 1 puff, 1 puff, inhalation, Daily, Noé Nunez MD, 1 puff at 10/12/21 0807  •  furosemide (LASIX) tablet 40 mg, 40 mg, oral, Daily (1p), Noé Nunez MD, 40 mg at 10/12/21 1229  •  guaiFENesin (MUCINEX) 12 hr ER tablet 600 mg, 600 mg, oral, BID, Noé Nunez MD, 600 mg at 10/12/21 2027  •  HYDROmorphone (DILAUDID) tablet 2 mg, 2 mg, oral, q4h PRN, Fito Michael MD, 2 mg at 10/08/21 1422  •  lamoTRIgine (LAMICTAL XR) extended release tablet 400 mg, 400 mg, oral, Daily, Fito Michael MD, 400 mg at 10/12/21 0806  •  lidocaine (ASPERCREME) 4 % topical patch 2 patch, 2 patch, Topical, Daily, Fito Michael MD, 2 patch at 10/12/21 0800  •  meloxicam (MOBIC) tablet 7.5 mg, 7.5 mg, oral, Daily, Fito Michael MD, 7.5 mg at 10/12/21 0759  •  montelukast (SINGULAIR) tablet 10 mg, 10 mg, oral, Nightly, Fito Michael MD, 10 mg at 10/12/21 2026  •  multivit-min-iron-folic-vit K1 (CENTRUM) chewable tablet 1 tablet, 1 tablet, oral, Daily, Fito Michael MD, 1 tablet at 10/12/21 0758  •   nortriptyline (PAMELOR) capsule 75 mg, 75 mg, oral, Nightly, Fito Michael MD, 75 mg at 10/11/21 2040  •  pantoprazole (PROTONIX) tablet,delayed release (DR/EC) 40 mg, 40 mg, oral, Daily before breakfast, Fito Michael MD, 40 mg at 10/12/21 0759  •  polyethylene glycol (MIRALAX) 17 gram packet 17 g, 17 g, oral, Daily PRN, Fito Michael MD  •  rOPINIRole (REQUIP) tablet 0.5 mg, 0.5 mg, oral, Daily with lunch, Fito Michael MD, 0.5 mg at 10/12/21 1142  •  rOPINIRole (REQUIP) tablet 1 mg, 1 mg, oral, Daily with dinner, Fito Michael MD, 1 mg at 10/12/21 1652  •  rOPINIRole (REQUIP) tablet 1 mg, 1 mg, oral, Daily, Fito Michael MD, 1 mg at 10/12/21 0801  •  rOPINIRole (REQUIP) tablet 2 mg, 2 mg, oral, Nightly, Fito Michael MD, 2 mg at 10/12/21 2026  •  sennosides-docusate sodium (SENOKOT-S) 8.6-50 mg per tablet 2 tablet, 2 tablet, oral, BID, Noé Nunez MD, 2 tablet at 10/10/21 0852  •  sertraline (ZOLOFT) tablet 300 mg, 300 mg, oral, Daily, Fito Michael MD, 300 mg at 10/12/21 0758       Labs / Radiology    Lab Results   Component Value Date    WBC 5.82 10/12/2021    HGB 7.9 (L) 10/12/2021    HCT 25.5 (L) 10/12/2021    MCV 94.8 10/12/2021     (H) 10/12/2021     Lab Results   Component Value Date    GLUCOSE 90 10/12/2021    CALCIUM 8.3 (L) 10/12/2021     10/12/2021    K 4.4 10/12/2021    CO2 29 10/12/2021     10/12/2021    BUN 8 10/12/2021    CREATININE 0.5 (L) 10/12/2021     Assessment and Plan    ASSESSMENT PLAN:  1. 60-year-old right handed white female with chronic conditions significant for polyarticular degenerative arthritis, hypertension, hyperlipidemia, seizure disorder, anxiety, depression, asthma, restless leg syndrome had elective bilateral total knee replacements secondary to degenerative arthritis of both knees by Dr. Bennett Em at [facility] on 9/20/21. Postoperatively, on the way from the PACU to the floor or soon after  arriving on the floor, the patient did sustain a seizure and she does have a history of seizure disorder but had not had one in about a decade.  She was seen by neurologist at [facility] after her seizure.  Postoperative course was significant for hypotension which was felt to be secondary to narcotics, dehydration as well as blood loss.  She had hypoxia with exertion. CT of chest on 9/23/21 revealed no CT evidence of pulmonary embolism, but scattered bilateral airspace opacities were noted and believed to be most likely representing multifocal pneumonia.  Covid 19 pneumonia was excluded. She was allowed weightbearing as tolerated on both lower extremities and on Aspirin and SCDs for DVT prophylaxis. She was apparently offered SNF placement per her records but she declined it.  She was medically cleared for discharge on Ceftin 500mg BID and Zithromax 500mg daily x 5 days and with instructions for follow-up care and discharged to home on 9/23/21 with home care.  Subsequently, she presented to the ER at [facility] on 9/24/21 with increased shortness of breath and was diagnosed with aspiration pneumonia.  She was intubated on 9/25/21 and eventually extubated on 10/1/21. She reports she was on oxygen in the acute care hospital and will try to wean her off oxygen as possible.  She has had multiple blisters on both knees.  On 10/5/21, hemoglobin was 8.5, WBC count 6.9, platelets were 545, BUN was 13 and creatinine was 0.6.  She has been needing assistance for mobility, transfers, self-care. She is transferred to [facility] on 10/6/21 for further rehabilitation care.     2. DVT prophylaxis - on Aspirin.  On SCDs.  Check doppler.  Platelets 213 on 9/22/2021. Platelets 643 on 10/8/2021.     3.  Deconditioning - status post recent bilateral total knee replacements, recent pneumonia, ventilator-dependent respiratory failure, seizure disorder, deconditioning, multiple medical problems -  continue PT, OT, psychology.  Follow falls precautions, cardiac precautions, monitor pulse oximetry in therapy.     4. GI - On Pepcid for GI prophylaxis.  On Protonix.  Continue Colace, Senokot.  On PRN MiraLAX.  On PRN Dulcolax suppository. On PRN Maalox.       5.  - voiding.  Denies dysuria.  Monitor post residuals.     6.  Seizure disorder -on Lamictal XR.  Follow seizure precautions.     7. Pain - on Mobic.  On Tylenol.  On PRN Dilaudid.  Ice to knees.  On topical Lidoderm patches.     8. Hematology -  Anemia - on MVI.  Hemoglobin 8.5, WBC 10.36 on 9/22/2021. Hemoglobin 7.7, WBC 8.71 on 10/8/2021.     9.  Psychiatry - history of anxiety and depression - on BuSpar.  On Zoloft.  On Pamelor.     10.  Pulmonary - H/O asthma, recent pneumonia, ventilator-dependent respiratory failure.  On Singulair.  On Breo Ellipta.  On PRN Ventolin HFA.     11.  Restless leg syndrome - on Requip.     12. Skin - bilateral knee incisions stable.  Multiple blisters noted on both incisions. Blisters bilateral knee incisions are drying up.     13.  Hyperlipidemia - on Lipitor.     14. F/E/N - On MVI. On vitamin C.       15.  Rehabilitation medicine - Continue comprehensive rehabilitation care. Continue PT, OT, psychology.  We will follow falls precautions, cardiac precautions, monitor pulse oximetry in therapy and follow aspiration precautions.Slept well last night.  Tolerating therapies per endurance.  Denies increased pain.  Had a bowel movement.  Voiding well.  Inspected bilateral knee incisions which are stable.  She did not have lab work today, labs will be drawn tomorrow per nursing.   Feels better today. Denies increased pain. Inspected knee incisions are stable. Encourage incentive spirometry and deep breathing exercises.She indicates she wants to go home sooner sometime this week. Will discuss in team tomorrow. Discussed with patient. Inspected knee incisions which are stable. Blisters around both incisions are drying  up.Discussed her progress in therapies with therapists in team meeting today. She will be discharged home later this week per her request. Family training to be done with her  prior to discharge.     16. Reviewed labs today.  BUN 13, creatinine 0.6 on 10/5/2021. BUN 7, creatinine 0.5 on 10/8/2021.           Fito Michael MD  10/12/2021

## 2021-10-13 NOTE — PROGRESS NOTES
NEPHROLOGY PROGRESS NOTE    Subjective:   Pt seen in f/u re: hypocalcemia.  No prior history of low calcium reported      Review of Systems   Constitutional: Negative for fever.   Respiratory: Positive for shortness of breath (on O2 ).    Gastrointestinal: Negative for diarrhea.   Endocrine: Negative for polyuria.   Genitourinary: Negative for difficulty urinating.   Musculoskeletal: Positive for gait problem.   Skin: Positive for wound (knees).   Neurological: Positive for weakness (lower legs). Negative for dizziness and tremors.   Psychiatric/Behavioral: Negative for confusion.       Vitals:    10/13/21 1310 10/13/21 1325 10/13/21 1410 10/13/21 1533   BP:   117/62 136/61   BP Location:   Left upper arm Right upper arm   Patient Position:   Sitting Sitting   Pulse:  92 88 86   Resp:    18   Temp:    36.6 °C (97.9 °F)   TempSrc:    Oral   SpO2: 94% 96% 96% 95%   Weight:       Height:           No intake or output data in the 24 hours ending 10/13/21 1741    Physical Exam  Constitutional:       General: She is not in acute distress.  HENT:      Head: Atraumatic.      Mouth/Throat:      Mouth: Mucous membranes are moist.   Eyes:      Pupils: Pupils are equal, round, and reactive to light.   Cardiovascular:      Rate and Rhythm: Normal rate and regular rhythm.   Pulmonary:      Effort: Pulmonary effort is normal.      Breath sounds: No rales.      Comments: Diminished breath sounds bases  Abdominal:      Palpations: Abdomen is soft.      Tenderness: There is no abdominal tenderness.   Musculoskeletal:         General: Swelling (reggie-articular knees) present.      Cervical back: Neck supple.   Skin:     General: Skin is warm.      Capillary Refill: Capillary refill takes less than 2 seconds.   Neurological:      Mental Status: She is alert and oriented to person, place, and time.   Psychiatric:         Mood and Affect: Mood normal.         Thought Content: Thought content normal.         Judgment: Judgment normal.          LABS:  Results from last 7 days   Lab Units 10/12/21  0553   SODIUM mEQ/L 140   POTASSIUM mEQ/L 4.4   CHLORIDE mEQ/L 102   CO2 mEQ/L 29   BUN mg/dL 8   CREATININE mg/dL 0.5*   EGFR mL/min/1.73m*2 >60.0   GLUCOSE mg/dL 90   CALCIUM mg/dL 8.3*   ALBUMIN g/dL 2.6*   WBC K/uL 5.82   HEMOGLOBIN g/dL 7.9*   HEMATOCRIT % 25.5*   PLATELETS K/uL 693*       Meds:    Current Facility-Administered Medications:   •  acetaminophen (TYLENOL) tablet 650 mg, 650 mg, oral, With meals & nightly, Fito Michael MD, 650 mg at 10/13/21 1702  •  acetaminophen (TYLENOL) tablet 650 mg, 650 mg, oral, q4h PRN, Fito Michael MD  •  acetaminophen (TYLENOL) tablet 650 mg, 650 mg, oral, q4h PRN, Fito Michael MD, 650 mg at 10/11/21 1219  •  albuterol HFA (VENTOLIN HFA) 90 mcg/actuation inhaler 2 puff, 2 puff, inhalation, q4h PRN, Fito Michael MD  •  albuterol HFA (VENTOLIN HFA) 90 mcg/actuation inhaler 2 puff, 2 puff, inhalation, QID (6a, 10a, 2p, 6p), Noé Nunez MD, 2 puff at 10/13/21 1709  •  alum-mag hydroxide-simeth (MAALOX) 200-200-20 mg/5 mL suspension 30 mL, 30 mL, oral, q4h PRN, Fito Michael MD  •  ascorbic acid (VITAMIN C) tablet 250 mg, 250 mg, oral, BID AC, Noé Nunez MD, 250 mg at 10/13/21 1704  •  aspirin chewable tablet 81 mg, 81 mg, oral, BID, Fito Michael MD, 81 mg at 10/13/21 0802  •  [Provider Managed Hold] atorvastatin (LIPITOR) tablet 40 mg, 40 mg, oral, Daily (6p), Noé Nunez MD, 40 mg at 10/08/21 1736  •  bisacodyL (DULCOLAX) 10 mg suppository 10 mg, 10 mg, rectal, Daily PRN, Fito Michael MD  •  busPIRone (BUSPAR) tablet 10 mg, 10 mg, oral, With meals & nightly, Fito Michael MD, 10 mg at 10/13/21 1704  •  camphor-methyl salicyl-menthoL (MUSCLE RUB) cream, , Topical, 2x daily PRN, Gildardo Jacobsen DPM, Given at 10/11/21 0827  •  cholecalciferol (vitamin D3) tablet 2,500 Units, 2,500 Units, oral, Daily, Kvng Francisco MD, 2,500 Units at 10/13/21 0801  •   enoxaparin (LOVENOX) syringe 40 mg, 40 mg, subcutaneous, Daily (6p), Noé Nunez MD, 40 mg at 10/13/21 1704  •  famotidine (PEPCID) tablet 20 mg, 20 mg, oral, Nightly, Fito Michael MD, 20 mg at 10/12/21 2026  •  ferrous sulfate tablet 325 mg, 325 mg, oral, BID with meals, Noé Nunez MD, 325 mg at 10/13/21 1705  •  fluticasone furoate-vilanteroL (BREO ELLIPTA) 200-25 mcg/dose powder for inhalation 1 puff, 1 puff, inhalation, Daily, Noé Nunez MD, 1 puff at 10/13/21 0805  •  furosemide (LASIX) tablet 40 mg, 40 mg, oral, Daily (1p), Noé Nunez MD, 40 mg at 10/13/21 1244  •  guaiFENesin (MUCINEX) 12 hr ER tablet 600 mg, 600 mg, oral, BID, Noé Nunez MD, 600 mg at 10/13/21 0758  •  HYDROmorphone (DILAUDID) tablet 2 mg, 2 mg, oral, q4h PRN, Fito Michael MD, 2 mg at 10/08/21 1422  •  lamoTRIgine (LAMICTAL XR) extended release tablet 400 mg, 400 mg, oral, Daily, Fito Michael MD, 400 mg at 10/13/21 0806  •  lidocaine (ASPERCREME) 4 % topical patch 2 patch, 2 patch, Topical, Daily, Fito Michael MD, 2 patch at 10/13/21 0802  •  meloxicam (MOBIC) tablet 7.5 mg, 7.5 mg, oral, Daily, Fito Michael MD, 7.5 mg at 10/13/21 0803  •  montelukast (SINGULAIR) tablet 10 mg, 10 mg, oral, Nightly, Fito Michael MD, 10 mg at 10/12/21 2026  •  multivit-min-iron-folic-vit K1 (CENTRUM) chewable tablet 1 tablet, 1 tablet, oral, Daily, Fito Michael MD, 1 tablet at 10/13/21 0758  •  nortriptyline (PAMELOR) capsule 75 mg, 75 mg, oral, Nightly, Fito Michael MD, 75 mg at 10/11/21 2040  •  pantoprazole (PROTONIX) tablet,delayed release (DR/EC) 40 mg, 40 mg, oral, Daily before breakfast, Fito Michael MD, 40 mg at 10/13/21 0809  •  polyethylene glycol (MIRALAX) 17 gram packet 17 g, 17 g, oral, Daily PRN, Fito Michael MD  •  rOPINIRole (REQUIP) tablet 0.5 mg, 0.5 mg, oral, Daily with lunch, Fito Michael MD, 0.5 mg at 10/13/21 1243  •  rOPINIRole (REQUIP) tablet  1 mg, 1 mg, oral, Daily with dinner, Fito Michael MD, 1 mg at 10/13/21 1703  •  rOPINIRole (REQUIP) tablet 1 mg, 1 mg, oral, Daily, Fito Michael MD, 1 mg at 10/13/21 0758  •  rOPINIRole (REQUIP) tablet 2 mg, 2 mg, oral, Nightly, Fito Michael MD, 2 mg at 10/12/21 2026  •  sennosides-docusate sodium (SENOKOT-S) 8.6-50 mg per tablet 2 tablet, 2 tablet, oral, BID, Noé Nunez MD, 2 tablet at 10/10/21 0852  •  sertraline (ZOLOFT) tablet 300 mg, 300 mg, oral, Daily, Fito Michael MD, 300 mg at 10/13/21 0759    ASSESSMENT:  Principal Problem:    Physical deconditioning  Active Problems:    Epilepsy (CMS/HCC)    HTN (hypertension)    HLD (hyperlipidemia)    Hypocalcemia    Depression    History of total knee arthroplasty, bilateral    Anemia    Asthma    Pulmonary edema      PLAN:  1. Hypocalcemia: corrected calcium WNL. 25 OH 23 c/w insufficiency. PTH 16 WNL.   - c/w vitamin D 2500 units daily as ordered.   - no further acute recommendations. Will sign off.     Brown Magallanes MD

## 2021-10-13 NOTE — PLAN OF CARE
Plan of Care Review  Plan of Care Reviewed With: patient  Progress: improving  Outcome Summary: alert and oriented x 3.     Incision healing without evidence of infection.   Pain managed using tylenol.     O2 continues @ 1-2 L via n/c.    Free from falls.

## 2021-10-13 NOTE — PROGRESS NOTES
Patient: Fide Amni  Location: [facility] [address]  MRN: [id number]  Today's date: 10/13/2021    History of Present Illness  Fide is a 60 y.o. female admitted on 10/6/2021 with Physical deconditioning [R53.81]. Principal problem is Physical deconditioning.   Mrs. Fide Amin is a 60 year old female who presented to [facility] on 9/20/21 for planned B TKA by Dr. Em. Pt suffered a seizure post op after 8 years of having no seizures. CT of chest completed 9/23 results reviewed with no CT evidence of pulmonary embolism.  Scattered bilateral airspace opacities noted and believed to be most likely representing multifocal pneumonia.  Covid 19 pneumonia was excluded. Patient was medically cleared for discharge 9/23/21 on ceftin 500mg BID and zithromax 500mg daily x 5 days and with instructions for f/u care.  Pt. Presented to [facility] on 9/24 for SOB and dx with aspiration PNA. Pt. Intubated on 9/25/21 and extubated on 10/1/21. Pt is now medically stable and admitted to [facility] for acute inpatient rehab.      Past Medical History  Fide has a past medical history of Arthritis, Asthma, Depression, Deviated septum, Epilepsy (CMS/HCC), GERD (gastroesophageal reflux disease), Hypertension, Lipid disorder, Motion sickness, and RLS (restless legs syndrome).       10/13/21 1301   Pain/Comfort/Sleep   Pain Charting Type Pain Assessment   Preferred Pain Scale number (Numeric Rating Pain Scale)   (0-10) Pain Rating: Rest 3   (0-10) Pain Rating: Activity 3   Pain Side/Orientation bilateral   Pain Body Location knee   Pain Description sore   Vital Signs   Heart Rate 88   Heart Rate Source Monitor   SpO2 95 %   Patient Activity At rest   Oxygen Therapy Supplemental oxygen   O2 Delivery Method Nasal cannula   O2 Flow Rate (L/min) 0.5 L/min      10/13/21 1306   Vital Signs   SpO2 (!) 84 %   Patient Activity Walking   Oxygen Therapy Supplemental oxygen   O2 Delivery Method Nasal cannula   O2 Flow Rate (L/min) 0.5  L/min        10/13/21 1310   Vital Signs   SpO2 94 %   Patient Activity At rest   Oxygen Therapy Supplemental oxygen   O2 Delivery Method Nasal cannula   O2 Flow Rate (L/min) 1 L/min      10/13/21 1325   Pain/Comfort/Sleep   Pain Charting Type Pain Reassessment   Preferred Pain Scale number (Numeric Rating Pain Scale)   (0-10) Pain Rating: Rest 3   Pain Side/Orientation left   Pain Body Location knee   Pain Description sore   Pain Management Interventions position adjusted   Vital Signs   Heart Rate 92   Heart Rate Source Monitor   SpO2 96 %   Patient Activity At rest   Oxygen Therapy Supplemental oxygen   O2 Delivery Method Nasal cannula   O2 Flow Rate (L/min) 1 L/min       Prior Living Environment      Most Recent Value   People in Home spouse  [4 kids]   Current Living Arrangements home   Home Accessibility stairs to enter home (Group),  stairs within home (Group)  [2+1 STEPHANY, ff inside, [place] 1st floor]   Living Environment Comment Pt lives in 2 . Pt reports two small steps to enter and FF to 2nd floor. Pt reports plans to have 1st floor set-up. Pt has guest room on 1st floor c 1/2 bath. Pt reports purchased commode and 2 RTS c handles.   Number of Stairs, Main Entrance 2   Surface of Stairs, Main Entrance concrete   Stair Railings, Main Entrance none   Location, Patient Bedroom second floor, must negotiate stairs to access   Patient Bedroom Access Comment Ability to have first floor set up   Location, Bathroom first (main) floor,  second floor, must negotiate stairs to access   Bathroom Access Comment 2nd floor FB c shower stall. (+) grab bar in shower stall. Pt reports has built in shower bench in stall.   Number of Stairs, Within Home, Primary other (see comments)  [7+7]   Surface of Stairs, Within Home, Primary hardwood   Stair Railings, Within Home, Primary railings on both sides of stairs   Landing, Stairs, Within Home, Primary railings present          Prior Level of Function      Most Recent Value    Dominant Hand right   Ambulation independent   Transferring independent   Toileting independent   Bathing independent   Dressing independent   Prior Level of Function Comment Pt was d/c'd home for one day after surgery using RW.   Assistive Device Currently Used at Home commode, 3-in-1,  raised toilet,  walker, front-wheeled  [RTS c handles]           10/13/21 1305   PT Time Calculation   Start Time 1300   Stop Time 1330   Time Calculation (min) 30 min   Session Details   Document Type daily treatment/progress note   Mode of Treatment individual therapy; physical therapy   General Information   Patient Profile Reviewed yes   General Observations of Patient pt received in  in gym, on 0.5L of O2   Sit to Stand Transfer   [place], Sit to Stand Transfer supervision   Verbal Cues safety   Assistive Device walker, front-wheeled   Comment from    Stand to Sit Transfer   [place], Stand to Sit Transfer supervision   Verbal Cues safety   Assistive Device walker, front-wheeled   Comment to    Stand Pivot Transfer   [place], Stand Pivot/Stand Step Transfer supervision   Verbal Cues technique   Assistive Device walker, front-wheeled   Comment via amb approach with RW to    Gait Training   [place], Gait close supervision   Assistive Device walker, front-wheeled   Distance in Feet 90 feet   Pattern (Gait) step-through   Deviations/Abnormal Patterns (Gait) tan decreased; gait speed decreased; step length decreased; stride length decreased   Comment (Gait/Stairs) 90' w/ RW on 0.5 L O2, SpO2 82% upon sitting, inc to 1L and SpO2 94% in 1 minute. PT managing O2 tank   10 Meter Walk Test (Self-Selected Velocity)   Trial Two: Ten Meter Walk Test (sec) 7.9 seconds   Trial One: Ten Meter Walk Test (sec) 8.7 seconds   Trial One: Gait Speed (m/s) 0.69 m/s   Trial Two: Gait Speed (m/s) 0.76 m/s   Average Gait Speed (m/s): Two Trials 0.73 m/s   Lower Extremity (Therapeutic Exercise)   Exercise Position/Type  "seated; standing   General Exercise bilateral; LAQ (long arc quad); partial squats; heel raises; knee flexion   Range of Motion Exercises bilateral; hip flexion/extension; knee flexion/extension   Comment 1) Seated: LAQ 2x10 3\" iso hold and eccentric lowering 2) Standing TE: Partial squats 2x10, Heel raises 2x10, HS Curls x10/side   Daily Progress Summary (PT)   Daily Outcome Statement Pt was received on 0.5L of O2 and SpO2 95% at rest. After ambulation SpO2 dropped to 82% and pt was notably SOB as comapred to when on 1L, despite pt subjectively reporting it did not feel different. O2 was increased to 1L for the rest of the session and SpO2 remained >90%. Pt's comfortable gait speed assessed with 10MWT, and pt improved from 0.28 m/s to 0.73 m/s. This gait speed is still below the age/gender matched norm of 1.2 m/s and indicates functional mobility impairments.                   IRF PT Goals      Most Recent Value   Bed Mobility Goal 1    Activity/Assistive Device bed mobility activities, all,  no assistive device at 10/07/2021 1003   [place] modified independence at 10/07/2021 1003   Time Frame 1 week at 10/07/2021 1003   Strategies/Barriers anxiousness, decreased safety awareness and carry-over, supplimental O2 at 10/11/2021 1754   Progress/Outcome good progress toward goal,  goal partially met at 10/11/2021 1754   Transfer Goal 1    Activity/Assistive Device sit-to-stand/stand-to-sit,  stand pivot,  car transfer,  walker, front-wheeled at 10/07/2021 1003   [place] supervision required,  set-up required at 10/07/2021 1003   Time Frame 1 week at 10/07/2021 1003   Strategies/Barriers anxiousness, decreased safety awareness and carry-over, supplimental O2, deconditioning at 10/11/2021 1754   Progress/Outcome continuing progress toward goal,  goal ongoing at 10/11/2021 1754   Transfer Goal 2    Activity/Assistive Device sit-to-stand/stand-to-sit,  stand pivot,  car transfer,  walker, front-wheeled at " 10/07/2021 1003   [place] modified independence at 10/07/2021 1003   Time Frame 14 days or less at 10/07/2021 1003   Strategies/Barriers anxiousness, decreased safety awareness and carry-over, supplimental O2, deconditioning at 10/11/2021 1754   Progress/Outcome goal ongoing at 10/11/2021 1754   Gait/Walking Locomotion Goal 1    Activity/Assistive Device assistive device use,  backward stepping,  decrease fall risk,  diminish gait deviation,  forward stepping,  improve balance and speed,  increase endurance/gait distance,  increase energy conservation,  normalize weight shifts,  turning, left,  turning, right,  walker, front-wheeled at 10/07/2021 1003   Distance 150 feet at 10/07/2021 1003   [place] supervision required at 10/07/2021 1003   Time Frame 1 week at 10/07/2021 1003   Strategies/Barriers anxiousness, decreased safety awareness and carry-over, supplimental O2, deconditioning at 10/11/2021 1754   Progress/Outcome continuing progress toward goal,  goal ongoing at 10/11/2021 1754   Gait/Walking Locomotion Goal 2    Activity/Assistive Device gait (walking locomotion),  assistive device use,  backward stepping,  decrease fall risk,  diminish gait deviation,  forward stepping,  improve balance and speed,  increase endurance/gait distance,  increase energy conservation,  normalize weight shifts,  sidestepping,  turning, left,  turning, right,  walker, front-wheeled at 10/07/2021 1003   Distance 300 feet at 10/07/2021 1003   [place] modified independence at 10/07/2021 1003   Time Frame 14 days or less at 10/07/2021 1003   Strategies/Barriers anxiousness, decreased safety awareness and carry-over, supplimental O2, deconditioning at 10/11/2021 1754   Progress/Outcome goal ongoing at 10/11/2021 1754   Stairs Goal 1    Activity/Assistive Device ascending stairs,  descending stairs,  step-to-step,  using handrail, right,  using handrail, left,  decrease fall risk,  improve balance and speed at 10/07/2021  1003   Number of Stairs 8 at 10/07/2021 1003   [place] supervision required at 10/07/2021 1003   Time Frame 1 week at 10/07/2021 1003   Strategies/Barriers anxiousness, decreased safety awareness and carry-over, supplimental O2 at 10/11/2021 1754   Progress/Outcome good progress toward goal,  goal partially met,  goal ongoing at 10/11/2021 1754   Stairs Goal 2    Activity/Assistive Device using handrail, left,  using handrail, right,  step-to-step,  decrease fall risk,  ascending stairs,  descending stairs,  improve balance and speed,  no assistive device at 10/07/2021 1003   Number of Stairs 14 at 10/07/2021 1003   [place] modified independence at 10/07/2021 1003   Time Frame 14 days or less at 10/07/2021 1003   Strategies/Barriers anxiousness, decreased safety awareness and carry-over, supplimental O2, deconditioning at 10/11/2021 1754   Progress/Outcome goal ongoing at 10/11/2021 1754   ROM Goal 1    ROM Goal 1 B knee ROM 0-90 at 10/07/2021 1003   Time Frame 14 days or less at 10/07/2021 1003   Strategies/Barriers anxiousness, decreased safety awareness and carry-over, supplimental O2 at 10/11/2021 1754   Progress/Outcome good progress toward goal,  goal ongoing at 10/11/2021 1754   ROM Goal 2    ROM Goal 2 B ankle DF ROM +10 degrees at 10/07/2021 1003   Time Frame 14 days or less at 10/07/2021 1003   Caregiver Training Goal 1    Caregiver Training Goal 1 Pt's  safe and I providing any necessary supervision/Assist with mobility tasks at 10/07/2021 1003   Time Frame 14 days or less at 10/07/2021 1003   Problem Specific Goal 1    Problem-Specific Goal 1 Pt weaned from supplemental O2 and tolerating > 92% with activity. at 10/07/2021 1003   Time Frame 14 days or less at 10/07/2021 1003   Strategies/Barriers anxiousness, decreased safety awareness and carry-over, supplimental O2 at 10/11/2021 1754   Progress/Outcome continuing progress toward goal,  goal ongoing at 10/11/2021 1754

## 2021-10-13 NOTE — PROGRESS NOTES
Patient: Fide Amin  Location: [facility] [address]  MRN: [id number]  Today's date: 10/13/2021    History of Present Illness  Fide is a 60 y.o. female admitted on 10/6/2021 with Physical deconditioning [R53.81]. Principal problem is Physical deconditioning.   Mrs. Fide Aimn is a 60 year old female who presented to [facility] on 9/20/21 for planned B TKA by Dr. Em. Pt suffered a seizure post op after 8 years of having no seizures. CT of chest completed 9/23 results reviewed with no CT evidence of pulmonary embolism.  Scattered bilateral airspace opacities noted and believed to be most likely representing multifocal pneumonia.  Covid 19 pneumonia was excluded. Patient was medically cleared for discharge 9/23/21 on ceftin 500mg BID and zithromax 500mg daily x 5 days and with instructions for f/u care.  Pt. Presented to [facility] on 9/24 for SOB and dx with aspiration PNA. Pt. Intubated on 9/25/21 and extubated on 10/1/21. Pt is now medically stable and admitted to [facility] for acute inpatient rehab.      Past Medical History  Fide has a past medical history of Arthritis, Asthma, Depression, Deviated septum, Epilepsy (CMS/HCC), GERD (gastroesophageal reflux disease), Hypertension, Lipid disorder, Motion sickness, and RLS (restless legs syndrome).      OT Vitals    Date/Time Pulse SpO2 Pt Activity O2 Therapy O2 Del Method O2 Flow Rate BP BP Location BP Method Pt Position [facility]   10/13/21 1130 -- 97 % At rest Supplemental oxygen Nasal cannula 1 L/min -- -- -- -- JV   10/13/21 1158 85 96 % At rest Supplemental oxygen Nasal cannula 0.5 L/min 150/65 Right upper arm Automatic Sitting JV      OT Pain    Date/Time Pain Type Side/Orientation Location Rating: Rest Interventions Who   10/13/21 1158 Pain Reassessment bilateral knee 3 position adjusted JV          Prior Living Environment      Most Recent Value   People in Home spouse  [4 kids]   Current Living Arrangements home   Home Accessibility stairs to  "enter home (Group),  stairs within home (Group)  [2+1 STEPHANY, ff inside, [place] 1st floor]   Living Environment Comment Pt lives in 2 . Pt reports two small steps to enter and FF to 2nd floor. Pt reports plans to have 1st floor set-up. Pt has guest room on 1st floor c 1/2 bath. Pt reports purchased commode and 2 RTS c handles.   Number of Stairs, Main Entrance 2   Surface of Stairs, Main Entrance concrete   Stair Railings, Main Entrance none   Location, Patient Bedroom second floor, must negotiate stairs to access   Patient Bedroom Access Comment Ability to have first floor set up   Location, Bathroom first (main) floor,  second floor, must negotiate stairs to access   Bathroom Access Comment 2nd floor FB c shower stall. (+) grab bar in shower stall. Pt reports has built in shower bench in stall.   Number of Stairs, Within Home, Primary other (see comments)  [7+7]   Surface of Stairs, Within Home, Primary hardwood   Stair Railings, Within Home, Primary railings on both sides of stairs   Landing, Stairs, Within Home, Primary railings present          Prior Level of Function      Most Recent Value   Dominant Hand right   Ambulation independent   Transferring independent   Toileting independent   Bathing independent   Dressing independent   Prior Level of Function Comment Pt was d/c'd home for one day after surgery using RW.   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   Occupational History/Life Experiences Pt lives at home in [facility] c  ([place]). Pt reports  also retired. Pt is a . Pt has epilepsy and hx of last seizure 2011 then had a seizure after BKA surgery 9/2021.   Patient Goals \"To get well enough to be able to go home and get PT there\"           IRF OT " Evaluation and Treatment - 10/13/21 1130        OT Time Calculation    Start Time 1130     Stop Time 1200     Time Calculation (min) 30 min        Session Details    Document Type daily treatment/progress note     Mode of Treatment occupational therapy; individual therapy        General Information    General Observations of Patient Pt pleasant and agreeable to therapy        Sit to Stand Transfer    [place], Sit to Stand Transfer supervision     Verbal Cues safety     Assistive Device walker, front-wheeled        Stand to Sit Transfer    [place], Stand to Sit Transfer supervision     Assistive Device walker, front-wheeled        Stand Pivot Transfer    [place], Stand Pivot/Stand Step Transfer supervision     Verbal Cues technique     Assistive Device walker, front-wheeled     Comment Ambulatory approach to w/c        Safety Issues, Functional Mobility    Comment, Safety Issues/Impairments (Mobility) S ambulating c RW in greenhouse. OT managing oxygen.        Balance    Comment, Balance S standing balance while reaching slightly outside [place] to retrieve plants and place into RW basket. Good carryover of safe RW positionoing c task.        Motor Skills    Functional Endurance Pt demo's good UE functional endurance c completion of dish garden activity. Pt repotting three plants into larger pot. Pt manipulates task items to complete task s difficulty.        Therapeutic Interventions    Comment, Therapeutic Intervention Pt utilizing RW basket to retrieve plants previously selected from greenhouse. Pt utilizing RW basket to transport to table for completion of dish garden activity. Pt reports purchased RW bag for use at home.        Therapeutic Standing Program    Comments Standing tolerance x 15-20 mins while engaged in activities in [employer]. Pt ambulating and standing at table to complete dish garden activity. Pt demo's good tolerance requiring seated rest break following completion of activity. Pt  tolerating 0.5L of supplemental air, maintaining SpO2 >94% throuhgout.        Grooming    [place] Assistance washes, rinses and dries hands     [place] supervision     Position sink side; unsupported standing        Daily Progress Summary (OT)    Daily Outcome Statement Pt tolerating decrease in supplemental oxygen to 0.5 well this session. Pt continues to demo improving carryover of safe techniques c RW management and safety during ambulation, transfers and balance tasks.                           IRF OT Goals      Most Recent Value   Transfer Goal 1    Activity/Assistive Device toilet at 10/07/2021 0740   [place] supervision required at 10/07/2021 0740   Time Frame short-term goal (STG),  1 week at 10/11/2021 1511   Progress/Outcome goal ongoing at 10/11/2021 1511   Transfer Goal 2    Activity/Assistive Device toilet at 10/07/2021 0740   [place] modified independence at 10/07/2021 0740   Time Frame long-term goal (LTG),  14 days or less at 10/07/2021 0740   Progress/Outcome goal ongoing at 10/11/2021 1511   Transfer Goal 3    Activity/Assistive Device shower at 10/07/2021 0740   [place] supervision required at 10/07/2021 0740   Time Frame short-term goal (STG),  1 week at 10/11/2021 1511   Progress/Outcome goal ongoing at 10/11/2021 1511   Transfer Goal 4    Activity/Assistive Device shower at 10/07/2021 0740   [place] modified independence at 10/07/2021 0740   Time Frame long-term goal (LTG),  14 days or less at 10/07/2021 0740   Progress/Outcome goal ongoing at 10/11/2021 1511   Bathing Goal 1    Activity/Assistive Device bathing skills, all at 10/11/2021 1511   [place] set-up required at 10/11/2021 1511   Time Frame short-term goal (STG),  3 - 5 days at 10/11/2021 1511   Progress/Outcome goal met,  goal revised this date at 10/11/2021 1511   Bathing Goal 2    Activity/Assistive Device bathing skills, all at 10/07/2021 0740   [place] set-up required at 10/07/2021 0740   Time  Frame long-term goal (LTG),  14 days or less at 10/07/2021 0740   Progress/Outcome goal ongoing at 10/11/2021 1511   UB Dressing Goal 1    Activity/Assistive Device upper body dressing at 10/07/2021 0740   [place] supervision required at 10/11/2021 1511   Time Frame short-term goal (STG),  3 - 5 days at 10/11/2021 1511   Strategies/Barriers c clothing retrieval at 10/07/2021 0740   Progress/Outcome goal met,  goal revised this date at 10/11/2021 1511   UB Dressing Goal 2    [place] modified independence at 10/07/2021 0740   Time Frame long-term goal (LTG),  14 days or less at 10/07/2021 0740   Progress/Outcome goal ongoing at 10/11/2021 1511   LB Dressing Goal 1    [place] supervision required at 10/11/2021 1511   Time Frame short-term goal (STG),  3 - 5 days at 10/11/2021 1511   Strategies/Barriers c AE at 10/07/2021 0740   Progress/Outcome goal met,  goal revised this date at 10/11/2021 1511   LB Dressing Goal 2    [place] modified independence at 10/07/2021 0740   Time Frame long-term goal (LTG),  14 days or less at 10/07/2021 0740   Progress/Outcome goal ongoing at 10/11/2021 1511   Grooming Goal 1    [place] supervision required at 10/07/2021 0740   Time Frame short-term goal (STG),  1 week at 10/07/2021 0740   Strategies/Barriers standing at 10/07/2021 0740   Progress/Outcome goal met at 10/11/2021 1511   Grooming Goal 2    [place] modified independence at 10/07/2021 0740   Time Frame long-term goal (LTG),  14 days or less at 10/07/2021 0740   Progress/Outcome goal ongoing at 10/11/2021 1511   Toileting Goal 1    [place] minimum assist (75% or more patient effort) at 10/07/2021 0740   Time Frame short-term goal (STG),  1 week at 10/07/2021 0740   Progress/Outcome goal met at 10/11/2021 1511   Toileting Goal 2    [place] modified independence at 10/07/2021 0740   Time Frame long-term goal (LTG),  14 days or less at 10/07/2021 0740   Progress/Outcome goal ongoing at  10/11/2021 1515

## 2021-10-13 NOTE — PROGRESS NOTES
Patient: Fide Amin  Location: [facility] [address]  MRN: [id number]  Today's date: 10/13/2021    History of Present Illness  Fide is a 60 y.o. female admitted on 10/6/2021 with Physical deconditioning [R53.81]. Principal problem is Physical deconditioning.   Mrs. Fide Amin is a 60 year old female who presented to [facility] on 9/20/21 for planned B TKA by Dr. Em. Pt suffered a seizure post op after 8 years of having no seizures. CT of chest completed 9/23 results reviewed with no CT evidence of pulmonary embolism.  Scattered bilateral airspace opacities noted and believed to be most likely representing multifocal pneumonia.  Covid 19 pneumonia was excluded. Patient was medically cleared for discharge 9/23/21 on ceftin 500mg BID and zithromax 500mg daily x 5 days and with instructions for f/u care.  Pt. Presented to [facility] on 9/24 for SOB and dx with aspiration PNA. Pt. Intubated on 9/25/21 and extubated on 10/1/21. Pt is now medically stable and admitted to [facility] for acute inpatient rehab.      Past Medical History  Fide has a past medical history of Arthritis, Asthma, Depression, Deviated septum, Epilepsy (CMS/HCC), GERD (gastroesophageal reflux disease), Hypertension, Lipid disorder, Motion sickness, and RLS (restless legs syndrome).      PT Vitals    Date/Time Pulse HR Source SpO2 Pt Activity O2 Therapy O2 Del Method O2 Flow Rate BP BP Location BP Method Pt Position [facility]   10/13/21 1410 88 Monitor 96 % At rest Supplemental oxygen Nasal cannula 1 L/min 117/62 Left upper arm Automatic Sitting PF      PT Pain    Date/Time Pain Type Side/Orientation Location Rating: Rest Description Interventions [facility]   10/13/21 1410 Pain Assessment bilateral knee 2 - mild pain sore diversional activity provided PF   10/13/21 1429 Pain Reassessment left knee 2 - mild pain sore -- PF          Prior Living Environment      Most Recent Value   People in Home spouse  [4 kids]   Current Living Arrangements  home   Home Accessibility stairs to enter home (Group),  stairs within home (Group)  [2+1 STEPHANY, ff inside, AK 1st floor]   Living Environment Comment Pt lives in 2 . Pt reports two small steps to enter and FF to 2nd floor. Pt reports plans to have 1st floor set-up. Pt has guest room on 1st floor c 1/2 bath. Pt reports purchased commode and 2 RTS c handles.   Number of Stairs, Main Entrance 2   Surface of Stairs, Main Entrance concrete   Stair Railings, Main Entrance none   Location, Patient Bedroom second floor, must negotiate stairs to access   Patient Bedroom Access Comment Ability to have first floor set up   Location, Bathroom first (main) floor,  second floor, must negotiate stairs to access   Bathroom Access Comment 2nd floor FB c shower stall. (+) grab bar in shower stall. Pt reports has built in shower bench in stall.   Number of Stairs, Within Home, Primary other (see comments)  [7+7]   Surface of Stairs, Within Home, Primary hardwood   Stair Railings, Within Home, Primary railings on both sides of stairs   Landing, Stairs, Within Home, Primary railings present          Prior Level of Function      Most Recent Value   Dominant Hand right   Ambulation independent   Transferring independent   Toileting independent   Bathing independent   Dressing independent   Prior Level of Function Comment Pt was d/c'd home for one day after surgery using RW.   Assistive Device Currently Used at Home commode, 3-in-1,  raised toilet,  walker, front-wheeled  [RTS c handles]           [facility] PT Evaluation and Treatment - 10/13/21 1410        PT Time Calculation    Start Time 1400     Stop Time 1430     Time Calculation (min) 30 min        Session Details    Document Type daily treatment/progress note     Mode of Treatment individual therapy; physical therapy        General Information    Patient Profile Reviewed yes     General Observations of Patient received seated in wc in gym and agreeable to treatment        Transfers     "Transfers stand pivot transfer     Comment via ambulatory approach        Sit to Stand Transfer    [place], Sit to Stand Transfer supervision     Verbal Cues safety     Assistive Device walker, front-wheeled     Comment from wc with RW        Stand to Sit Transfer    [place], Stand to Sit Transfer supervision     Verbal Cues safety     Assistive Device walker, front-wheeled     Comment to wc with RW        Stand Pivot Transfer    [place], Stand Pivot/Stand Step Transfer supervision     Verbal Cues safety     Assistive Device walker, front-wheeled     Comment via ambulatory approach to wc with RW        Gait Training    [place], Gait close supervision     Assistive Device walker, front-wheeled     Distance in Feet 150 feet     Pattern (Gait) step-through     Deviations/Abnormal Patterns (Gait) tan decreased; gait speed decreased; step length decreased     Comment (Gait/Stairs) with RW req close S for safety and therapist pushing O2 tank, desatted to 84% but quickly recovered to 90% within 10 seconds, and to 95% within a minute rest. cues for pursed lip breathing        Lower Extremity (Therapeutic Exercise)    Exercise Position/Type seated; standing     General Exercise bilateral     Reps and Sets 2 x 10     Comment 1) AP 2) LAQ 3) seated knee raises 4) knee flex stretch with 8\" bolster 5) standing heel raises 6) standing alt toe taps on 4\" block        Daily Progress Summary (PT)    Daily Outcome Statement session focused on gait training and LE strengthening. Pt progressed increasing gait distance amb x 150 ft with RW req SBA and therapist pushing O2 tank. Desatted to 84% but quickly recovered to 90% within 10 seconds, then to 95% within a minute rest. Continue to progress as able.                           [facility] PT Goals      Most Recent Value   Bed Mobility Goal 1    Activity/Assistive Device bed mobility activities, all,  no assistive device at 10/07/2021 1003   [place] modified " independence at 10/07/2021 1003   Time Frame 1 week at 10/07/2021 1003   Strategies/Barriers anxiousness, decreased safety awareness and carry-over, supplimental O2 at 10/11/2021 1754   Progress/Outcome good progress toward goal,  goal partially met at 10/11/2021 1754   Transfer Goal 1    Activity/Assistive Device sit-to-stand/stand-to-sit,  stand pivot,  car transfer,  walker, front-wheeled at 10/07/2021 1003   [place] supervision required,  set-up required at 10/07/2021 1003   Time Frame 1 week at 10/07/2021 1003   Strategies/Barriers anxiousness, decreased safety awareness and carry-over, supplimental O2, deconditioning at 10/11/2021 1754   Progress/Outcome continuing progress toward goal,  goal ongoing at 10/11/2021 1754   Transfer Goal 2    Activity/Assistive Device sit-to-stand/stand-to-sit,  stand pivot,  car transfer,  walker, front-wheeled at 10/07/2021 1003   [place] modified independence at 10/07/2021 1003   Time Frame 14 days or less at 10/07/2021 1003   Strategies/Barriers anxiousness, decreased safety awareness and carry-over, supplimental O2, deconditioning at 10/11/2021 1754   Progress/Outcome goal ongoing at 10/11/2021 1754   Gait/Walking Locomotion Goal 1    Activity/Assistive Device assistive device use,  backward stepping,  decrease fall risk,  diminish gait deviation,  forward stepping,  improve balance and speed,  increase endurance/gait distance,  increase energy conservation,  normalize weight shifts,  turning, left,  turning, right,  walker, front-wheeled at 10/07/2021 1003   Distance 150 feet at 10/07/2021 1003   [place] supervision required at 10/07/2021 1003   Time Frame 1 week at 10/07/2021 1003   Strategies/Barriers anxiousness, decreased safety awareness and carry-over, supplimental O2, deconditioning at 10/11/2021 1754   Progress/Outcome continuing progress toward goal,  goal ongoing at 10/11/2021 1754   Gait/Walking Locomotion Goal 2    Activity/Assistive Device gait  (walking locomotion),  assistive device use,  backward stepping,  decrease fall risk,  diminish gait deviation,  forward stepping,  improve balance and speed,  increase endurance/gait distance,  increase energy conservation,  normalize weight shifts,  sidestepping,  turning, left,  turning, right,  walker, front-wheeled at 10/07/2021 1003   Distance 300 feet at 10/07/2021 1003   [place] modified independence at 10/07/2021 1003   Time Frame 14 days or less at 10/07/2021 1003   Strategies/Barriers anxiousness, decreased safety awareness and carry-over, supplimental O2, deconditioning at 10/11/2021 1754   Progress/Outcome goal ongoing at 10/11/2021 1754   Stairs Goal 1    Activity/Assistive Device ascending stairs,  descending stairs,  step-to-step,  using handrail, right,  using handrail, left,  decrease fall risk,  improve balance and speed at 10/07/2021 1003   Number of Stairs 8 at 10/07/2021 1003   [place] supervision required at 10/07/2021 1003   Time Frame 1 week at 10/07/2021 1003   Strategies/Barriers anxiousness, decreased safety awareness and carry-over, supplimental O2 at 10/11/2021 1754   Progress/Outcome good progress toward goal,  goal partially met,  goal ongoing at 10/11/2021 1754   Stairs Goal 2    Activity/Assistive Device using handrail, left,  using handrail, right,  step-to-step,  decrease fall risk,  ascending stairs,  descending stairs,  improve balance and speed,  no assistive device at 10/07/2021 1003   Number of Stairs 14 at 10/07/2021 1003   [place] modified independence at 10/07/2021 1003   Time Frame 14 days or less at 10/07/2021 1003   Strategies/Barriers anxiousness, decreased safety awareness and carry-over, supplimental O2, deconditioning at 10/11/2021 1754   Progress/Outcome goal ongoing at 10/11/2021 1754   ROM Goal 1    ROM Goal 1 B knee ROM 0-90 at 10/07/2021 1003   Time Frame 14 days or less at 10/07/2021 1003   Strategies/Barriers anxiousness, decreased safety  awareness and carry-over, supplimental O2 at 10/11/2021 1754   Progress/Outcome good progress toward goal,  goal ongoing at 10/11/2021 1754   ROM Goal 2    ROM Goal 2 B ankle DF ROM +10 degrees at 10/07/2021 1003   Time Frame 14 days or less at 10/07/2021 1003   Caregiver Training Goal 1    Caregiver Training Goal 1 Pt's  safe and I providing any necessary supervision/Assist with mobility tasks at 10/07/2021 1003   Time Frame 14 days or less at 10/07/2021 1003   Problem Specific Goal 1    Problem-Specific Goal 1 Pt weaned from supplemental O2 and tolerating > 92% with activity. at 10/07/2021 1003   Time Frame 14 days or less at 10/07/2021 1003   Strategies/Barriers anxiousness, decreased safety awareness and carry-over, supplimental O2 at 10/11/2021 1754   Progress/Outcome continuing progress toward goal,  goal ongoing at 10/11/2021 1754

## 2021-10-13 NOTE — PROGRESS NOTES
Patient: Fide Amin  Location: [facility] [address]  MRN: [id number]  Today's date: 10/13/2021    History of Present Illness  Fide is a 60 y.o. female admitted on 10/6/2021 with Physical deconditioning [R53.81]. Principal problem is Physical deconditioning.   Mrs. Fide Amin is a 60 year old female who presented to [facility] on 9/20/21 for planned B TKA by Dr. Em. Pt suffered a seizure post op after 8 years of having no seizures. CT of chest completed 9/23 results reviewed with no CT evidence of pulmonary embolism.  Scattered bilateral airspace opacities noted and believed to be most likely representing multifocal pneumonia.  Covid 19 pneumonia was excluded. Patient was medically cleared for discharge 9/23/21 on ceftin 500mg BID and zithromax 500mg daily x 5 days and with instructions for f/u care.  Pt. Presented to [facility] on 9/24 for SOB and dx with aspiration PNA. Pt. Intubated on 9/25/21 and extubated on 10/1/21. Pt is now medically stable and admitted to [facility] for acute inpatient rehab.      Past Medical History  Fide has a past medical history of Arthritis, Asthma, Depression, Deviated septum, Epilepsy (CMS/HCC), GERD (gastroesophageal reflux disease), Hypertension, Lipid disorder, Motion sickness, and RLS (restless legs syndrome).      PT Vitals    Date/Time Pulse HR Source SpO2 Pt Activity O2 Therapy O2 Del Method O2 Flow Rate BP BP Location BP Method Pt Position [facility]   10/13/21 0935 92 Monitor 97 % At rest Supplemental oxygen Nasal cannula 1 L/min 120/78 Right upper arm Automatic Sitting PLP   10/13/21 0955 93 Monitor 93 % At rest Supplemental oxygen Nasal cannula 1 L/min 144/67 Right upper arm Automatic Sitting PLP      PT Pain    Date/Time Pain Type Side/Orientation Location Rating: Rest Rating: Activity Description Interventions [facility]   10/13/21 0935 Pain Assessment bilateral knee 3 3 sore -- PLP   10/13/21 0955 Pain Reassessment bilateral knee 3 -- sore position adjusted PLP           Prior Living Environment      Most Recent Value   People in Home spouse  [4 kids]   Current Living Arrangements home   Home Accessibility stairs to enter home (Group),  stairs within home (Group)  [2+1 STEPHANY, ff inside, MA 1st floor]   Living Environment Comment Pt lives in 2 . Pt reports two small steps to enter and FF to 2nd floor. Pt reports plans to have 1st floor set-up. Pt has guest room on 1st floor c 1/2 bath. Pt reports purchased commode and 2 RTS c handles.   Number of Stairs, Main Entrance 2   Surface of Stairs, Main Entrance concrete   Stair Railings, Main Entrance none   Location, Patient Bedroom second floor, must negotiate stairs to access   Patient Bedroom Access Comment Ability to have first floor set up   Location, Bathroom first (main) floor,  second floor, must negotiate stairs to access   Bathroom Access Comment 2nd floor FB c shower stall. (+) grab bar in shower stall. Pt reports has built in shower bench in stall.   Number of Stairs, Within Home, Primary other (see comments)  [7+7]   Surface of Stairs, Within Home, Primary hardwood   Stair Railings, Within Home, Primary railings on both sides of stairs   Landing, Stairs, Within Home, Primary railings present          Prior Level of Function      Most Recent Value   Dominant Hand right   Ambulation independent   Transferring independent   Toileting independent   Bathing independent   Dressing independent   Prior Level of Function Comment Pt was d/c'd home for one day after surgery using RW.   Assistive Device Currently Used at Home commode, 3-in-1,  raised toilet,  walker, front-wheeled  [RTS c handles]           IRF PT Evaluation and Treatment - 10/13/21 0935        PT Time Calculation    Start Time 0930     Stop Time 1000     Time Calculation (min) 30 min        Session Details    Document Type daily treatment/progress note     Mode of Treatment individual therapy; physical therapy        General Information    Patient Profile  Reviewed yes     Patient/Family/Caregiver Comments/Observations pt's  present for family training     General Observations of Patient pt received in ILU in wc; pleasant and agreeable to family training; longer tubing added to O2 for ease of mobility        Caregiver Training    Caregiver(s) to be Trained spouse/significant other     Comment, Caregiver Training Plan Pt's , Michel, present for family training. He observed and participated in ambulation, elevations, bed mobility, transfers, and car transfer.        Bed Mobility    [place], Supine to Sit supervision     [place], Sit to Supine supervision     Assistive Device none     Comment (Bed Mobility) Sit<>supine with S on low bed to replicate cot that will be on pt’s first floor set-up. Pt’s  observed bed mobility and was edu on potential purchase of bedrail as needed.        Transfers    Comment pt's  present and observed all transfers        Sit to Stand Transfer    [place], Sit to Stand Transfer supervision     Verbal Cues safety     Assistive Device walker, front-wheeled     Comment from wc and EOB        Stand to Sit Transfer    [place], Stand to Sit Transfer supervision     Verbal Cues safety     Assistive Device walker, front-wheeled     Comment to wc and EOB        Stand Pivot Transfer    [place], Stand Pivot/Stand Step Transfer supervision     Verbal Cues technique     Assistive Device walker, front-wheeled     Comment via amb approach with RW several times throughout; S for safety        Car Transfer    Transfer Technique stand pivot     [place], Car Transfer close supervision     Verbal Cues safety; technique     Assistive Device walker, front-wheeled     Comment Pt transferred into 's car, both pt and spouse were edu on technique as well as managing O2 tank into backseat. Pt required VC for safe hand placement when exiting car.        Gait Training    [place], Gait close supervision      "Assistive Device walker, front-wheeled     Distance in Feet 15 feet     Pattern (Gait) step-through     Deviations/Abnormal Patterns (Gait) tan decreased; step length decreased; stride length decreased; gait speed decreased; antalgic     Comment (Gait/Stairs) Short distances completed with use of longer O2 tubing. Pt practiced managing the tubing and spouse practiced wheeling the tank while following pt.        Curb Negotiation    [place] touching/steadying assist     Assistive Device walker, 4-wheeled     Curb Height 8 inches   4\"+4\"    Comment ascended/descended 4\"+4\" curb step using RW, first trial with PT proving steady assist, second set hands on training with  providing steady assist. O2 tank with long tubing left at bottom of curb. Pt and spouse edu that if not using a portable tank on RW or in purse, tank can be left outside while navigating steps into the house as the lead should be long enough.        Stairs Training    [place], Stairs touching/steadying assist     Assistive Device railing     Handrail Location (Stairs) left side (ascending); right side (descending)     Number of Stairs 8     Stair Height 6 inches     Ascending Stairs Technique step-to-step   leading with RLE    Descending Stairs Technique step-to-step   leading with LLE    Comment Ascended/descended 8 steps using B UE on LHR ascending, RHR descending. First 4 steps PT provided steady assist at pelvis, last 4 steps pt's spouse provided steady assist with VC for proper technique from PT.        Daily Progress Summary (PT)    Daily Outcome Statement Pt's  present for family training and completed hands on practice for providing steady assist during elevations. Pt and spouse were educated on safe techniques for car transfer and how to safely manage the O2 tank. Family reports feeling more prepared for pt's DC home and did not have any further questions. No additional family training is necessary.                  "     Education Documentation  Guarding Techniques, Assist With Transfers/Ambulation, taught by Miriam Burton PT at 10/13/2021 12:46 PM.  Learner: Significant Other  Readiness: Eager  Method: Explanation, Demonstration  Response: Verbalizes Understanding, Demonstrated Understanding  Comment: Pt and  educated on guarding techniques for elevations and car transfer. Discussed awareness of O2 tube lining and potential options for longer tubing or more portable tanks.    Guarding Techniques, Assist With Transfers/Ambulation, taught by Miriam Burton PT at 10/13/2021 12:46 PM.  Learner: Patient  Readiness: Eager  Method: Explanation, Demonstration  Response: Verbalizes Understanding, Demonstrated Understanding  Comment: Pt and  educated on guarding techniques for elevations and car transfer. Discussed awareness of O2 tube lining and potential options for longer tubing or more portable tanks.          IRF PT Goals      Most Recent Value   Bed Mobility Goal 1    Activity/Assistive Device bed mobility activities, all,  no assistive device at 10/07/2021 1003   [place] modified independence at 10/07/2021 1003   Time Frame 1 week at 10/07/2021 1003   Strategies/Barriers anxiousness, decreased safety awareness and carry-over, supplimental O2 at 10/11/2021 1754   Progress/Outcome good progress toward goal,  goal partially met at 10/11/2021 1754   Transfer Goal 1    Activity/Assistive Device sit-to-stand/stand-to-sit,  stand pivot,  car transfer,  walker, front-wheeled at 10/07/2021 1003   [place] supervision required,  set-up required at 10/07/2021 1003   Time Frame 1 week at 10/07/2021 1003   Strategies/Barriers anxiousness, decreased safety awareness and carry-over, supplimental O2, deconditioning at 10/11/2021 1754   Progress/Outcome continuing progress toward goal,  goal ongoing at 10/11/2021 1754   Transfer Goal 2    Activity/Assistive Device sit-to-stand/stand-to-sit,  stand pivot,  car  transfer,  walker, front-wheeled at 10/07/2021 1003   [place] modified independence at 10/07/2021 1003   Time Frame 14 days or less at 10/07/2021 1003   Strategies/Barriers anxiousness, decreased safety awareness and carry-over, supplimental O2, deconditioning at 10/11/2021 1754   Progress/Outcome goal ongoing at 10/11/2021 1754   Gait/Walking Locomotion Goal 1    Activity/Assistive Device assistive device use,  backward stepping,  decrease fall risk,  diminish gait deviation,  forward stepping,  improve balance and speed,  increase endurance/gait distance,  increase energy conservation,  normalize weight shifts,  turning, left,  turning, right,  walker, front-wheeled at 10/07/2021 1003   Distance 150 feet at 10/07/2021 1003   [place] supervision required at 10/07/2021 1003   Time Frame 1 week at 10/07/2021 1003   Strategies/Barriers anxiousness, decreased safety awareness and carry-over, supplimental O2, deconditioning at 10/11/2021 1754   Progress/Outcome continuing progress toward goal,  goal ongoing at 10/11/2021 1754   Gait/Walking Locomotion Goal 2    Activity/Assistive Device gait (walking locomotion),  assistive device use,  backward stepping,  decrease fall risk,  diminish gait deviation,  forward stepping,  improve balance and speed,  increase endurance/gait distance,  increase energy conservation,  normalize weight shifts,  sidestepping,  turning, left,  turning, right,  walker, front-wheeled at 10/07/2021 1003   Distance 300 feet at 10/07/2021 1003   [place] modified independence at 10/07/2021 1003   Time Frame 14 days or less at 10/07/2021 1003   Strategies/Barriers anxiousness, decreased safety awareness and carry-over, supplimental O2, deconditioning at 10/11/2021 1754   Progress/Outcome goal ongoing at 10/11/2021 1754   Stairs Goal 1    Activity/Assistive Device ascending stairs,  descending stairs,  step-to-step,  using handrail, right,  using handrail, left,  decrease fall risk,   improve balance and speed at 10/07/2021 1003   Number of Stairs 8 at 10/07/2021 1003   [place] supervision required at 10/07/2021 1003   Time Frame 1 week at 10/07/2021 1003   Strategies/Barriers anxiousness, decreased safety awareness and carry-over, supplimental O2 at 10/11/2021 1754   Progress/Outcome good progress toward goal,  goal partially met,  goal ongoing at 10/11/2021 1754   Stairs Goal 2    Activity/Assistive Device using handrail, left,  using handrail, right,  step-to-step,  decrease fall risk,  ascending stairs,  descending stairs,  improve balance and speed,  no assistive device at 10/07/2021 1003   Number of Stairs 14 at 10/07/2021 1003   [place] modified independence at 10/07/2021 1003   Time Frame 14 days or less at 10/07/2021 1003   Strategies/Barriers anxiousness, decreased safety awareness and carry-over, supplimental O2, deconditioning at 10/11/2021 1754   Progress/Outcome goal ongoing at 10/11/2021 1754   ROM Goal 1    ROM Goal 1 B knee ROM 0-90 at 10/07/2021 1003   Time Frame 14 days or less at 10/07/2021 1003   Strategies/Barriers anxiousness, decreased safety awareness and carry-over, supplimental O2 at 10/11/2021 1754   Progress/Outcome good progress toward goal,  goal ongoing at 10/11/2021 1754   ROM Goal 2    ROM Goal 2 B ankle DF ROM +10 degrees at 10/07/2021 1003   Time Frame 14 days or less at 10/07/2021 1003   Caregiver Training Goal 1    Caregiver Training Goal 1 Pt's  safe and I providing any necessary supervision/Assist with mobility tasks at 10/07/2021 1003   Time Frame 14 days or less at 10/07/2021 1003   Problem Specific Goal 1    Problem-Specific Goal 1 Pt weaned from supplemental O2 and tolerating > 92% with activity. at 10/07/2021 1003   Time Frame 14 days or less at 10/07/2021 1003   Strategies/Barriers anxiousness, decreased safety awareness and carry-over, supplimental O2 at 10/11/2021 1754   Progress/Outcome continuing progress toward goal,  goal  ongoing at 10/11/2021 7996

## 2021-10-13 NOTE — PROGRESS NOTES
Patient: Fide Amin  Location: [facility] [address]  MRN: [id number]  Today's date: 10/13/2021    History of Present Illness  Fide is a 60 y.o. female admitted on 10/6/2021 with Physical deconditioning [R53.81]. Principal problem is Physical deconditioning.   Mrs. Fide Amin is a 60 year old female who presented to [facility] on 9/20/21 for planned B TKA by Dr. Em. Pt suffered a seizure post op after 8 years of having no seizures. CT of chest completed 9/23 results reviewed with no CT evidence of pulmonary embolism.  Scattered bilateral airspace opacities noted and believed to be most likely representing multifocal pneumonia.  Covid 19 pneumonia was excluded. Patient was medically cleared for discharge 9/23/21 on ceftin 500mg BID and zithromax 500mg daily x 5 days and with instructions for f/u care.  Pt. Presented to [facility] on 9/24 for SOB and dx with aspiration PNA. Pt. Intubated on 9/25/21 and extubated on 10/1/21. Pt is now medically stable and admitted to [facility] for acute inpatient rehab.      Past Medical History  Fide has a past medical history of Arthritis, Asthma, Depression, Deviated septum, Epilepsy (CMS/HCC), GERD (gastroesophageal reflux disease), Hypertension, Lipid disorder, Motion sickness, and RLS (restless legs syndrome).      OT Vitals    Date/Time Pulse SpO2 Pt Activity O2 Therapy O2 Del Method O2 Flow Rate BP BP Location BP Method Pt Position [facility]   10/13/21 0844 92 97 % At rest Supplemental oxygen Nasal cannula 1 L/min 130/61 Right upper arm Automatic Sitting JV      OT Pain    Date/Time Pain Type Side/Orientation Location Rating: Rest Rating: Rest Interventions [facility]   10/13/21 0844 Pain Assessment bilateral knee 3 -- premedicated for activity JV   10/13/21 0845 Pain Reassessment -- knee -- 2 - mild pain -- NV          Prior Living Environment      Most Recent Value   People in Home spouse  [4 kids]   Current Living Arrangements home   Home Accessibility stairs to  "enter home (Group),  stairs within home (Group)  [2+1 STEPHANY, ff inside, FL 1st floor]   Living Environment Comment Pt lives in 2 . Pt reports two small steps to enter and FF to 2nd floor. Pt reports plans to have 1st floor set-up. Pt has guest room on 1st floor c 1/2 bath. Pt reports purchased commode and 2 RTS c handles.   Number of Stairs, Main Entrance 2   Surface of Stairs, Main Entrance concrete   Stair Railings, Main Entrance none   Location, Patient Bedroom second floor, must negotiate stairs to access   Patient Bedroom Access Comment Ability to have first floor set up   Location, Bathroom first (main) floor,  second floor, must negotiate stairs to access   Bathroom Access Comment 2nd floor FB c shower stall. (+) grab bar in shower stall. Pt reports has built in shower bench in stall.   Number of Stairs, Within Home, Primary other (see comments)  [7+7]   Surface of Stairs, Within Home, Primary hardwood   Stair Railings, Within Home, Primary railings on both sides of stairs   Landing, Stairs, Within Home, Primary railings present          Prior Level of Function      Most Recent Value   Dominant Hand right   Ambulation independent   Transferring independent   Toileting independent   Bathing independent   Dressing independent   Prior Level of Function Comment Pt was d/c'd home for one day after surgery using RW.   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   Occupational History/Life Experiences Pt lives at home in [facility] c  ([place]). Pt reports  also retired. Pt is a . Pt has epilepsy and hx of last seizure 2011 then had a seizure after BKA surgery 9/2021.   Patient Goals \"To get well enough to be able to go home and get PT there\"           IRF OT " Evaluation and Treatment - 10/13/21 0845        OT Time Calculation    Start Time 0830     Stop Time 0930     Time Calculation (min) 60 min        Session Details    Document Type daily treatment/progress note     Mode of Treatment occupational therapy; individual therapy        General Information    General Observations of Patient Pt pleasant; received in bed.  present for family training.        Caregiver Training    Caregiver(s) to be Trained spouse/significant other     Caregiver Training Plan ambulation using assistive device; safe ADL techniques; transfer training     Comment, Caregiver Training Plan Pt's  present for family training. OT educating on safe/effective BADL and functional transfer techniques. Pt has RTS c handles for toilet transfer and built in shower bench in shower stall. OT recommending installation of grab bar at entrance to shower stall. OT providing education on current level of assist and anticipated level at d/c. OT providing demo for all transfers then pt completes c .        Sit to Stand Transfer    Patten, Sit to Stand Transfer close supervision     Verbal Cues technique     Assistive Device walker, front-wheeled        Stand to Sit Transfer    [place]atten, Stand to Sit Transfer close supervision     Verbal Cues technique     Assistive Device walker, front-wheeled        Stand Pivot Transfer    [place]atten, Stand Pivot/Stand Step Transfer close supervision     Verbal Cues technique     Assistive Device walker, front-wheeled     Comment Ambulatory approach c RW. OT assisting c managing O2 tank.        Toilet Transfer    Transfer Technique stand pivot     Patten, Toilet Transfer close supervision     Verbal Cues technique     Assistive Device raised toilet seat   c handles    Comment Transfer training; Ambulatory approach. Pt and  report purchase of RTS c handles.        Shower Transfer    Transfer Technique step over, left entry; step over,  right entry     [place]ukes, Shower Transfer close supervision; verbal cues     Verbal Cues technique     Assistive Device grab bars/tub rail; shower chair; walker, front-wheeled     Comment Transfer training; Ambulatory approach. Education/demo on safe technique. Pt reports has built in bench. OT recommending placement of grab bar at entrance to shower stall.        Safety Issues, Functional Mobility    Comment, Safety Issues/Impairments (Mobility) Cl S and assist c O2 management ambulating c RW for item retrieval activity. OT educating on recommendation for use of reacher and RW bag for safe item retrieval. Pt and  report understanding.        Bathing    Comment OT educating on recommendation to provide set-up assist and DS.        Upper Body Dressing    Comment OT educating on anticipated level of assist at d/c.        Lower Body Dressing    Adaptive Equipment dressing stick; reacher; sock aid; elastic shoelaces; long-handled shoe horn     Dukes, Footwear supervision     Comment Pt provides demo for doffing/donning shoes and socks using AE. OT educating on anticipated level of assist at d/c for LB dressing.        Grooming    Comment OT educating on anticipated level of assist at d/c.        Toileting    Comment OT educating on anticipated level of assist at d/c.        BADL Safety/Performance    Skilled BADL Treatment/Intervention energy conservation; BADL process/adaptation training   Complete in seated       Meal Preparation (IADLs)    Comment, Meal Preparation Pt and  educated on safe/effective methods for simple meal prep. Pt completes item retrieval from 3 locations and utilizes counter to transport items c MOD VCs for technique/RW management and safety. Recommend S for completion upon d/c home.        Daily Progress Summary (OT)    Daily Outcome Statement Family training completed this session. Pt and  demonstrate understanding of all recommendations.                       Education Documentation  Guarding Techniques, Assist With Transfers/Ambulation, taught by Vangie Cordova OT at 10/13/2021 12:50 PM.  Learner: Significant Other  Readiness: Acceptance  Method: Explanation, Demonstration, Handout  Response: Verbalizes Understanding, Demonstrated Understanding  Comment: Educated on safe/effective BADL and functional transfer techniques. Pt has DME and reports understanding of OT recommendation for installation of grab bar for shower transfer.    Guarding Techniques, Assist With Transfers/Ambulation, taught by Vangie Cordova OT at 10/13/2021 12:50 PM.  Learner: Patient  Readiness: Acceptance  Method: Explanation, Demonstration, Handout  Response: Verbalizes Understanding, Demonstrated Understanding  Comment: Educated on safe/effective BADL and functional transfer techniques. Pt has DME and reports understanding of OT recommendation for installation of grab bar for shower transfer.    Fall Risk Prevention/Management, Strategies, taught by Vangie Cordova OT at 10/13/2021 12:50 PM.  Learner: Significant Other  Readiness: Acceptance  Method: Explanation, Demonstration, Handout  Response: Verbalizes Understanding, Demonstrated Understanding  Comment: Educated on safe/effective BADL and functional transfer techniques. Pt has DME and reports understanding of OT recommendation for installation of grab bar for shower transfer.    Fall Risk Prevention/Management, Strategies, taught by Vangie Cordova OT at 10/13/2021 12:50 PM.  Learner: Patient  Readiness: Acceptance  Method: Explanation, Demonstration, Handout  Response: Verbalizes Understanding, Demonstrated Understanding  Comment: Educated on safe/effective BADL and functional transfer techniques. Pt has DME and reports understanding of OT recommendation for installation of grab bar for shower transfer.    Encourage Use of Personal Aids, Safe/Effective Methods, taught by Vangie Cordova OT at 10/13/2021 12:50  PM.  Learner: Significant Other  Readiness: Acceptance  Method: Explanation, Demonstration, Handout  Response: Verbalizes Understanding, Demonstrated Understanding  Comment: Educated on safe/effective BADL and functional transfer techniques. Pt has DME and reports understanding of OT recommendation for installation of grab bar for shower transfer.    Encourage Use of Personal Aids, Safe/Effective Methods, taught by Vangie Cordova OT at 10/13/2021 12:50 PM.  Learner: Patient  Readiness: Acceptance  Method: Explanation, Demonstration, Handout  Response: Verbalizes Understanding, Demonstrated Understanding  Comment: Educated on safe/effective BADL and functional transfer techniques. Pt has DME and reports understanding of OT recommendation for installation of grab bar for shower transfer.          IRF OT Goals      Most Recent Value   Transfer Goal 1    Activity/Assistive Device toilet at 10/07/2021 0740   [place] supervision required at 10/07/2021 0740   Time Frame short-term goal (STG),  1 week at 10/11/2021 1511   Progress/Outcome goal ongoing at 10/11/2021 1511   Transfer Goal 2    Activity/Assistive Device toilet at 10/07/2021 0740   [place] modified independence at 10/07/2021 0740   Time Frame long-term goal (LTG),  14 days or less at 10/07/2021 0740   Progress/Outcome goal ongoing at 10/11/2021 1511   Transfer Goal 3    Activity/Assistive Device shower at 10/07/2021 0740   [place] supervision required at 10/07/2021 0740   Time Frame short-term goal (STG),  1 week at 10/11/2021 1511   Progress/Outcome goal ongoing at 10/11/2021 1511   Transfer Goal 4    Activity/Assistive Device shower at 10/07/2021 0740   [place] modified independence at 10/07/2021 0740   Time Frame long-term goal (LTG),  14 days or less at 10/07/2021 0740   Progress/Outcome goal ongoing at 10/11/2021 1511   Bathing Goal 1    Activity/Assistive Device bathing skills, all at 10/11/2021 1511   [place] set-up required  at 10/11/2021 1511   Time Frame short-term goal (STG),  3 - 5 days at 10/11/2021 1511   Progress/Outcome goal met,  goal revised this date at 10/11/2021 1511   Bathing Goal 2    Activity/Assistive Device bathing skills, all at 10/07/2021 0740   [place] set-up required at 10/07/2021 0740   Time Frame long-term goal (LTG),  14 days or less at 10/07/2021 0740   Progress/Outcome goal ongoing at 10/11/2021 1511   UB Dressing Goal 1    Activity/Assistive Device upper body dressing at 10/07/2021 0740   [place] supervision required at 10/11/2021 1511   Time Frame short-term goal (STG),  3 - 5 days at 10/11/2021 1511   Strategies/Barriers c clothing retrieval at 10/07/2021 0740   Progress/Outcome goal met,  goal revised this date at 10/11/2021 1511   UB Dressing Goal 2    [place] modified independence at 10/07/2021 0740   Time Frame long-term goal (LTG),  14 days or less at 10/07/2021 0740   Progress/Outcome goal ongoing at 10/11/2021 1511   LB Dressing Goal 1    [place] supervision required at 10/11/2021 1511   Time Frame short-term goal (STG),  3 - 5 days at 10/11/2021 1511   Strategies/Barriers c AE at 10/07/2021 0740   Progress/Outcome goal met,  goal revised this date at 10/11/2021 1511   LB Dressing Goal 2    [place] modified independence at 10/07/2021 0740   Time Frame long-term goal (LTG),  14 days or less at 10/07/2021 0740   Progress/Outcome goal ongoing at 10/11/2021 1511   Grooming Goal 1    [place] supervision required at 10/07/2021 0740   Time Frame short-term goal (STG),  1 week at 10/07/2021 0740   Strategies/Barriers standing at 10/07/2021 0740   Progress/Outcome goal met at 10/11/2021 1511   Grooming Goal 2    [place] modified independence at 10/07/2021 0740   Time Frame long-term goal (LTG),  14 days or less at 10/07/2021 0740   Progress/Outcome goal ongoing at 10/11/2021 1511   Toileting Goal 1    [place] minimum assist (75% or more patient effort) at 10/07/2021  0740   Time Frame short-term goal (STG),  1 week at 10/07/2021 0740   Progress/Outcome goal met at 10/11/2021 1511   Toileting Goal 2    [place] modified independence at 10/07/2021 0740   Time Frame long-term goal (LTG),  14 days or less at 10/07/2021 0740   Progress/Outcome goal ongoing at 10/11/2021 1511

## 2021-10-14 ENCOUNTER — APPOINTMENT (INPATIENT)
Dept: OCCUPATIONAL THERAPY | Facility: REHABILITATION | Age: 61
DRG: 948 | End: 2021-10-14
Payer: COMMERCIAL

## 2021-10-14 ENCOUNTER — APPOINTMENT (OUTPATIENT)
Dept: PSYCHOLOGY | Facility: CLINIC | Age: 61
End: 2021-10-14
Payer: COMMERCIAL

## 2021-10-14 ENCOUNTER — APPOINTMENT (INPATIENT)
Dept: PHYSICAL THERAPY | Facility: REHABILITATION | Age: 61
DRG: 948 | End: 2021-10-14
Payer: COMMERCIAL

## 2021-10-14 ENCOUNTER — HOSPITAL ENCOUNTER (OUTPATIENT)
Dept: CARDIOLOGY | Age: 61
Discharge: HOME | End: 2021-10-14
Attending: INTERNAL MEDICINE
Payer: COMMERCIAL

## 2021-10-14 VITALS
DIASTOLIC BLOOD PRESSURE: 50 MMHG | HEIGHT: 62 IN | BODY MASS INDEX: 25.76 KG/M2 | SYSTOLIC BLOOD PRESSURE: 125 MMHG | WEIGHT: 140 LBS

## 2021-10-14 DIAGNOSIS — J81.0 ACUTE PULMONARY EDEMA (CMS/HCC): ICD-10-CM

## 2021-10-14 LAB
AORTIC ROOT ANNULUS: 2.8 CM
ASCENDING AORTA: 3.2 CM
BSA FOR ECHO PROCEDURE: 1.67 M2
E WAVE DECELERATION TIME: 343 MS
E/A RATIO: 0.8
E/E' RATIO: 13.9
E/LAT E' RATIO: 11.6
FRACTIONAL SHORTENING: 42.1 %
INTERVENTRICULAR SEPTUM: 0.89 CM
LA/AORTA RATIO: 1.25
LAAS-AP4: 15 CM2
LAD 2D: 3.5 CM
LALD A4C: 4.9 CM
LEFT ATRIUM VOLUME INDEX: 22.34 CM3/M2
LEFT ATRIUM VOLUME: 37.3 CM3
LEFT INTERNAL DIMENSION IN SYSTOLE: 2.82 CM (ref 2.33–3.52)
LEFT VENTRICULAR INTERNAL DIMENSION IN DIASTOLE: 4.87 CM (ref 3.92–5.44)
LEFT VENTRICULAR POSTERIOR WALL IN END DIASTOLE: 0.93 CM (ref 0.51–0.95)
LVOT 2D: 1.9 CM
LVOT A: 2.84 CM2
MV E'TISSUE VEL-LAT: 0.09 M/S
MV E'TISSUE VEL-MED: 0.07 M/S
MV PEAK A VEL: 1.25 M/S
MV PEAK E VEL: 1.01 M/S
POSTERIOR WALL: 0.93 CM
PV PEAK GRADIENT: 4 MMHG
PV PV: 0.97 M/S
TR MAX PG: 32 MMHG
TRICUSPID VALVE PEAK REGURGITATION VELOCITY: 2.82 M/S
Z-SCORE OF LEFT VENTRICULAR DIMENSION IN END DIASTOLE: 0.54
Z-SCORE OF LEFT VENTRICULAR DIMENSION IN END SYSTOLE: -0.11
Z-SCORE OF LEFT VENTRICULAR POSTERIOR WALL IN END DIASTOLE: 1.54

## 2021-10-14 PROCEDURE — 97530 THERAPEUTIC ACTIVITIES: CPT | Mod: GP

## 2021-10-14 PROCEDURE — 93306 TTE W/DOPPLER COMPLETE: CPT

## 2021-10-14 PROCEDURE — 97535 SELF CARE MNGMENT TRAINING: CPT | Mod: GO

## 2021-10-14 PROCEDURE — 97110 THERAPEUTIC EXERCISES: CPT | Mod: GP

## 2021-10-14 PROCEDURE — 63700000 HC SELF-ADMINISTRABLE DRUG: Performed by: PHYSICAL MEDICINE & REHABILITATION

## 2021-10-14 PROCEDURE — 97116 GAIT TRAINING THERAPY: CPT | Mod: GP

## 2021-10-14 PROCEDURE — 63600000 HC DRUGS/DETAIL CODE: Performed by: INTERNAL MEDICINE

## 2021-10-14 PROCEDURE — 90832 PSYTX W PT 30 MINUTES: CPT | Performed by: PSYCHOLOGIST

## 2021-10-14 PROCEDURE — 63700000 HC SELF-ADMINISTRABLE DRUG: Performed by: INTERNAL MEDICINE

## 2021-10-14 PROCEDURE — 97530 THERAPEUTIC ACTIVITIES: CPT | Mod: GO

## 2021-10-14 PROCEDURE — 12800000 HC ROOM AND CARE SEMIPRIVATE REHAB

## 2021-10-14 RX ORDER — MELOXICAM 7.5 MG/1
7.5 TABLET ORAL DAILY
Qty: 30 TABLET | Refills: 0 | Status: SHIPPED | OUTPATIENT
Start: 2021-10-14 | End: 2021-11-13

## 2021-10-14 RX ORDER — FAMOTIDINE 20 MG/1
20 TABLET, FILM COATED ORAL NIGHTLY
Qty: 82 TABLET | Refills: 0 | COMMUNITY
Start: 2021-10-14 | End: 2022-01-04

## 2021-10-14 RX ORDER — ACETAMINOPHEN 500 MG
2500 TABLET ORAL DAILY
Qty: 15 TABLET | Refills: 0 | COMMUNITY
Start: 2021-10-15 | End: 2021-11-14

## 2021-10-14 RX ORDER — NAPROXEN SODIUM 220 MG/1
81 TABLET, FILM COATED ORAL 2 TIMES DAILY
Qty: 28 TABLET | Refills: 0 | COMMUNITY
Start: 2021-10-15 | End: 2021-10-29

## 2021-10-14 RX ORDER — ATORVASTATIN CALCIUM 40 MG/1
40 TABLET, FILM COATED ORAL NIGHTLY
Qty: 30 TABLET | Refills: 0 | Status: SHIPPED | OUTPATIENT
Start: 2021-10-14 | End: 2021-11-13

## 2021-10-14 RX ORDER — BUSPIRONE HYDROCHLORIDE 10 MG/1
10 TABLET ORAL
Qty: 330 TABLET | Refills: 0 | Status: SHIPPED | OUTPATIENT
Start: 2021-10-14 | End: 2022-01-05

## 2021-10-14 RX ORDER — ASCORBIC ACID 250 MG
250 TABLET ORAL
Qty: 60 TABLET | Refills: 0 | COMMUNITY
Start: 2021-10-14 | End: 2021-11-13

## 2021-10-14 RX ORDER — GUAIFENESIN 600 MG/1
600 TABLET, EXTENDED RELEASE ORAL 2 TIMES DAILY
Qty: 20 TABLET | Refills: 0 | COMMUNITY
Start: 2021-10-14 | End: 2021-10-24

## 2021-10-14 RX ORDER — LAMOTRIGINE 200 MG/1
400 TABLET, EXTENDED RELEASE ORAL DAILY
Qty: 60 TABLET | Refills: 0 | Status: SHIPPED | OUTPATIENT
Start: 2021-10-15 | End: 2021-11-14

## 2021-10-14 RX ORDER — FUROSEMIDE 40 MG/1
40 TABLET ORAL DAILY
Qty: 30 TABLET | Refills: 0 | Status: SHIPPED | OUTPATIENT
Start: 2021-10-14 | End: 2021-11-13

## 2021-10-14 RX ORDER — FLUTICASONE FUROATE AND VILANTEROL 200; 25 UG/1; UG/1
1 POWDER RESPIRATORY (INHALATION) DAILY
Qty: 30 EACH | Refills: 0 | Status: SHIPPED | OUTPATIENT
Start: 2021-10-15 | End: 2021-11-14

## 2021-10-14 RX ORDER — LIDOCAINE 560 MG/1
2 PATCH PERCUTANEOUS; TOPICAL; TRANSDERMAL DAILY
Qty: 60 PATCH | Refills: 0 | COMMUNITY
Start: 2021-10-15 | End: 2021-11-14

## 2021-10-14 RX ORDER — ROPINIROLE 1 MG/1
3 TABLET, FILM COATED ORAL 3 TIMES DAILY
Qty: 270 TABLET | Refills: 0 | Status: SHIPPED | OUTPATIENT
Start: 2021-10-14 | End: 2021-11-13

## 2021-10-14 RX ORDER — ALBUTEROL SULFATE 90 UG/1
2 INHALANT RESPIRATORY (INHALATION)
Qty: 18 G | Refills: 1 | Status: SHIPPED | OUTPATIENT
Start: 2021-10-14 | End: 2021-11-13

## 2021-10-14 RX ORDER — SERTRALINE HYDROCHLORIDE 100 MG/1
300 TABLET, FILM COATED ORAL DAILY
Qty: 246 TABLET | Refills: 0 | Status: SHIPPED | OUTPATIENT
Start: 2021-10-15 | End: 2022-01-05

## 2021-10-14 RX ORDER — NAPROXEN SODIUM 220 MG/1
81 TABLET, FILM COATED ORAL 2 TIMES DAILY
Qty: 14 TABLET | Refills: 0 | COMMUNITY
Start: 2021-10-14 | End: 2021-10-14 | Stop reason: SDUPTHER

## 2021-10-14 RX ORDER — NORTRIPTYLINE HYDROCHLORIDE 75 MG/1
75 CAPSULE ORAL NIGHTLY
Qty: 30 CAPSULE | Refills: 0 | Status: SHIPPED | OUTPATIENT
Start: 2021-10-14 | End: 2021-11-13

## 2021-10-14 RX ORDER — MONTELUKAST SODIUM 10 MG/1
10 TABLET ORAL NIGHTLY
Qty: 30 TABLET | Refills: 0 | Status: SHIPPED | OUTPATIENT
Start: 2021-10-14 | End: 2021-11-13

## 2021-10-14 RX ORDER — ACETAMINOPHEN 325 MG/1
650 TABLET ORAL
Qty: 26 TABLET | Refills: 0 | COMMUNITY
Start: 2021-10-14 | End: 2021-10-18

## 2021-10-14 RX ORDER — FERROUS SULFATE 325(65) MG
325 TABLET ORAL 2 TIMES DAILY WITH MEALS
Qty: 60 TABLET | Refills: 0 | COMMUNITY
Start: 2021-10-14 | End: 2021-11-13

## 2021-10-14 RX ADMIN — Medication 1 TABLET: at 07:24

## 2021-10-14 RX ADMIN — FLUTICASONE FUROATE AND VILANTEROL 1 PUFF: 200; 25 POWDER RESPIRATORY (INHALATION) at 07:26

## 2021-10-14 RX ADMIN — Medication 325 MG: at 17:16

## 2021-10-14 RX ADMIN — ALBUTEROL SULFATE 2 PUFF: 90 AEROSOL, METERED RESPIRATORY (INHALATION) at 17:18

## 2021-10-14 RX ADMIN — ACETAMINOPHEN 650 MG: 325 TABLET, FILM COATED ORAL at 07:23

## 2021-10-14 RX ADMIN — Medication 325 MG: at 07:24

## 2021-10-14 RX ADMIN — MONTELUKAST 10 MG: 10 TABLET, FILM COATED ORAL at 20:39

## 2021-10-14 RX ADMIN — Medication 2500 UNITS: at 07:24

## 2021-10-14 RX ADMIN — BUSPIRONE HYDROCHLORIDE 10 MG: 5 TABLET ORAL at 07:24

## 2021-10-14 RX ADMIN — ALBUTEROL SULFATE 2 PUFF: 90 AEROSOL, METERED RESPIRATORY (INHALATION) at 05:25

## 2021-10-14 RX ADMIN — FUROSEMIDE 40 MG: 40 TABLET ORAL at 12:23

## 2021-10-14 RX ADMIN — BUSPIRONE HYDROCHLORIDE 10 MG: 5 TABLET ORAL at 12:22

## 2021-10-14 RX ADMIN — ENOXAPARIN SODIUM 40 MG: 100 INJECTION SUBCUTANEOUS at 17:15

## 2021-10-14 RX ADMIN — BUSPIRONE HYDROCHLORIDE 10 MG: 5 TABLET ORAL at 20:39

## 2021-10-14 RX ADMIN — BUSPIRONE HYDROCHLORIDE 10 MG: 5 TABLET ORAL at 17:15

## 2021-10-14 RX ADMIN — SERTRALINE HYDROCHLORIDE 300 MG: 100 TABLET ORAL at 07:24

## 2021-10-14 RX ADMIN — Medication 250 MG: at 07:24

## 2021-10-14 RX ADMIN — ROPINIROLE HYDROCHLORIDE 2 MG: 1 TABLET, FILM COATED ORAL at 20:40

## 2021-10-14 RX ADMIN — MELOXICAM 7.5 MG: 7.5 TABLET ORAL at 07:24

## 2021-10-14 RX ADMIN — FAMOTIDINE 20 MG: 20 TABLET ORAL at 20:39

## 2021-10-14 RX ADMIN — ASPIRIN 81 MG CHEWABLE TABLET 81 MG: 81 TABLET CHEWABLE at 07:24

## 2021-10-14 RX ADMIN — ROPINIROLE HYDROCHLORIDE 1 MG: 1 TABLET, FILM COATED ORAL at 07:24

## 2021-10-14 RX ADMIN — GUAIFENESIN 600 MG: 600 TABLET ORAL at 20:39

## 2021-10-14 RX ADMIN — ASPIRIN 81 MG CHEWABLE TABLET 81 MG: 81 TABLET CHEWABLE at 20:39

## 2021-10-14 RX ADMIN — Medication 250 MG: at 17:16

## 2021-10-14 RX ADMIN — ROPINIROLE HYDROCHLORIDE 1 MG: 1 TABLET, FILM COATED ORAL at 17:15

## 2021-10-14 RX ADMIN — GUAIFENESIN 600 MG: 600 TABLET ORAL at 07:24

## 2021-10-14 RX ADMIN — ROPINIROLE HYDROCHLORIDE 0.5 MG: 0.5 TABLET, FILM COATED ORAL at 12:22

## 2021-10-14 RX ADMIN — PANTOPRAZOLE SODIUM 40 MG: 40 TABLET, DELAYED RELEASE ORAL at 07:24

## 2021-10-14 RX ADMIN — LIDOCAINE 2 PATCH: 246 PATCH TOPICAL at 07:23

## 2021-10-14 RX ADMIN — NORTRIPTYLINE HYDROCHLORIDE 75 MG: 25 CAPSULE ORAL at 20:39

## 2021-10-14 RX ADMIN — ACETAMINOPHEN 650 MG: 325 TABLET, FILM COATED ORAL at 17:15

## 2021-10-14 RX ADMIN — ALBUTEROL SULFATE 2 PUFF: 90 AEROSOL, METERED RESPIRATORY (INHALATION) at 10:31

## 2021-10-14 RX ADMIN — ACETAMINOPHEN 650 MG: 325 TABLET, FILM COATED ORAL at 12:22

## 2021-10-14 RX ADMIN — LAMOTRIGINE 400 MG: 200 TABLET, EXTENDED RELEASE ORAL at 07:26

## 2021-10-14 RX ADMIN — ACETAMINOPHEN 650 MG: 325 TABLET, FILM COATED ORAL at 20:40

## 2021-10-14 ASSESSMENT — COGNITIVE AND FUNCTIONAL STATUS - GENERAL
RECENT MEMORY: WNL
AFFECT: WNL
APPETITE: NO CHANGE
ORIENTATION: FULLY ORIENTED
MOOD: HOPEFUL;MOTIVATED
DELUSIONS: NONE OR AGE APPROPRIATE
EST. PREMORBID INTELLIGENCE: ABOVE AVERAGE
SPEECH: REGULAR
CONCENTRATION: WNL
SLEEP_WAKE_CYCLE: DECREASED
THOUGHT_PROCESS: WNL
ATTENTION: WNL
LIBIDO: NON-CONTRIBUTORY
PERCEPTUAL FUNCTION: NORMAL
APPEARANCE: WELL GROOMED
THOUGHT_CONTENT: APPROPRIATE
AFFECT: FULL RANGE
INSIGHT: INTACT
IMPULSE CONTROL: INTACT
REMOTE MEMORY: WNL
EYE_CONTACT: WNL
PSYCHOMOTOR FUNCTIONING: DECREASED

## 2021-10-14 NOTE — PROGRESS NOTES
Patient: Fide Amin  Location: [facility] [address]  MRN: [id number]  Today's date: 10/14/2021    History of Present Illness  Fide is a 60 y.o. female admitted on 10/6/2021 with Physical deconditioning [R53.81]. Principal problem is Physical deconditioning.   Mrs. Fide Amin is a 60 year old female who presented to [facility] on 9/20/21 for planned B TKA by Dr. Em. Pt suffered a seizure post op after 8 years of having no seizures. CT of chest completed 9/23 results reviewed with no CT evidence of pulmonary embolism.  Scattered bilateral airspace opacities noted and believed to be most likely representing multifocal pneumonia.  Covid 19 pneumonia was excluded. Patient was medically cleared for discharge 9/23/21 on ceftin 500mg BID and zithromax 500mg daily x 5 days and with instructions for f/u care.  Pt. Presented to [facility] on 9/24 for SOB and dx with aspiration PNA. Pt. Intubated on 9/25/21 and extubated on 10/1/21. Pt is now medically stable and admitted to [facility] for acute inpatient rehab.      Past Medical History  Fide has a past medical history of Arthritis, Asthma, Depression, Deviated septum, Epilepsy (CMS/HCC), GERD (gastroesophageal reflux disease), Hypertension, Lipid disorder, Motion sickness, and RLS (restless legs syndrome).      PT Vitals    Date/Time Pulse HR Source SpO2 Pt Activity O2 Therapy O2 Del Method O2 Flow Rate BP BP Location BP Method Pt Position [facility]   10/14/21 1102 88 Monitor 95 % At rest Supplemental oxygen Nasal cannula 0.5 L/min 142/65 Right upper arm Automatic Sitting PLP   10/14/21 1115 -- -- 88 % Walking Supplemental oxygen Nasal cannula 0.5 L/min -- -- -- -- PLP   10/14/21 1159 88 Monitor 95 % At rest Supplemental oxygen Nasal cannula 0.5 L/min 119/70 Right upper arm Automatic Sitting PLP      PT Pain    Date/Time Pain Type Side/Orientation Location Rating: Rest Rating: Activity Description Interventions [facility]   10/14/21 1102 Pain Assessment left knee 3 3  sore -- PLP   10/14/21 1159 Pain Reassessment left knee 4 -- sore position adjusted PLP          Prior Living Environment      Most Recent Value   People in Home spouse  [4 kids]   Current Living Arrangements home   Home Accessibility stairs to enter home (Group),  stairs within home (Group)  [2+1 STEPHANY, ff inside, AL 1st floor]   Living Environment Comment Pt lives in 2 . Pt reports two small steps to enter and FF to 2nd floor. Pt reports plans to have 1st floor set-up. Pt has guest room on 1st floor c 1/2 bath. Pt reports purchased commode and 2 RTS c handles.   Number of Stairs, Main Entrance 2   Surface of Stairs, Main Entrance concrete   Stair Railings, Main Entrance none   Location, Patient Bedroom second floor, must negotiate stairs to access   Patient Bedroom Access Comment Ability to have first floor set up   Location, Bathroom first (main) floor,  second floor, must negotiate stairs to access   Bathroom Access Comment 2nd floor FB c shower stall. (+) grab bar in shower stall. Pt reports has built in shower bench in stall.   Number of Stairs, Within Home, Primary other (see comments)  [7+7]   Surface of Stairs, Within Home, Primary hardwood   Stair Railings, Within Home, Primary railings on both sides of stairs   Landing, Stairs, Within Home, Primary railings present          Prior Level of Function      Most Recent Value   Dominant Hand right   Ambulation independent   Transferring independent   Toileting independent   Bathing independent   Dressing independent   Prior Level of Function Comment Pt was d/c'd home for one day after surgery using RW.   Assistive Device Currently Used at Home commode, 3-in-1,  raised toilet,  walker, front-wheeled  [RTS c handles]           IRF PT Evaluation and Treatment - 10/14/21 1105        PT Time Calculation    Start Time 1100     Stop Time 1200     Time Calculation (min) 60 min        Session Details    Document Type discharge evaluation     Mode of Treatment individual  therapy; physical therapy        General Information    Patient Profile Reviewed yes     General Observations of Patient pt received in gym, direct hand-off from OT; 0.5 L of O2        Cognition/Psychosocial    Affect/Mental Status (Cognition) WNL     Orientation Status (Cognition) oriented x 4; oriented to; person; place; situation; time        Sensory Assessment (Somatosensory)    Left LE Sensory Assessment light touch awareness; light touch localization; proprioception; intact   L3-S2 light touch sensation; great toe proprioception (5/5)    Right LE Sensory Assessment light touch awareness; light touch localization; proprioception; intact   L3-S2 light touch sensation; great toe proprioception (5/5)       Strength (Manual Muscle Testing)    Strength (Manual Muscle Testing) bilateral lower extremities; strength is WFL     Hip, Left (Strength) flex 4/5, ext 4-/5, abd 3+/5, add 3+/4, IR/ER 3+/5     Knee, Left (Strength) flex 3+/5, ext 4/5     Ankle, Left (Strength) DF 5/5, PF 4+/5, Ev/Inv 4/5     Hip, Right (Strength) flex 4/5, ext 4-/5, abd 3+/5, add 3+/4, IR/ER 3+/5     Knee, Right (Strength) flex 3+/5, ext 4/5     Ankle, Right (Strength) DF 5/5, PF 4+/5, Ev/Inv 4/5        Sit to Stand Transfer    [place], Sit to Stand Transfer modified independence     Verbal Cues safety     Assistive Device walker, front-wheeled        Stand to Sit Transfer    [place], Stand to Sit Transfer modified independence     Verbal Cues safety     Assistive Device walker, front-wheeled        Stand Pivot Transfer    [place], Stand Pivot/Stand Step Transfer supervision     Verbal Cues safety     Assistive Device walker, front-wheeled     Comment via amb approach with RW, S for safety        Car Transfer    Transfer Technique stand pivot     [place], Car Transfer close supervision     Verbal Cues safety     Assistive Device walker, front-wheeled     Comment clS for safety and VC for safe hand placement and technique      "   Gait Training    [place], Gait close supervision     Assistive Device walker, front-wheeled     Distance in Feet 200 feet     Pattern (Gait) step-through     Deviations/Abnormal Patterns (Gait) tan decreased; gait speed decreased; stride length decreased     [place], Picking Up Object modified independence   stands at RW and uses reacher to  object    Comment (Gait/Stairs) up to 200' with RW and clS for safety, assist for managing O2, SpO2 dropped to 89% on 0.5 L but returned to 92% in 15 seconds.        Curb Negotiation    [place] touching/steadying assist     Assistive Device walker, front-wheeled     Curb Height 8 inches   4\"+4\"    Comment ascended/descended 8\" broken up with 4\"+4\" using RW, steady assist at pelvis for balance and safety; therapist assist for O2 tank        Rough/Uneven Surface Gait Skills    [place] touching/steadying assist     Assistive Device walker, front-wheeled     Distance in Feet 50 feet     Comment 50' outdoors on asphalt and concrete, navigating slopes and cracks in the sidewalk. Steady assist for safety and VC for how to safely navigate gaps using the walker and reminders for slower pacing for safety. PT assist for managing O2 tank, on 0.5 L O2. SpO2 94%.        Sloped Surface Gait Skills    [place] touching/steadying assist     Assistive Device walker, front-wheeled     Distance in Feet 5 feet     Comment ascended/descended 5' ramp with RW and steady assist at pelvis for balance, therapist managing O2 tank        Stairs Training    [place], Stairs touching/steadying assist     Assistive Device railing     Handrail Location (Stairs) both sides; left side (ascending); right side (descending)     Number of Stairs 8     Stair Height 6 inches     Ascending Stairs Technique step-to-step   leading with R LE    Descending Stairs Technique step-to-step   leading with L LE    Comment ascended/descended 4 steps with B UE on L HR ascending, R HR " "descending, then 4 steps with B HR to mimic home set-up. Steady assist at pelvis for safety and therapist managing O2 tank. Pt desatted to 88% afterwards, returned to 92% in 30 seconds.        Wheelchair Mobility/Management    Comment, Wheelchair Mobility Pt does not have need for and will not be discharged with wheelchair        Safety Issues, Functional Mobility    Safety Issues Affecting Function (Mobility) impulsivity; safety precautions follow-through/compliance; safety precaution awareness        Balance    Static Sitting Balance supported; unsupported; WFL     Dynamic Sitting Balance supported; unsupported; WFL     Static Standing Balance supported; WFL; unsupported; mild impairment     Dynamic Standing Balance supported; WFL; unsupported; mild impairment        Motor Skills    Coordination WFL; bilateral; lower extremity   heel to shin    Functional Endurance fair due to SOB        Postural Deviations    Postural Deviations head and neck; shoulder; upper back     Head and Neck forward head     Shoulder right shoulder elevation; left shoulder elevation; left shoulder medial rotation; right shoulder medial rotation     Upper Back abducted scapulae; kyphosis        Lower Extremity (Therapeutic Exercise)    Exercise Position/Type seated     General Exercise bilateral; LAQ (long arc quad)     Range of Motion Exercises bilateral; hip flexion/extension; knee flexion/extension     Reps and Sets 2x10     Comment 1) LAQ 2x10 with 2\" iso hold 2) Knee flexion stretch with pillowcase under foot x10/side with 3\" hold        Discharge Summary (PT)    Outcomes Achieved/Progress Made Upon Discharge (PT) goals partially achieved prior to discharge     Transfer to Another Level of Care or Facility (PT) recommend therapy via home health     Discharge Summary Statement (PT) Pt is a 60 y.o. female who presented to [facility] on 9/20/21 for planned B TKA. Post-op CT of chest completed 9/23/21 w/ no CT evidence of pulmonary embolism. " Scattered bilateral airspace opacities noted and believed to be most likely representing multifocal pneumonia. She was cleared for d/c home on ABX instructions for f/u care. However, pt [facility]24[facility] for increased shortness of breath and diagnosed w/ aspiration PNA. She was Intubated on 9/25/21 and extubated on 10/1/21. Pt is currently on 0.5 L O2 at rest, with SpO2 dropping to 88% with more intense activity and increasing to >92% in less than 30 seconds. Pt is currently functioning at mod I for bed mobility and sit to stand. She requires S for SPT and ambulation up to 200’ with assist for managing the O2 tank. For elevations, pt requires steady assist for safety and balance. Pt cont to present with anxiousness, impulsivity of speed with mobility, and decreased safety awareness, which is why S is recommended for ambulation. Education and handout was provided to pt about general safety awareness tips. She is also limited by decreased endurance, strength, and dynamic balance. Pt improved her 10 MWT time from 0.28 m/s to 0.73 m/s; however, this gait speed is still below the age and gender matched norm of 1.2 m/s for safe community ambulation. Pt’s B knees have made good progress in ROM, achieving 0 degrees of extension bilaterally, and at least 100 degrees on the L and 110 degrees on the R. She has made good progress is LE strength, with all muscle groups >3/5, Pt’s , Michel, attended family training on 10/13/21 and actively participated in providing steady assist during elevations, ambulation, and car transfer. He was edu on safe management of O2 tank and tubing. No further training was necessary. HEP was reviewed and pt was provided with handout of exercises for which she understands proper technique and dosing. Pt already owns RW and does not require any other equipment. Anticipate pt to be DC home on 10/15/21 and will benefit from OP PT in order to continue progressing her  "remaining impairments.                      Education Documentation  Safety Techniques, taught by Miriam Burton, PT at 10/14/2021 12:42 PM.  Learner: Patient  Readiness: Acceptance  Method: Explanation  Response: Verbalizes Understanding  Comment: Pt edu on safe navigation outdoors and discussed generally \"slow and steady\" for improved safety.          IRF PT Goals      Most Recent Value   Bed Mobility Goal 1    Activity/Assistive Device bed mobility activities, all,  no assistive device at 10/07/2021 1003   [place] modified independence at 10/07/2021 1003   Time Frame 1 week at 10/07/2021 1003   Strategies/Barriers anxiousness, decreased safety awareness and carry-over, supplimental O2 at 10/11/2021 1754   Progress/Outcome goal met at 10/14/2021 1105   Transfer Goal 1    Activity/Assistive Device sit-to-stand/stand-to-sit,  stand pivot,  car transfer,  walker, front-wheeled at 10/07/2021 1003   [place] supervision required,  set-up required at 10/07/2021 1003   Time Frame 1 week at 10/07/2021 1003   Strategies/Barriers anxiousness, decreased safety awareness and carry-over, supplimental O2, deconditioning at 10/11/2021 1754   Progress/Outcome goal met at 10/14/2021 1105   Transfer Goal 2    Activity/Assistive Device sit-to-stand/stand-to-sit,  stand pivot,  car transfer,  walker, front-wheeled at 10/07/2021 1003   [place] modified independence at 10/07/2021 1003   Time Frame 14 days or less at 10/07/2021 1003   Strategies/Barriers anxiousness, decreased safety awareness and carry-over, supplimental O2, deconditioning at 10/11/2021 1754   Progress/Outcome goal met at 10/14/2021 1105   Gait/Walking Locomotion Goal 1    Activity/Assistive Device assistive device use,  backward stepping,  decrease fall risk,  diminish gait deviation,  forward stepping,  improve balance and speed,  increase endurance/gait distance,  increase energy conservation,  normalize weight shifts,  turning, left,  turning, " right,  walker, front-wheeled at 10/07/2021 1003   Distance 150 feet at 10/07/2021 1003   [place] supervision required at 10/07/2021 1003   Time Frame 1 week at 10/07/2021 1003   Strategies/Barriers anxiousness, decreased safety awareness and carry-over, supplimental O2, deconditioning at 10/11/2021 1754   Progress/Outcome goal met at 10/14/2021 1105   Gait/Walking Locomotion Goal 2    Activity/Assistive Device gait (walking locomotion),  assistive device use,  backward stepping,  decrease fall risk,  diminish gait deviation,  forward stepping,  improve balance and speed,  increase endurance/gait distance,  increase energy conservation,  normalize weight shifts,  sidestepping,  turning, left,  turning, right,  walker, front-wheeled at 10/07/2021 1003   Distance 300 feet at 10/07/2021 1003   [place] modified independence at 10/07/2021 1003   Time Frame 14 days or less at 10/07/2021 1003   Strategies/Barriers anxiousness, decreased safety awareness and carry-over, supplimental O2, deconditioning at 10/11/2021 1754   Progress/Outcome goal partially met  [200' with S] at 10/14/2021 1105   Stairs Goal 1    Activity/Assistive Device ascending stairs,  descending stairs,  step-to-step,  using handrail, right,  using handrail, left,  decrease fall risk,  improve balance and speed at 10/07/2021 1003   Number of Stairs 8 at 10/07/2021 1003   [place] supervision required at 10/07/2021 1003   Time Frame 1 week at 10/07/2021 1003   Strategies/Barriers anxiousness, decreased safety awareness and carry-over, supplimental O2 at 10/11/2021 1754   Progress/Outcome goal partially met  [steady assist] at 10/14/2021 1105   Stairs Goal 2    Activity/Assistive Device using handrail, left,  using handrail, right,  step-to-step,  decrease fall risk,  ascending stairs,  descending stairs,  improve balance and speed,  no assistive device at 10/07/2021 1003   Number of Stairs 14 at 10/07/2021 1003   [place] modified  independence at 10/07/2021 1003   Time Frame 14 days or less at 10/07/2021 1003   Strategies/Barriers anxiousness, decreased safety awareness and carry-over, supplimental O2, deconditioning at 10/11/2021 1754   Progress/Outcome goal partially met  [8 steps with steady assist] at 10/14/2021 1105   ROM Goal 1    ROM Goal 1 B knee ROM 0-90 at 10/07/2021 1003   Time Frame 14 days or less at 10/07/2021 1003   Strategies/Barriers anxiousness, decreased safety awareness and carry-over, supplimental O2 at 10/11/2021 1754   Progress/Outcome goal met at 10/14/2021 1105   ROM Goal 2    ROM Goal 2 B ankle DF ROM +10 degrees at 10/07/2021 1003   Time Frame 14 days or less at 10/07/2021 1003   Caregiver Training Goal 1    Caregiver Training Goal 1 Pt's  safe and I providing any necessary supervision/Assist with mobility tasks at 10/07/2021 1003   Time Frame 14 days or less at 10/07/2021 1003   Progress/Outcome goal met at 10/14/2021 1105   Problem Specific Goal 1    Problem-Specific Goal 1 Pt weaned from supplemental O2 and tolerating > 92% with activity. at 10/07/2021 1003   Time Frame 14 days or less at 10/07/2021 1003   Strategies/Barriers anxiousness, decreased safety awareness and carry-over, supplimental O2 at 10/11/2021 1754   Progress/Outcome goal partially met  [pt on 0.5 L O2] at 10/14/2021 1105

## 2021-10-14 NOTE — PLAN OF CARE
Problem: Rehabilitation (IRF) Plan of Care  Goal: Plan of Care Review  Outcome: Progressing  Flowsheets (Taken 10/14/2021 1426)  Progress: improving  Plan of Care Reviewed With: patient  Outcome Summary: pt aaox3. continent of b&b. participated in therapies. 1/2L O2 via nasal cannula at all times. outside appt with [facility] (for TTE) at 1500, with p/u at 1345. Uses lidocaine patches, tylenol and repositioning for pain management. pt is eager for d/c tomorrow.

## 2021-10-14 NOTE — PROGRESS NOTES
Patient: Fide Amin  Location: [facility] [address]  MRN: [id number]  Today's date: 10/14/2021    History of Present Illness  Fide is a 60 y.o. female admitted on 10/6/2021 with Physical deconditioning [R53.81]. Principal problem is Physical deconditioning.   Mrs. Fide Amin is a 60 year old female who presented to [facility] on 9/20/21 for planned B TKA by Dr. Em. Pt suffered a seizure post op after 8 years of having no seizures. CT of chest completed 9/23 results reviewed with no CT evidence of pulmonary embolism.  Scattered bilateral airspace opacities noted and believed to be most likely representing multifocal pneumonia.  Covid 19 pneumonia was excluded. Patient was medically cleared for discharge 9/23/21 on ceftin 500mg BID and zithromax 500mg daily x 5 days and with instructions for f/u care.  Pt. Presented to [facility] on 9/24 for SOB and dx with aspiration PNA. Pt. Intubated on 9/25/21 and extubated on 10/1/21. Pt is now medically stable and admitted to [facility] for acute inpatient rehab.      Past Medical History  Fide has a past medical history of Arthritis, Asthma, Depression, Deviated septum, Epilepsy (CMS/HCC), GERD (gastroesophageal reflux disease), Hypertension, Lipid disorder, Motion sickness, and RLS (restless legs syndrome).      OT Vitals    Date/Time Pulse SpO2 Pt Activity O2 Therapy O2 Del Method O2 Flow Rate BP BP Location BP Method Pt Position [facility]   10/14/21 1037 85 95 % At rest Supplemental oxygen Nasal cannula 0.5 L/min 137/62 Left upper arm Automatic Sitting JV      OT Pain    Date/Time Pain Type Side/Orientation Location Rating: Rest Rating: Rest Interventions [facility]   10/14/21 1037 Pain Assessment -- knee -- 2 - mild pain premedicated for activity JV   10/14/21 1059 Pain Reassessment left knee 3 -- position adjusted JV          Prior Living Environment      Most Recent Value   People in Home spouse  [4 kids]   Current Living Arrangements home   Home Accessibility  "stairs to enter home (Group),  stairs within home (Group)  [2+1 STEPHANY, ff inside, VT 1st floor]   Living Environment Comment Pt lives in 2 . Pt reports two small steps to enter and FF to 2nd floor. Pt reports plans to have 1st floor set-up. Pt has guest room on 1st floor c 1/2 bath. Pt reports purchased commode and 2 RTS c handles.   Number of Stairs, Main Entrance 2   Surface of Stairs, Main Entrance concrete   Stair Railings, Main Entrance none   Location, Patient Bedroom second floor, must negotiate stairs to access   Patient Bedroom Access Comment Ability to have first floor set up   Location, Bathroom first (main) floor,  second floor, must negotiate stairs to access   Bathroom Access Comment 2nd floor FB c shower stall. (+) grab bar in shower stall. Pt reports has built in shower bench in stall.   Number of Stairs, Within Home, Primary other (see comments)  [7+7]   Surface of Stairs, Within Home, Primary hardwood   Stair Railings, Within Home, Primary railings on both sides of stairs   Landing, Stairs, Within Home, Primary railings present          Prior Level of Function      Most Recent Value   Dominant Hand right   Ambulation independent   Transferring independent   Toileting independent   Bathing independent   Dressing independent   Prior Level of Function Comment Pt was d/c'd home for one day after surgery using RW.   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   Occupational History/Life Experiences Pt lives at home in [facility] c  ([place]). Pt reports  also retired. Pt is a . Pt has epilepsy and hx of last seizure 2011 then had a seizure after BKA surgery 9/2021.   Patient Goals PSFS reassed pt score improving from 4.33 to 10 on pt goals of \"typing on " "phone, tying shoes, and retrieving things out of reach\".           IRF OT Evaluation and Treatment - 10/14/21 1041        OT Time Calculation    Start Time 1030     Stop Time 1100     Time Calculation (min) 30 min        Session Details    Document Type daily treatment/progress note     Mode of Treatment occupational therapy; individual therapy        General Information    General Observations of Patient Pt pleasant and agreeable to therapy.        Hand  Strength Testing    Comment, Left Hand WFL     Comment, Right Hand WFL     Comment, Left Hand WFL     Comment, Right Hand WFL        Sit to Stand Transfer    [place], Sit to Stand Transfer modified independence     Assistive Device walker, front-wheeled        Stand to Sit Transfer    [place], Stand to Sit Transfer modified independence     Assistive Device walker, front-wheeled        Motor Skills    Results, 9 Hole Peg Test of Fine Motor Coordination Not reassessed 2* WFL.        Therapeutic Standing Program    Comments S standing tolerance while engaged in UE endurance task of reaching to place/remove resistive clothespins from vertical dowel. Pt completes s difficulty maintaining SpO@ >93% on 0.5L.        Upper Extremity (Therapeutic Exercise)    Exercise Position/Type seated     General Exercise bilateral     Range of Motion Exercises shoulder flexion/extension; shoulder abduction/adduction; shoulder horizontal abduction/adduction; shoulder external/internal rotation; elbow flexion/extension; forearm supination/pronation; wrist flexion/extension     Reps and Sets 10     Comment Pt issued Cardiopulmonary HEP for Bilateral Upper Extremities. Pt self guides through exercises c MIN VCs for technique and MOD VCs for breathing technique to support movements. Pt educated on recommendation to complete 2 sets of 10 a day.        Hand (Therapeutic Exercise)    Exercise Position/Type seated     Weight/Resistance therapy putty; hand gripper     Reps and Sets " 3 x 10     Comment Pt reviews HEP for theraputty and hand gripper. Performs 3 sets of 10 hand gripper c green resistiance. Performs once through each hand for theraputty.        Daily Progress Summary (OT)    Daily Outcome Statement Session dedicated to review of HEPs for d/c home.                           IRF OT Goals      Most Recent Value   Transfer Goal 1    Activity/Assistive Device toilet at 10/07/2021 0740   [place] supervision required at 10/07/2021 0740   Time Frame short-term goal (STG),  1 week at 10/11/2021 1511   Progress/Outcome goal met at 10/14/2021 0817   Transfer Goal 2    Activity/Assistive Device toilet at 10/07/2021 0740   [place] modified independence at 10/07/2021 0740   Time Frame long-term goal (LTG),  14 days or less at 10/07/2021 0740   Progress/Outcome goal not met at 10/14/2021 0817   Transfer Goal 3    Activity/Assistive Device shower at 10/07/2021 0740   [place] supervision required at 10/07/2021 0740   Time Frame short-term goal (STG),  1 week at 10/11/2021 1511   Progress/Outcome goal met at 10/14/2021 0817   Transfer Goal 4    Activity/Assistive Device shower at 10/07/2021 0740   [place] modified independence at 10/07/2021 0740   Time Frame long-term goal (LTG),  14 days or less at 10/07/2021 0740   Progress/Outcome goal not met at 10/14/2021 0817   Bathing Goal 1    Activity/Assistive Device bathing skills, all at 10/11/2021 1511   [place] set-up required at 10/11/2021 1511   Time Frame short-term goal (STG),  3 - 5 days at 10/11/2021 1511   Progress/Outcome goal met at 10/14/2021 0817   Bathing Goal 2    Activity/Assistive Device bathing skills, all at 10/07/2021 0740   [place] set-up required at 10/07/2021 0740   Time Frame long-term goal (LTG),  14 days or less at 10/07/2021 0740   Progress/Outcome goal met at 10/14/2021 0817   UB Dressing Goal 1    Activity/Assistive Device upper body dressing at 10/07/2021 0700   [place] supervision required  at 10/11/2021 1511   Time Frame short-term goal (STG),  3 - 5 days at 10/11/2021 1511   Strategies/Barriers c clothing retrieval at 10/07/2021 0740   Progress/Outcome goal met at 10/14/2021 0817   UB Dressing Goal 2    [place] modified independence at 10/07/2021 0740   Time Frame long-term goal (LTG),  14 days or less at 10/07/2021 0740   Progress/Outcome goal not met at 10/14/2021 0817   LB Dressing Goal 1    [place] supervision required at 10/11/2021 1511   Time Frame short-term goal (STG),  3 - 5 days at 10/11/2021 1511   Strategies/Barriers c AE at 10/07/2021 0740   Progress/Outcome goal met at 10/14/2021 0817   LB Dressing Goal 2    [place] modified independence at 10/07/2021 0740   Time Frame long-term goal (LTG),  14 days or less at 10/07/2021 0740   Progress/Outcome goal not met at 10/14/2021 0817   Grooming Goal 1    [place] supervision required at 10/07/2021 0740   Time Frame short-term goal (STG),  1 week at 10/07/2021 0740   Strategies/Barriers standing at 10/07/2021 0740   Progress/Outcome goal met at 10/11/2021 1511   Grooming Goal 2    [place] modified independence at 10/07/2021 0740   Time Frame long-term goal (LTG),  14 days or less at 10/07/2021 0740   Progress/Outcome goal met at 10/14/2021 0817   Toileting Goal 1    [place] minimum assist (75% or more patient effort) at 10/07/2021 0740   Time Frame short-term goal (STG),  1 week at 10/07/2021 0740   Progress/Outcome goal met at 10/11/2021 1511   Toileting Goal 2    [place] modified independence at 10/07/2021 0740   Time Frame long-term goal (LTG),  14 days or less at 10/07/2021 0740   Progress/Outcome goal met at 10/14/2021 0817

## 2021-10-14 NOTE — PLAN OF CARE
Plan of Care Review  Plan of Care Reviewed With: patient  Progress: improving  Outcome Summary: Pt has the order to get the trending PO2 done. Pt's initial PO2 was  93-94% on RA but started dipping down to 87-88%. Patient started on O2 0.5ltrs/min via n/c, PO2 came upto 92-94%. Supervisor and MD notified about the readings. Patient slept comfortably. Call bell in reach.

## 2021-10-14 NOTE — DISCHARGE INSTR - ACTIVITY
"Occupational Therapy     Toilet Transfers: Recommend supervision while Fide transfers on/off toilet via ambulatory approach using rolling walker. Recommend use of previously purchased raised toilet seat with handles.     Shower/Tub Transfers: Recommend close supervision while Fide transfers in/out of shower stall via ambulatory approach with rolling walker.. Recommend transfer to built in shower stall bench. Recommend installation of grab bar at entrance to shower stall.     Upper Body Dressing: Recommend set-up assistance and performance in seated.    Lower Body Dressing: Recommend set-up assistance and performance in seated. Recommend use of long handled adaptive equipment for energy conservation.    Bathing: Recommend providing set-up/clean up assistance. Recommend completing seated on built in shower stall bench. Recommend use of long handled sponge for washing feet and use of reacher to assist with drying feet for energy conservation.    Toileting: Fide is able to complete toileting independently.    Grooming: Fide is able to complete grooming tasks independently standing at sink with rolling walker or in seated at sink for energy conservation.    Household Mobility/Household Activity: Recommend supervision while Fide ambulates throughout house with rolling walker and assist for O2 tank management. Recommend removing throw rugs and clearing pathways of potential tripping hazards. Recommend use of rolling walker bag and reacher for safe item retrieval and transport. Recommend completing appropriate household tasks in seated for energy conservation. Recommend use of \"slide along\" method on countertops for transporting task items in kitchen.    Driving: Driving is unsafe and not recommended at this time. Consult your physician for approval before resuming driving.     Entered by: Vangie Cordova OTR/L     Physical Therapy:      Bed mobility: Fide is modified independent for rolling in bed and transitioning from " sitting to lying/lying to sitting.     Transfers: Fide is modified independent for sit to stand/stand using rolling walker and it is recommended she have supervision for stand pivot transfers, such as when turning to sit on the bed or in a chair. Always have hands on the seat surface when standing, then transition hands to the walker. Always reach back for the surface when sitting back down, do not keep hands on the walker. Recommended Fide has supervision while completing car transfer and reminders for safe hand placement. Do not hold onto the door or the walker.      Ambulation: Recommended that Fide receives supervision and assistance for managing the O2 tank during ambulation using the rolling walker. Distances should be kept to shorter household distances (about 200 feet) at this time for energy conservation.      Elevations: Recommend steadying assistance at her hips while Fide negotiates any elevations. Stand behind Fide while she ascends steps and in front/slightly to the side while she descends the steps. Be aware of O2 tubing for safety.      Wheelchair Mobility: Fide does not require use of a wheelchair upon discharge.      Entered by: Miriam Burton, PT, DPT.     Additional     Weight-Bearing Status: Fide is weight bearing as tolerated for both lower extremities.      Durable Medical Equipment: Fide owns rolling walker from prior surgery. No durable medical equipment provided at this time.      Home Exercise Program: Provided with print out for [facility] Total Knee Replacement Protocol.

## 2021-10-14 NOTE — PROGRESS NOTES
Subjective    Patient seen and examined on rounds.  Chart reviewed.  Events overnight noted.  History reviewed briefly with patient.    CC:  Deficits in mobility, transfers, self-care status post recent bilateral total knee replacements, recent pneumonia, ventilator-dependent respiratory failure, seizure disorder, deconditioning, multiple medical problems    HPI:  Ms. Fide Amin is a 60-year-old right handed white female with chronic conditions significant for polyarticular degenerative arthritis, hypertension, hyperlipidemia, seizure disorder, anxiety, depression, asthma, restless leg syndrome had elective bilateral total knee replacements secondary to degenerative arthritis of both knees by Dr. Bennett Em at [facility] on 9/20/21. Postoperatively, on the way from the PACU to the floor or soon after arriving on the floor, the patient did sustain a seizure and she does have a history of seizure disorder but had not had one in about a decade.  She was seen by neurologist at [facility] after her seizure.  Postoperative course was significant for hypotension which was felt to be secondary to narcotics, dehydration as well as blood loss.  She had hypoxia with exertion. CT of chest on 9/23/21 revealed no CT evidence of pulmonary embolism, but scattered bilateral airspace opacities were noted and believed to be most likely representing multifocal pneumonia.  Covid 19 pneumonia was excluded. She was allowed weightbearing as tolerated on both lower extremities and on Aspirin and SCDs for DVT prophylaxis. She was apparently offered SNF placement per her records but she declined it.  She was medically cleared for discharge on Ceftin 500mg BID and Zithromax 500mg daily x 5 days and with instructions for follow-up care and discharged to home on 9/23/21 with home care.  Subsequently, she presented to the ER at [facility] on 9/24/21 with increased shortness of breath and was diagnosed with  "aspiration pneumonia.  She was intubated on 9/25/21 and eventually extubated on 10/1/21. She reports she was on oxygen in the acute care hospital and will try to wean her off oxygen as possible.  She has had multiple blisters on both knees.  On 10/5/21, hemoglobin was 8.5, WBC count 6.9, platelets were 545, BUN was 13 and creatinine was 0.6.  She has been needing assistance for mobility, transfers, self-care. She is transferred to [facility] on 10/6/21 for further rehabilitation care.     SUBJ: Discussed recommendations of PCC with patient. She feels comfortable with discharge plans to home on this Friday. She will follow up with her pulmonary physician next week and discuss weaning off oxygen when able to. Continuous pulse oximetry to be checked tonight. She will need home oxygen. Discussed with .    ROS: Denies chest pain or shortness of breath. Other ROS negative. Past, family, social history is unchanged.    Physical Exam      Blood pressure 140/64, pulse 86, temperature 36.9 °C (98.4 °F), temperature source Oral, resp. rate 18, height 1.575 m (5' 2.01\"), weight 63.9 kg (140 lb 14 oz), SpO2 98 %, not currently breastfeeding.  Body mass index is 25.76 kg/m².    General Appearance: Not in acute distress  Head/Ear/Nose/Throat: Normocephalic; Atraumatic.   Eye: EOMI; PERRL.   Neck: No JVD; No Bruits.   Respiratory: Decreased breath sounds at bases.   Cardiovascular: RRR; Normal S1, S2.   Gastrointestinal: Soft; NT; +BS.   Extremities: Bilateral lower extremity edema noted.  Healing incisions noted on anterior aspect of both knees.  She has multiple large blisters present on both knees, with the ones on the right knee drying out.  Dressing is present on the blisters on left knee. She is able to do active straight leg raise more easily on the right and with some difficulty on the left.  Musculoskeletal: Functional active range of motion in both upper extremities.  Some limitation of active " range of motion in both hips and knees, limited by pain.    Neurological: AAO ×3. Speech is fluent. Cranial nerve examination does not reveal any gross facial asymmetry. Strength testing about 4+/5 strength in both upper extremities.  Bilateral hip flexion is 3/5.  Right quadriceps is 3+/5, left quadriceps is 3/5.  Bilateral ankle dorsi and plantar flexion are 4/5.  She is grossly able to localize touch and position sense in her toes.  Deep tendon reflexes are hypoactive and symmetric bilaterally.  Plantars are flexor.  Coordination is functional upper extremities.  Both knee jerks were not tested. Neurologically unchanged.  Behavior/Emotional: Appropriate; Cooperative.   Skin: No obvious rashes noted.  Bilateral knee incisions healing.  She has multiple large blisters present on both knees, with the ones on the right knee drying out.  Dressing is present on the blisters on left knee.  She has bruising noted in both lower extremities including her feet after recent bilateral knee replacements.      Current Facility-Administered Medications:   •  acetaminophen (TYLENOL) tablet 650 mg, 650 mg, oral, With meals & nightly, Fito Michael MD, 650 mg at 10/13/21 2043  •  acetaminophen (TYLENOL) tablet 650 mg, 650 mg, oral, q4h PRN, Fito Michael MD  •  acetaminophen (TYLENOL) tablet 650 mg, 650 mg, oral, q4h PRN, Fito Michael MD, 650 mg at 10/11/21 1219  •  albuterol HFA (VENTOLIN HFA) 90 mcg/actuation inhaler 2 puff, 2 puff, inhalation, q4h PRN, Fito Michael MD  •  albuterol HFA (VENTOLIN HFA) 90 mcg/actuation inhaler 2 puff, 2 puff, inhalation, QID (6a, 10a, 2p, 6p), Noé Nunez MD, 2 puff at 10/13/21 1709  •  alum-mag hydroxide-simeth (MAALOX) 200-200-20 mg/5 mL suspension 30 mL, 30 mL, oral, q4h PRN, Fito Michael MD  •  ascorbic acid (VITAMIN C) tablet 250 mg, 250 mg, oral, BID AC, Noé Nunez MD, 250 mg at 10/13/21 1702  •  aspirin chewable tablet 81 mg, 81 mg, oral, BID, Alec  Fito APARICIO MD, 81 mg at 10/13/21 2042  •  [Provider Managed Hold] atorvastatin (LIPITOR) tablet 40 mg, 40 mg, oral, Daily (6p), Noé Nunez MD, 40 mg at 10/08/21 1736  •  bisacodyL (DULCOLAX) 10 mg suppository 10 mg, 10 mg, rectal, Daily PRN, Fito Michael MD  •  busPIRone (BUSPAR) tablet 10 mg, 10 mg, oral, With meals & nightly, Fito Michael MD, 10 mg at 10/13/21 2042  •  camphor-methyl salicyl-menthoL (MUSCLE RUB) cream, , Topical, 2x daily PRN, Gildardo Jacobsen DPM, Given at 10/11/21 0827  •  cholecalciferol (vitamin D3) tablet 2,500 Units, 2,500 Units, oral, Daily, Kvng Francisco MD, 2,500 Units at 10/13/21 0801  •  enoxaparin (LOVENOX) syringe 40 mg, 40 mg, subcutaneous, Daily (6p), Noé Nunez MD, 40 mg at 10/13/21 1704  •  famotidine (PEPCID) tablet 20 mg, 20 mg, oral, Nightly, Fito Michael MD, 20 mg at 10/13/21 2042  •  ferrous sulfate tablet 325 mg, 325 mg, oral, BID with meals, Noé Nunez MD, 325 mg at 10/13/21 1705  •  fluticasone furoate-vilanteroL (BREO ELLIPTA) 200-25 mcg/dose powder for inhalation 1 puff, 1 puff, inhalation, Daily, Noé Nunez MD, 1 puff at 10/13/21 0805  •  furosemide (LASIX) tablet 40 mg, 40 mg, oral, Daily (1p), Noé Nunez MD, 40 mg at 10/13/21 1244  •  guaiFENesin (MUCINEX) 12 hr ER tablet 600 mg, 600 mg, oral, BID, Noé Nunez MD, 600 mg at 10/13/21 2044  •  HYDROmorphone (DILAUDID) tablet 2 mg, 2 mg, oral, q4h PRN, Fito Michael MD, 2 mg at 10/08/21 1422  •  lamoTRIgine (LAMICTAL XR) extended release tablet 400 mg, 400 mg, oral, Daily, Fito Michael MD, 400 mg at 10/13/21 0806  •  lidocaine (ASPERCREME) 4 % topical patch 2 patch, 2 patch, Topical, Daily, Fito Michael MD, 2 patch at 10/13/21 0802  •  meloxicam (MOBIC) tablet 7.5 mg, 7.5 mg, oral, Daily, Fito Michael MD, 7.5 mg at 10/13/21 0803  •  montelukast (SINGULAIR) tablet 10 mg, 10 mg, oral, Nightly, Fito Michael MD, 10 mg at 10/13/21 2042  •   multivit-min-iron-folic-vit K1 (CENTRUM) chewable tablet 1 tablet, 1 tablet, oral, Daily, Fito Michael MD, 1 tablet at 10/13/21 0758  •  nortriptyline (PAMELOR) capsule 75 mg, 75 mg, oral, Nightly, Fito Michael MD, 75 mg at 10/13/21 2043  •  pantoprazole (PROTONIX) tablet,delayed release (DR/EC) 40 mg, 40 mg, oral, Daily before breakfast, Fito Michael MD, 40 mg at 10/13/21 0809  •  polyethylene glycol (MIRALAX) 17 gram packet 17 g, 17 g, oral, Daily PRN, Fito Michael MD  •  rOPINIRole (REQUIP) tablet 0.5 mg, 0.5 mg, oral, Daily with lunch, Fito Michael MD, 0.5 mg at 10/13/21 1243  •  rOPINIRole (REQUIP) tablet 1 mg, 1 mg, oral, Daily with dinner, Fito Michael MD, 1 mg at 10/13/21 1703  •  rOPINIRole (REQUIP) tablet 1 mg, 1 mg, oral, Daily, Fito Michael MD, 1 mg at 10/13/21 0758  •  rOPINIRole (REQUIP) tablet 2 mg, 2 mg, oral, Nightly, Fito Michael MD, 2 mg at 10/13/21 2042  •  sennosides-docusate sodium (SENOKOT-S) 8.6-50 mg per tablet 2 tablet, 2 tablet, oral, BID, Noé Nunez MD, 2 tablet at 10/10/21 0852  •  sertraline (ZOLOFT) tablet 300 mg, 300 mg, oral, Daily, Fito Michael MD, 300 mg at 10/13/21 0759       Labs / Radiology    Lab Results   Component Value Date    WBC 5.82 10/12/2021    HGB 7.9 (L) 10/12/2021    HCT 25.5 (L) 10/12/2021    MCV 94.8 10/12/2021     (H) 10/12/2021     Lab Results   Component Value Date    GLUCOSE 90 10/12/2021    CALCIUM 8.3 (L) 10/12/2021     10/12/2021    K 4.4 10/12/2021    CO2 29 10/12/2021     10/12/2021    BUN 8 10/12/2021    CREATININE 0.5 (L) 10/12/2021     Assessment and Plan    ASSESSMENT PLAN:  1. 60-year-old right handed white female with chronic conditions significant for polyarticular degenerative arthritis, hypertension, hyperlipidemia, seizure disorder, anxiety, depression, asthma, restless leg syndrome had elective bilateral total knee replacements secondary to degenerative  arthritis of both knees by Dr. Bennett Em at [facility] on 9/20/21. Postoperatively, on the way from the PACU to the floor or soon after arriving on the floor, the patient did sustain a seizure and she does have a history of seizure disorder but had not had one in about a decade.  She was seen by neurologist at [facility] after her seizure.  Postoperative course was significant for hypotension which was felt to be secondary to narcotics, dehydration as well as blood loss.  She had hypoxia with exertion. CT of chest on 9/23/21 revealed no CT evidence of pulmonary embolism, but scattered bilateral airspace opacities were noted and believed to be most likely representing multifocal pneumonia.  Covid 19 pneumonia was excluded. She was allowed weightbearing as tolerated on both lower extremities and on Aspirin and SCDs for DVT prophylaxis. She was apparently offered SNF placement per her records but she declined it.  She was medically cleared for discharge on Ceftin 500mg BID and Zithromax 500mg daily x 5 days and with instructions for follow-up care and discharged to home on 9/23/21 with home care.  Subsequently, she presented to the ER at [facility] on 9/24/21 with increased shortness of breath and was diagnosed with aspiration pneumonia.  She was intubated on 9/25/21 and eventually extubated on 10/1/21. She reports she was on oxygen in the acute care hospital and will try to wean her off oxygen as possible.  She has had multiple blisters on both knees.  On 10/5/21, hemoglobin was 8.5, WBC count 6.9, platelets were 545, BUN was 13 and creatinine was 0.6.  She has been needing assistance for mobility, transfers, self-care. She is transferred to [place] [facility] on 10/6/21 for further rehabilitation care.     2. DVT prophylaxis - on Aspirin.  On SCDs.  Check doppler.  Platelets 213 on 9/22/2021. Platelets 643 on 10/8/2021. Platelets 693 on 10/12/2021.     3.  Deconditioning -  status post recent bilateral total knee replacements, recent pneumonia, ventilator-dependent respiratory failure, seizure disorder, deconditioning, multiple medical problems - continue PT, OT, psychology.  Follow falls precautions, cardiac precautions, monitor pulse oximetry in therapy.     4. GI - On Pepcid for GI prophylaxis.  On Protonix.  Continue Colace, Senokot.  On PRN MiraLAX.  On PRN Dulcolax suppository. On PRN Maalox.       5.  - voiding.  Denies dysuria.  Monitor post residuals.     6.  Seizure disorder -on Lamictal XR.  Follow seizure precautions.     7. Pain - on Mobic.  On Tylenol.  On PRN Dilaudid.  Ice to knees.  On topical Lidoderm patches.     8. Hematology -  Anemia - on MVI.  Hemoglobin 8.5, WBC 10.36 on 9/22/2021. Hemoglobin 7.7, WBC 8.71 on 10/8/2021. Hemoglobin 7.9, WBC 5.82 on 10/12/2021.     9.  Psychiatry - history of anxiety and depression - on BuSpar.  On Zoloft.  On Pamelor.     10.  Pulmonary - H/O asthma, recent pneumonia, ventilator-dependent respiratory failure.  On Singulair.  On Breo Ellipta.  On PRN Ventolin HFA.     11.  Restless leg syndrome - on Requip.     12. Skin - bilateral knee incisions stable.  Multiple blisters noted on both incisions. Blisters bilateral knee incisions are drying up.     13.  Hyperlipidemia - on Lipitor.     14. F/E/N - On MVI. On vitamin C.       15.  Rehabilitation medicine - Continue comprehensive rehabilitation care. Continue PT, OT, psychology.  We will follow falls precautions, cardiac precautions, monitor pulse oximetry in therapy and follow aspiration precautions.Slept well last night.  Tolerating therapies per endurance.  Denies increased pain.  Had a bowel movement.  Voiding well.  Inspected bilateral knee incisions which are stable.  She did not have lab work today, labs will be drawn tomorrow per nursing.   Feels better today. Denies increased pain. Inspected knee incisions are stable. Encourage incentive spirometry and deep breathing  exercises.She indicates she wants to go home sooner sometime this week. Will discuss in team tomorrow. Discussed with patient. Inspected knee incisions which are stable. Blisters around both incisions are drying up.Discussed her progress in therapies with therapists in team meeting today. She will be discharged home later this week per her request. Family training to be done with her  prior to discharge.Discussed recommendations of PCC with patient. She feels comfortable with discharge plans to home on this Friday. She will follow up with her pulmonary physician next week and discuss weaning off oxygen when able to. Continuous pulse oximetry to be checked tonight. She will need home oxygen. Discussed with .     16. Reviewed labs today.  BUN 13, creatinine 0.6 on 10/5/2021. BUN 7, creatinine 0.5 on 10/8/2021. BUN 8, creatinine 0.5 on 10/12/2021.            Fito Michael MD  10/13/2021

## 2021-10-14 NOTE — PROGRESS NOTES
Patient: Fide Amin  Location: [facility] [address]  MRN: [id number]  Today's date: 10/14/2021    History of Present Illness  Fide is a 60 y.o. female admitted on 10/6/2021 with Physical deconditioning [R53.81]. Principal problem is Physical deconditioning.   Mrs. Fide Amin is a 60 year old female who presented to [facility] on 9/20/21 for planned B TKA by Dr. Em. Pt suffered a seizure post op after 8 years of having no seizures. CT of chest completed 9/23 results reviewed with no CT evidence of pulmonary embolism.  Scattered bilateral airspace opacities noted and believed to be most likely representing multifocal pneumonia.  Covid 19 pneumonia was excluded. Patient was medically cleared for discharge 9/23/21 on ceftin 500mg BID and zithromax 500mg daily x 5 days and with instructions for f/u care.  Pt. Presented to [facility] on 9/24 for SOB and dx with aspiration PNA. Pt. Intubated on 9/25/21 and extubated on 10/1/21. Pt is now medically stable and admitted to [facility] for acute inpatient rehab.      Past Medical History  Fide has a past medical history of Arthritis, Asthma, Depression, Deviated septum, Epilepsy (CMS/HCC), GERD (gastroesophageal reflux disease), Hypertension, Lipid disorder, Motion sickness, and RLS (restless legs syndrome).      OT Vitals    Date/Time Pulse SpO2 Pt Activity O2 Therapy O2 Del Method O2 Flow Rate BP BP Location BP Method Pt Position Observations [employer]   10/14/21 0802 91 96 % At rest Supplemental oxygen Nasal cannula 0.5 L/min 154/67 Right upper arm Automatic Sitting ` JV      OT Pain    Date/Time Pain Type Side/Orientation Location Rating: Rest Rating: Rest Interventions [employer]   10/14/21 0802 Pain Assessment bilateral knee -- 2 - mild pain premedicated for activity JV   10/14/21 0858 Pain Reassessment -- -- 0 -- -- JV          Prior Living Environment      Most Recent Value   People in Home spouse  [4 kids]   Current Living Arrangements home   Home Accessibility  "stairs to enter home (Group),  stairs within home (Group)  [2+1 STEPHANY, ff inside, NC 1st floor]   Living Environment Comment Pt lives in 2 . Pt reports two small steps to enter and FF to 2nd floor. Pt reports plans to have 1st floor set-up. Pt has guest room on 1st floor c 1/2 bath. Pt reports purchased commode and 2 RTS c handles.   Number of Stairs, Main Entrance 2   Surface of Stairs, Main Entrance concrete   Stair Railings, Main Entrance none   Location, Patient Bedroom second floor, must negotiate stairs to access   Patient Bedroom Access Comment Ability to have first floor set up   Location, Bathroom first (main) floor,  second floor, must negotiate stairs to access   Bathroom Access Comment 2nd floor FB c shower stall. (+) grab bar in shower stall. Pt reports has built in shower bench in stall.   Number of Stairs, Within Home, Primary other (see comments)  [7+7]   Surface of Stairs, Within Home, Primary hardwood   Stair Railings, Within Home, Primary railings on both sides of stairs   Landing, Stairs, Within Home, Primary railings present          Prior Level of Function      Most Recent Value   Dominant Hand right   Ambulation independent   Transferring independent   Toileting independent   Bathing independent   Dressing independent   Prior Level of Function Comment Pt was d/c'd home for one day after surgery using RW.   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   Occupational History/Life Experiences Pt lives at home in [facility] c  ([place]). Pt reports  also retired. Pt is a . Pt has epilepsy and hx of last seizure 2011 then had a seizure after BKA surgery 9/2021.   Patient Goals PSFS reassed pt score improving from 4.33 to 10 on pt goals of \"typing on " "phone, tying shoes, and retrieving things out of reach\".           [facility] OT Evaluation and Treatment - 10/14/21 0817        OT Time Calculation    Start Time 0800     Stop Time 0900     Time Calculation (min) 60 min        Session Details    Document Type discharge evaluation     Mode of Treatment occupational therapy; individual therapy        General Information    General Observations of Patient Pt received awake in bed on 0.5 L supplemental air. Pt agreeable to therapy.        Range of Motion (ROM)    Range of Motion bilateral upper extremities; ROM is WFL        Strength (Manual Muscle Testing)    Strength (Manual Muscle Testing) bilateral upper extremities; strength is WFL        Sit to Stand Transfer    [place], Sit to Stand Transfer modified independence     Assistive Device walker, front-wheeled     Comment MOD I for safety considerations.        Stand to Sit Transfer    [place], Stand to Sit Transfer modified independence     Assistive Device walker, front-wheeled     Comment MOD I for safety considerations.        Stand Pivot Transfer    [place], Stand Pivot/Stand Step Transfer supervision     Assistive Device walker, front-wheeled     Comment Ambulataory level. Assist for O2 tank management.        Toilet Transfer    Transfer Technique stand pivot     [place], Toilet Transfer supervision     Assistive Device commode, 3-in-1; walker, front-wheeled     Comment Ambulatory approach. Assist for O2 tank management.        Shower Transfer    Transfer Technique stand pivot     [place], Shower Transfer supervision     Assistive Device shower chair; grab bars/tub rail; walker, front-wheeled     Comment Ambulatory approach. Assist for O2 tank management.        Safety Issues, Functional Mobility    Comment, Safety Issues/Impairments (Mobility) S ambulating c RW in room for BADL routine. OT assisting c O2 tank management.        Basic Activities of Daily Living (BADLs)    Energy Conservation " Techniques activity adapted to sitting; activity pacing encouraged; correct body mechanics utilized; equipment and device use facilitated        Bathing    Self-Performance chest; left arm; abdomen; right arm; front perineal area; buttocks; left upper leg; right upper leg; left lower leg, including foot; right lower leg, including foot     [place] set up     Position supported sitting     Setup Assistance adaptive equipment setup; obtain supplies     Adaptive Equipment grab bar/tub rail; hand-held shower spray hose; shower chair; long-handled sponge     Comment OT providing set up/clean up assist.        Upper Body Dressing    Self-Performance threads left arm, shirt; pulls shirt over head/around back; pulls shirt down/adjusts; threads right arm, shirt     [place] Assistance obtains clothes     [place] set up     Position edge of bed sitting     Adaptive Equipment none     Comment OT providing set-up assistance to maximize independence.        Lower Body Dressing    Self-Performance threads left leg, underpants; threads right leg, underpants; pulls underpants up or down; threads left leg, pants/shorts; threads right leg, pants/shorts; pulls pants/shorts up or down; dons/doffs left sock; dons/doffs right sock; dons/doffs left shoe; dons/doffs right shoe     [place] Assistance obtains clothes     [place] set up     Position edge of bed sitting; supported standing     Adaptive Equipment dressing stick; elastic shoelaces; long-handled shoe horn; reacher; sock aid     [place], Footwear set up     Comment OT providing set-up assistance to maximize independence. Pt utilizing AE appropriately to complete LB dressing tasks.        Grooming    Self-Performance brushes/jacobs hair; oral care (brushing teeth, cleaning dentures); applies deodorant     [place] modified independence     Position sink side; unsupported standing     Adaptive Equipment none     [place], Oral Hygiene modified independence      Comment MOD I standing at sink c RW.        Toileting    [place] modified independence     Position unsupported sitting; unsupported standing     Adaptive Equipment commode, 3-in-1        Discharge Summary (OT)    Outcomes Achieved Upon Discharge (OT) goals partially achieved prior to discharge     Discharge Summary Statement (OT) Performance day completed in preparation for d/c pending 10/15. Pt progressing to S with functional transfers ambulatory level using RW and recommended DME. Pt progressing to set-up asssist with bathing, UB and LB dressing, and MOD I c toileting and grooming. Pt c good carryover of energy conservation techniques for BADLs including adapting tasks to seated position, use of adaptive equipment, taking rest breaks and utilizing good breathing techniques. Pt tolerating 0.5L supplemental air for completion of BADL requiring 1 rest break x 1 min following toileting/ambulation to recover to >92%. Family training has been completed c  and he demonstrates understanding of all recommendations and ability to provide recommended level of assist. Pt has all recommended DME for home. Pt has been educated on no driving at this time and reports understanding. Pt is preparing for d/c home c home care.     Transfer to Another Level of Care or Facility (OT) recommend therapy via home health                           IR[facility] OT Goals      Most Recent Value   Transfer Goal 1    Activity/Assistive Device toilet at 10/07/2021 0740   [place] supervision required at 10/07/2021 0740   Time Frame short-term goal (STG),  1 week at 10/11/2021 1511   Progress/Outcome goal met at 10/14/2021 0817   Transfer Goal 2    Activity/Assistive Device toilet at 10/07/2021 0740   [place] modified independence at 10/07/2021 0740   Time Frame long-term goal (LTG),  14 days or less at 10/07/2021 0740   Progress/Outcome goal not met at 10/14/2021 0817   Transfer Goal 3    Activity/Assistive Device shower at  10/07/2021 0740   [place] supervision required at 10/07/2021 0740   Time Frame short-term goal (STG),  1 week at 10/11/2021 1511   Progress/Outcome goal met at 10/14/2021 0817   Transfer Goal 4    Activity/Assistive Device shower at 10/07/2021 0740   [place] modified independence at 10/07/2021 0740   Time Frame long-term goal (LTG),  14 days or less at 10/07/2021 0740   Progress/Outcome goal not met at 10/14/2021 0817   Bathing Goal 1    Activity/Assistive Device bathing skills, all at 10/11/2021 1511   [place] set-up required at 10/11/2021 1511   Time Frame short-term goal (STG),  3 - 5 days at 10/11/2021 1511   Progress/Outcome goal met at 10/14/2021 0817   Bathing Goal 2    Activity/Assistive Device bathing skills, all at 10/07/2021 0740   [place] set-up required at 10/07/2021 0740   Time Frame long-term goal (LTG),  14 days or less at 10/07/2021 0740   Progress/Outcome goal met at 10/14/2021 0817   UB Dressing Goal 1    Activity/Assistive Device upper body dressing at 10/07/2021 0740   [place] supervision required at 10/11/2021 1511   Time Frame short-term goal (STG),  3 - 5 days at 10/11/2021 1511   Strategies/Barriers c clothing retrieval at 10/07/2021 0740   Progress/Outcome goal met at 10/14/2021 0817   UB Dressing Goal 2    [place] modified independence at 10/07/2021 0740   Time Frame long-term goal (LTG),  14 days or less at 10/07/2021 0740   Progress/Outcome goal not met at 10/14/2021 0817   LB Dressing Goal 1    [place] supervision required at 10/11/2021 1511   Time Frame short-term goal (STG),  3 - 5 days at 10/11/2021 1511   Strategies/Barriers c AE at 10/07/2021 0740   Progress/Outcome goal met at 10/14/2021 0817   LB Dressing Goal 2    [place] modified independence at 10/07/2021 0740   Time Frame long-term goal (LTG),  14 days or less at 10/07/2021 0740   Progress/Outcome goal not met at 10/14/2021 0817   Grooming Goal 1    [place] supervision required at  10/07/2021 0740   Time Frame short-term goal (STG),  1 week at 10/07/2021 0740   Strategies/Barriers standing at 10/07/2021 0740   Progress/Outcome goal met at 10/11/2021 1511   Grooming Goal 2    [place] modified independence at 10/07/2021 0740   Time Frame long-term goal (LTG),  14 days or less at 10/07/2021 0740   Progress/Outcome goal met at 10/14/2021 0817   Toileting Goal 1    [place] minimum assist (75% or more patient effort) at 10/07/2021 0740   Time Frame short-term goal (STG),  1 week at 10/07/2021 0740   Progress/Outcome goal met at 10/11/2021 1511   Toileting Goal 2    [place] modified independence at 10/07/2021 0740   Time Frame long-term goal (LTG),  14 days or less at 10/07/2021 0740   Progress/Outcome goal met at 10/14/2021 0817

## 2021-10-14 NOTE — PROGRESS NOTES
[facility] Progress Note          Patient was seen and examined at bedside.    Subjective:     She is looking forward to pending discharge.     Respiratory status stable, pain controlled, moving bowels and bladder       Objective   Vital signs in last 24 hours:  Temp:  [36.6 °C (97.9 °F)-37.1 °C (98.7 °F)] 37.1 °C (98.7 °F)  Heart Rate:  [80-92] 88  Resp:  [18-19] 19  BP: (117-154)/(61-70) 119/70    No intake or output data in the 24 hours ending 10/14/21 1235  Intake/Output this shift:  No intake/output data recorded.   Review of Systems:  All other systems reviewed and negative except as noted in the HPI.   Objective      Labs  reviewed her labs thoroughly   Lab Results   Component Value Date    WBC 5.82 10/12/2021    HGB 7.9 (L) 10/12/2021    HCT 25.5 (L) 10/12/2021    MCV 94.8 10/12/2021     (H) 10/12/2021     Lab Results   Component Value Date    GLUCOSE 90 10/12/2021    CALCIUM 8.3 (L) 10/12/2021     10/12/2021    K 4.4 10/12/2021    CO2 29 10/12/2021     10/12/2021    BUN 8 10/12/2021    CREATININE 0.5 (L) 10/12/2021       Imaging  OSH imaging study reports reviewed   CXR 10/7/21: IMPRESSION:  1.  Persistent multifocal airspace disease, increased in the right lung and  mildly improved in the left since 9/23/2021.  2.  Low lung volumes.  3.  Small bilateral pleural effusions, mildly increased since 9/23/2021.    B/l LE venous compression U/S 10/7/21: Study negative for deep vein thrombosis  Visualized veins are normal in compressibility with normal color flow from groin to ankle bilaterally.    Full Code    Physical Exam:  Head/Ear/Nose/Throat: normocephalic; atraumatic; moisture mouth mm, no oropharyngeal thrush noted.   Eyes: anicteric sclera, EOMI; PERRL.   Neck : supple, no JVD, no carotid bruits appeciated.   Respiratory: no evidence of labored breathing, lung sounds CTA b/l, good aeration bibasilar area, no w/r/c.   Cardiovascular: RRR; normal S1, S2; no  m/r/g; no S3 or S4.   Gastrointestinal: soft; NT; BS normal; mildly distended; no CVAT b/l.   Genitourinary: no vazquez.   Extremities : s/p b/l TKA, incisional site residual erythema with mild swelling .   Neurological: AO x 2-3, fluent speeches, following commands, CNS II-XII grossly intact; no focal neurologic deficits, noted intermittent clonic spasms.   Behavior/Emotional: in NAD, appropriate; cooperative.   Skin: clean, dry and intact.     Plan of care was discussed with patient, RN, and PMR attending     Assessment   CC:S/p b/l TKA, epilepsy with post op recurrence, aspiration multifocal PNA complicated by VDRF, physical deconditioning.      59 yo female, with PMH of epilepsy (last episodes 2011), depression/anxiety, RLS with intermittent clonic spasms, asthma on ICS/LABA and rescue albuterol inhaler, HTN, HLD, anemia, hypocalcemia, DJD with severe b/l knee OA for which she presented to [facility] on 9/20/21 and underwent an elective b/l TKA, of note, following her surgery, she has a transient seizure episode, resolved by its self, further complicated by suspected aspiration, CT of chest on 9/23 reviewed no evidence of pulmonary embolism, scattered bilateral airspace opacities, believed to be most likely representing multifocal pneumonia. Covid 19 pneumonia was excluded. Patient was medically cleared for discahrge on ceftin 500mg BID and zithromax 500mg daily x 5 days and with instructions for f/u care. After discharge from [facility], she presented and was admitted to [facility] on 9/24 for dyspnea, diagnosed with aspiration PNA with acute respiratory failure, s/p intubation/ventilation support on 9/25/21 and extubated on 10/1/21. Completed 7-day course of ABX, MRSA negative, BCX NGTD, provided bronchodilator and steroid therapy, CXR with persistent bibasilar opacities, will need follow-up imaging in 4 to 6 weeks. Functionally, she has physical deconditioning, requiring inpt acute rehab, transferred to [facility] on 10/6/21.      1. S/p  b/l TKA, epilepsy with post op recurrence, aspiration multifocal PNA complicated by VDRF, physical deconditioning: inpt acute rehab, gait and balancing training, fall precaution, medical management, DVT prophylaxis, dermal defense, f/u with surgeon and pulmonologist as scheduled.     2.Pulm-asthma AE, s/p aspiration multifocal PNA complicated by VDRF, residual air space diseases: monitor SO2, prn NC O2, keep SO2>92%, incentive spirometry, LABA/ICS, bronchodilator   nebulizer, mucolytic agent, pulm toileting. Repeat CXR, f/u with her pulmonologist.      3. Anemia: f/u H/H, no need PRBC based on current H/H level, check iron profile, po iron with Vit C as indicated.     4. GI-constipation, GERD: provide constipation bowel regimen, po hydration, fiber intake, timed toilet visits; PPI for GERD and GI prophy.     5. S/p b/l TKA, residual b/l knee swelling, risk of DVT, DVT prophy: SCD, early ambulation, ASA 81 mg bid x 6 weeks, LE venous DUS to r/o DVT.       6. HTN, HLD: not on HTN meds, monitor BP, add as indicated, on statin .      7. Neuro- epilepsy with post op recurrence, neuropathy, RLS with intermittent clonic spasms: consult neurologist, cont AED, on Requip and nortriptyline, iron supplement if has deficiency, regular neuro checks, f/u with her neurologist as outpt      8. Renal, electrolytes: monitor renal function and volume status,   monitor and keep electrolytes balance.     9. Psych-anxiety, depression: on Buspar and sertraline, consult psychiatrist for comanagement, psychology CBT,  support.      10. - no UTI or retention    11. Dispo:  -Anticipated discharge to home 10/15/21  -Reviewed chart and meds  -Completed discharge documentation and wrote scripts as needed       Billing code: [id number]  Diagnoses:  Patient Active Problem List   Diagnosis   • Arthritis of knee   • Epilepsy (CMS/HCC)   • HTN (hypertension)   • HLD (hyperlipidemia)   • Hypocalcemia   • Depression   • History of total knee  arthroplasty, bilateral   • Physical deconditioning   • Anemia   • Asthma   • Pulmonary edema   complicated case with multiple comorbidities as mentioned in subjective section, spent 35 min to manage the case, >50% of the time consulting the patient about current medical condition, existing comorbidities, new findings/concerns and care/management plan.                Willsi Rodriguez MD  10/14/2021

## 2021-10-14 NOTE — PROGRESS NOTES
Patient: Fide Amin  Location: [facility] [address]  MRN: [id number]  Today's date: 10/14/2021    History of Present Illness  Fide is a 60 y.o. female admitted on 10/6/2021 with Physical deconditioning [R53.81]. Principal problem is Physical deconditioning.   Mrs. Fide Amin is a 60 year old female who presented to [facility] on 9/20/21 for planned B TKA by Dr. Em. Pt suffered a seizure post op after 8 years of having no seizures. CT of chest completed 9/23 results reviewed with no CT evidence of pulmonary embolism.  Scattered bilateral airspace opacities noted and believed to be most likely representing multifocal pneumonia.  Covid 19 pneumonia was excluded. Patient was medically cleared for discharge 9/23/21 on ceftin 500mg BID and zithromax 500mg daily x 5 days and with instructions for f/u care.  Pt. Presented to [facility] on 9/24 for SOB and dx with aspiration PNA. Pt. Intubated on 9/25/21 and extubated on 10/1/21. Pt is now medically stable and admitted to [facility] for acute inpatient rehab.      Past Medical History  Fide has a past medical history of Arthritis, Asthma, Depression, Deviated septum, Epilepsy (CMS/HCC), GERD (gastroesophageal reflux disease), Hypertension, Lipid disorder, Motion sickness, and RLS (restless legs syndrome).      PT Vitals    Date/Time Pulse HR Source SpO2 Pt Activity BP BP Location BP Method Pt Position [facility]   10/14/21 1307 88 Monitor 95 % At rest 125/51 Right upper arm Automatic Sitting PLP      PT Pain    Date/Time Pain Type Side/Orientation Location Rating: Rest Rating: Activity Description Interventions [facility]   10/14/21 1307 Pain Assessment left knee 2 2 sore -- PLP   10/14/21 1328 Pain Reassessment left knee 3 -- -- position adjusted PLP          Prior Living Environment      Most Recent Value   People in Home spouse  [4 kids]   Current Living Arrangements home   Home Accessibility stairs to enter home (Group),  stairs within home (Group)  [2+1 STEPHANY, ff  inside, MA 1st floor]   Living Environment Comment Pt lives in 2 . Pt reports two small steps to enter and FF to 2nd floor. Pt reports plans to have 1st floor set-up. Pt has guest room on 1st floor c 1/2 bath. Pt reports purchased commode and 2 RTS c handles.   Number of Stairs, Main Entrance 2   Surface of Stairs, Main Entrance concrete   Stair Railings, Main Entrance none   Location, Patient Bedroom second floor, must negotiate stairs to access   Patient Bedroom Access Comment Ability to have first floor set up   Location, Bathroom first (main) floor,  second floor, must negotiate stairs to access   Bathroom Access Comment 2nd floor FB c shower stall. (+) grab bar in shower stall. Pt reports has built in shower bench in stall.   Number of Stairs, Within Home, Primary other (see comments)  [7+7]   Surface of Stairs, Within Home, Primary hardwood   Stair Railings, Within Home, Primary railings on both sides of stairs   Landing, Stairs, Within Home, Primary railings present          Prior Level of Function      Most Recent Value   Dominant Hand right   Ambulation independent   Transferring independent   Toileting independent   Bathing independent   Dressing independent   Prior Level of Function Comment Pt was d/c'd home for one day after surgery using RW.   Assistive Device Currently Used at Home commode, 3-in-1,  raised toilet,  walker, front-wheeled  [RTS c handles]           IRF PT Evaluation and Treatment - 10/14/21 1305        PT Time Calculation    Start Time 1300     Stop Time 1330     Time Calculation (min) 30 min        Session Details    Document Type daily treatment/progress note     Mode of Treatment individual therapy; physical therapy        Range of Motion (ROM)    Range of Motion ROM is WFL     Knee, Left (ROM) 0-100     Ankle, Left (ROM) DF +10     Knee, Right (ROM) 0-110     Ankle, Right (ROM) DF +10        Bed Mobility    [place], Roll Left modified independence     [place], Roll Right  "modified independence     [place], Supine to Sit modified independence     [place], Sit to Supine modified independence     Assistive Device none        Sit to Stand Transfer    [place], Sit to Stand Transfer modified independence     Assistive Device walker, front-wheeled        Stand to Sit Transfer    [place], Stand to Sit Transfer modified independence     Assistive Device walker, front-wheeled        Stand Pivot Transfer    [place], Stand Pivot/Stand Step Transfer supervision     Verbal Cues safety     Assistive Device walker, front-wheeled     Comment wc <> EOM; S for safety        Lower Extremity (Therapeutic Exercise)    Exercise Position/Type supine; AROM (active range of motion); static stretching     General Exercise bilateral     Reps and Sets see blow     Comment HEP reviewed and handout provided 1) Ankle pumps x30 2) Quad sets x20 with 5\" hold 3) Glute sets x20 with 5\" hold 4) Heel slides x10/side 5) Hip abd/add x20 6) SAQ x10/side with 3\" hold 7) Bridges x5 8) Gastroc stretch 2x20\"/side 9) HS Stretch x20\"/side        Daily Progress Summary (PT)    Daily Outcome Statement HEP reviewed verbally and pt completed at least one set of each exercise, demonstrating good understanding. Pt was instructed to complete 3 sets of 10 reps 2-3x/day. Pt ROM was measured and pt achieved knee flexion of 100 deg PROM on the L and 110 degrees PROM on the R.                      Education Documentation  Mobility Aids/Assistive Devices, taught by Miriam Burton, PT at 10/14/2021  3:55 PM.  Learner: Patient  Readiness: Acceptance  Method: Explanation, Handout, Demonstration  Response: Verbalizes Understanding  Comment: Pt provided with HEP handout and educated on proper technique for each exericses as well as dosing.          IRF PT Goals      Most Recent Value   Bed Mobility Goal 1    Activity/Assistive Device bed mobility activities, all,  no assistive device at 10/07/2021 1003   [place] " modified independence at 10/07/2021 1003   Time Frame 1 week at 10/07/2021 1003   Strategies/Barriers anxiousness, decreased safety awareness and carry-over, supplimental O2 at 10/11/2021 1754   Progress/Outcome goal met at 10/14/2021 1105   Transfer Goal 1    Activity/Assistive Device sit-to-stand/stand-to-sit,  stand pivot,  car transfer,  walker, front-wheeled at 10/07/2021 1003   [place] supervision required,  set-up required at 10/07/2021 1003   Time Frame 1 week at 10/07/2021 1003   Strategies/Barriers anxiousness, decreased safety awareness and carry-over, supplimental O2, deconditioning at 10/11/2021 1754   Progress/Outcome goal met at 10/14/2021 1105   Transfer Goal 2    Activity/Assistive Device sit-to-stand/stand-to-sit,  stand pivot,  car transfer,  walker, front-wheeled at 10/07/2021 1003   [place] modified independence at 10/07/2021 1003   Time Frame 14 days or less at 10/07/2021 1003   Strategies/Barriers anxiousness, decreased safety awareness and carry-over, supplimental O2, deconditioning at 10/11/2021 1754   Progress/Outcome goal met at 10/14/2021 1105   Gait/Walking Locomotion Goal 1    Activity/Assistive Device assistive device use,  backward stepping,  decrease fall risk,  diminish gait deviation,  forward stepping,  improve balance and speed,  increase endurance/gait distance,  increase energy conservation,  normalize weight shifts,  turning, left,  turning, right,  walker, front-wheeled at 10/07/2021 1003   Distance 150 feet at 10/07/2021 1003   [place] supervision required at 10/07/2021 1003   Time Frame 1 week at 10/07/2021 1003   Strategies/Barriers anxiousness, decreased safety awareness and carry-over, supplimental O2, deconditioning at 10/11/2021 1754   Progress/Outcome goal met at 10/14/2021 1105   Gait/Walking Locomotion Goal 2    Activity/Assistive Device gait (walking locomotion),  assistive device use,  backward stepping,  decrease fall risk,  diminish gait  deviation,  forward stepping,  improve balance and speed,  increase endurance/gait distance,  increase energy conservation,  normalize weight shifts,  sidestepping,  turning, left,  turning, right,  walker, front-wheeled at 10/07/2021 1003   Distance 300 feet at 10/07/2021 1003   [place] modified independence at 10/07/2021 1003   Time Frame 14 days or less at 10/07/2021 1003   Strategies/Barriers anxiousness, decreased safety awareness and carry-over, supplimental O2, deconditioning at 10/11/2021 1754   Progress/Outcome goal partially met  [200' with S] at 10/14/2021 1105   Stairs Goal 1    Activity/Assistive Device ascending stairs,  descending stairs,  step-to-step,  using handrail, right,  using handrail, left,  decrease fall risk,  improve balance and speed at 10/07/2021 1003   Number of Stairs 8 at 10/07/2021 1003   [place] supervision required at 10/07/2021 1003   Time Frame 1 week at 10/07/2021 1003   Strategies/Barriers anxiousness, decreased safety awareness and carry-over, supplimental O2 at 10/11/2021 1754   Progress/Outcome goal partially met  [steady assist] at 10/14/2021 1105   Stairs Goal 2    Activity/Assistive Device using handrail, left,  using handrail, right,  step-to-step,  decrease fall risk,  ascending stairs,  descending stairs,  improve balance and speed,  no assistive device at 10/07/2021 1003   Number of Stairs 14 at 10/07/2021 1003   [place] modified independence at 10/07/2021 1003   Time Frame 14 days or less at 10/07/2021 1003   Strategies/Barriers anxiousness, decreased safety awareness and carry-over, supplimental O2, deconditioning at 10/11/2021 1754   Progress/Outcome goal partially met  [8 steps with steady assist] at 10/14/2021 1105   ROM Goal 1    ROM Goal 1 B knee ROM 0-90 at 10/07/2021 1003   Time Frame 14 days or less at 10/07/2021 1003   Strategies/Barriers anxiousness, decreased safety awareness and carry-over, supplimental O2 at 10/11/2021 1754    Progress/Outcome goal met at 10/14/2021 1105   ROM Goal 2    ROM Goal 2 B ankle DF ROM +10 degrees at 10/07/2021 1003   Time Frame 14 days or less at 10/07/2021 1003   Caregiver Training Goal 1    Caregiver Training Goal 1 Pt's  safe and I providing any necessary supervision/Assist with mobility tasks at 10/07/2021 1003   Time Frame 14 days or less at 10/07/2021 1003   Progress/Outcome goal met at 10/14/2021 1105   Problem Specific Goal 1    Problem-Specific Goal 1 Pt weaned from supplemental O2 and tolerating > 92% with activity. at 10/07/2021 1003   Time Frame 14 days or less at 10/07/2021 1003   Strategies/Barriers anxiousness, decreased safety awareness and carry-over, supplimental O2 at 10/11/2021 1754   Progress/Outcome goal partially met  [pt on 0.5 L O2] at 10/14/2021 1105

## 2021-10-14 NOTE — PROGRESS NOTES
Cardiology Progress Note    Subjective:    Pt was seen and examined this morning. Denies any chest pain, SOB, orthopnea, PND, edema, lightheadedness, dizziness, palpitations.    Allergies: Penicillins and Sulfa (sulfonamide antibiotics)  ROS: Negative except those listed in HPI and A&P     Physical Exam:  General Appearance: Alert, no distress. On O2 NC  Head: Normocephalic, without obvious abnormality, atraumatic  Neck: No carotid bruits. No JVD  Lungs: Decreased breath sounds at the bases bilaterally, respirations unlabored, no rales, no wheezing, no rhonchi  Heart: RRR. No murmur, gallop, or rub. S1 and S2 are present  Abdomen: Soft, non-tender, no masses, no organomegaly. No bruits  Neuro: A&Ox3  Skin/Extremities: No pitting LE edema bilaterally.  Behavior/Emotional: Appropriate, cooperative    VS:  Patient Vitals for the past 72 hrs:   BP Temp Temp src Pulse Resp SpO2   10/14/21 1037 137/62 -- -- 85 -- 95 %   10/14/21 0802 (!) 154/67 -- -- 91 -- 96 %   10/14/21 0652 (!) 145/67 37.1 °C (98.7 °F) Oral 80 19 98 %   10/13/21 2012 140/64 36.9 °C (98.4 °F) Oral 86 18 98 %   10/13/21 1533 136/61 36.6 °C (97.9 °F) Oral 86 18 95 %   10/13/21 1410 117/62 -- -- 88 -- 96 %   10/13/21 1325 -- -- -- 92 -- 96 %   10/13/21 1310 -- -- -- -- -- 94 %   10/13/21 1306 -- -- -- -- -- (!) 84 %   10/13/21 1301 -- -- -- 88 -- 95 %   10/13/21 1158 (!) 150/65 -- -- 85 -- 96 %   10/13/21 1130 -- -- -- -- -- 97 %   10/13/21 0955 (!) 144/67 -- -- 93 -- 93 %   10/13/21 0935 120/78 -- -- 92 -- 97 %   10/13/21 0844 130/61 -- -- 92 -- 97 %   10/13/21 0600 140/61 36.9 °C (98.4 °F) Oral 83 18 95 %   10/12/21 1900 (!) 157/66 37 °C (98.6 °F) Oral 86 18 100 %   10/12/21 1600 130/62 37.4 °C (99.4 °F) Oral 87 18 100 %   10/12/21 1529 (!) 112/59 -- -- 87 -- 97 %   10/12/21 1450 -- -- -- -- -- 94 %   10/12/21 1448 -- -- -- -- -- (!) 82 %   10/12/21 1437 (!) 118/58 -- -- 92 -- 96 %   10/12/21 1342 (!) 101/54 -- -- 91 -- 99 %   10/12/21 1035 (!) 153/70 --  -- 90 -- 97 %   10/12/21 1026 -- -- -- -- -- 96 %   10/12/21 1019 -- -- -- -- -- (!) 90 %   10/12/21 1008 (!) 159/71 -- -- 89 -- 95 %   10/12/21 0641 137/65 36.7 °C (98.1 °F) Oral 82 18 95 %   10/11/21 2000 135/60 36.7 °C (98.1 °F) Oral -- 18 95 %   10/11/21 1616 (!) 108/55 37.1 °C (98.8 °F) Oral 85 19 100 %   10/11/21 1437 (!) 119/54 -- -- 88 -- 94 %   10/11/21 1057 -- -- -- 88 -- (!) 83 %     Labs:  Recent labs reviewed  Results from last 7 days   Lab Units 10/12/21  0553 10/08/21  0619   WBC K/uL 5.82 8.71   HEMOGLOBIN g/dL 7.9* 7.7*   HEMATOCRIT % 25.5* 24.7*   PLATELETS K/uL 693* 643*   SODIUM mEQ/L 140 137   POTASSIUM mEQ/L 4.4 4.5   CHLORIDE mEQ/L 102 101   CO2 mEQ/L 29 27   GLUCOSE mg/dL 90 86   BUN mg/dL 8 7*   CREATININE mg/dL 0.5* 0.5*   CALCIUM mg/dL 8.3* 8.0*  8.0*   ANION GAP mEQ/L 9 9   AST IU/L 44* 87*   ALT IU/L 47 83*   ALBUMIN g/dL 2.6* 2.6*   EGFR mL/min/1.73m*2 >60.0 >60.0   MAGNESIUM mg/dL 2.1 2.0   TSH mIU/L 1.79  --    BNP pg/mL 145* 183*     Component      Latest Ref Rng & Units 10/12/2021   Triglycerides      30 - 149 mg/dL 101   Cholesterol      <=200 mg/dL 132   HDL      >=55 mg/dL 33 (L)   LDL Calculated      <=100 mg/dL 79       No intake or output data in the 24 hours ending 10/14/21 1051     Tests:  EKG 09/20/21:  Sinus tachycardia 103 BPM  Left anterior fascicular block  Poor R wave progression is more prominent than that that is seen with LAHB alone  Possible Lateral infarct, age undetermined  TN interval 154 ms  QRS duration 86 ms  QT/QTc 356/466 ms  P-R-T axes 50 -57 46    EKG 09/20/21:  Sinus [id number] BPM  RSR' or QR pattern in V1 suggests right ventricular conduction delay  Left anterior fascicular block  Possible Lateral infarct  TN interval 156 ms  QRS duration 90 ms  QT/QTc 366/477 ms  P-R-T axes 46 -57 59    EKG 10/08/21:  Sinus rhythm 90 BPM  Cannot rule out Anterior infarct, age undetermined  TN interval 120 ms  QRS duration 76 ms  QT/QTcB 384/469 ms  P-R-T axes 55  -24 39    US venous b/l LE 10/07/21:  Study negative for deep vein thrombosis    TTE:  pending    Current CV Medications:  •  aspirin chewable tablet 81 mg, 81 mg, oral, BID  •  atorvastatin (LIPITOR) tablet 40 mg, 40 mg, oral, Daily->hold 10/09  •  enoxaparin (LOVENOX) syringe 40 mg, 40 mg, subcutaneous, Daily  •  furosemide (LASIX) tablet 40 mg, 40 mg, oral, Daily    Assessment and Plan:  Fide Amin is a 60 y.o. female w/ a PMH of HTN, HLD, Anxiety/Depression Disorder, RLS (w/ intermittent clonic spasms), Epilepsy (last episodes 2011), Anemia, OA, Arthritis, GERD, Asthma and DJD who presented to [facility] on 9/20/21 and underwent an elective b/l TKA. Post-op had a transient seizure episode, resolved by its self, but further complicated by suspected aspiration. CT chest on 9/23 no evidence of pulmonary embolism, scattered bilateral airspace opacities, believed to be most likely representing multifocal pneumonia. Covid 19 pneumonia was excluded. Patient was medically cleared for discahrge on ceftin 500mg BID and zithromax 500mg daily x 5 days and with instructions for f/u care. After discharge from [facility], she presented and was admitted to [facility] on 9/24 for dyspnea, diagnosed with aspiration PNA with acute respiratory failure. Pt was intubated and on ventilation support until 10/1/21. Completed 7-day course of ABX, MRSA negative, BCX NGTD, provided bronchodilator and steroid therapy, CXR with persistent bibasilar opacities, will need follow-up imaging in 4 to 6 weeks. Transferred to [facility] on 10/06/21 for acute inpatient rehab. Cardiology consulted at [facility] to eval for CHF.    1) Surgery:  - S/p bilat TKA on 9/20  - Per IM    2) PNA:  - Post-op had a transient seizure episode, complicated by suspected aspiration.  - CT chest showed no evidence of PE showed scattered bilateral airspace opacities most likely representing multifocal pneumonia COVID-19 should be ruled out. Repeat COVID-19 test was negative    3) Questionable  CHF:  - TTE ordered by IM and is pending   - Last E[facility] at [facility] showed NSR  -  minimally elevated on 10/08-->recheck on 10/12 showed   - Pt appears euvolemic.  - On diuretics w/ Lasix 40mg QD by IM  - Will await for echo results for review, before addressing plan of care    4) HTN:  - BP appears mostly stable.  - Continue to monitor    5) HLD:  - LDL 79 and Trig 101 on 10/12  - On Statin therapy  - Recent LFTs mildly elevated and statin was held--> improving per 10/12 CMP. Continue to monitor.    6) Anemia/Thrombocytosis:  - Hgb 7.7 and Platelets 643 on 10/8-->7.9 & 693 respectively on 10/12  - Per IM      WCC  Asa Aly PA-C

## 2021-10-14 NOTE — PLAN OF CARE
Problem: Rehabilitation (IRF) Plan of Care  Goal: Plan of Care Review  Flowsheets (Taken 10/14/2021 5755)  Plan of Care Reviewed With: patient  Outcome Summary: met with pt to go over dc info and process. she is letting her  know of pickup time. hc and O2 set up.

## 2021-10-15 VITALS
WEIGHT: 140.87 LBS | BODY MASS INDEX: 25.92 KG/M2 | OXYGEN SATURATION: 99 % | HEART RATE: 84 BPM | TEMPERATURE: 97.8 F | SYSTOLIC BLOOD PRESSURE: 147 MMHG | DIASTOLIC BLOOD PRESSURE: 72 MMHG | RESPIRATION RATE: 18 BRPM | HEIGHT: 62 IN

## 2021-10-15 PROCEDURE — 63700000 HC SELF-ADMINISTRABLE DRUG: Performed by: INTERNAL MEDICINE

## 2021-10-15 PROCEDURE — 63700000 HC SELF-ADMINISTRABLE DRUG: Performed by: PHYSICAL MEDICINE & REHABILITATION

## 2021-10-15 RX ADMIN — ROPINIROLE HYDROCHLORIDE 1 MG: 1 TABLET, FILM COATED ORAL at 07:54

## 2021-10-15 RX ADMIN — MELOXICAM 7.5 MG: 7.5 TABLET ORAL at 07:54

## 2021-10-15 RX ADMIN — Medication 2500 UNITS: at 07:54

## 2021-10-15 RX ADMIN — ROPINIROLE HYDROCHLORIDE 0.5 MG: 0.5 TABLET, FILM COATED ORAL at 12:13

## 2021-10-15 RX ADMIN — BUSPIRONE HYDROCHLORIDE 10 MG: 5 TABLET ORAL at 07:54

## 2021-10-15 RX ADMIN — LAMOTRIGINE 400 MG: 200 TABLET, EXTENDED RELEASE ORAL at 07:58

## 2021-10-15 RX ADMIN — ACETAMINOPHEN 650 MG: 325 TABLET, FILM COATED ORAL at 12:13

## 2021-10-15 RX ADMIN — Medication 1 TABLET: at 07:54

## 2021-10-15 RX ADMIN — GUAIFENESIN 600 MG: 600 TABLET ORAL at 07:55

## 2021-10-15 RX ADMIN — ALBUTEROL SULFATE 2 PUFF: 90 AEROSOL, METERED RESPIRATORY (INHALATION) at 12:15

## 2021-10-15 RX ADMIN — FLUTICASONE FUROATE AND VILANTEROL 1 PUFF: 200; 25 POWDER RESPIRATORY (INHALATION) at 07:58

## 2021-10-15 RX ADMIN — BUSPIRONE HYDROCHLORIDE 10 MG: 5 TABLET ORAL at 12:14

## 2021-10-15 RX ADMIN — PANTOPRAZOLE SODIUM 40 MG: 40 TABLET, DELAYED RELEASE ORAL at 07:55

## 2021-10-15 RX ADMIN — ACETAMINOPHEN 650 MG: 325 TABLET, FILM COATED ORAL at 07:53

## 2021-10-15 RX ADMIN — ALBUTEROL SULFATE 2 PUFF: 90 AEROSOL, METERED RESPIRATORY (INHALATION) at 05:27

## 2021-10-15 RX ADMIN — ASPIRIN 81 MG CHEWABLE TABLET 81 MG: 81 TABLET CHEWABLE at 07:54

## 2021-10-15 RX ADMIN — SERTRALINE HYDROCHLORIDE 300 MG: 100 TABLET ORAL at 07:54

## 2021-10-15 RX ADMIN — Medication 325 MG: at 07:55

## 2021-10-15 RX ADMIN — Medication 250 MG: at 07:55

## 2021-10-15 RX ADMIN — FUROSEMIDE 40 MG: 40 TABLET ORAL at 12:14

## 2021-10-15 NOTE — DISCHARGE INSTR - OTHER ORDERS
-Monitor blistered areas and incisions. Report any redness, swelling, drainage, or a elevated temperature of >99 to your PCP.     -Continue to use 1/2L of oxygen at all times. Follow-up with your PCP and pulmonologist.     -Attend all follow-up appointments as required.

## 2021-10-15 NOTE — PLAN OF CARE
Plan of Care Review  Plan of Care Reviewed With: patient  Progress: improving  Outcome Summary: pt aaox3. continent of b/b. 1/2L O2 nasal cannula worn at all times. Sleeping well, bed alarm on, call bell within reach

## 2021-10-15 NOTE — DISCHARGE SUMMARY
Rehab Discharge Summary      Admitting Provider: Fito Michael MD  Discharge Provider: Fito Michael MD  Primary Care Physician at Discharge: Edwige Huff CRNP [phone]     Admission Date: 10/6/2021     Discharge Date: 10/15/2021    Discharge Disposition  Home      Code Status at Discharge: Full Code    Discharge Medications     Medication List      START taking these medications    albuterol HFA 90 mcg/actuation inhaler  Commonly known as: VENTOLIN HFA  Inhale 2 puffs 4 (four) times a day.  Dose: 2 puff     cholecalciferol (vitamin D3) 5,000 unit (125 mcg) tablet  Take 0.5 tablets (2,500 Units total) by mouth daily.  Dose: 2,500 Units     famotidine 20 mg tablet  Commonly known as: PEPCID  Take 1 tablet (20 mg total) by mouth nightly for 82 doses Indications: stress ulcer prevention.  Dose: 20 mg     ferrous sulfate 325 mg (65 mg iron) tablet  Take 1 tablet (325 mg total) by mouth 2 (two) times a day with meals.  Dose: 325 mg     furosemide 40 mg tablet  Commonly known as: LASIX  Take 1 tablet (40 mg total) by mouth daily.  Dose: 40 mg     guaiFENesin 600 mg 12 hr tablet  Commonly known as: MUCINEX  Take 1 tablet (600 mg total) by mouth 2 (two) times a day for 10 days.  Dose: 600 mg     lidocaine 4 % adhesive patch,medicated topical patch  Commonly known as: ASPERCREME  Apply 2 patches topically daily. Apply one patch to each knee daily, remove after 12 hours  Dose: 2 patch        CHANGE how you take these medications    acetaminophen 325 mg tablet  Commonly known as: TYLENOL  Take 2 tablets (650 mg total) by mouth 4 (four) times a day (with meals and nightly) for 13 doses.  Dose: 650 mg  What changed:   · how much to take  · when to take this     ascorbic acid 250 mg tablet  Commonly known as: VITAMIN C  Take 1 tablet (250 mg total) by mouth 2 (two) times a day before breakfast and dinner.  Dose: 250 mg  What changed:   · medication strength  · how much to take  · when to take this     aspirin  81 mg chewable tablet  Take 1 tablet (81 mg total) by mouth 2 (two) times a day for 14 days. To prevent blood clots after knee replacement surgery  Dose: 81 mg  What changed: additional instructions     busPIRone 10 mg tablet  Commonly known as: BUSPAR  Take 1 tablet (10 mg total) by mouth 4 (four) times a day (with meals and nightly) for 330 doses.  Dose: 10 mg  What changed: when to take this     lamoTRIgine 200 mg tablet extended release 24hr  Commonly known as: LaMICtal XR  Take 2 tablets (400 mg total) by mouth daily.  Dose: 400 mg  What changed: additional instructions     montelukast 10 mg tablet  Commonly known as: SINGULAIR  Take 1 tablet (10 mg total) by mouth nightly.  Dose: 10 mg  What changed: when to take this     sertraline 100 mg tablet  Commonly known as: ZOLOFT  Take 3 tablets (300 mg total) by mouth daily for 82 doses.  Dose: 300 mg  What changed: additional instructions        CONTINUE taking these medications    atorvastatin 40 mg tablet  Commonly known as: LIPITOR  Take 1 tablet (40 mg total) by mouth nightly.  Dose: 40 mg     fluticasone furoate-vilanteroL 200-25 mcg/dose blister with device powder for inhalation  Commonly known as: BREO ELLIPTA  Inhale 1 puff daily.  Dose: 1 puff     meloxicam 7.5 mg tablet  Commonly known as: MOBIC  Take 1 tablet (7.5 mg total) by mouth daily.  Dose: 7.5 mg     multivitamin tablet  Take 1 tablet by mouth daily.  Dose: 1 tablet     NEXIUM ORAL  Take 20 mg by mouth nightly.  Dose: 20 mg     nortriptyline 75 mg capsule  Commonly known as: PAMELOR  Take 1 capsule (75 mg total) by mouth nightly.  Dose: 75 mg     rOPINIRole 1 mg tablet  Commonly known as: REQUIP  Take 3 tablets (3 mg total) by mouth 3 (three) times a day. 0.5 mg in afternoon, 1 mg am and 1mg dinner and 2mg bedtime  Dose: 3 mg        STOP taking these medications    ECHINACEA ORAL     lisinopriL 5 mg tablet  Commonly known as: PRINIVIL     phytonadione (vitamin K1) 100 mcg tablet     polyethylene  glycol 17 gram packet  Commonly known as: MIRALAX     sennosides-docusate sodium 8.6-50 mg  Commonly known as: SENOKOT-S              Reason for Hospitalization    Deficits in mobility, transfers, self-care status post recent bilateral total knee replacements, recent pneumonia, ventilator-dependent respiratory failure, seizure disorder, deconditioning, multiple medical problems    History of Present Illness    Ms. Fide Amin is a 60-year-old right handed white female with chronic conditions significant for polyarticular degenerative arthritis, hypertension, hyperlipidemia, seizure disorder, anxiety, depression, asthma, restless leg syndrome had elective bilateral total knee replacements secondary to degenerative arthritis of both knees by Dr. Bennett Em at [facility] on 9/20/21. Postoperatively, on the way from the PACU to the floor or soon after arriving on the floor, the patient did sustain a seizure and she does have a history of seizure disorder but had not had one in about a decade.  She was seen by neurologist at [facility] after her seizure.  Postoperative course was significant for hypotension which was felt to be secondary to narcotics, dehydration as well as blood loss.  She had hypoxia with exertion. CT of chest on 9/23/21 revealed no CT evidence of pulmonary embolism, but scattered bilateral airspace opacities were noted and believed to be most likely representing multifocal pneumonia.  Covid 19 pneumonia was excluded. She was allowed weightbearing as tolerated on both lower extremities and on Aspirin and SCDs for DVT prophylaxis. She was apparently offered SNF placement per her records but she declined it.  She was medically cleared for discharge on Ceftin 500mg BID and Zithromax 500mg daily x 5 days and with instructions for follow-up care and discharged to home on 9/23/21 with home care.  Subsequently, she presented to the ER at [facility] on 9/24/21 with  increased shortness of breath and was diagnosed with aspiration pneumonia.  She was intubated on 9/25/21 and eventually extubated on 10/1/21. She reports she was on oxygen in the acute care hospital and will try to wean her off oxygen as possible.  She has had multiple blisters on both knees.  On 10/5/21, hemoglobin was 8.5, WBC count 6.9, platelets were 545, BUN was 13 and creatinine was 0.6.  She has been needing assistance for mobility, transfers, self-care. She is transferred to [facility] on 10/6/21 for further rehabilitation care.     Pertinent Physical Findings on Admission    The patient had stable vital signs.  General Appearance: Not in acute distress  Head/Ear/Nose/Throat: Normocephalic; Atraumatic.   Eye: EOMI; PERRL.   Neck: No JVD; No Bruits.   Respiratory: Decreased breath sounds at bases.   Cardiovascular: RRR; Normal S1, S2.   Gastrointestinal: Soft; NT; +BS.   Extremities: Bilateral lower extremity edema noted.  Healing incisions noted on anterior aspect of both knees.  She has multiple large blisters present on both knees, with the ones on the right knee drying out.  Dressing is present on the blisters on left knee. She is able to do active straight leg raise more easily on the right and with some difficulty on the left.  Musculoskeletal: Functional active range of motion in both upper extremities.  Some limitation of active range of motion in both hips and knees, limited by pain.    Neurological: AAO ×3. Speech is fluent. Cranial nerve examination does not reveal any gross facial asymmetry. Strength testing about 4+/5 strength in both upper extremities.  Bilateral hip flexion is 3/5.  Right quadriceps is 3+/5, left quadriceps is 3/5.  Bilateral ankle dorsi and plantar flexion are 4/5.  She is grossly able to localize touch and position sense in her toes.  Deep tendon reflexes are hypoactive and symmetric bilaterally.  Plantars are flexor.  Coordination is functional upper  extremities.  Both knee jerks were not tested.  Behavior/Emotional: Appropriate; Cooperative.   Skin: No obvious rashes noted.  Bilateral knee incisions healing.  She has multiple large blisters present on both knees, with the ones on the right knee drying out.  Dressing is present on the blisters on left knee.  She has bruising noted in both lower extremities including her feet after recent bilateral knee replacements.       Hospital Course    1. The patient was admitted to [facility] for a comprehensive inpatient rehabilitation program to include physical therapy, occupational therapy, rehabilitation nursing, case management evaluation.  Dr. Nunez was consulted to follow from an internal medicine standpoint. She was followed by Dr. Rodriguez from an internal medicine standpoint during Dr. Nunez's absence.    2. DVT prophylaxis - on Aspirin.  On SCDs.  Check doppler.  Platelets 213 on 9/22/2021. Platelets 643 on 10/8/2021. Platelets 693 on 10/12/2021.     3.  Deconditioning - status post recent bilateral total knee replacements, recent pneumonia, ventilator-dependent respiratory failure, seizure disorder, deconditioning, multiple medical problems - continue PT, OT, psychology.  Follow falls precautions, cardiac precautions, monitor pulse oximetry in therapy.     4. GI - On Pepcid for GI prophylaxis.  On Protonix.  Continue Colace, Senokot.  On PRN MiraLAX.  On PRN Dulcolax suppository. On PRN Maalox.       5.  - voiding.  Denies dysuria.  Monitor post residuals.     6.  Seizure disorder -on Lamictal XR.  Follow seizure precautions.     7. Pain - on Mobic.  On Tylenol.  On PRN Dilaudid.  Ice to knees.  On topical Lidoderm patches.     8. Hematology -  Anemia - on MVI.  Hemoglobin 8.5, WBC 10.36 on 9/22/2021. Hemoglobin 7.7, WBC 8.71 on 10/8/2021. Hemoglobin 7.9, WBC 5.82 on 10/12/2021.     9.  Psychiatry - history of anxiety and depression - on BuSpar.  On Zoloft.  On Pamelor.     10.  Pulmonary  - H/O asthma, recent pneumonia, ventilator-dependent respiratory failure.  On Singulair.  On Breo Ellipta.  On PRN Ventolin HFA.     11.  Restless leg syndrome - on Requip.     12. Skin - bilateral knee incisions stable.  Multiple blisters noted on both incisions. Blisters bilateral knee incisions are drying up.     13.  Hyperlipidemia - on Lipitor.     14. F/E/N - On MVI. On vitamin C.       15.  Rehabilitation medicine - Continue comprehensive rehabilitation care. Continue PT, OT, psychology.  We will follow falls precautions, cardiac precautions, monitor pulse oximetry in therapy and follow aspiration precautions.Slept well last night.  Tolerating therapies per endurance.  Denies increased pain.  Had a bowel movement.  Voiding well.  Inspected bilateral knee incisions which are stable.  She did not have lab work today, labs will be drawn tomorrow per nursing.   Feels better today. Denies increased pain. Inspected knee incisions are stable. Encourage incentive spirometry and deep breathing exercises.She indicates she wants to go home sooner sometime this week. Will discuss in team tomorrow. Discussed with patient. Inspected knee incisions which are stable. Blisters around both incisions are drying up.Discussed her progress in therapies with therapists in team meeting today. She will be discharged home later this week per her request. Family training to be done with her  prior to discharge.Discussed recommendations of PCC with patient. She feels comfortable with discharge plans to home on this Friday. She will follow up with her pulmonary physician next week and discuss weaning off oxygen when able to. Continuous pulse oximetry to be checked tonight. She will need home oxygen. Discussed with .[facility] day today. Feels comfortable with discharge plans to home for tomorrow. Discussed follow-up instructions with her.Feels fine. For discharge today. No C/O today. Appreciative of care here.  Discussed follow-up instructions with her. Discussed with patient and with her  were waiting for oxygen delivery to be confirmed. The left later in the afternoon after getting confirmation of oxygen arrangement and getting oxygen for transport to home. She has follow-up appointment with some pulmonary physician for next week. She was given a copy of recent chest x-ray on disc to take to her pulmonary physician. Discussed follow-up instructions with her.     16. Reviewed labs today.  BUN 13, creatinine 0.6 on 10/5/2021. BUN 7, creatinine 0.5 on 10/8/2021. BUN 8, creatinine 0.5 on 10/12/2021.     17. Discharge to home today, 10/15/2021. Discussed discharge plans. Discharge summary will be completed. Follow up with PCP, orthopedic surgeon Dr Em, pulmonary physician, psychiatrist, psychologist, cardiologist and other appropriate physicians. Further therapies set up after discharge. Discharge instructions on discharge form.     18. Otherwise she tolerated therapies well and made good progress in her functional status. She had no further medical complications and was subsequently discharged to home. She will need close follow-up with her PCP, orthopedic surgeon Dr Em, pulmonary physician, psychiatrist, psychologist, cardiologist and other appropriate physicians and should continue further therapies after discharge from [facility].

## 2021-10-15 NOTE — PLAN OF CARE
Problem: Rehabilitation (IRF) Plan of Care  Goal: Plan of Care Review  10/15/2021 1123 by Mira Dillard RN  Outcome: Met  Flowsheets (Taken 10/15/2021 1123)  Progress: improving  Plan of Care Reviewed With: patient  Outcome Summary: AVS printed and reviewed with patient. All belongings returned to patient. D/c to home with . O2 delivered here and to be delievered to home when pt arrives back at home.

## 2021-10-15 NOTE — PROGRESS NOTES
Podiatry Progress Note    Subjective  Follow up evaluation for bilateral ankle pain.  Pain resolved.    Interval History: none.       Medications:    Current Facility-Administered Medications:   •  acetaminophen (TYLENOL) tablet 650 mg, 650 mg, oral, With meals & nightly, Fito Michael MD, 650 mg at 10/14/21 2040  •  acetaminophen (TYLENOL) tablet 650 mg, 650 mg, oral, q4h PRN, Fito Michael MD  •  acetaminophen (TYLENOL) tablet 650 mg, 650 mg, oral, q4h PRN, Fito Michael MD, 650 mg at 10/11/21 1219  •  albuterol HFA (VENTOLIN HFA) 90 mcg/actuation inhaler 2 puff, 2 puff, inhalation, q4h PRN, Fito Michael MD  •  albuterol HFA (VENTOLIN HFA) 90 mcg/actuation inhaler 2 puff, 2 puff, inhalation, QID (6a, 10a, 2p, 6p), Noé Nunez MD, 2 puff at 10/14/21 1718  •  alum-mag hydroxide-simeth (MAALOX) 200-200-20 mg/5 mL suspension 30 mL, 30 mL, oral, q4h PRN, Fito Michael MD  •  ascorbic acid (VITAMIN C) tablet 250 mg, 250 mg, oral, BID AC, Noé Nunez MD, 250 mg at 10/14/21 1716  •  aspirin chewable tablet 81 mg, 81 mg, oral, BID, Fito Michael MD, 81 mg at 10/14/21 2039  •  [Provider Managed Hold] atorvastatin (LIPITOR) tablet 40 mg, 40 mg, oral, Daily (6p), Noé Nunez MD, 40 mg at 10/08/21 1736  •  bisacodyL (DULCOLAX) 10 mg suppository 10 mg, 10 mg, rectal, Daily PRN, Fito Michael MD  •  busPIRone (BUSPAR) tablet 10 mg, 10 mg, oral, With meals & nightly, Fito Michael MD, 10 mg at 10/14/21 2039  •  camphor-methyl salicyl-menthoL (MUSCLE RUB) cream, , Topical, 2x daily PRN, Gildardo Jacobsen DPM, Given at 10/11/21 0827  •  cholecalciferol (vitamin D3) tablet 2,500 Units, 2,500 Units, oral, Daily, Kvng Francisco MD, 2,500 Units at 10/14/21 0724  •  enoxaparin (LOVENOX) syringe 40 mg, 40 mg, subcutaneous, Daily (6p), Noé Nunez MD, 40 mg at 10/14/21 1715  •  famotidine (PEPCID) tablet 20 mg, 20 mg, oral, Nightly, Fito Michael MD, 20 mg at 10/14/21  2039  •  ferrous sulfate tablet 325 mg, 325 mg, oral, BID with meals, Noé Nunez MD, 325 mg at 10/14/21 1716  •  fluticasone furoate-vilanteroL (BREO ELLIPTA) 200-25 mcg/dose powder for inhalation 1 puff, 1 puff, inhalation, Daily, Noé Nunez MD, 1 puff at 10/14/21 0726  •  furosemide (LASIX) tablet 40 mg, 40 mg, oral, Daily (1p), Noé Nunez MD, 40 mg at 10/14/21 1223  •  guaiFENesin (MUCINEX) 12 hr ER tablet 600 mg, 600 mg, oral, BID, Noé Nunez MD, 600 mg at 10/14/21 2039  •  HYDROmorphone (DILAUDID) tablet 2 mg, 2 mg, oral, q4h PRN, Fito Michael MD, 2 mg at 10/08/21 1422  •  lamoTRIgine (LAMICTAL XR) extended release tablet 400 mg, 400 mg, oral, Daily, Fito Michael MD, 400 mg at 10/14/21 0726  •  lidocaine (ASPERCREME) 4 % topical patch 2 patch, 2 patch, Topical, Daily, Fito Michael MD, 2 patch at 10/14/21 0723  •  meloxicam (MOBIC) tablet 7.5 mg, 7.5 mg, oral, Daily, Fito Michael MD, 7.5 mg at 10/14/21 0724  •  montelukast (SINGULAIR) tablet 10 mg, 10 mg, oral, Nightly, Fito Michael MD, 10 mg at 10/14/21 2039  •  multivit-min-iron-folic-vit K1 (CENTRUM) chewable tablet 1 tablet, 1 tablet, oral, Daily, Fito Michael MD, 1 tablet at 10/14/21 0724  •  nortriptyline (PAMELOR) capsule 75 mg, 75 mg, oral, Nightly, Fito Michael MD, 75 mg at 10/14/21 2039  •  pantoprazole (PROTONIX) tablet,delayed release (DR/EC) 40 mg, 40 mg, oral, Daily before breakfast, Fito Michael MD, 40 mg at 10/14/21 0724  •  polyethylene glycol (MIRALAX) 17 gram packet 17 g, 17 g, oral, Daily PRN, Fito Michael MD  •  rOPINIRole (REQUIP) tablet 0.5 mg, 0.5 mg, oral, Daily with lunch, Fito Michael MD, 0.5 mg at 10/14/21 1222  •  rOPINIRole (REQUIP) tablet 1 mg, 1 mg, oral, Daily with dinner, Fito Michael MD, 1 mg at 10/14/21 1715  •  rOPINIRole (REQUIP) tablet 1 mg, 1 mg, oral, Daily, Fito Michael MD, 1 mg at 10/14/21 0724  •  rOPINIRole (REQUIP)  tablet 2 mg, 2 mg, oral, Nightly, Fito Michael MD, 2 mg at 10/14/21 2040  •  sertraline (ZOLOFT) tablet 300 mg, 300 mg, oral, Daily, Fito Michael MD, 300 mg at 10/14/21 0724    Labs:  Lab Results   Component Value Date    GLUCOSE 90 10/12/2021    CALCIUM 8.3 (L) 10/12/2021     10/12/2021    K 4.4 10/12/2021    CO2 29 10/12/2021     10/12/2021    BUN 8 10/12/2021    CREATININE 0.5 (L) 10/12/2021     Lab Results   Component Value Date    WBC 5.82 10/12/2021    HGB 7.9 (L) 10/12/2021    HCT 25.5 (L) 10/12/2021    MCV 94.8 10/12/2021     (H) 10/12/2021       Pathology Results     ** No results found for the last 720 hours. **        Microbiology Results     Procedure Component Value Units Date/Time    SARS-CoV-2 (COVID-19), PCR Nasopharynx [[id number]]  (Normal) Collected: 09/23/21 1553    Specimen: Nasopharyngeal Swab from Nasopharynx Updated: 09/23/21 1631    Narrative:      The following orders were created for panel order SARS-CoV-2 (COVID-19), PCR Nasopharynx.  Procedure                               Abnormality         Status                     ---------                               -----------         ------                     SARS-CoV-2 (COVID-19), P...[[id number]]  Normal              Final result                 Please view results for these tests on the individual orders.    SARS-CoV-2 (COVID-19), PCR Nasopharynx [[id number]]  (Normal) Collected: 09/23/21 1553    Specimen: Nasopharyngeal Swab from Nasopharynx Updated: 09/23/21 1631     SARS-CoV-2 (COVID-19) Negative    COVID-19 PAT/Pre-procedural [[id number]]  (Normal) Collected: 09/16/21 1025    Specimen: Nasopharyngeal Swab from Nasopharynx Updated: 09/16/21 2028    Narrative:      The following orders were created for panel order COVID-19 PAT/Pre-procedural.  Procedure                               Abnormality         Status                     ---------                               -----------         ------                      SARS-CoV-2 (COVID-19), PCR[[id number]]   Normal              Final result                 Please view results for these tests on the individual orders.    SARS-CoV-2 (COVID-19), PCR [[id number]]  (Normal) Collected: 09/16/21 1025    Specimen: Nasopharyngeal Swab from Nasopharynx Updated: 09/16/21 2028     SARS-CoV-2 (COVID-19) Negative                Imaging  X-RAY CHEST 2 VIEWS    Result Date: 10/7/2021  IMPRESSION: 1.  Persistent multifocal airspace disease, increased in the right lung and mildly improved in the left since 9/23/2021. 2.  Low lung volumes. 3.  Small bilateral pleural effusions, mildly increased since 9/23/2021.    CT CHEST PULMONARY EMBOLISM WITH IV CONTRAST    Addendum Date: 9/23/2021    Results were called to Dr Elgin Navarro at 3:55 pm on 9/23/21    Result Date: 9/23/2021  IMPRESSION: 1.  No CT evidence of pulmonary embolism. 2.  Scattered bilateral airspace opacities most likely representing multifocal pneumonia.  Covid 19 pneumonia should be excluded.    X-RAY KNEE BILATERAL 1 OR 2 VIEWS    Result Date: 9/20/2021  IMPRESSION: Satisfactory postoperative appearance status post right and left total knee arthroplasty.          Vital signs in last 24 hours:  Temp:  [36.6 °C (97.8 °F)-37.1 °C (98.7 °F)] 37.1 °C (98.7 °F)  Heart Rate:  [78-91] 85  Resp:  [18-20] 20  BP: (119-154)/(50-70) 127/60      No intake or output data in the 24 hours ending 10/14/21 2206      Review of Systems - Negative fever, chills, night sweats.     Objective     PE:palpable DP nonpalpable PT bilateral feet.  Decreased texture, temperature, and turgor bilateral feet.  Decreased digital hair bilateral feet.   Edema bilateral lower extremity.  Xerotic skin bilateral feet.  No open lesions bilateral. thickened, yellow nails with subungual debris  <6.  Tenderness with palpation of affected toenails.   Digital contracture noted toe 2-5 bilateral.   Hallux valgus deformity bilateral.  Epicritic sensation intact  bilateral feet.  No tenderness bilateral achilles tendon.  No gapping in tendon.  No crepitus.  Negative campbell test.  Some ecchymosis noted.  Cavus foot type.  Equinus bilateral.            A/P   Assessment   Achilles tendonitis resolved.   Plan   Continue topical pain cream as needed.  Follow up prn.    Any change in the foot since last evaluation? improvement          Gildardo Jacobsen DPM  10/14/2021  10:06 PM

## 2021-10-15 NOTE — PROGRESS NOTES
Subjective    Patient seen and examined on rounds.  Chart reviewed.  Events overnight noted.  History reviewed briefly with patient.    CC:  Deficits in mobility, transfers, self-care status post recent bilateral total knee replacements, recent pneumonia, ventilator-dependent respiratory failure, seizure disorder, deconditioning, multiple medical problems    HPI:  Ms. Fide Amin is a 60-year-old right handed white female with chronic conditions significant for polyarticular degenerative arthritis, hypertension, hyperlipidemia, seizure disorder, anxiety, depression, asthma, restless leg syndrome had elective bilateral total knee replacements secondary to degenerative arthritis of both knees by Dr. Bennett Em at [facility] on 9/20/21. Postoperatively, on the way from the PACU to the floor or soon after arriving on the floor, the patient did sustain a seizure and she does have a history of seizure disorder but had not had one in about a decade.  She was seen by neurologist at [facility] after her seizure.  Postoperative course was significant for hypotension which was felt to be secondary to narcotics, dehydration as well as blood loss.  She had hypoxia with exertion. CT of chest on 9/23/21 revealed no CT evidence of pulmonary embolism, but scattered bilateral airspace opacities were noted and believed to be most likely representing multifocal pneumonia.  Covid 19 pneumonia was excluded. She was allowed weightbearing as tolerated on both lower extremities and on Aspirin and SCDs for DVT prophylaxis. She was apparently offered SNF placement per her records but she declined it.  She was medically cleared for discharge on Ceftin 500mg BID and Zithromax 500mg daily x 5 days and with instructions for follow-up care and discharged to home on 9/23/21 with home care.  Subsequently, she presented to the ER at [facility] on 9/24/21 with increased shortness of breath and was diagnosed with  "aspiration pneumonia.  She was intubated on 9/25/21 and eventually extubated on 10/1/21. She reports she was on oxygen in the acute care hospital and will try to wean her off oxygen as possible.  She has had multiple blisters on both knees.  On 10/5/21, hemoglobin was 8.5, WBC count 6.9, platelets were 545, BUN was 13 and creatinine was 0.6.  She has been needing assistance for mobility, transfers, self-care. She is transferred to [facility] on 10/6/21 for further rehabilitation care.     SUBJ: Performance day today. Feels comfortable with discharge plans to home for tomorrow. Discussed follow-up instructions with her.    ROS: Denies chest pain or shortness of breath. Other ROS negative. Past, family, social history is unchanged.    Physical Exam      Blood pressure 127/60, pulse 85, temperature 37.1 °C (98.7 °F), temperature source Oral, resp. rate 20, height 1.575 m (5' 2.01\"), weight 63.9 kg (140 lb 14 oz), SpO2 96 %, not currently breastfeeding.  Body mass index is 25.76 kg/m².    General Appearance: Not in acute distress  Head/Ear/Nose/Throat: Normocephalic; Atraumatic.   Eye: EOMI; PERRL.   Neck: No JVD; No Bruits.   Respiratory: Decreased breath sounds at bases.   Cardiovascular: RRR; Normal S1, S2.   Gastrointestinal: Soft; NT; +BS.   Extremities: Bilateral lower extremity edema noted.  Healing incisions noted on anterior aspect of both knees.  She has multiple large blisters present on both knees, with the ones on the right knee drying out.  Dressing is present on the blisters on left knee. She is able to do active straight leg raise more easily on the right and with some difficulty on the left.  Musculoskeletal: Functional active range of motion in both upper extremities.  Some limitation of active range of motion in both hips and knees, limited by pain.    Neurological: AAO ×3. Speech is fluent. Cranial nerve examination does not reveal any gross facial asymmetry. Strength testing about 4+/5 " strength in both upper extremities.  Bilateral hip flexion is 3/5.  Right quadriceps is 3+/5, left quadriceps is 3/5.  Bilateral ankle dorsi and plantar flexion are 4/5.  She is grossly able to localize touch and position sense in her toes.  Deep tendon reflexes are hypoactive and symmetric bilaterally.  Plantars are flexor.  Coordination is functional upper extremities.  Both knee jerks were not tested. Neurologically stable.  Behavior/Emotional: Appropriate; Cooperative.   Skin: No obvious rashes noted.  Bilateral knee incisions healing.  She has multiple large blisters present on both knees, with the ones on the right knee drying out.  Dressing is present on the blisters on left knee.  She has bruising noted in both lower extremities including her feet after recent bilateral knee replacements. Bilateral knee blisters healing and drying out.      Current Facility-Administered Medications:   •  acetaminophen (TYLENOL) tablet 650 mg, 650 mg, oral, With meals & nightly, Fito Michael MD, 650 mg at 10/14/21 2040  •  acetaminophen (TYLENOL) tablet 650 mg, 650 mg, oral, q4h PRN, Fito Michael MD  •  acetaminophen (TYLENOL) tablet 650 mg, 650 mg, oral, q4h PRN, Fito Michael MD, 650 mg at 10/11/21 1219  •  albuterol HFA (VENTOLIN HFA) 90 mcg/actuation inhaler 2 puff, 2 puff, inhalation, q4h PRN, Fito Michael MD  •  albuterol HFA (VENTOLIN HFA) 90 mcg/actuation inhaler 2 puff, 2 puff, inhalation, QID (6a, 10a, 2p, 6p), Noé Nunez MD, 2 puff at 10/14/21 1718  •  alum-mag hydroxide-simeth (MAALOX) 200-200-20 mg/5 mL suspension 30 mL, 30 mL, oral, q4h PRN, Fito Michael MD  •  ascorbic acid (VITAMIN C) tablet 250 mg, 250 mg, oral, BID AC, Noé Nunez MD, 250 mg at 10/14/21 1716  •  aspirin chewable tablet 81 mg, 81 mg, oral, BID, Fito Michael MD, 81 mg at 10/14/21 2039  •  [Provider Managed Hold] atorvastatin (LIPITOR) tablet 40 mg, 40 mg, oral, Daily (6p), Noé Nunez MD, 40  mg at 10/08/21 1736  •  bisacodyL (DULCOLAX) 10 mg suppository 10 mg, 10 mg, rectal, Daily PRN, Fito Michael MD  •  busPIRone (BUSPAR) tablet 10 mg, 10 mg, oral, With meals & nightly, Fito Michael MD, 10 mg at 10/14/21 2039  •  camphor-methyl salicyl-menthoL (MUSCLE RUB) cream, , Topical, 2x daily PRN, Gildardo Jacobsen DPM, Given at 10/11/21 0827  •  cholecalciferol (vitamin D3) tablet 2,500 Units, 2,500 Units, oral, Daily, Kvng Francisco MD, 2,500 Units at 10/14/21 0724  •  enoxaparin (LOVENOX) syringe 40 mg, 40 mg, subcutaneous, Daily (6p), Noé Nunez MD, 40 mg at 10/14/21 1715  •  famotidine (PEPCID) tablet 20 mg, 20 mg, oral, Nightly, Fito Michael MD, 20 mg at 10/14/21 2039  •  ferrous sulfate tablet 325 mg, 325 mg, oral, BID with meals, Noé Nunez MD, 325 mg at 10/14/21 1716  •  fluticasone furoate-vilanteroL (BREO ELLIPTA) 200-25 mcg/dose powder for inhalation 1 puff, 1 puff, inhalation, Daily, Noé Nunez MD, 1 puff at 10/14/21 0726  •  furosemide (LASIX) tablet 40 mg, 40 mg, oral, Daily (1p), oNé Nunez MD, 40 mg at 10/14/21 1223  •  guaiFENesin (MUCINEX) 12 hr ER tablet 600 mg, 600 mg, oral, BID, Noé Nunez MD, 600 mg at 10/14/21 2039  •  HYDROmorphone (DILAUDID) tablet 2 mg, 2 mg, oral, q4h PRN, Fito Michael MD, 2 mg at 10/08/21 1422  •  lamoTRIgine (LAMICTAL XR) extended release tablet 400 mg, 400 mg, oral, Daily, Fito Michael MD, 400 mg at 10/14/21 0726  •  lidocaine (ASPERCREME) 4 % topical patch 2 patch, 2 patch, Topical, Daily, Fito Michael MD, 2 patch at 10/14/21 0723  •  meloxicam (MOBIC) tablet 7.5 mg, 7.5 mg, oral, Daily, Fito Michael MD, 7.5 mg at 10/14/21 0724  •  montelukast (SINGULAIR) tablet 10 mg, 10 mg, oral, Nightly, Fito Michael MD, 10 mg at 10/14/21 2039  •  multivit-min-iron-folic-vit K1 (CENTRUM) chewable tablet 1 tablet, 1 tablet, oral, Daily, Fito Michael MD, 1 tablet at 10/14/21 0724  •  nortriptyline  (PAMELOR) capsule 75 mg, 75 mg, oral, Nightly, Fito Michael MD, 75 mg at 10/14/21 2039  •  pantoprazole (PROTONIX) tablet,delayed release (DR/EC) 40 mg, 40 mg, oral, Daily before breakfast, Fito Michael MD, 40 mg at 10/14/21 0724  •  polyethylene glycol (MIRALAX) 17 gram packet 17 g, 17 g, oral, Daily PRN, Fito Michael MD  •  rOPINIRole (REQUIP) tablet 0.5 mg, 0.5 mg, oral, Daily with lunch, Fito Michael MD, 0.5 mg at 10/14/21 1222  •  rOPINIRole (REQUIP) tablet 1 mg, 1 mg, oral, Daily with dinner, Fito Michael MD, 1 mg at 10/14/21 1715  •  rOPINIRole (REQUIP) tablet 1 mg, 1 mg, oral, Daily, Fito Michael MD, 1 mg at 10/14/21 0724  •  rOPINIRole (REQUIP) tablet 2 mg, 2 mg, oral, Nightly, Fito Michael MD, 2 mg at 10/14/21 2040  •  sertraline (ZOLOFT) tablet 300 mg, 300 mg, oral, Daily, Fito Michael MD, 300 mg at 10/14/21 0724       Labs / Radiology    Lab Results   Component Value Date    WBC 5.82 10/12/2021    HGB 7.9 (L) 10/12/2021    HCT 25.5 (L) 10/12/2021    MCV 94.8 10/12/2021     (H) 10/12/2021     Lab Results   Component Value Date    GLUCOSE 90 10/12/2021    CALCIUM 8.3 (L) 10/12/2021     10/12/2021    K 4.4 10/12/2021    CO2 29 10/12/2021     10/12/2021    BUN 8 10/12/2021    CREATININE 0.5 (L) 10/12/2021     Assessment and Plan    ASSESSMENT PLAN:  1. 60-year-old right handed white female with chronic conditions significant for polyarticular degenerative arthritis, hypertension, hyperlipidemia, seizure disorder, anxiety, depression, asthma, restless leg syndrome had elective bilateral total knee replacements secondary to degenerative arthritis of both knees by Dr. Bennett Em at [facility] on 9/20/21. Postoperatively, on the way from the PACU to the floor or soon after arriving on the floor, the patient did sustain a seizure and she does have a history of seizure disorder but had not had one in about a decade.  She was seen  by neurologist at [facility] after her seizure.  Postoperative course was significant for hypotension which was felt to be secondary to narcotics, dehydration as well as blood loss.  She had hypoxia with exertion. CT of chest on 9/23/21 revealed no CT evidence of pulmonary embolism, but scattered bilateral airspace opacities were noted and believed to be most likely representing multifocal pneumonia.  Covid 19 pneumonia was excluded. She was allowed weightbearing as tolerated on both lower extremities and on Aspirin and SCDs for DVT prophylaxis. She was apparently offered SNF placement per her records but she declined it.  She was medically cleared for discharge on Ceftin 500mg BID and Zithromax 500mg daily x 5 days and with instructions for follow-up care and discharged to home on 9/23/21 with home care.  Subsequently, she presented to the ER at [facility] on 9/24/21 with increased shortness of breath and was diagnosed with aspiration pneumonia.  She was intubated on 9/25/21 and eventually extubated on 10/1/21. She reports she was on oxygen in the acute care hospital and will try to wean her off oxygen as possible.  She has had multiple blisters on both knees.  On 10/5/21, hemoglobin was 8.5, WBC count 6.9, platelets were 545, BUN was 13 and creatinine was 0.6.  She has been needing assistance for mobility, transfers, self-care. She is transferred to [place] [facility] on 10/6/21 for further rehabilitation care.     2. DVT prophylaxis - on Aspirin.  On SCDs.  Check doppler.  Platelets 213 on 9/22/2021. Platelets 643 on 10/8/2021. Platelets 693 on 10/12/2021.     3.  Deconditioning - status post recent bilateral total knee replacements, recent pneumonia, ventilator-dependent respiratory failure, seizure disorder, deconditioning, multiple medical problems - continue PT, OT, psychology.  Follow falls precautions, cardiac precautions, monitor pulse oximetry in therapy.     4. GI - On  Pepcid for GI prophylaxis.  On Protonix.  Continue Colace, Senokot.  On PRN MiraLAX.  On PRN Dulcolax suppository. On PRN Maalox.       5.  - voiding.  Denies dysuria.  Monitor post residuals.     6.  Seizure disorder -on Lamictal XR.  Follow seizure precautions.     7. Pain - on Mobic.  On Tylenol.  On PRN Dilaudid.  Ice to knees.  On topical Lidoderm patches.     8. Hematology -  Anemia - on MVI.  Hemoglobin 8.5, WBC 10.36 on 9/22/2021. Hemoglobin 7.7, WBC 8.71 on 10/8/2021. Hemoglobin 7.9, WBC 5.82 on 10/12/2021.     9.  Psychiatry - history of anxiety and depression - on BuSpar.  On Zoloft.  On Pamelor.     10.  Pulmonary - H/O asthma, recent pneumonia, ventilator-dependent respiratory failure.  On Singulair.  On Breo Ellipta.  On PRN Ventolin HFA.     11.  Restless leg syndrome - on Requip.     12. Skin - bilateral knee incisions stable.  Multiple blisters noted on both incisions. Blisters bilateral knee incisions are drying up.     13.  Hyperlipidemia - on Lipitor.     14. F/E/N - On MVI. On vitamin C.       15.  Rehabilitation medicine - Continue comprehensive rehabilitation care. Continue PT, OT, psychology.  We will follow falls precautions, cardiac precautions, monitor pulse oximetry in therapy and follow aspiration precautions.Slept well last night.  Tolerating therapies per endurance.  Denies increased pain.  Had a bowel movement.  Voiding well.  Inspected bilateral knee incisions which are stable.  She did not have lab work today, labs will be drawn tomorrow per nursing.   Feels better today. Denies increased pain. Inspected knee incisions are stable. Encourage incentive spirometry and deep breathing exercises.She indicates she wants to go home sooner sometime this week. Will discuss in team tomorrow. Discussed with patient. Inspected knee incisions which are stable. Blisters around both incisions are drying up.Discussed her progress in therapies with therapists in team meeting today. She will be  discharged home later this week per her request. Family training to be done with her  prior to discharge.Discussed recommendations of PCC with patient. She feels comfortable with discharge plans to home on this Friday. She will follow up with her pulmonary physician next week and discuss weaning off oxygen when able to. Continuous pulse oximetry to be checked tonight. She will need home oxygen. Discussed with .Performance day today. Feels comfortable with discharge plans to home for tomorrow. Discussed follow-up instructions with her.     16. Reviewed labs today.  BUN 13, creatinine 0.6 on 10/5/2021. BUN 7, creatinine 0.5 on 10/8/2021. BUN 8, creatinine 0.5 on 10/12/2021.             Fito Michael MD  10/14/2021

## 2021-10-15 NOTE — PROGRESS NOTES
Subjective    Patient seen and examined on rounds.  Chart reviewed.  Events overnight noted.  History reviewed briefly with patient.    CC:  Deficits in mobility, transfers, self-care status post recent bilateral total knee replacements, recent pneumonia, ventilator-dependent respiratory failure, seizure disorder, deconditioning, multiple medical problems    HPI:  Ms. Fide Amin is a 60-year-old right handed white female with chronic conditions significant for polyarticular degenerative arthritis, hypertension, hyperlipidemia, seizure disorder, anxiety, depression, asthma, restless leg syndrome had elective bilateral total knee replacements secondary to degenerative arthritis of both knees by Dr. Bennett Em at [facility] on 9/20/21. Postoperatively, on the way from the PACU to the floor or soon after arriving on the floor, the patient did sustain a seizure and she does have a history of seizure disorder but had not had one in about a decade.  She was seen by neurologist at [facility] after her seizure.  Postoperative course was significant for hypotension which was felt to be secondary to narcotics, dehydration as well as blood loss.  She had hypoxia with exertion. CT of chest on 9/23/21 revealed no CT evidence of pulmonary embolism, but scattered bilateral airspace opacities were noted and believed to be most likely representing multifocal pneumonia.  Covid 19 pneumonia was excluded. She was allowed weightbearing as tolerated on both lower extremities and on Aspirin and SCDs for DVT prophylaxis. She was apparently offered SNF placement per her records but she declined it.  She was medically cleared for discharge on Ceftin 500mg BID and Zithromax 500mg daily x 5 days and with instructions for follow-up care and discharged to home on 9/23/21 with home care.  Subsequently, she presented to the ER at [facility] on 9/24/21 with increased shortness of breath and was diagnosed with  "aspiration pneumonia.  She was intubated on 9/25/21 and eventually extubated on 10/1/21. She reports she was on oxygen in the acute care hospital and will try to wean her off oxygen as possible.  She has had multiple blisters on both knees.  On 10/5/21, hemoglobin was 8.5, WBC count 6.9, platelets were 545, BUN was 13 and creatinine was 0.6.  She has been needing assistance for mobility, transfers, self-care. She is transferred to [facility] on 10/6/21 for further rehabilitation care.     SUBJ: Feels fine. For discharge today. No C/O today. Appreciative of care here. Discussed follow-up instructions with her. Discussed with patient and with her  were waiting for oxygen delivery to be confirmed. The left later in the afternoon after getting confirmation of oxygen arrangement and getting oxygen for transport to home. She has follow-up appointment with some pulmonary physician for next week. She was given a copy of recent chest x-ray on disc to take to her pulmonary physician. Discussed follow-up instructions with her.    ROS: Denies chest pain or shortness of breath. Other ROS negative. Past, family, social history is unchanged.    Physical Exam      Blood pressure (!) 147/72, pulse 84, temperature 36.6 °C (97.8 °F), temperature source Oral, resp. rate 18, height 1.575 m (5' 2.01\"), weight 63.9 kg (140 lb 14 oz), SpO2 99 %, not currently breastfeeding.  Body mass index is 25.76 kg/m².    General Appearance: Not in acute distress  Head/Ear/Nose/Throat: Normocephalic; Atraumatic.   Eye: EOMI; PERRL.   Neck: No JVD; No Bruits.   Respiratory: Decreased breath sounds at bases.   Cardiovascular: RRR; Normal S1, S2.   Gastrointestinal: Soft; NT; +BS.   Extremities: Bilateral lower extremity edema noted.  Healing incisions noted on anterior aspect of both knees.  She has multiple large blisters present on both knees, with the ones on the right knee drying out.  Dressing is present on the blisters on left " knee. She is able to do active straight leg raise more easily on the right and with some difficulty on the left.  Musculoskeletal: Functional active range of motion in both upper extremities.  Some limitation of active range of motion in both hips and knees, limited by pain.    Neurological: AAO ×3. Speech is fluent. Cranial nerve examination does not reveal any gross facial asymmetry. Strength testing about 4+/5 strength in both upper extremities.  Bilateral hip flexion is 3/5.  Right quadriceps is 3+/5, left quadriceps is 3/5.  Bilateral ankle dorsi and plantar flexion are 4/5.  She is grossly able to localize touch and position sense in her toes.  Deep tendon reflexes are hypoactive and symmetric bilaterally.  Plantars are flexor.  Coordination is functional upper extremities.  Both knee jerks were not tested.  Behavior/Emotional: Appropriate; Cooperative.   Skin: No obvious rashes noted.  Bilateral knee incisions healing.  She has multiple large blisters present on both knees, with the ones on the right knee drying out.  Dressing is present on the blisters on left knee.  She has bruising noted in both lower extremities including her feet after recent bilateral knee replacements.      Current Facility-Administered Medications:   •  acetaminophen (TYLENOL) tablet 650 mg, 650 mg, oral, With meals & nightly, Fito Michael MD, 650 mg at 10/15/21 0753  •  acetaminophen (TYLENOL) tablet 650 mg, 650 mg, oral, q4h PRN, Fito Michael MD  •  acetaminophen (TYLENOL) tablet 650 mg, 650 mg, oral, q4h PRN, Fito Michael MD, 650 mg at 10/11/21 1219  •  albuterol HFA (VENTOLIN HFA) 90 mcg/actuation inhaler 2 puff, 2 puff, inhalation, q4h PRN, Fito Michael MD  •  albuterol HFA (VENTOLIN HFA) 90 mcg/actuation inhaler 2 puff, 2 puff, inhalation, QID (6a, 10a, 2p, 6p), Noé Nunez MD, 2 puff at 10/15/21 0535  •  alum-mag hydroxide-simeth (MAALOX) 200-200-20 mg/5 mL suspension 30 mL, 30 mL, oral, q4h PRN,  Fito Michael MD  •  ascorbic acid (VITAMIN C) tablet 250 mg, 250 mg, oral, BID AC, Noé Nunez MD, 250 mg at 10/15/21 0755  •  aspirin chewable tablet 81 mg, 81 mg, oral, BID, Fito Michael MD, 81 mg at 10/15/21 0754  •  [Provider Managed Hold] atorvastatin (LIPITOR) tablet 40 mg, 40 mg, oral, Daily (6p), Noé Nunez MD, 40 mg at 10/08/21 1736  •  bisacodyL (DULCOLAX) 10 mg suppository 10 mg, 10 mg, rectal, Daily PRN, Fito Michael MD  •  busPIRone (BUSPAR) tablet 10 mg, 10 mg, oral, With meals & nightly, Fito Michael MD, 10 mg at 10/15/21 0754  •  camphor-methyl salicyl-menthoL (MUSCLE RUB) cream, , Topical, 2x daily PRN, Gildardo Jacobsne DPM, Given at 10/11/21 0827  •  cholecalciferol (vitamin D3) tablet 2,500 Units, 2,500 Units, oral, Daily, Kvng Francisco MD, 2,500 Units at 10/15/21 0754  •  enoxaparin (LOVENOX) syringe 40 mg, 40 mg, subcutaneous, Daily (6p), Noé Nunez MD, 40 mg at 10/14/21 1715  •  famotidine (PEPCID) tablet 20 mg, 20 mg, oral, Nightly, Fito Michael MD, 20 mg at 10/14/21 2039  •  ferrous sulfate tablet 325 mg, 325 mg, oral, BID with meals, Noé Nunez MD, 325 mg at 10/15/21 0755  •  fluticasone furoate-vilanteroL (BREO ELLIPTA) 200-25 mcg/dose powder for inhalation 1 puff, 1 puff, inhalation, Daily, Noé Nunez MD, 1 puff at 10/15/21 0758  •  furosemide (LASIX) tablet 40 mg, 40 mg, oral, Daily (1p), Noé Nunez MD, 40 mg at 10/14/21 1223  •  guaiFENesin (MUCINEX) 12 hr ER tablet 600 mg, 600 mg, oral, BID, Noé Nunez MD, 600 mg at 10/15/21 0755  •  HYDROmorphone (DILAUDID) tablet 2 mg, 2 mg, oral, q4h PRN, Fito Michael MD, 2 mg at 10/08/21 1422  •  lamoTRIgine (LAMICTAL XR) extended release tablet 400 mg, 400 mg, oral, Daily, Fito Michael MD, 400 mg at 10/15/21 0758  •  lidocaine (ASPERCREME) 4 % topical patch 2 patch, 2 patch, Topical, Daily, Fito Michael MD, 2 patch at 10/14/21 0785  •  meloxicam (MOBIC) tablet 7.5  mg, 7.5 mg, oral, Daily, Fito Michael MD, 7.5 mg at 10/15/21 0754  •  montelukast (SINGULAIR) tablet 10 mg, 10 mg, oral, Nightly, Fito Michael MD, 10 mg at 10/14/21 2039  •  multivit-min-iron-folic-vit K1 (CENTRUM) chewable tablet 1 tablet, 1 tablet, oral, Daily, Fito Michael MD, 1 tablet at 10/15/21 0754  •  nortriptyline (PAMELOR) capsule 75 mg, 75 mg, oral, Nightly, Fito Michael MD, 75 mg at 10/14/21 2039  •  pantoprazole (PROTONIX) tablet,delayed release (DR/EC) 40 mg, 40 mg, oral, Daily before breakfast, Fito Michael MD, 40 mg at 10/15/21 0755  •  polyethylene glycol (MIRALAX) 17 gram packet 17 g, 17 g, oral, Daily PRN, Fito Michael MD  •  rOPINIRole (REQUIP) tablet 0.5 mg, 0.5 mg, oral, Daily with lunch, Fito Michael MD, 0.5 mg at 10/14/21 1222  •  rOPINIRole (REQUIP) tablet 1 mg, 1 mg, oral, Daily with dinner, Fito Michael MD, 1 mg at 10/14/21 1715  •  rOPINIRole (REQUIP) tablet 1 mg, 1 mg, oral, Daily, Fito Michael MD, 1 mg at 10/15/21 0754  •  rOPINIRole (REQUIP) tablet 2 mg, 2 mg, oral, Nightly, Fito Michael MD, 2 mg at 10/14/21 2040  •  sertraline (ZOLOFT) tablet 300 mg, 300 mg, oral, Daily, Fito Michael MD, 300 mg at 10/15/21 0754       Labs / Radiology    Lab Results   Component Value Date    WBC 5.82 10/12/2021    HGB 7.9 (L) 10/12/2021    HCT 25.5 (L) 10/12/2021    MCV 94.8 10/12/2021     (H) 10/12/2021     Lab Results   Component Value Date    GLUCOSE 90 10/12/2021    CALCIUM 8.3 (L) 10/12/2021     10/12/2021    K 4.4 10/12/2021    CO2 29 10/12/2021     10/12/2021    BUN 8 10/12/2021    CREATININE 0.5 (L) 10/12/2021     Assessment and Plan    ASSESSMENT PLAN:  1. 60-year-old right handed white female with chronic conditions significant for polyarticular degenerative arthritis, hypertension, hyperlipidemia, seizure disorder, anxiety, depression, asthma, restless leg syndrome had elective bilateral total  knee replacements secondary to degenerative arthritis of both knees by Dr. Bennett Em at [facility] on 9/20/21. Postoperatively, on the way from the PACU to the floor or soon after arriving on the floor, the patient did sustain a seizure and she does have a history of seizure disorder but had not had one in about a decade.  She was seen by neurologist at [facility] after her seizure.  Postoperative course was significant for hypotension which was felt to be secondary to narcotics, dehydration as well as blood loss.  She had hypoxia with exertion. CT of chest on 9/23/21 revealed no CT evidence of pulmonary embolism, but scattered bilateral airspace opacities were noted and believed to be most likely representing multifocal pneumonia.  Covid 19 pneumonia was excluded. She was allowed weightbearing as tolerated on both lower extremities and on Aspirin and SCDs for DVT prophylaxis. She was apparently offered SNF placement per her records but she declined it.  She was medically cleared for discharge on Ceftin 500mg BID and Zithromax 500mg daily x 5 days and with instructions for follow-up care and discharged to home on 9/23/21 with home care.  Subsequently, she presented to the ER at [facility] on 9/24/21 with increased shortness of breath and was diagnosed with aspiration pneumonia.  She was intubated on 9/25/21 and eventually extubated on 10/1/21. She reports she was on oxygen in the acute care hospital and will try to wean her off oxygen as possible.  She has had multiple blisters on both knees.  On 10/5/21, hemoglobin was 8.5, WBC count 6.9, platelets were 545, BUN was 13 and creatinine was 0.6.  She has been needing assistance for mobility, transfers, self-care. She is transferred to [facility] on 10/6/21 for further rehabilitation care.     2. DVT prophylaxis - on Aspirin.  On SCDs.  Check doppler.  Platelets 213 on 9/22/2021. Platelets 643 on 10/8/2021. Platelets  693 on 10/12/2021.     3.  Deconditioning - status post recent bilateral total knee replacements, recent pneumonia, ventilator-dependent respiratory failure, seizure disorder, deconditioning, multiple medical problems - continue PT, OT, psychology.  Follow falls precautions, cardiac precautions, monitor pulse oximetry in therapy.     4. GI - On Pepcid for GI prophylaxis.  On Protonix.  Continue Colace, Senokot.  On PRN MiraLAX.  On PRN Dulcolax suppository. On PRN Maalox.       5.  - voiding.  Denies dysuria.  Monitor post residuals.     6.  Seizure disorder -on Lamictal XR.  Follow seizure precautions.     7. Pain - on Mobic.  On Tylenol.  On PRN Dilaudid.  Ice to knees.  On topical Lidoderm patches.     8. Hematology -  Anemia - on MVI.  Hemoglobin 8.5, WBC 10.36 on 9/22/2021. Hemoglobin 7.7, WBC 8.71 on 10/8/2021. Hemoglobin 7.9, WBC 5.82 on 10/12/2021.     9.  Psychiatry - history of anxiety and depression - on BuSpar.  On Zoloft.  On Pamelor.     10.  Pulmonary - H/O asthma, recent pneumonia, ventilator-dependent respiratory failure.  On Singulair.  On Breo Ellipta.  On PRN Ventolin HFA.     11.  Restless leg syndrome - on Requip.     12. Skin - bilateral knee incisions stable.  Multiple blisters noted on both incisions. Blisters bilateral knee incisions are drying up.     13.  Hyperlipidemia - on Lipitor.     14. F/E/N - On MVI. On vitamin C.       15.  Rehabilitation medicine - Continue comprehensive rehabilitation care. Continue PT, OT, psychology.  We will follow falls precautions, cardiac precautions, monitor pulse oximetry in therapy and follow aspiration precautions.Slept well last night.  Tolerating therapies per endurance.  Denies increased pain.  Had a bowel movement.  Voiding well.  Inspected bilateral knee incisions which are stable.  She did not have lab work today, labs will be drawn tomorrow per nursing.   Feels better today. Denies increased pain. Inspected knee incisions are stable. Encourage  incentive spirometry and deep breathing exercises.She indicates she wants to go home sooner sometime this week. Will discuss in team tomorrow. Discussed with patient. Inspected knee incisions which are stable. Blisters around both incisions are drying up.Discussed her progress in therapies with therapists in team meeting today. She will be discharged home later this week per her request. Family training to be done with her  prior to discharge.Discussed recommendations of PCC with patient. She feels comfortable with discharge plans to home on this Friday. She will follow up with her pulmonary physician next week and discuss weaning off oxygen when able to. Continuous pulse oximetry to be checked tonight. She will need home oxygen. Discussed with .Performance day today. Feels comfortable with discharge plans to home for tomorrow. Discussed follow-up instructions with her.Feels fine. For discharge today. No C/O today. Appreciative of care here. Discussed follow-up instructions with her. Discussed with patient and with her  were waiting for oxygen delivery to be confirmed. The left later in the afternoon after getting confirmation of oxygen arrangement and getting oxygen for transport to home. She has follow-up appointment with some pulmonary physician for next week. She was given a copy of recent chest x-ray on disc to take to her pulmonary physician. Discussed follow-up instructions with her.     16. Reviewed labs today.  BUN 13, creatinine 0.6 on 10/5/2021. BUN 7, creatinine 0.5 on 10/8/2021. BUN 8, creatinine 0.5 on 10/12/2021.     17. Discharge to home today, 10/15/2021. Discussed discharge plans. Discharge summary will be completed. Follow up with PCP, orthopedic surgeon Dr Em, pulmonary physician, psychiatrist, psychologist, cardiologist and other appropriate physicians. Further therapies set up after discharge. Discharge instructions on discharge form.          Fito Michael  MD  10/15/2021

## 2021-10-15 NOTE — PROGRESS NOTES
Psychiatry Progress Note    History of Present Illness   See initial consult.  History pertaining to psychosis, depression and anxiety and other psychiatric and psychologic conditions as well as general medical history detailed in initial consult.      Interval History:   Stable overnight  No si or avh  D/c today  Feels good about her progress.  Has some end expiratory anxiety about disposition were no weight.  CBT helpful.  Reviewed medications and options  progressing well in therapy  No nursing issues  Still anxious despite Zoloft 300 mg.  She thought about our discussion about lowering the dose but declines to do so.  Sodium okay at 140.  Vital signs stable    MENTAL STATUS EXAM  Appearance: well groomed  Gait and Motor: no abnormal movementsit is usually a pretty simple straightforward state  Speech: normal rate/rhythm/volume  Mood: depressed and anxious  Affect: constricted  Associations: coherent  Thought Process: goal-directed  Thought Content: no auditory or visual hallucinations.  Suicidality/Homicidality: denies  Judgement/Insight: minimizes severity level of illness  Orientation: situation  Memory: knows current president  Attention: distracted  Knowledge: normal  Language: normal    Vital Signs for the last 24 hours:  Temp:  [36.6 °C (97.8 °F)-37.1 °C (98.7 °F)] 36.6 °C (97.8 °F)  Heart Rate:  [78-88] 84  Resp:  [18-20] 18  BP: (119-147)/(50-72) 147/72    Labs:  Labs below reviewed for pertinence to psychiatric condition  Lab Results   Component Value Date    GLUCOSE 90 10/12/2021    CALCIUM 8.3 (L) 10/12/2021     10/12/2021    K 4.4 10/12/2021    CO2 29 10/12/2021     10/12/2021    BUN 8 10/12/2021    CREATININE 0.5 (L) 10/12/2021     Lab Results   Component Value Date    WBC 5.82 10/12/2021    HGB 7.9 (L) 10/12/2021    HCT 25.5 (L) 10/12/2021    MCV 94.8 10/12/2021     (H) 10/12/2021     Pain Management Panel    There is no flowsheet data to display.           Current  Facility-Administered Medications   Medication Dose Route Frequency Provider Last Rate Last Admin   • acetaminophen (TYLENOL) tablet 650 mg  650 mg oral With meals & nightly Fito Michael MD   650 mg at 10/15/21 0753   • acetaminophen (TYLENOL) tablet 650 mg  650 mg oral q4h PRN Fito Michael MD       • acetaminophen (TYLENOL) tablet 650 mg  650 mg oral q4h PRN Fito Michael MD   650 mg at 10/11/21 1219   • albuterol HFA (VENTOLIN HFA) 90 mcg/actuation inhaler 2 puff  2 puff inhalation q4h PRN Fito Michael MD       • albuterol HFA (VENTOLIN HFA) 90 mcg/actuation inhaler 2 puff  2 puff inhalation QID (6a, 10a, 2p, 6p) Noé Nunez MD   2 puff at 10/15/21 0527   • alum-mag hydroxide-simeth (MAALOX) 200-200-20 mg/5 mL suspension 30 mL  30 mL oral q4h PRN Fito Michael MD       • ascorbic acid (VITAMIN C) tablet 250 mg  250 mg oral BID AC Noé Nunez MD   250 mg at 10/15/21 0755   • aspirin chewable tablet 81 mg  81 mg oral BID Fito Michael MD   81 mg at 10/15/21 0754   • [Provider Managed Hold] atorvastatin (LIPITOR) tablet 40 mg  40 mg oral Daily (6p) Noé Nunez MD   40 mg at 10/08/21 1736   • bisacodyL (DULCOLAX) 10 mg suppository 10 mg  10 mg rectal Daily PRN Fito Michael MD       • busPIRone (BUSPAR) tablet 10 mg  10 mg oral With meals & nightly Fito Michael MD   10 mg at 10/15/21 0754   • camphor-methyl salicyl-menthoL (MUSCLE RUB) cream   Topical 2x daily PRN Gildardo Jacobsen DPM   Given at 10/11/21 0827   • cholecalciferol (vitamin D3) tablet 2,500 Units  2,500 Units oral Daily Kvng Francisco MD   2,500 Units at 10/15/21 0754   • enoxaparin (LOVENOX) syringe 40 mg  40 mg subcutaneous Daily (6p) Noé Nunez MD   40 mg at 10/14/21 1715   • famotidine (PEPCID) tablet 20 mg  20 mg oral Nightly Fito Michael MD   20 mg at 10/14/21 2039   • ferrous sulfate tablet 325 mg  325 mg oral BID with meals Noé Nunez MD   325 mg at 10/15/21 0755   •  fluticasone furoate-vilanteroL (BREO ELLIPTA) 200-25 mcg/dose powder for inhalation 1 puff  1 puff inhalation Daily Noé Nunez MD   1 puff at 10/15/21 0758   • furosemide (LASIX) tablet 40 mg  40 mg oral Daily (1p) Noé Nunez MD   40 mg at 10/14/21 1223   • guaiFENesin (MUCINEX) 12 hr ER tablet 600 mg  600 mg oral BID Noé Nunez MD   600 mg at 10/15/21 0755   • HYDROmorphone (DILAUDID) tablet 2 mg  2 mg oral q4h PRN Fito Michael MD   2 mg at 10/08/21 1422   • lamoTRIgine (LAMICTAL XR) extended release tablet 400 mg  400 mg oral Daily Fito Michael MD   400 mg at 10/15/21 0758   • lidocaine (ASPERCREME) 4 % topical patch 2 patch  2 patch Topical Daily Fito Michael MD   2 patch at 10/14/21 0723   • meloxicam (MOBIC) tablet 7.5 mg  7.5 mg oral Daily Fito Michael MD   7.5 mg at 10/15/21 0754   • montelukast (SINGULAIR) tablet 10 mg  10 mg oral Nightly Fito Michael MD   10 mg at 10/14/21 2039   • multivit-min-iron-folic-vit K1 (CENTRUM) chewable tablet 1 tablet  1 tablet oral Daily Fito Michael MD   1 tablet at 10/15/21 0754   • nortriptyline (PAMELOR) capsule 75 mg  75 mg oral Nightly Fito Michael MD   75 mg at 10/14/21 2039   • pantoprazole (PROTONIX) tablet,delayed release (DR/EC) 40 mg  40 mg oral Daily before breakfast Fito Michael MD   40 mg at 10/15/21 0755   • polyethylene glycol (MIRALAX) 17 gram packet 17 g  17 g oral Daily PRN Fito Michael MD       • rOPINIRole (REQUIP) tablet 0.5 mg  0.5 mg oral Daily with lunch Fito Michael MD   0.5 mg at 10/14/21 1222   • rOPINIRole (REQUIP) tablet 1 mg  1 mg oral Daily with dinner Fito Michael MD   1 mg at 10/14/21 1715   • rOPINIRole (REQUIP) tablet 1 mg  1 mg oral Daily Fito Michael MD   1 mg at 10/15/21 0754   • rOPINIRole (REQUIP) tablet 2 mg  2 mg oral Nightly Fito Michael MD   2 mg at 10/14/21 2040   • sertraline (ZOLOFT) tablet 300 mg  300 mg oral Daily Alec  Fito APARICIO MD   300 mg at 10/15/21 0754        Patient Active Problem List   Diagnosis   • Arthritis of knee   • Epilepsy (CMS/[facility])   • HTN (hypertension)   • HLD (hyperlipidemia)   • Hypocalcemia   • Depression   • History of total knee arthroplasty, bilateral   • Physical deconditioning   • Anemia   • Asthma   • Pulmonary edema           Assessment/Plan    Depression: CBT automatic anxious and negative thoughts  Adjustment Disorder to Disability: supportive therapy  Sleep:  Insight into illness: insight therapy/psychoeducation  Psychotherapy: supportive  Monitor: Mood, Speech, Appetite, Energy, Cognition, Behavioral, Impulsivity, Agitation  Family Support  Medications: monitor for side effects  - presently no gi, akathesia , ha or sedation  Hg 8.5  Sodium 140  Support atd  Promote insight  BuSpar 10 4 times a day  Zoloft 300  I discussed in detail with the patient and her  my concern about her current Zoloft dose and how it is likely contributing to her jumpiness and jitteriness though she feels this is related to her asthma medicines.  She was clear that she wants to stay on her current dose.  We agreed to do so for now but to continue to monitor for any potential side effects.  Sodium is okay and within normal limits.  She prefers not to lower the Zoloft.  CBT automatic anxious and negative thoughts  Monitor sleep and behavior - d/w nursing  No side effects from meds except possible increase in involuntary movement (not EPS) d/w pt and   CBT related to anticipatory anxiety about this position  Patient is stable for disposition.  Has no suicidal ideation, plan.  Has been educated on use of psychotropic medications potential side effects.  Educated on warning signs for recurrence of mental health condition.  Plan in place for continued pharmacotherapy.she needs to discuss with her psychiatrist long-term reducing medications particularly Zoloft-patient expressed that she will do this  Cody Vaughn  MD  10/15/2021

## 2021-10-19 NOTE — PROGRESS NOTES
Inpatient Psychology Progress Note    Duration: 25 minutes  Fide Amin : 1960, a 60 y.o. female, for follow up visit.  Reason:  She has been experiencing Adjustment Issues.    HPI: status post recent bilateral total knee replacements, recent pneumonia, ventilator-dependent respiratory failure, seizure disorder, deconditioning, multiple medical problems    Current Evaluation:     Mental Status Evaluation:  Arousal Level: Alert  Appearance: Well Groomed  Speech: Regular  Psychomotor Functioning: Decreased  Eye Contact: WNL  Est. Premorbid Intelligence: Above average  Orientation: Fully oriented  Attention: WNL  Concentration: WNL  Recent Memory: WNL  Remote Memory: WNL  Thought Content: Appropriate  Thought Process: WNL  Insight: Intact  Perceptual Function: Normal  Delusions: None or age appropriate  Sleeping: Decreased  Appetite: No Change  Libido: Non-Contributory  Affect: Full Range  Mood: Hopeful, Motivated       [place] Suicide Severity Rating Scale:  Not done today          Safe-T Assessment:  Not done today           Goals Addressed                 This Visit's Progress    • Maximize adjustment and acceptance of limitations             Interventions  Acceptance & Adjustment  Cognitive Behavior Training  Family Training  Monitoring of Symptoms  Psychoeducation    Psychoeducation provided on:  Orthopedic Recovery and Other: Deconditoning     Recommendations:      No Further sessions; Patient pending discharge        Visit Diagnosis:     1. Adjustment disorder with anxiety        Diagnostic Impression: Seen with her . He reports discharge is scheduled for tomorrow and she is very pleased with this.  She does acknowledge that the extra time here has been helpful.  Discussed discharge issues including energy conservation.  Mood is much brighter and she is much less anxious.  Reinforced coping and progress.  Will discharge from psychology as discharge is pending      Psychological condition is  generally improving       MIKAYLA Gutierrez.D

## 2021-10-21 ENCOUNTER — TRANSCRIBE ORDERS (OUTPATIENT)
Dept: SCHEDULING | Facility: REHABILITATION | Age: 61
End: 2021-10-21
Payer: COMMERCIAL

## 2021-10-21 DIAGNOSIS — Z96.653 PRESENCE OF ARTIFICIAL KNEE JOINT, BILATERAL: Primary | ICD-10-CM

## 2021-11-01 ENCOUNTER — HOSPITAL ENCOUNTER (OUTPATIENT)
Dept: PHYSICAL THERAPY | Age: 61
Setting detail: THERAPIES SERIES
Discharge: HOME | End: 2021-11-01
Attending: ORTHOPAEDIC SURGERY
Payer: COMMERCIAL

## 2021-11-01 DIAGNOSIS — Z96.653 PRESENCE OF ARTIFICIAL KNEE JOINT, BILATERAL: ICD-10-CM

## 2021-11-01 PROCEDURE — 97110 THERAPEUTIC EXERCISES: CPT | Mod: GP | Performed by: PHYSICAL THERAPIST

## 2021-11-01 PROCEDURE — 97162 PT EVAL MOD COMPLEX 30 MIN: CPT | Mod: GP | Performed by: PHYSICAL THERAPIST

## 2021-11-01 NOTE — OP PT TREATMENT LOG
TESTS Performed  today      Time  Description    LENA  11/1/2021   Progress note Progress note RA      Weight bearing status  full          Precautions No kneeling        Pt eduation yes        HEP  yes          OUTcome measure  KOS  LEFS  KOS  50/80      TOTAL TIME FOR SESSION     Minutes   AROM         Left knee  5 to 110 prone         Right knee 5 to 120                 PROM         Left knee 0 to 115        Right knee 0 to 125                 MMT         Left quad 3/5        Right quad 4/5                 Pat mobility         right good         left fair        Swelling minimal        Circumference  measurements Not taken        Pitting edema none                 Incision         Open or closed Left superior incision scabbed and red, right knee incision clean and healed        drainage none        hot warm        Gait        TOTAL TIME FOR SESSION Not performed   gait  amb with single point cane,           stairs  step to step          Shoe wear  tie sneakers          Balance        TOTAL TIME FOR SESSION   Minutes   SLS  Right  left   Unable  unable                     Modalities        TOTAL TIME FOR SESSION Not performed   cold         heat            VASO

## 2021-11-01 NOTE — Clinical Note
[address] [address]  [phone]      Dear DR. Em,      Thank you for this referral. Please review the attached notes and plan of care for your approval.  Please contact our department with any questions.     Sincerely,     Lisa Kimbrough, PT      Referring Provider: By co-signing this Plan of Care (POC) either electronically or physically you agree to the following:    I have reviewed the the Plan of Care established by the therapist within this document and certify that the services are skilled and medically necessary. I have reviewed the plan and recommend that these services continue to meet the goals stated in this document.       EXTERNAL PROVIDER FAXING BACK:    PHYSICIAN SIGNATURE: __________________________________     DATE: ___________________  TIME: _____________    IMPORTANT:  If returning this Plan of Care by fax, please fax back ONLY the signature page.   _________________________________________________________________________      [facility] Fax: [phone]        PT EVALUATION FOR OUTPATIENT THERAPY    Patient: Fide Amin    MRN: [id number]  : 1960 60 y.o.   Referring Physician: Bennett Em MD  Date of Visit: 2021      Certification Dates:  21 through 22         Recommended Frequency & Duration:  2 times/week for up to 3 months     Diagnosis:   1. Presence of artificial knee joint, bilateral        Chief Complaints:   Chief Complaint   Patient presents with   • Pain   • Dec ROM   • Difficulty Walking   • Dec Strength   • Decreased Endurance   • Decreased recreational/play activity   • Decreased Mobility   • Joint Pain       Precautions: other (see comments)    Past Medical History:   Past Medical History:   Diagnosis Date   • Arthritis     OA   • Asthma    • Depression    • Deviated septum    • Epilepsy (CMS/HCC)     Last seizure 8 years ago   • GERD (gastroesophageal reflux disease)    • Hypertension    • Lipid disorder    •  Motion sickness    • RLS (restless legs syndrome)        Past Surgical History:   Past Surgical History:   Procedure Laterality Date   •  SECTION      x1   • SINUS SURGERY           LEARNING ASSESSMENT    Assessment completed:  Yes    Learner name:  Fide    Relationship: Patient    Learning Barriers:  Learning barriers: No Barriers    Preferred Language: English     Needed: No    Learning New Concepts: Demonstration    Education Provided: yes, hep      CO-LEARNER ASSESSMENT:    Completed: No            OBJECTIVE MEASUREMENTS/DATA:     Assessment and Plan - 21 1115        Assessment    Plan of Care reviewed and patient/family in agreement Yes     System Pathology/Pathophysiology Noted musculoskeletal     Functional Limitations in Following Categories (PT Eval) community/leisure;home management;self-care     Rehab Potential/Prognosis good, to achieve stated therapy goals     Problem List decreased endurance;decreased flexibility;decreased ROM;decreased strength;impaired balance;pain     Clinical Assessment Pt presents to therapy with mild to moderate gait deviations, decreased patellar mobiity, minimal swelling and decreased bilateral LE strength.  Pt left leg is more impaired than right and has 3 inferior and lateral tape burns at the knee.  Right knee is not red.     Plan and Recommendations PT to improve rom, strength, household and community mobility     Planned Services CPT 56055 Gait training;CPT 59389 Manual therapy;CPT 62899 Neuromuscular Reeducation;CPT 21577 Self-care/Home management training;CPT 30301 Therapeutic activities;CPT 64328 Therapeutic exercises;CPT 52236 Therapeutic Massage;CPT 02428 Electrical stimulation UNATTENDED;CPT 58971 Hot/Cold Packs therapy;CPT 49700 Vasopneumatic device therapy (Application of blood vessel compression or decompression device to 1 or more areas)                General Information - 21 1115        Session Details    Document Type initial  evaluation     Mode of Treatment physical therapy;individual therapy     Patient/Family/Caregiver Comments/Observations Pt presents to therapy with bilateral knee replacements and a complicated medical course post surgery incluing seizures and pnuemonias with intubation.  Pt  was in [facility] for 9 days then discharged to home therapy x 10days.  Pt reports transitioning to spcane x 1 week ago from rolling walker     OP Specialty Orthopedics        Time Calculation    Start Time 1100     Stop Time 1200     Time Calculation (min) 60 min        General Information    Onset of Illness/Injury or Date of Surgery 09/20/21     Referring Physician Dr. Em     Pertinent History of Current Functional Problem Pt reportss mild to moderate pain but mostly stiffness.  Left knee is more problematic since surgery with infection 2 weeks ago at superior incision at superior pat pole.  Pt is on antibiotics and has seen surgeon for assessment x 1 week.  Pt reports knee looks and feels better currently.     Existing Precautions/Restrictions other (see comments)     Precautions comments seizures, pneumonias                Pain/Vitals - 11/01/21 1115        Pain Assessment    Currently in pain Yes     Preferred Pain Scale number (Numeric Rating Pain Scale)     Pain Side/Orientation left;right     Pain: Body location Knee     Pain Rating (0-10): Pre Activity 2     Pain Rating (0-10): Post Activity 2     Pain Description intermittent     Pain Onset/Duration pain with movement     Pain Management Interventions medication administered;other (see comments)   tylenol and mobic       Pain Intervention    Intervention  IE     Post Intervention Comments IE                Falls - 11/01/21 1521        Initial Falls Assessment    One or more falls in the last year --                PT - 11/01/21 1115        Physical Therapy    Physical Therapy Ortho        PT Plan    Frequency of treatment 2 times/week     PT Duration 3 months     PT Cert From 11/01/21      PT Cert To 02/04/22     Date PT POC was sent to provider 11/01/21                   Living Environment    Living Environment - 11/01/21 1115        Living Environment    People in Home spouse     Living Arrangements house     Living Environment Comment stairs to second floor bedroom and bath, accomondations on main level               PLOF:    Prior Level of Function - 11/01/21 1115        OTHER    Previous level of function Attends [facility] facility for strengthening with personal trainier, 3 x week                 Goals     • Mutually agreed upon pain goal      Mutually agreed upon pain goal: walk without stiffness and pain      • PT stg/ltgs knee      Short Term Goals Time Frame Result Comment/Progress   Knee pain at rest will decrease by 50% 4     Knee extension PROM 0 degrees 4     Knee flexion AROM > 90 degrees 4     Pt will perform a SLR without a lag 4     Pt will ambulate into out center from the car independently with appropriate assistive device 4     KOS > 65% 4        Long Term Goals Time Frame Result Comment/Progress   KOS >95% 12 wks           Active knee ROM 0 to 125 12wks       Quad and hip abductor strength 5/5 12wks     amb community distances without assistive device 12wks     Pt will pick object off floor with squatting and no UE support 12wks     Manage stairs step over step with a rail 12 wks                      TREATMENT PLAN:    TESTS Performed  today      Time  Description    EVAL  11/1/2021   Progress note Progress note RA      Weight bearing status  full          Precautions No kneeling        Pt eduation yes        HEP  yes          OUTcome measure  KOS  LEFS  KOS  50/80      TOTAL TIME FOR SESSION     Minutes   AROM         Left knee  5 to 110 prone         Right knee 5 to 120                 PROM         Left knee 0 to 115        Right knee 0 to 125                 MMT         Left quad 3/5        Right quad 4/5                 Pat mobility         right good         left fair         Swelling minimal        Circumference  measurements Not taken        Pitting edema none                 Incision         Open or closed Left superior incision scabbed and red, right knee incision clean and healed        drainage none        hot warm        Gait        TOTAL TIME FOR SESSION Not performed   gait  amb with single point cane,           stairs  step to step          Shoe wear  tie sneakers          Balance        TOTAL TIME FOR SESSION   Minutes   SLS  Right  left   Unable  unable                     Modalities        TOTAL TIME FOR SESSION Not performed   cold         heat            VASO                 ASSESSMENT:    This 60 y.o. year old female presents to PT with above stated diagnosis. Physical Therapy evaluation reveals decreased endurance,decreased flexibility,decreased ROM,decreased strength,impaired balance,pain resulting in community/leisure,home management,self-care limitations. Fide Amin will benefit from skilled PT services to address limitation, work towards rehab and patient goals and maximize PLOF of chosen ADLs.     Planned Services: The patient's treatment will include CPT 66528 Gait training,CPT 99387 Manual therapy,CPT 27438 Neuromuscular Reeducation,CPT 89909 Self-care/Home management training,CPT 46074 Therapeutic activities,CPT 01835 Therapeutic exercises,CPT 54327 Therapeutic Massage,CPT 01785 Electrical stimulation UNATTENDED,CPT 16995 Hot/Cold Packs therapy,CPT 59392 Vasopneumatic device therapy (Application of blood vessel compression or decompression device to 1 or more areas), .

## 2021-11-01 NOTE — PROGRESS NOTES
Referring Provider: By co-signing this Plan of Care (POC) either electronically or physically you agree to the following:    I have reviewed the the Plan of Care established by the therapist within this document and certify that the services are skilled and medically necessary. I have reviewed the plan and recommend that these services continue to meet the goals stated in this document.       EXTERNAL PROVIDER FAXING BACK:    PHYSICIAN SIGNATURE: __________________________________     DATE: ___________________  TIME: _____________    IMPORTANT:  If returning this Plan of Care by fax, please fax back ONLY the signature page.   _________________________________________________________________________      [facility] Fax: [phone]        PT EVALUATION FOR OUTPATIENT THERAPY    Patient: Fide Amin    MRN: [id number]  : 1960 60 y.o.   Referring Physician: Bennett Em MD  Date of Visit: 2021      Certification Dates:  21 through 22         Recommended Frequency & Duration:  2 times/week for up to 3 months     Diagnosis:   1. Presence of artificial knee joint, bilateral        Chief Complaints:   Chief Complaint   Patient presents with   • Pain   • Dec ROM   • Difficulty Walking   • Dec Strength   • Decreased Endurance   • Decreased recreational/play activity   • Decreased Mobility   • Joint Pain       Precautions: other (see comments)    Past Medical History:   Past Medical History:   Diagnosis Date   • Arthritis     OA   • Asthma    • Depression    • Deviated septum    • Epilepsy (CMS/HCC)     Last seizure 8 years ago   • GERD (gastroesophageal reflux disease)    • Hypertension    • Lipid disorder    • Motion sickness    • RLS (restless legs syndrome)        Past Surgical History:   Past Surgical History:   Procedure Laterality Date   •  SECTION      x1   • SINUS SURGERY           LEARNING ASSESSMENT    Assessment completed:  Yes    Learner name:   Fide    Relationship: Patient    Learning Barriers:  Learning barriers: No Barriers    Preferred Language: English     Needed: No    Learning New Concepts: Demonstration    Education Provided: yes, hep      CO-LEARNER ASSESSMENT:    Completed: No            OBJECTIVE MEASUREMENTS/DATA:     Assessment and Plan - 11/01/21 1115        Assessment    Plan of Care reviewed and patient/family in agreement Yes     System Pathology/Pathophysiology Noted musculoskeletal     Functional Limitations in Following Categories (PT Eval) community/leisure;home management;self-care     Rehab Potential/Prognosis good, to achieve stated therapy goals     Problem List decreased endurance;decreased flexibility;decreased ROM;decreased strength;impaired balance;pain     Clinical Assessment Pt presents to therapy with mild to moderate gait deviations, decreased patellar mobiity, minimal swelling and decreased bilateral LE strength.  Pt left leg is more impaired than right and has 3 inferior and lateral tape burns at the knee.  Right knee is not red.     Plan and Recommendations PT to improve rom, strength, household and community mobility     Planned Services CPT 38410 Gait training;CPT 95214 Manual therapy;CPT 60371 Neuromuscular Reeducation;CPT 88639 Self-care/Home management training;CPT 32555 Therapeutic activities;CPT 78876 Therapeutic exercises;CPT 38291 Therapeutic Massage;CPT 68324 Electrical stimulation UNATTENDED;CPT 38574 Hot/Cold Packs therapy;CPT 46958 Vasopneumatic device therapy (Application of blood vessel compression or decompression device to 1 or more areas)                General Information - 11/01/21 1115        Session Details    Document Type initial evaluation     Mode of Treatment physical therapy;individual therapy     Patient/Family/Caregiver Comments/Observations Pt presents to therapy with bilateral knee replacements and a complicated medical course post surgery incluing seizures and pnuemonias with  intubation.  Pt  was in [facility] for 9 days then discharged to home therapy x 10days.  Pt reports transitioning to spcane x 1 week ago from rolling walker     OP Specialty Orthopedics        Time Calculation    Start Time 1100     Stop Time 1200     Time Calculation (min) 60 min        General Information    Onset of Illness/Injury or Date of Surgery 09/20/21     Referring Physician Dr. Em     Pertinent History of Current Functional Problem Pt reportss mild to moderate pain but mostly stiffness.  Left knee is more problematic since surgery with infection 2 weeks ago at superior incision at superior pat pole.  Pt is on antibiotics and has seen surgeon for assessment x 1 week.  Pt reports knee looks and feels better currently.     Existing Precautions/Restrictions other (see comments)     Precautions comments seizures, pneumonias                Pain/Vitals - 11/01/21 1115        Pain Assessment    Currently in pain Yes     Preferred Pain Scale number (Numeric Rating Pain Scale)     Pain Side/Orientation left;right     Pain: Body location Knee     Pain Rating (0-10): Pre Activity 2     Pain Rating (0-10): Post Activity 2     Pain Description intermittent     Pain Onset/Duration pain with movement     Pain Management Interventions medication administered;other (see comments)   tylenol and mobic       Pain Intervention    Intervention  IE     Post Intervention Comments IE                Falls - 11/01/21 1521        Initial Falls Assessment    One or more falls in the last year --                PT - 11/01/21 1115        Physical Therapy    Physical Therapy Ortho        PT Plan    Frequency of treatment 2 times/week     PT Duration 3 months     PT Cert From 11/01/21     PT Cert To 02/04/22     Date PT POC was sent to provider 11/01/21                   Living Environment    Living Environment - 11/01/21 1115        Living Environment    People in Home spouse     Living Arrangements house     Living Environment Comment  stairs to second floor bedroom and bath, accomondations on main level               PLOF:    Prior Level of Function - 11/01/21 1115        OTHER    Previous level of function Attends [facility] for strengthening with personal trainier, 3 x week                 Goals     • Mutually agreed upon pain goal      Mutually agreed upon pain goal: walk without stiffness and pain      • PT stg/ltgs knee      Short Term Goals Time Frame Result Comment/Progress   Knee pain at rest will decrease by 50% 4     Knee extension PROM 0 degrees 4     Knee flexion AROM > 90 degrees 4     Pt will perform a SLR without a lag 4     Pt will ambulate into out center from the car independently with appropriate assistive device 4     KOS > 65% 4        Long Term Goals Time Frame Result Comment/Progress   KOS >95% 12 wks           Active knee ROM 0 to 125 12wks       Quad and hip abductor strength 5/5 12wks     amb community distances without assistive device 12wks     Pt will pick object off floor with squatting and no UE support 12wks     Manage stairs step over step with a rail 12 wks                      TREATMENT PLAN:    TESTS Performed  today      Time  Description    LENA  11/1/2021   Progress note Progress note RA      Weight bearing status  full          Precautions No kneeling        Pt eduation yes        HEP  yes          OUTcome measure  KOS  LEFS  KOS  50/80      TOTAL TIME FOR SESSION     Minutes   AROM         Left knee  5 to 110 prone         Right knee 5 to 120                 PROM         Left knee 0 to 115        Right knee 0 to 125                 MMT         Left quad 3/5        Right quad 4/5                 Pat mobility         right good         left fair        Swelling minimal        Circumference  measurements Not taken        Pitting edema none                 Incision         Open or closed Left superior incision scabbed and red, right knee incision clean and healed        drainage none        hot warm         Gait        TOTAL TIME FOR SESSION Not performed   gait  amb with single point cane,           stairs  step to step          Shoe wear  tie sneakers          Balance        TOTAL TIME FOR SESSION   Minutes   SLS  Right  left   Unable  unable                     Modalities        TOTAL TIME FOR SESSION Not performed   cold         heat            VASO                 ASSESSMENT:    This 60 y.o. year old female presents to PT with above stated diagnosis. Physical Therapy evaluation reveals decreased endurance,decreased flexibility,decreased ROM,decreased strength,impaired balance,pain resulting in community/leisure,home management,self-care limitations. Fide Amin will benefit from skilled PT services to address limitation, work towards rehab and patient goals and maximize PLOF of chosen ADLs.     Planned Services: The patient's treatment will include CPT 54379 Gait training,CPT 23936 Manual therapy,CPT 90495 Neuromuscular Reeducation,CPT 84561 Self-care/Home management training,CPT 20111 Therapeutic activities,CPT 34292 Therapeutic exercises,CPT 65886 Therapeutic Massage,CPT 81862 Electrical stimulation UNATTENDED,CPT 82628 Hot/Cold Packs therapy,CPT 39023 Vasopneumatic device therapy (Application of blood vessel compression or decompression device to 1 or more areas), .

## 2021-11-03 ENCOUNTER — HOSPITAL ENCOUNTER (OUTPATIENT)
Dept: PHYSICAL THERAPY | Age: 61
Setting detail: THERAPIES SERIES
Discharge: HOME | End: 2021-11-03
Attending: ORTHOPAEDIC SURGERY
Payer: COMMERCIAL

## 2021-11-03 DIAGNOSIS — Z96.653 PRESENCE OF ARTIFICIAL KNEE JOINT, BILATERAL: Primary | ICD-10-CM

## 2021-11-03 PROCEDURE — 97116 GAIT TRAINING THERAPY: CPT | Mod: GP | Performed by: PHYSICAL THERAPIST

## 2021-11-03 PROCEDURE — 97110 THERAPEUTIC EXERCISES: CPT | Mod: GP | Performed by: PHYSICAL THERAPIST

## 2021-11-03 NOTE — PROGRESS NOTES
PT DAILY NOTE FOR OUTPATIENT THERAPY    Patient: Fide Amin   MRN: [id number]  : 1960 60 y.o.  Referring Physician: Bennett Em MD  Date of Visit: 11/3/2021    Certification Dates: 21 through 22    Diagnosis:   1. Presence of artificial knee joint, bilateral        Chief Complaints:  s/p bilateral tkr    Precautions:   Existing Precautions/Restrictions: no known precautions/restrictions     TODAY'S VISIT     History/Vitals/Pain/Encounter Info - 21        Injury History/Precautions/Daily Required Info    Primary Therapist Lisa Kimbrough PT, DPT, SCS, CSCS     Chief Complaint/Reason for Visit  s/p bilateral tkr     Onset of Illness/Injury or Date of Surgery 21     Referring Physician Dr. Em     Existing Precautions/Restrictions no known precautions/restrictions     Pertinent History of Current Functional Problem Pt reports mild pain, mostly stiffness, pt concerned about being behind due to comoplications post op and is rushing to get better.  Discussed the importance of safety and realistic time lines with pt     Document Type daily treatment     Patient/Family/Caregiver Comments/Observations Amb with spcane     Start Time 0900     Stop Time 1000     Time Calculation (min) 60 min     Patient reported fall since last visit No        Pain Assessment    Currently in pain Yes     Preferred Pain Scale number (Numeric Rating Pain Scale)     Pain Side/Orientation bilateral     Pain: Body location Knee     Pain Rating (0-10): Pre Activity 1     Pain Rating (0-10): Post Activity 1     Pain Radiation to other (see comments)   no radiating pain    Pain Description intermittent        Pain Intervention    Intervention  ther ex     Post Intervention Comments no change in sxs                Daily Treatment Assessment and Plan - 21        Daily Treatment Assessment and Plan    Progress toward goals Progressing     Daily Outcome Summary Gait endurance, quality and speed  improved.  Pt able to peform ther ex without an increase in sxs.  Progress off the cane in the house as able     Plan and Recommendations [place] nue with gait training and le strengthening, add step ups                     OBJECTIVE DATA TAKEN TODAY:    None taken    Today's Treatment:    Exercises Performed today Sets/ Reps comments Current Session Time   Eval date 11/1/2021  completed      Progress note  Due date 11/29/2021        Re eval  Due date:        THER ACT        toTOTAL TIME FOR SESSION Not performed                           THER EX       TOTAL TIME FOR SESSION    45 Minutes          Bike: R  yes    upright          Nu step                     ROM/stretching       Supine  PB physio curls  SLR off PB  Heel slides  Singke knee to chest  Ball sqz  Bridges  Quad set w heel prop  SLR  Hip flexor stretch   Yes  Yes  Yes  Yes  Yes      Yes         2 x 10  2 x 10  2 x 10  2 x 10  2 x 10  2 x 10     Sitting  FAQ  Hamstring stretch  Knee flexion  Sit to stand           yes           2 x10     Prone  Knee flexion yes 2 x 10     standing       3 way hip       Wall slides       Assisted sqts         sqts                            Update  HEP       NEURO RE-ED       TOTAL TIME FOR SESSION Not performed    biodex balance            blue foam           SLS        rocker board           GAIT TRAINING       TOTAL TIME FOR SESSION 15 min   amb yes        stairs       curbs       Out side       stepping over objects  yes         MANUAL       TOTAL TIME FOR SESSION Not performed    STM/ retro grade massage           Jt mobs       PROM       Passive stretch       MODALITIES           TOTAL TIME FOR SESSION Not performed   vaso        ice pack

## 2021-11-09 ENCOUNTER — HOSPITAL ENCOUNTER (OUTPATIENT)
Dept: PHYSICAL THERAPY | Age: 61
Setting detail: THERAPIES SERIES
Discharge: HOME | End: 2021-11-09
Attending: ORTHOPAEDIC SURGERY
Payer: COMMERCIAL

## 2021-11-09 DIAGNOSIS — Z96.653 PRESENCE OF ARTIFICIAL KNEE JOINT, BILATERAL: Primary | ICD-10-CM

## 2021-11-09 PROCEDURE — 97010 HOT OR COLD PACKS THERAPY: CPT | Mod: GP,CQ

## 2021-11-09 PROCEDURE — 97110 THERAPEUTIC EXERCISES: CPT | Mod: GP,CQ

## 2021-11-09 NOTE — OP PT TREATMENT LOG
Exercises Performed today Sets/ Reps comments Current Session Time   Eval date 11/1/2021  completed      Progress note  Due date 11/29/2021        Re eval  Due date:        THER ACT        toTOTAL TIME FOR SESSION Not performed                           THER EX       TOTAL TIME FOR SESSION    50 Minutes          Bike: R  yes 10min   recumbant                               ROM/stretching       Supine  PB physio curls  SLR   Heel slides  Singke knee to chest  Ball sqz  Bridges  Quad set w heel prop  SLR  Hip flexor stretch   Yes  Yes  Yes  Yes  Yes      Yes  yes         2 x 10  2 x 10  1 x 10  2 x 10  2 x 10      2 x 10  5s x20     Sitting  FAQ  Hamstring stretch  Knee flexion  Sit to stand           no           2 x10     Prone  Knee flexion yes 2 x 10     standing       3 way hip       Wall slides       Assisted sqts         sqts                            Update  HEP       NEURO RE-ED       TOTAL TIME FOR SESSION Not performed    biodex balance            blue foam           SLS        rocker board           GAIT TRAINING       TOTAL TIME FOR SESSION 10 min   amb         stairs yes 6 inch step up X 10 each    curbs       Out side       stepping over objects  yes  low hurdles  2 laps each  fwd, alternate, side   MANUAL       TOTAL TIME FOR SESSION Not performed    STM/ retro grade massage           Jt mobs       PROM       Passive stretch       MODALITIES           TOTAL TIME FOR SESSION Not performed   vaso        ice pack

## 2021-11-09 NOTE — PROGRESS NOTES
"PT DAILY NOTE FOR OUTPATIENT THERAPY    Patient: Fide Amin   MRN: [id number]  : 1960 60 y.o.  Referring Physician: Bennett Em MD  Date of Visit: 2021    Certification Dates: 21 through 22    Diagnosis:   1. Presence of artificial knee joint, bilateral        Chief Complaints:  s/p bilateral tkr    Precautions:   Existing Precautions/Restrictions: no known precautions/restrictions     TODAY'S VISIT     History/Vitals/Pain/Encounter Info - 21 1224        Injury History/Precautions/Daily Required Info    Primary Therapist Lisa Kimbrough PT, DPT, SCS, CSCS     Chief Complaint/Reason for Visit  s/p bilateral tkr     Onset of Illness/Injury or Date of Surgery 21     Referring Physician Dr. Em     Existing Precautions/Restrictions no known precautions/restrictions     Pertinent History of Current Functional Problem Pt reports mild pain, mostly stiffness, pt concerned about being behind due to comoplications post op and is rushing to get better.  Discussed the importance of safety and realistic time lines with pt     [facility]     Document Type daily treatment     Patient/Family/Caregiver Comments/Observations Pt. reports no issues over weekend.     Start Time 1200     Stop Time 1305     Time Calculation (min) 65 min     Patient reported fall since last visit No        Pain Assessment    Currently in pain Yes     Preferred Pain Scale number (Numeric Rating Pain Scale)     Pain Side/Orientation bilateral     Pain: Body location Knee     Pain Rating (0-10): Pre Activity 3     Pain Rating (0-10): Activity 4     Pain Rating (0-10): Post Activity 2        Pain Intervention    Intervention  TE, CP     Post Intervention Comments feels better                Daily Treatment Assessment and Plan - 21 1224        Daily Treatment Assessment and Plan    Progress toward goals Progressing     Daily Outcome Summary Pt. reports L knee more \"cranky\" than the R knee. " Using cane only when in community and on stairs.  COntinued with AAROM, quad strengthening and gait activities as noted on log. Added 6 inch step ups x 10 each. CP to B knees with elevation x 8 min at end of session.     Plan and Recommendations Continue to advance as able.                   Today's Treatment:    Exercises Performed today Sets/ Reps comments Current Session Time   Eval date 11/1/2021  completed      Progress note  Due date 11/29/2021        Re eval  Due date:        THER ACT        toTOTAL TIME FOR SESSION Not performed                           THER EX       TOTAL TIME FOR SESSION    50 Minutes          Bike: R  yes 10min   recumbant                               ROM/stretching       Supine  PB physio curls  SLR   Heel slides  Singke knee to chest  Ball sqz  Bridges  Quad set w heel prop  SLR  Hip flexor stretch   Yes  Yes  Yes  Yes  Yes      Yes  yes         2 x 10  2 x 10  1 x 10  2 x 10  2 x 10      2 x 10  5s x20     Sitting  FAQ  Hamstring stretch  Knee flexion  Sit to stand           no           2 x10     Prone  Knee flexion yes 2 x 10     standing       3 way hip       Wall slides       Assisted sqts         sqts                            Update  HEP       NEURO RE-ED       TOTAL TIME FOR SESSION Not performed    biodex balance            blue foam           SLS        rocker board           GAIT TRAINING       TOTAL TIME FOR SESSION 10 min   amb         stairs yes 6 inch step up X 10 each    curbs       Out side       stepping over objects  yes  low hurdles  2 laps each  fwd, alternate, side   MANUAL       TOTAL TIME FOR SESSION Not performed    STM/ retro grade massage           Jt mobs       PROM       Passive stretch       MODALITIES           TOTAL TIME FOR SESSION Not performed   vaso        ice pack

## 2021-11-17 ENCOUNTER — HOSPITAL ENCOUNTER (OUTPATIENT)
Dept: PHYSICAL THERAPY | Age: 61
Setting detail: THERAPIES SERIES
Discharge: HOME | End: 2021-11-17
Attending: ORTHOPAEDIC SURGERY
Payer: COMMERCIAL

## 2021-11-17 DIAGNOSIS — Z96.653 PRESENCE OF ARTIFICIAL KNEE JOINT, BILATERAL: Primary | ICD-10-CM

## 2021-11-17 PROCEDURE — 97110 THERAPEUTIC EXERCISES: CPT | Mod: GP,CQ

## 2021-11-17 NOTE — PROGRESS NOTES
PT DAILY NOTE FOR OUTPATIENT THERAPY    Patient: Fide Amin   MRN: [id number]  : 1960 60 y.o.  Referring Physician: Bennett Em MD  Date of Visit: 2021    Certification Dates: 21 through 22    Diagnosis:   1. Presence of artificial knee joint, bilateral        Chief Complaints:  s/p bilateral tkr    Precautions:   Existing Precautions/Restrictions: no known precautions/restrictions     TODAY'S VISIT     History/Vitals/Pain/Encounter Info - 21 1028        Injury History/Precautions/Daily Required Info    Primary Therapist Lisa Kimbrough PT, DPT, SCS, CSCS     Chief Complaint/Reason for Visit  s/p bilateral tkr     Onset of Illness/Injury or Date of Surgery 21     Referring Physician Dr. Em     Existing Precautions/Restrictions no known precautions/restrictions     Pertinent History of Current Functional Problem Pt reports mild pain, mostly stiffness, pt concerned about being behind due to comoplications post op and is rushing to get better.  Discussed the importance of safety and realistic time lines with pt     [facility]     Document Type daily treatment     Patient/Family/Caregiver Comments/Observations Pt. reports that the top scab on the L knee came off when she removed the bandaid that was on second scab after the doctor squeezed some fluid out and put a bandaid on it.     Start Time 1000     Stop Time 1100     Time Calculation (min) 60 min     Patient reported fall since last visit No        Pain Assessment    Currently in pain No/Denies        Pain Intervention    Intervention  na     Post Intervention Comments na                Daily Treatment Assessment and Plan - 21 1028        Daily Treatment Assessment and Plan    Progress toward goals Progressing     Daily Outcome Summary ROM L 0-128, R 0-125. Some skin iritation on L incision and lateral area of L knee and lower leg. Warm up on bike x 12 min and review of TE per activity log for  ROM and strength. Pt. advised to use ice at home prn.     Plan and Recommendations Next visit review standing TE and resume step ups and dynamic balance exs.                   Today's Treatment:    Exercises Performed today Sets/ Reps comments Current Session Time   Eval date 11/1/2021  completed      Progress note  Due date 11/29/2021        Re eval  Due date:        THER ACT        toTOTAL TIME FOR SESSION Not performed                           THER EX       TOTAL TIME FOR SESSION    60 Minutes          Bike: R  yes 12min   recumbant                               ROM/stretching       Supine  PB physio curls  SLR   Heel slides  Singke knee to chest  Ball sqz  Bridges  Quad set w heel prop  SLR  Hip flexor stretch  GSS@step   Yes  Yes  Yes  Yes  Yes      Yes  Yes    yes  No    yes 2 x 10  2 x 10  1 x 10  2 x 10  2 x 10      2 x 10  5s x20    2x10 ea      3x30s     Sitting  FAQ  Hamstring stretch  Knee flexion  Sit to stand           yes           2 x10     Prone  Knee flexion no 2 x 10     standing       3 way hip       Wall slides                       Hip abd yes 2x10     HR yes 2x10     Knee flex yes 2x10     Update  HEP       NEURO RE-ED       TOTAL TIME FOR SESSION Not performed    biodex balance            blue foam           SLS        rocker board           GAIT TRAINING       TOTAL TIME FOR SESSION no   amb         stairs yes 6 inch step up X 10 each    curbs       Out side       stepping over objects  yes  low hurdles  2 laps each  fwd, alternate, side   MANUAL       TOTAL TIME FOR SESSION Not performed    STM/ retro grade massage           Jt mobs       PROM       Passive stretch       MODALITIES           TOTAL TIME FOR SESSION Not performed   vaso        ice pack

## 2021-11-17 NOTE — OP PT TREATMENT LOG
Exercises Performed today Sets/ Reps comments Current Session Time   Eval date 11/1/2021  completed      Progress note  Due date 11/29/2021        Re eval  Due date:        THER ACT        toTOTAL TIME FOR SESSION Not performed                           THER EX       TOTAL TIME FOR SESSION    60 Minutes          Bike: R  yes 12min   recumbant                               ROM/stretching       Supine  PB physio curls  SLR   Heel slides  Singke knee to chest  Ball sqz  Bridges  Quad set w heel prop  SLR  Hip flexor stretch  GSS@step   Yes  Yes  Yes  Yes  Yes      Yes  Yes    yes  No    yes 2 x 10  2 x 10  1 x 10  2 x 10  2 x 10      2 x 10  5s x20    2x10 ea      3x30s     Sitting  FAQ  Hamstring stretch  Knee flexion  Sit to stand           yes           2 x10     Prone  Knee flexion no 2 x 10     standing       3 way hip       Wall slides                       Hip abd yes 2x10     HR yes 2x10     Knee flex yes 2x10     Update  HEP       NEURO RE-ED       TOTAL TIME FOR SESSION Not performed    biodex balance            blue foam           SLS        rocker board           GAIT TRAINING       TOTAL TIME FOR SESSION no   amb         stairs yes 6 inch step up X 10 each    curbs       Out side       stepping over objects  yes  low hurdles  2 laps each  fwd, alternate, side   MANUAL       TOTAL TIME FOR SESSION Not performed    STM/ retro grade massage           Jt mobs       PROM       Passive stretch       MODALITIES           TOTAL TIME FOR SESSION Not performed   vaso        ice pack

## 2021-11-19 ENCOUNTER — HOSPITAL ENCOUNTER (OUTPATIENT)
Dept: PHYSICAL THERAPY | Age: 61
Setting detail: THERAPIES SERIES
Discharge: HOME | End: 2021-11-19
Attending: ORTHOPAEDIC SURGERY
Payer: COMMERCIAL

## 2021-11-19 DIAGNOSIS — Z96.653 PRESENCE OF ARTIFICIAL KNEE JOINT, BILATERAL: Primary | ICD-10-CM

## 2021-11-19 PROCEDURE — 97110 THERAPEUTIC EXERCISES: CPT | Mod: GP,CQ,59

## 2021-11-19 PROCEDURE — 97112 NEUROMUSCULAR REEDUCATION: CPT | Mod: GP,CQ,59

## 2021-11-19 PROCEDURE — 97150 GROUP THERAPEUTIC PROCEDURES: CPT | Mod: GP,CQ

## 2021-11-19 NOTE — OP PT TREATMENT LOG
Exercises Performed today Sets/ Reps comments Current Session Time   Eval date 11/1/2021  completed      Progress note  Due date 11/29/2021        Re eval  Due date:        THER ACT        toTOTAL TIME FOR SESSION Not performed                           THER EX       TOTAL TIME FOR SESSION   15 Minutes  30 group            Bike: R  yes 12min G  recumbant                               ROM/stretching       Supine  PB physio curls  SLR   Heel slides  Singke knee to chest  Ball sqz  Bridges  Quad set w heel prop  SLR  Hip flexor stretch  GSS@step   Yes  Yes  no  no  no      no  No    no  No    yes 2 x 10  2 x 10  1 x 10  2 x 10  2 x 10      2 x 10  5s x20    2x10 ea      3x30s G                          G            Sitting  FAQ  Hamstring stretch  Knee flexion  Sit to stand           yes           2 x10           4# ball    Prone  Knee flexion str yes 3x30 s strap    standing       3 way hip       Wall slides                Step up yes 2x10 each 6 inches    Hip abd No   2x10     HR no 2x10     Knee flex no 2x10     Update  HEP       NEURO RE-ED       TOTAL TIME FOR SESSION 15 min    biodex balance            blue foam  yes  3x30s  NBOS, tandem     SLS        rocker board  yes  A/P , lateral X 30 each     GAIT TRAINING       TOTAL TIME FOR SESSION no   amb         stairs yes 6 inch step up X 10 each    curbs       Out side       stepping over objects  yes  low hurdles  2 laps each  fwd, alternate, side   MANUAL       TOTAL TIME FOR SESSION Not performed    STM/ retro grade massage           Jt mobs       PROM       Passive stretch       MODALITIES           TOTAL TIME FOR SESSION Not performed   vaso        ice pack

## 2021-11-19 NOTE — PROGRESS NOTES
PT DAILY NOTE FOR OUTPATIENT THERAPY    Patient: Fide Amin   MRN: [id number]  : 1960 60 y.o.  Referring Physician: Bennett Em MD  Date of Visit: 2021    Certification Dates: 21 through 22    Diagnosis:   1. Presence of artificial knee joint, bilateral        Chief Complaints:  s/p bilateral tkr    Precautions:   Existing Precautions/Restrictions: no known precautions/restrictions     TODAY'S VISIT     History/Vitals/Pain/Encounter Info - 21 1018        Injury History/Precautions/Daily Required Info    Primary Therapist Lisa Kimbrough PT, DPT, SCS, CSCS     Chief Complaint/Reason for Visit  s/p bilateral tkr     Onset of Illness/Injury or Date of Surgery 21     Referring Physician Dr. Em     Existing Precautions/Restrictions no known precautions/restrictions     Pertinent History of Current Functional Problem Pt reports mild pain, mostly stiffness, pt concerned about being behind due to comoplications post op and is rushing to get better.  Discussed the importance of safety and realistic time lines with pt     [facility]     Document Type daily treatment     Patient/Family/Caregiver Comments/Observations Reports B knees feel stiff.     Start Time 1000     Stop Time 1100     Time Calculation (min) 60 min     Patient reported fall since last visit No        Pain Assessment    Currently in pain No/Denies        Pain Intervention    Intervention  na     Post Intervention Comments na                Daily Treatment Assessment and Plan - 21 1018        Daily Treatment Assessment and Plan    Progress toward goals Progressing     Daily Outcome Summary Added rockerboard and airex foam for balance exs with mirror for feedback. Pt. reprots B knees feel less stiff at end of session and declines CP.     Plan and Recommendations Add gait and dynamic balance exs as able and update HEP prn.                     Today's Treatment:    Exercises Performed today  Sets/ Reps comments Current Session Time   Eval date 11/1/2021  completed      Progress note  Due date 11/29/2021        Re eval  Due date:        THER ACT        toTOTAL TIME FOR SESSION Not performed                           THER EX       TOTAL TIME FOR SESSION   15 Minutes  30 group            Bike: R  yes 12min G  recumbant                               ROM/stretching       Supine  PB physio curls  SLR   Heel slides  Singke knee to chest  Ball sqz  Bridges  Quad set w heel prop  SLR  Hip flexor stretch  GSS@step   Yes  Yes  no  no  no      no  No    no  No    yes 2 x 10  2 x 10  1 x 10  2 x 10  2 x 10      2 x 10  5s x20    2x10 ea      3x30s G                          G            Sitting  FAQ  Hamstring stretch  Knee flexion  Sit to stand           yes           2 x10           4# ball    Prone  Knee flexion str yes 3x30 s strap    standing       3 way hip       Wall slides                Step up yes 2x10 each 6 inches    Hip abd No   2x10     HR no 2x10     Knee flex no 2x10     Update  HEP       NEURO RE-ED       TOTAL TIME FOR SESSION 15 min    biodex balance            blue foam  yes  3x30s  NBOS, tandem     SLS        rocker board  yes  A/P , lateral X 30 each     GAIT TRAINING       TOTAL TIME FOR SESSION no   amb         stairs yes 6 inch step up X 10 each    curbs       Out side       stepping over objects  yes  low hurdles  2 laps each  fwd, alternate, side   MANUAL       TOTAL TIME FOR SESSION Not performed    STM/ retro grade massage           Jt mobs       PROM       Passive stretch       MODALITIES           TOTAL TIME FOR SESSION Not performed   vaso        ice pack

## 2021-11-22 ENCOUNTER — HOSPITAL ENCOUNTER (OUTPATIENT)
Dept: PHYSICAL THERAPY | Age: 61
Setting detail: THERAPIES SERIES
Discharge: HOME | End: 2021-11-22
Attending: ORTHOPAEDIC SURGERY
Payer: COMMERCIAL

## 2021-11-22 DIAGNOSIS — Z96.653 PRESENCE OF ARTIFICIAL KNEE JOINT, BILATERAL: Primary | ICD-10-CM

## 2021-11-22 PROCEDURE — 97110 THERAPEUTIC EXERCISES: CPT | Mod: GP,CQ,59

## 2021-11-22 PROCEDURE — 97150 GROUP THERAPEUTIC PROCEDURES: CPT | Mod: GP,CQ

## 2021-11-22 PROCEDURE — 97112 NEUROMUSCULAR REEDUCATION: CPT | Mod: GP,CQ,59

## 2021-11-22 NOTE — PROGRESS NOTES
PT DAILY NOTE FOR OUTPATIENT THERAPY    Patient: Fide Amin   MRN: [id number]  : 1960 60 y.o.  Referring Physician: Bennett Em MD  Date of Visit: 2021    Certification Dates: 21 through 22    Diagnosis:   1. Presence of artificial knee joint, bilateral        Chief Complaints:  s/p bilateral tkr    Precautions:   Existing Precautions/Restrictions: no known precautions/restrictions     TODAY'S VISIT     History/Vitals/Pain/Encounter Info - 21 0758        Injury History/Precautions/Daily Required Info    Primary Therapist Lisa Kimbrough PT, DPT, SCS, CSCS     Chief Complaint/Reason for Visit  s/p bilateral tkr     Onset of Illness/Injury or Date of Surgery 21     Referring Physician Dr. Em     Existing Precautions/Restrictions no known precautions/restrictions     Pertinent History of Current Functional Problem Pt reports mild pain, mostly stiffness, pt concerned about being behind due to comoplications post op and is rushing to get better.  Discussed the importance of safety and realistic time lines with pt     [facility]     Document Type daily treatment     Patient/Family/Caregiver Comments/Observations Pt. reports that there is another area on incision of L knee that feels soft and has contacted surgeon to be seen.     Start Time 0700     Stop Time 0803     Time Calculation (min) 63 min     Patient reported fall since last visit No        Pain Assessment    Currently in pain No/Denies        Pain Intervention    Intervention  na     Post Intervention Comments na                Daily Treatment Assessment and Plan - 21 0758        Daily Treatment Assessment and Plan    Progress toward goals Progressing     Daily Outcome Summary Review of balance and step ups added last session and added sidelying clam and hip abd 2x10 each. Pt. denies pain and is going to call surgeon or go to ortho urgent care to have L incision looked at. Area is red and  slightly swollen but not warm at upper-mid incsion line below where scab was last week.     Plan and Recommendations Continue with  gait and dynamic balance exs as able and update HEP prn.                     Today's Treatment:    Exercises Performed today Sets/ Reps comments Current Session Time   Eval date 11/1/2021  completed      Progress note  Due date 11/29/2021        Re eval  Due date:        THER ACT        toTOTAL TIME FOR SESSION Not performed                           THER EX       TOTAL TIME FOR SESSION   15 Minutes  30 group            Bike: R  yes 12min G  recumbant                               ROM/stretching       HSS yes 3x30s strap     Supine  PB physio curls  SLR   Heel slides  Singke knee to chest  Ball sqz  Bridges  Quad set w heel prop  SLR  Hip flexor stretch  GSS@step  Clam in sidely  Hip abd in sidely Yes  Yes  yes  no  no      yes  No    no  No    No  Yes  yes 2 x 10  2 x 10  1 x 10  2 x 10  2 x 10      2 x 10  5s x20    2x10 ea      3x30s  2x10  2x10 G  G                          G            Sitting  FAQ  Hamstring stretch  Knee flexion  Sit to stand           No           2 x10           4# ball    Prone  Knee flexion str no 3x30 s strap    standing       3 way hip       Wall slides                Step up yes 2x10 each 6 inches    Hip abd No   2x10     HR no 2x10     Knee flex no 2x10     Update  HEP       NEURO RE-ED       TOTAL TIME FOR SESSION 15 min    biodex balance            blue foam  yes  3x30s  NBOS, tandem     SLS        rocker board  yes  A/P , lateral X 30 each     GAIT TRAINING       TOTAL TIME FOR SESSION no   amb         stairs yes 6 inch step up X 10 each    curbs       Out side       stepping over objects  yes  low hurdles  2 laps each  fwd, alternate, side   MANUAL       TOTAL TIME FOR SESSION Not performed    STM/ retro grade massage           Jt mobs       PROM       Passive stretch       MODALITIES           TOTAL TIME FOR SESSION Not performed   vaso        ice  pack

## 2021-11-22 NOTE — OP PT TREATMENT LOG
Exercises Performed today Sets/ Reps comments Current Session Time   Eval date 11/1/2021  completed      Progress note  Due date 11/29/2021        Re eval  Due date:        THER ACT        toTOTAL TIME FOR SESSION Not performed                           THER EX       TOTAL TIME FOR SESSION   15 Minutes  30 group            Bike: R  yes 12min G  recumbant                               ROM/stretching       HSS yes 3x30s strap     Supine  PB physio curls  SLR   Heel slides  Singke knee to chest  Ball sqz  Bridges  Quad set w heel prop  SLR  Hip flexor stretch  GSS@step  Clam in sidely  Hip abd in sidely Yes  Yes  yes  no  no      yes  No    no  No    No  Yes  yes 2 x 10  2 x 10  1 x 10  2 x 10  2 x 10      2 x 10  5s x20    2x10 ea      3x30s  2x10  2x10 G  G                          G            Sitting  FAQ  Hamstring stretch  Knee flexion  Sit to stand           No           2 x10           4# ball    Prone  Knee flexion str no 3x30 s strap    standing       3 way hip       Wall slides                Step up yes 2x10 each 6 inches    Hip abd No   2x10     HR no 2x10     Knee flex no 2x10     Update  HEP       NEURO RE-ED       TOTAL TIME FOR SESSION 15 min    biodex balance            blue foam  yes  3x30s  NBOS, tandem     SLS        rocker board  yes  A/P , lateral X 30 each     GAIT TRAINING       TOTAL TIME FOR SESSION no   amb         stairs yes 6 inch step up X 10 each    curbs       Out side       stepping over objects  yes  low hurdles  2 laps each  fwd, alternate, side   MANUAL       TOTAL TIME FOR SESSION Not performed    STM/ retro grade massage           Jt mobs       PROM       Passive stretch       MODALITIES           TOTAL TIME FOR SESSION Not performed   vaso        ice pack                     Patient Information     Patient Name MRN Jewels Ferguson [id number] Female 1957      Progress Notes by Alis Carmichael R.T. ([facility]) at 2017  9:04 AM     Author:  Alis Carmichael R.T. ([facility] Service:  (none) Author Type:  [employer] - Registered Radiologic Technologist     Filed:  2017  9:05 AM Date of Service:  2017  9:04 AM Status:  Signed     :  Alis Carmichael R.T. ([facility]) ([employer] - Registered Radiologic Technologist)            Falls Risk Criteria:    Age 65 and older or under age 4        Sensory deficits    Poor vision    Use of ambulatory aides    Impaired judgment    Unable to walk independently    Meets High Risk criteria for falls:  Yes               1.  Do you have dizziness or vertigo?    yes                    2.  Do you need help standing or walking?   yes                 3.  Have you fallen within the last 6 months?    yes           4.  Has the patient been fasting?      yes       If any risks are marked Yes, the following interventions are utilized:    Do not leave patient unattended     Assist patient in the dressing room and bathroom    Have ambulatory aides available throughout procedure    Involve patient s family if available

## 2021-11-26 ENCOUNTER — HOSPITAL ENCOUNTER (OUTPATIENT)
Dept: PHYSICAL THERAPY | Age: 61
Setting detail: THERAPIES SERIES
Discharge: HOME | End: 2021-11-26
Attending: ORTHOPAEDIC SURGERY
Payer: COMMERCIAL

## 2021-11-26 DIAGNOSIS — Z96.653 PRESENCE OF ARTIFICIAL KNEE JOINT, BILATERAL: Primary | ICD-10-CM

## 2021-11-26 PROCEDURE — 97110 THERAPEUTIC EXERCISES: CPT | Mod: GP,59 | Performed by: PHYSICAL THERAPIST

## 2021-11-26 PROCEDURE — 97150 GROUP THERAPEUTIC PROCEDURES: CPT | Mod: GP | Performed by: PHYSICAL THERAPIST

## 2021-11-26 NOTE — OP PT TREATMENT LOG
Exercises Performed today Sets/ Reps comments Current Session Time   Eval date 11/1/2021  completed      Progress note  Due date 11/29/2021        Re eval  Due date:        THER ACT        toTOTAL TIME FOR SESSION Not performed                           THER EX       TOTAL TIME FOR SESSION   15 Minutes  30 group            Bike: R  yes 12min G  recumbant                               ROM/stretching       HSS yes 3x30s strap     Supine  PB physio curls  SLR   Heel slides  Singke knee to chest  Ball sqz  Bridges  Quad set w heel prop  SLR  Hip flexor stretch  GSS@step  Clam in sidely  Hip abd in sidely Yes  Yes  yes  no  no      yes  No    no  No    No  Yes  yes 2 x 10  2 x 10  1 x 10  2 x 10  2 x 10      2 x 10  5s x20    2x10 ea      3x30s  2x10  2x10 G  G                          G            Sitting  FAQ  Hamstring stretch  Knee flexion  Sit to stand           No           2 x10           4# ball    Prone  Knee flexion str no 3x30 s strap    standing       3 way hip       Wall slides                Step up yes 2x10 each 6 inches    Hip abd No   2x10     HR no 2x10     Knee flex no 2x10     Update  HEP       NEURO RE-ED       TOTAL TIME FOR SESSION 15 min    biodex balance            blue foam  yes  3x30s  NBOS, tandem     SLS        rocker board  yes  A/P , lateral X 30 each     GAIT TRAINING       TOTAL TIME FOR SESSION no   amb         stairs yes 6 inch step up X 10 each    curbs       Out side       stepping over objects  yes  low hurdles  2 laps each  fwd, alternate, side   MANUAL       TOTAL TIME FOR SESSION Not performed    STM/ retro grade massage           Jt mobs       PROM       Passive stretch       MODALITIES           TOTAL TIME FOR SESSION Not performed   vaso        ice pack

## 2021-11-26 NOTE — PROGRESS NOTES
PT DAILY NOTE FOR OUTPATIENT THERAPY    Patient: Fide Amin   MRN: [id number]  : 1960 60 y.o.  Referring Physician: Bennett Em MD  Date of Visit: 2021    Certification Dates: 21 through 22    Diagnosis:   1. Presence of artificial knee joint, bilateral        Chief Complaints:  s/p bilateral tkr    Precautions:   Existing Precautions/Restrictions: no known precautions/restrictions     TODAY'S VISIT     History/Vitals/Pain/Encounter Info - 21 0916        Injury History/Precautions/Daily Required Info    Primary Therapist Lisa Kimbrough PT, DPT, SCS, CSCS     Chief Complaint/Reason for Visit  s/p bilateral tkr     Onset of Illness/Injury or Date of Surgery 21     Referring Physician Dr. Em     Existing Precautions/Restrictions no known precautions/restrictions     Pertinent History of Current Functional Problem Pt reports mild pain, mostly stiffness, pt concerned about being behind due to comoplications post op and is rushing to get better.  Discussed the importance of safety and realistic time lines with pt     OP Specialty Orthopedics     Document Type daily treatment     Patient/Family/Caregiver Comments/Observations Pt reports seeing dermotologist earlier this wk for biopsy of soft tissue L knee     Start Time 0900     Stop Time 1000     Time Calculation (min) 60 min     Patient reported fall since last visit No        Pain Assessment    Currently in pain No/Denies     Preferred Pain Scale number (Numeric Rating Pain Scale)     Pain Side/Orientation bilateral     Pain: Body location Knee     Pain Rating (0-10): Pre Activity 0     Pain Rating (0-10): Activity 0     Pain Rating (0-10): Post Activity 0     Pain Description intermittent        Pain Intervention    Intervention  monitored     Post Intervention Comments see assessment                Daily Treatment Assessment and Plan - 21 0916        Daily Treatment Assessment and Plan    Progress toward goals  Progressing     Daily Outcome Summary Pt completed all TE as per flow sheet w/o increased symptpoms. Pt awaiting resultls of skin biopsy L knee.     Plan and Recommendations Advance as tolerated.                     OBJECTIVE DATA TAKEN TODAY:    None taken    Today's Treatment:    Exercises Performed today Sets/ Reps comments Current Session Time   Eval date 11/1/2021  completed      Progress note  Due date 11/29/2021        Re eval  Due date:        THER ACT        toTOTAL TIME FOR SESSION Not performed                           THER EX       TOTAL TIME FOR SESSION   15 Minutes  30 group            Bike: R  yes 12min G  recumbant                               ROM/stretching       HSS yes 3x30s strap     Supine  PB physio curls  SLR   Heel slides  Singke knee to chest  Ball sqz  Bridges  Quad set w heel prop  SLR  Hip flexor stretch  GSS@step  Clam in sidely  Hip abd in sidely Yes  Yes  yes  no  no      yes  No    no  No    No  Yes  yes 2 x 10  2 x 10  1 x 10  2 x 10  2 x 10      2 x 10  5s x20    2x10 ea      3x30s  2x10  2x10 G  G                          G            Sitting  FAQ  Hamstring stretch  Knee flexion  Sit to stand           No           2 x10           4# ball    Prone  Knee flexion str no 3x30 s strap    standing       3 way hip       Wall slides                Step up yes 2x10 each 6 inches    Hip abd No   2x10     HR no 2x10     Knee flex no 2x10     Update  HEP       NEURO RE-ED       TOTAL TIME FOR SESSION 15 min    biodex balance            blue foam  yes  3x30s  NBOS, tandem     SLS        rocker board  yes  A/P , lateral X 30 each     GAIT TRAINING       TOTAL TIME FOR SESSION no   amb         stairs yes 6 inch step up X 10 each    curbs       Out side       stepping over objects  yes  low hurdles  2 laps each  fwd, alternate, side   MANUAL       TOTAL TIME FOR SESSION Not performed    STM/ retro grade massage           Jt mobs       PROM       Passive stretch       MODALITIES           TOTAL  TIME FOR SESSION Not performed   vaso        ice pack

## 2021-11-29 ENCOUNTER — HOSPITAL ENCOUNTER (OUTPATIENT)
Dept: PHYSICAL THERAPY | Age: 61
Setting detail: THERAPIES SERIES
Discharge: HOME | End: 2021-11-29
Attending: ORTHOPAEDIC SURGERY
Payer: COMMERCIAL

## 2021-11-29 DIAGNOSIS — Z96.653 PRESENCE OF ARTIFICIAL KNEE JOINT, BILATERAL: Primary | ICD-10-CM

## 2021-11-29 PROCEDURE — 97150 GROUP THERAPEUTIC PROCEDURES: CPT | Mod: GP,CQ

## 2021-11-29 PROCEDURE — 97110 THERAPEUTIC EXERCISES: CPT | Mod: GP,CQ,59

## 2021-11-29 NOTE — OP PT TREATMENT LOG
Exercises Performed today Sets/ Reps comments Current Session Time   Eval date 11/1/2021  completed      Progress note  Due date 11/29/2021        Re eval  Due date:        THER ACT        toTOTAL TIME FOR SESSION Not performed                           THER EX       TOTAL TIME FOR SESSION   30 min TE  30 min group            Bike: R  yes 12min recumbant G                               ROM/stretching       HSS yes 3x30s strap  G   Supine  PB physio curls  SLR   Heel slides  Singke knee to chest  Ball sqz  Bridges  Quad set w heel prop  SLR  Hip flexor stretch  GSS@step  Clam in sidely  Hip abd in sidely Yes  Yes  yes  no  no      yes  No    yes  No    yes  Yes  yes 2 x 10  2 x 10  1 x 10  2 x 10  2 x 10      2 x 10  5s x20    2x10 ea      2x1 min  2x10  2x10                             Red TB    Sitting  FAQ  Hamstring stretch  Knee flexion  Sit to stand           yes           2 x10           4# ball    Prone  Knee flexion str no 3x30 s strap    standing       3 way hip       Wall slides                Step up yes 1x10 each 8 inches    Hip abd No   2x10     HR no 2x10     Knee flex no 2x10     Update  HEP       NEURO RE-ED       TOTAL TIME FOR SESSION Not performed    biodex balance            blue foam  yes  3x30s  NBOS, tandem     SLS        rocker board  yes  A/P , lateral X 30 each     GAIT TRAINING       TOTAL TIME FOR SESSION no   amb         stairs yes 6 inch step up X 10 each    curbs       Out side       stepping over objects  yes  low hurdles  2 laps each  fwd, alternate, side   MANUAL       TOTAL TIME FOR SESSION Not performed    STM/ retro grade massage           Jt mobs       PROM       Passive stretch       MODALITIES           TOTAL TIME FOR SESSION Not performed   vaso        ice pack

## 2021-11-29 NOTE — PROGRESS NOTES
PT DAILY NOTE FOR OUTPATIENT THERAPY    Patient: Fide Amin   MRN: [id number]  : 1960 60 y.o.  Referring Physician: Bennett Em MD  Date of Visit: 2021    Certification Dates: 21 through 22    Diagnosis:   1. Presence of artificial knee joint, bilateral        Chief Complaints:  s/p bilateral tkr    Precautions:   Existing Precautions/Restrictions: no known precautions/restrictions     TODAY'S VISIT     History/Vitals/Pain/Encounter Info - 21 09        Injury History/Precautions/Daily Required Info    Primary Therapist Lisa Kimbrough PT, DPT, SCS, CSCS     Chief Complaint/Reason for Visit  s/p bilateral tkr     Onset of Illness/Injury or Date of Surgery 21     Referring Physician Dr. Em     Existing Precautions/Restrictions no known precautions/restrictions     Pertinent History of Current Functional Problem Pt reports mild pain, mostly stiffness, pt concerned about being behind due to comoplications post op and is rushing to get better.  Discussed the importance of safety and realistic time lines with pt     OP Specialty Orthopedics     Document Type daily treatment     Patient/Family/Caregiver Comments/Observations Pt. reports that the skin test results are not back yet.     Start Time 0900     Stop Time 1000     Time Calculation (min) 60 min     Patient reported fall since last visit No        Pain Assessment    Currently in pain No/Denies        Pain Intervention    Intervention  na     Post Intervention Comments na                Daily Treatment Assessment and Plan - 21 0959        Daily Treatment Assessment and Plan    Progress toward goals Progressing     Daily Outcome Summary Pt. has 3 scabs on L incision and 1 to lateral side of incision that is the area that is getting tested. Continue to advance TE as able to increase ROM, balance and strength in BLE. Will resume standing TE next session.     Plan and Recommendations Continue to monitor incison  on L. Assess strength B LE                   Today's Treatment:    Exercises Performed today Sets/ Reps comments Current Session Time   Eval date 11/1/2021  completed      Progress note  Due date 11/29/2021        Re eval  Due date:        THER ACT        toTOTAL TIME FOR SESSION Not performed                           THER EX       TOTAL TIME FOR SESSION   30 min TE  30 min group            Bike: R  yes 12min recumbant G                               ROM/stretching       HSS yes 3x30s strap  G   Supine  PB physio curls  SLR   Heel slides  Singke knee to chest  Ball sqz  Bridges  Quad set w heel prop  SLR  Hip flexor stretch  GSS@step  Clam in sidely  Hip abd in sidely Yes  Yes  yes  no  no      yes  No    yes  No    yes  Yes  yes 2 x 10  2 x 10  1 x 10  2 x 10  2 x 10      2 x 10  5s x20    2x10 ea      2x1 min  2x10  2x10                             Red TB    Sitting  FAQ  Hamstring stretch  Knee flexion  Sit to stand           yes           2 x10           4# ball    Prone  Knee flexion str no 3x30 s strap    standing       3 way hip       Wall slides                Step up yes 1x10 each 8 inches    Hip abd No   2x10     HR no 2x10     Knee flex no 2x10     Update  HEP       NEURO RE-ED       TOTAL TIME FOR SESSION Not performed    biodex balance            blue foam  yes  3x30s  NBOS, tandem     SLS        rocker board  yes  A/P , lateral X 30 each     GAIT TRAINING       TOTAL TIME FOR SESSION no   amb         stairs yes 6 inch step up X 10 each    curbs       Out side       stepping over objects  yes  low hurdles  2 laps each  fwd, alternate, side   MANUAL       TOTAL TIME FOR SESSION Not performed    STM/ retro grade massage           Jt mobs       PROM       Passive stretch       MODALITIES           TOTAL TIME FOR SESSION Not performed   vaso        ice pack

## 2021-12-01 ENCOUNTER — HOSPITAL ENCOUNTER (OUTPATIENT)
Dept: PHYSICAL THERAPY | Age: 61
Setting detail: THERAPIES SERIES
Discharge: HOME | End: 2021-12-01
Attending: ORTHOPAEDIC SURGERY
Payer: COMMERCIAL

## 2021-12-01 DIAGNOSIS — Z96.653 PRESENCE OF ARTIFICIAL KNEE JOINT, BILATERAL: Primary | ICD-10-CM

## 2021-12-01 PROCEDURE — 97110 THERAPEUTIC EXERCISES: CPT | Mod: GP,CQ,59

## 2021-12-01 PROCEDURE — 97150 GROUP THERAPEUTIC PROCEDURES: CPT | Mod: GP,CQ

## 2021-12-01 NOTE — OP PT TREATMENT LOG
Exercises Performed today Sets/ Reps comments Current Session Time   Eval date 11/1/2021  completed      Progress note  Due date 11/29/2021        Re eval  Due date:        THER ACT        toTOTAL TIME FOR SESSION Not performed                           THER EX       TOTAL TIME FOR SESSION    30 TE  30 group          Bike: R  yes 12min recumbant G                               ROM/stretching       HSS yes 3x30s strap  G   Supine  PB physio curls    Heel slides  Singke knee to chest  Ball sqz  Bridges  Quad set w heel prop  SLR  Hip flexor stretch  GSS@slant board  Clam in sidely  Hip abd in sidely Yes  Yes    no  no      yes  No    yes  No    Yes    Yes  yes 2 x 10  2 x 10    2 x 10  2 x 10      2 x 10  5s x20    2x10 ea      2x1 min    2x10  2x10               p-ball                green TB   G            G   Sitting  FAQ  Hamstring stretch  Knee flexion  Sit to stand           no           2 x10           4# ball           standing       3 way hip       Wall slides                Step up yes 2x10 each 8 inches G   Hip abd No   2x10     HR no 2x10     Knee flex no 2x10     Update  HEP       NEURO RE-ED       TOTAL TIME FOR SESSION Not performed    biodex balance            blue foam  yes  3x30s  NBOS, tandem     SLS        rocker board  yes  A/P , lateral X 30 each     GAIT TRAINING       TOTAL TIME FOR SESSION no   amb         stairs yes 6 inch step up X 10 each    curbs       Out side       stepping over objects  yes  low hurdles  2 laps each  fwd, alternate, side   MANUAL       TOTAL TIME FOR SESSION Not performed    STM/ retro grade massage           Jt mobs       PROM       Passive stretch       MODALITIES           TOTAL TIME FOR SESSION Not performed   vaso        ice pack

## 2021-12-06 ENCOUNTER — HOSPITAL ENCOUNTER (OUTPATIENT)
Dept: PHYSICAL THERAPY | Age: 61
Setting detail: THERAPIES SERIES
Discharge: HOME | End: 2021-12-06
Attending: ORTHOPAEDIC SURGERY
Payer: COMMERCIAL

## 2021-12-06 DIAGNOSIS — Z96.653 PRESENCE OF ARTIFICIAL KNEE JOINT, BILATERAL: Primary | ICD-10-CM

## 2021-12-06 PROCEDURE — 97110 THERAPEUTIC EXERCISES: CPT | Mod: GP | Performed by: PHYSICAL THERAPIST

## 2021-12-06 PROCEDURE — 97530 THERAPEUTIC ACTIVITIES: CPT | Mod: GP | Performed by: PHYSICAL THERAPIST

## 2021-12-06 NOTE — OP PT TREATMENT LOG
TESTS Performed  today           Time  Description     EVAL  11/1/2021    Progress note    12/6/2021 Progress note RA         Weight bearing status  full  full           Precautions No kneeling             Pt eduation yes  yes           HEP  yes  yes           OUTcome measure  KOS  LEFS  KOS  50/80  46/50       TOTAL TIME FOR SESSION     Minutes   AROM               Left knee  5 to 110 prone  0 to 120           Right knee 5 to 120  0 to 130                           PROM               Left knee 0 to 115  0 to 125           Right knee 0 to 125  0 to 135                           MMT               Left quad 3/5  4/5           Right quad 4/5  4+/5                           Pat mobility               right good             left fair  fair        motion inhibited by skin irriation and scab on scar   Swelling minimal  continues on left knee           Circumference  measurements Not taken             Pitting edema none                             Incision               Open or closed Left superior incision scabbed and red, right knee incision clean and healed  Left knee only: two  pencil size  Scabbed areas at proximal incision        10/6 4 to 5 blotchy redden areas med,  Lateral, posterior, superior and inferior   drainage none  none drainage           hot warm  warm           Gait           TOTAL TIME FOR SESSION Not performed   gait  amb with single point cane,   no cane  Mild limp           stairs  step to step  step overt step up    Step to step down           Shoe wear  tie sneakers             Balance           TOTAL TIME FOR SESSION   Minutes   SLS  Right  left    Unable  unable    < 10  ,10                            Modalities           TOTAL TIME FOR SESSION Not performed   cold               heat               VASO

## 2021-12-06 NOTE — PROGRESS NOTES
PT PROGRESS NOTE FOR OUTPATIENT THERAPY    Patient: Fide Amin   MRN: [id number]  : 1960 60 y.o.  Referring Physician: Bennett Em MD  Date of Visit: 2021      Certification Dates: 21 through      Recommended Frequency & Duration:  2 times/week for up to 3 months          Diagnosis:   1. Presence of artificial knee joint, bilateral        Chief Complaints:  Chief Complaint   Patient presents with   • Pain   • Dec ROM   • Balance Deficits   • Difficulty Walking   • Dec Strength   • Decreased Endurance   • Joint Pain       Precautions:   Existing Precautions/Restrictions: no known precautions/restrictions     TODAY'S VISIT:     General Information - 21 0954        Session Details    Document Type progress note     Mode of Treatment individual therapy;physical therapy     Patient/Family/Caregiver Comments/Observations Rom is good, weakness still present.  Pt biggest concern is the sloww healing of skin wounds around knee and scabbed areas on incision     OP Specialty Orthopedics        Time Calculation    Start Time 0900     Stop Time 1000     Time Calculation (min) 60 min        General Information    Onset of Illness/Injury or Date of Surgery 21     Referring Physician Dr. Em     Pertinent History of Current Functional Problem Pt reports mild pain, mostly stiffness, pt concerned about being behind due to comoplications post op and is rushing to get better.  Discussed the importance of safety and realistic time lines with pt     Existing Precautions/Restrictions no known precautions/restrictions                Daily Falls Screen - 21 0954        Daily Falls Assessment    Patient reported fall since last visit No                Pain/Vitals - 21 0954        Pain Assessment    Currently in pain Yes     Preferred Pain Scale number (Numeric Rating Pain Scale)     Pain Side/Orientation left;right     Pain: Body location Knee     Pain Rating (0-10): Pre Activity 1      Pain Rating (0-10): Post Activity 1        Pain Intervention    Intervention  ther ex     Post Intervention Comments no changer                PT - 12/06/21 0954        Physical Therapy    Physical Therapy Ortho        PT Plan    Frequency of treatment 2 times/week     PT Duration 3 months     PT Cert From 11/01/21     Date PT POC was sent to provider 11/01/21     Signed PT Plan of Care received?  Yes                   OBJECTIVE MEASUREMENTS/DATA:    None taken    Today's Treatment::    TESTS Performed  today           Time  Description     EVAL  11/1/2021    Progress note    12/6/2021 Progress note RA         Weight bearing status  full  full           Precautions No kneeling             Pt eduation yes  yes           HEP  yes  yes           OUTcome measure  KOS  LEFS  KOS  50/80  46/50       TOTAL TIME FOR SESSION     Minutes   AROM               Left knee  5 to 110 prone  0 to 120           Right knee 5 to 120  0 to 130                           PROM               Left knee 0 to 115  0 to 125           Right knee 0 to 125  0 to 135                           MMT               Left quad 3/5  4/5           Right quad 4/5  4+/5                           Pat mobility               right good             left fair  fair        motion inhibited by skin irriation and scab on scar   Swelling minimal  continues on left knee           Circumference  measurements Not taken             Pitting edema none                             Incision               Open or closed Left superior incision scabbed and red, right knee incision clean and healed  Left knee only: two  pencil size  Scabbed areas at proximal incision        10/6 4 to 5 blotchy redden areas med,  Lateral, posterior, superior and inferior   drainage none  none drainage           hot warm  warm           Gait           TOTAL TIME FOR SESSION Not performed   gait  amb with single point cane,   no cane  Mild limp           stairs  step to step  step overt step  up    Step to step down           Shoe wear  tie sneakers             Balance           TOTAL TIME FOR SESSION   Minutes   SLS  Right  left    Unable  unable    < 10  ,10                            Modalities           TOTAL TIME FOR SESSION Not performed   cold               heat               VASO                    Goals Addressed                 This Visit's Progress    • PT stg/ltgs knee        Short Term Goals Time Frame Result Comment/Progress   Knee pain at rest will decrease by 50% 4 met    Knee extension PROM 0 degrees 4 met    Knee flexion AROM > 90 degrees 4 met    Pt will perform a SLR without a lag 4 met    Pt will ambulate into out center from the car independently with appropriate assistive device 4 met No assistive device   KOS > 65% 4 Not met       Long Term Goals Time Frame Result Comment/Progress   KOS >95% 12 wks           Active knee ROM 0 to 125 12wks       Quad and hip abductor strength 5/5 12wks     amb community distances without assistive device 12wks     Pt will pick object off floor with squatting and no UE support 12wks     Manage stairs step over step with a rail 12 wks                    Clinical Impression: Pt demonstrating improvements in rom, strength and mobility, however, skin lesions on left knee region and scabs along proximal scar persist.  Pt awaiting results of skin culture from dermatologist, knee surgeon and PCP have all evaluated redden areas.

## 2021-12-08 ENCOUNTER — HOSPITAL ENCOUNTER (OUTPATIENT)
Dept: PHYSICAL THERAPY | Age: 61
Setting detail: THERAPIES SERIES
Discharge: HOME | End: 2021-12-08
Attending: ORTHOPAEDIC SURGERY
Payer: COMMERCIAL

## 2021-12-08 DIAGNOSIS — Z96.653 PRESENCE OF ARTIFICIAL KNEE JOINT, BILATERAL: Primary | ICD-10-CM

## 2021-12-08 PROCEDURE — 97150 GROUP THERAPEUTIC PROCEDURES: CPT | Mod: GP,CQ

## 2021-12-08 PROCEDURE — 97112 NEUROMUSCULAR REEDUCATION: CPT | Mod: GP,CQ,59

## 2021-12-08 PROCEDURE — 97110 THERAPEUTIC EXERCISES: CPT | Mod: GP,CQ,59

## 2021-12-08 NOTE — PROGRESS NOTES
"PT DAILY NOTE FOR OUTPATIENT THERAPY    Patient: Fide Amin   MRN: [id number]  : 1960 60 y.o.  Referring Physician: Bennett Em MD  Date of Visit: 2021    Certification Dates: 21 through 22    Diagnosis:   1. Presence of artificial knee joint, bilateral        Chief Complaints:  weakness, left knee skin irritation    Precautions:   Existing Precautions/Restrictions: no known precautions/restrictions     TODAY'S VISIT     History/Vitals/Pain/Encounter Info - 21 0919        Injury History/Precautions/Daily Required Info    Primary Therapist Lisa Kimbrough DPT [facility]     Chief Complaint/Reason for Visit  weakness, left knee skin irritation     Onset of Illness/Injury or Date of Surgery 21     Referring Physician Dr. Em     Existing Precautions/Restrictions no known precautions/restrictions     Pertinent History of Current Functional Problem Pt reports mild pain, mostly stiffness, pt concerned about being behind due to comoplications post op and is rushing to get better.  Discussed the importance of safety and realistic time lines with pt     [facility]     Document Type progress note     Patient/Family/Caregiver Comments/Observations Pt. reports that the dermatologist called and said that the tissue biopsy shows \"scar tissue\" and that they may have to take a deeper sample.     Start Time 0900     Stop Time 1000     Time Calculation (min) 60 min     Patient reported fall since last visit No        Pain Assessment    Currently in pain Yes     Preferred Pain Scale number (Numeric Rating Pain Scale)     Pain Side/Orientation left;right     Pain: Body location Knee     Pain Rating (0-10): Pre Activity 1     Pain Rating (0-10): Post Activity 1        Pain Intervention    Intervention  TE     Post Intervention Comments No worse                Daily Treatment Assessment and Plan - 21 1508        Daily Treatment Assessment and Plan    Progress toward goals " Progressing     Daily Outcome Summary Continued warm up on bike x 12 min and balance exs as noted on log, LE ROM and strength TE as noted on log. Pt. reports no change in symptoms in B knees at end of session.     Plan and Recommendations Continue to monitor incison on L.  Update HEP.                   Today's Treatment:    Exercises Performed today Sets/ Reps comments Current Session Time   Eval date 11/1/2021  completed      Progress note  Due date 11/29/2021        Re eval  Due date:        THER ACT        toTOTAL TIME FOR SESSION Not performed                           THER EX       TOTAL TIME FOR SESSION    15 TE  30 group          Bike: R  yes 12min recumbant G                               ROM/stretching       HSS yes 3x30s strap  G   Supine  PB physio curls    Heel slides  Singke knee to chest  Ball sqz  Bridges  Quad set w heel prop  SLR  Hip flexor stretch  GSS@slant board  Clam in sidely  Hip abd in sidely Yes  Yes    no  no      yes  No    yes  No    Yes    No  no 2 x 10  2 x 10    2 x 10  2 x 10      2 x 10  5s x20    2x10 ea      2x1 min    2x10  2x10               p-ball                green TB   G            G   Sitting  FAQ  Hamstring stretch  Knee flexion  Sit to stand           yes           2 x10           4# ball           standing       3 way hip       Wall slides                Step up yes 2x10 each 8 inches G   Hip abd No   2x10     HR no 2x10     Knee flex no 2x10     Update  HEP       NEURO RE-ED       TOTAL TIME FOR SESSION 8-22 Minutes      biodex balance            blue foam  yes  3x30s   tandem     SLS        rocker board  yes  A/P , lateral X 30 each     GAIT TRAINING       TOTAL TIME FOR SESSION no   amb         stairs yes 6 inch step up X 10 each    curbs       Out side       stepping over objects  yes  low hurdles  2 laps each  fwd, alternate, side   MANUAL       TOTAL TIME FOR SESSION Not performed    STM/ retro grade massage           Jt mobs       PROM       Passive stretch        MODALITIES           TOTAL TIME FOR SESSION Not performed   vaso        ice pack

## 2021-12-08 NOTE — OP PT TREATMENT LOG
Exercises Performed today Sets/ Reps comments Current Session Time   Eval date 11/1/2021  completed      Progress note  Due date 11/29/2021        Re eval  Due date:        THER ACT        toTOTAL TIME FOR SESSION Not performed                           THER EX       TOTAL TIME FOR SESSION    15 TE  30 group          Bike: R  yes 12min recumbant G                               ROM/stretching       HSS yes 3x30s strap  G   Supine  PB physio curls    Heel slides  Singke knee to chest  Ball sqz  Bridges  Quad set w heel prop  SLR  Hip flexor stretch  GSS@slant board  Clam in sidely  Hip abd in sidely Yes  Yes    no  no      yes  No    yes  No    Yes    No  no 2 x 10  2 x 10    2 x 10  2 x 10      2 x 10  5s x20    2x10 ea      2x1 min    2x10  2x10               p-ball                green TB   G            G   Sitting  FAQ  Hamstring stretch  Knee flexion  Sit to stand           yes           2 x10           4# ball           standing       3 way hip       Wall slides                Step up yes 2x10 each 8 inches G   Hip abd No   2x10     HR no 2x10     Knee flex no 2x10     Update  HEP       NEURO RE-ED       TOTAL TIME FOR SESSION 8-22 Minutes      biodex balance            blue foam  yes  3x30s   tandem     SLS        rocker board  yes  A/P , lateral X 30 each     GAIT TRAINING       TOTAL TIME FOR SESSION no   amb         stairs yes 6 inch step up X 10 each    curbs       Out side       stepping over objects  yes  low hurdles  2 laps each  fwd, alternate, side   MANUAL       TOTAL TIME FOR SESSION Not performed    STM/ retro grade massage           Jt mobs       PROM       Passive stretch       MODALITIES           TOTAL TIME FOR SESSION Not performed   vaso        ice pack

## 2021-12-13 ENCOUNTER — HOSPITAL ENCOUNTER (OUTPATIENT)
Dept: PHYSICAL THERAPY | Age: 61
Setting detail: THERAPIES SERIES
Discharge: HOME | End: 2021-12-13
Attending: ORTHOPAEDIC SURGERY
Payer: COMMERCIAL

## 2021-12-13 DIAGNOSIS — Z96.653 PRESENCE OF ARTIFICIAL KNEE JOINT, BILATERAL: Primary | ICD-10-CM

## 2021-12-13 PROCEDURE — 97110 THERAPEUTIC EXERCISES: CPT | Mod: GP

## 2021-12-13 PROCEDURE — 97112 NEUROMUSCULAR REEDUCATION: CPT | Mod: GP

## 2021-12-13 NOTE — PROGRESS NOTES
PT DAILY NOTE FOR OUTPATIENT THERAPY    Patient: Fide Amin   MRN: [id number]  : 1960 60 y.o.  Referring Physician: Bennett Em MD  Date of Visit: 2021    Certification Dates: 21 through 22    Diagnosis:   1. Presence of artificial knee joint, bilateral        Chief Complaints:  weakness, left knee skin irritation    Precautions:   Existing Precautions/Restrictions: no known precautions/restrictions     TODAY'S VISIT     History/Vitals/Pain/Encounter Info - 21 1013        Injury History/Precautions/Daily Required Info    Primary Therapist Lisa Kimbrough DPT [facility]     Chief Complaint/Reason for Visit  weakness, left knee skin irritation     Onset of Illness/Injury or Date of Surgery 21     Referring Physician Dr. Em     Existing Precautions/Restrictions no known precautions/restrictions     Pertinent History of Current Functional Problem Pt reports mild pain, mostly stiffness, pt concerned about being behind due to comoplications post op and is rushing to get better.  Discussed the importance of safety and realistic time lines with pt     [facility]     Document Type daily treatment     Patient/Family/Caregiver Comments/Observations Patient reports slight ache in B thighs, no significant pain, no issues since last visit.     Start Time 1010     Stop Time 1104     Time Calculation (min) 54 min     Patient reported fall since last visit No        Pain Assessment    Currently in pain Yes     Preferred Pain Scale number (Numeric Rating Pain Scale)     Pain Side/Orientation left;right     Pain: Body location Knee     Pain Rating (0-10): Pre Activity 1     Pain Rating (0-10): Activity 1     Pain Rating (0-10): Post Activity 1     Pain Management Interventions position adjusted        Pain Intervention    Intervention  TE     Post Intervention Comments pain as noted                Daily Treatment Assessment and Plan - 21 1013        Daily Treatment  Assessment and Plan    Progress toward goals Progressing     Daily Outcome Summary Initiation of session with standing stretches, followed by supine muscle activation exercises and standing exercises followed. Recumbant bike to end the session and no increased pain reported. Verbal cues for accurate weight shifting improved performance on rockerboard exercises.     Plan and Recommendations Continue to progress range and strength, monitor L knee incision.                     OBJECTIVE DATA TAKEN TODAY:    None taken    Today's Treatment:    Exercises Performed today Sets/ Reps comments Current Session Time   Eval date 11/1/2021  completed      Progress note  Due date 11/29/2021        Re eval  Due date:        THER ACT        toTOTAL TIME FOR SESSION Not performed                           THER EX       TOTAL TIME FOR SESSION    34 TE            Bike: R  yes 12 min recumbant                                ROM/stretching       HSS yes 3x30s strap     Supine  PB physio curls    Heel slides  Single knee to chest  Ball sqz  Bridges  Quad set w heel prop  SLR  Hip flexor stretch  GSS@slant board  Clam in sidely  Hip abd in sidely Yes  Yes    no  no      yes  No    yes  No    Yes    yes  no 2 x 10  2 x 10    2 x 10  2 x 10      2 x 10  5s x20    2x10 ea      2x1 min    2x10  2x10               p-ball      1#          green TB                Sitting  FAQ  Hamstring stretch  Knee flexion  Sit to stand           yes           2 x10           4# ball           standing       Hamstring stretch yes 3 x 30  At step   Standing quad stretch yes 3 x 30  With strap   3 way hip       Wall slides                Step up yes 2x10 each 8 inches    Hip abd No   2x10     HR no 2x10     Knee flex no 2x10     Update  HEP       NEURO RE-ED       TOTAL TIME FOR SESSION 8-22 Minutes      biodex balance            blue foam  yes  3x30s   tandem     SLS        rocker board  yes  A/P , lateral X 30 each     GAIT TRAINING       TOTAL TIME FOR  SESSION no   amb         stairs yes 6 inch step up X 10 each    curbs       Out side       stepping over objects  yes  low hurdles  2 laps each  fwd, alternate, side   MANUAL       TOTAL TIME FOR SESSION Not performed    STM/ retro grade massage           Jt mobs       PROM       Passive stretch       MODALITIES           TOTAL TIME FOR SESSION Not performed   vaso        ice pack

## 2021-12-13 NOTE — OP PT TREATMENT LOG
Exercises Performed today Sets/ Reps comments Current Session Time   Eval date 11/1/2021  completed      Progress note  Due date 11/29/2021        Re eval  Due date:        THER ACT        toTOTAL TIME FOR SESSION Not performed                           THER EX       TOTAL TIME FOR SESSION    34 TE            Bike: R  yes 8 min recumbant                                ROM/stretching       HSS yes 3x30s strap     Supine  PB physio curls    Heel slides  Single knee to chest  Ball sqz  Bridges  Quad set w heel prop  SLR  Hip flexor stretch  GSS@slant board  Clam in sidely  Hip abd in sidely Yes  Yes    no  no      yes  No    yes  No    Yes    yes  no 2 x 10  2 x 10    2 x 10  2 x 10      2 x 10  5s x20    2x10 ea      2x1 min    2x10  2x10               p-ball      1#          green TB                Sitting  FAQ  Hamstring stretch  Knee flexion  Sit to stand           yes           2 x10           4# ball           standing       Hamstring stretch yes 3 x 30  At step   Standing quad stretch yes 3 x 30  With strap   3 way hip       Wall slides                Step up yes 2x10 each 8 inches    Hip abd No   2x10     HR no 2x10     Knee flex no 2x10     Update  HEP       NEURO RE-ED       TOTAL TIME FOR SESSION 8-22 Minutes      biodex balance            blue foam  yes  3x30s   tandem     SLS        rocker board  yes  A/P , lateral X 30 each     GAIT TRAINING       TOTAL TIME FOR SESSION no   amb         stairs  6 inch step up X 10 each    curbs       Out side       stepping over objects    low hurdles  2 laps each  fwd, alternate, side   MANUAL       TOTAL TIME FOR SESSION Not performed    STM/ retro grade massage           Jt mobs       PROM       Passive stretch       MODALITIES           TOTAL TIME FOR SESSION Not performed   vaso        ice pack

## 2021-12-16 ENCOUNTER — HOSPITAL ENCOUNTER (OUTPATIENT)
Dept: PHYSICAL THERAPY | Age: 61
Setting detail: THERAPIES SERIES
Discharge: HOME | End: 2021-12-16
Attending: ORTHOPAEDIC SURGERY
Payer: COMMERCIAL

## 2021-12-16 DIAGNOSIS — Z96.653 PRESENCE OF ARTIFICIAL KNEE JOINT, BILATERAL: Primary | ICD-10-CM

## 2021-12-16 PROCEDURE — 97110 THERAPEUTIC EXERCISES: CPT | Mod: GP

## 2021-12-16 PROCEDURE — 97530 THERAPEUTIC ACTIVITIES: CPT | Mod: GP

## 2021-12-16 NOTE — OP PT TREATMENT LOG
Exercises Performed today Sets/ Reps comments Current Session Time   Eval date 11/1/2021  completed      Progress note  Due date 11/29/2021        Re eval  Due date:        THER ACT        toTOTAL TIME FOR SESSION 15 min    HEP  yes      updated as noted               THER EX       TOTAL TIME FOR SESSION    45 TE            Bike: R  yes 10 min recumbant                         ROM/stretching       Prone quad stretch yes 2 x 30  Noodle under thigh, floating the knee   HSS yes 3x30s strap     Supine  PB physio curls    Heel slides        Bridges  Quad set w heel prop  SLR  SLR circles  Hip flexor stretch    GSS@slant board  Clam in sidely  Hip abd in sidely  Side lying hip flexion/ext   yes    no        yes  No      yes  No    Yes      Yes  yes   2 x 10    2 x 10        2 x 10 5s  5s x20    2x10 ea  5 x 5 each      2x1 min    2x10  2x10  X 10               p-ball      1#            green TB  1#                    Sitting  LAQ  LAQ band  Hamstring stretch  Knee flexion  Sit to stand   Yes  yes        yes   3 x 10  X 10        2 x10   4#  red        4# ball           standing       Hamstring stretch  3 x 30  At step   Standing quad stretch yes 3 x 30  With strap   3 way hip       Wall slides yes 2 x 10     Side lunge yes X 10   top half   Step up  2x10 each 8 inches    Hip abd No   2x10     HR no 2x10     Knee flex no 2x10     Update  HEP       NEURO RE-ED       TOTAL TIME FOR SESSION Not performed      biodex balance            blue foam    3x30s   tandem     SLS        rocker board    A/P , lateral X 30 each     GAIT TRAINING       TOTAL TIME FOR SESSION no   amb         stairs  6 inch step up X 10 each    curbs       Out side       stepping over objects    low hurdles  2 laps each  fwd, alternate, side   MANUAL       TOTAL TIME FOR SESSION Not performed    STM/ retro grade massage           Jt mobs       PROM       Passive stretch       MODALITIES           TOTAL TIME FOR SESSION Not performed   vaso        ice pack

## 2021-12-16 NOTE — PROGRESS NOTES
PT DAILY NOTE FOR OUTPATIENT THERAPY    Patient: Fide Amin   MRN: [id number]  : 1960 60 y.o.  Referring Physician: Bennett Em MD  Date of Visit: 2021    Certification Dates: 21 through 22    Diagnosis:   1. Presence of artificial knee joint, bilateral        Chief Complaints:  weakness, left knee skin irritation    Precautions:   Existing Precautions/Restrictions: no known precautions/restrictions     TODAY'S VISIT     History/Vitals/Pain/Encounter Info - 21        Injury History/Precautions/Daily Required Info    Primary Therapist Lisa Kimbrough DPT [facility]     Chief Complaint/Reason for Visit  weakness, left knee skin irritation     Onset of Illness/Injury or Date of Surgery 21     Referring Physician Dr. Em     Existing Precautions/Restrictions no known precautions/restrictions     Pertinent History of Current Functional Problem Pt reports mild pain, mostly stiffness, pt concerned about being behind due to comoplications post op and is rushing to get better.  Discussed the importance of safety and realistic time lines with pt     [facility]     Document Type daily treatment     Patient/Family/Caregiver Comments/Observations Patient reports no issues after last treatment.     Start Time 0900     Stop Time 1000     Time Calculation (min) 60 min     Patient reported fall since last visit No        Pain Assessment    Currently in pain No/Denies     Preferred Pain Scale number (Numeric Rating Pain Scale)     Pain: Body location Knee     Pain Rating (0-10): Pre Activity 0     Pain Rating (0-10): Activity 0     Pain Rating (0-10): Post Activity 0        Pain Intervention    Intervention  TE     Post Intervention Comments pain as noted                Daily Treatment Assessment and Plan - 21        Daily Treatment Assessment and Plan    Progress toward goals Progressing     Daily Outcome Summary Progression of exercises as noted, addition  of SLR circles, hip flexion/extension in side lying, and addition of weighted LAQ/banded LAQ with good tolerance. Wall slides/squats were added to HEP as well. Alternating side lunge attempted but while not painful, was incoordinated, with too much of a lateral weight shift and no pain.     Plan and Recommendations Continue to progress range and strength, monitor L knee incision.                     OBJECTIVE DATA TAKEN TODAY:    None taken    Today's Treatment:    Exercises Performed today Sets/ Reps comments Current Session Time   Eval date 11/1/2021  completed      Progress note  Due date 11/29/2021        Re eval  Due date:        THER ACT        toTOTAL TIME FOR SESSION 15 min    HEP  yes      updated as noted               THER EX       TOTAL TIME FOR SESSION    45 TE            Bike: R  yes 10 min recumbant                         ROM/stretching       Prone quad stretch yes 2 x 30  Noodle under thigh, floating the knee   HSS yes 3x30s strap     Supine  PB physio curls    Heel slides        Bridges  Quad set w heel prop  SLR  SLR circles  Hip flexor stretch    GSS@slant board  Clam in sidely  Hip abd in sidely  Side lying hip flexion/ext   yes    no        yes  No      yes  No    Yes      Yes  yes   2 x 10    2 x 10        2 x 10 5s  5s x20    2x10 ea  5 x 5 each      2x1 min    2x10  2x10  X 10               p-ball      1#            green TB  1#                    Sitting  LAQ  LAQ band  Hamstring stretch  Knee flexion  Sit to stand   Yes  yes        yes   3 x 10  X 10        2 x10   4#  red        4# ball           standing       Hamstring stretch  3 x 30  At step   Standing quad stretch yes 3 x 30  With strap   3 way hip       Wall slides yes 2 x 10     Side lunge yes X 10   top half   Step up  2x10 each 8 inches    Hip abd No   2x10     HR no 2x10     Knee flex no 2x10     Update  HEP       NEURO RE-ED       TOTAL TIME FOR SESSION Not performed      ANTERIOS balance            blue foam    3x30s   tandem      SLS        rocker board    A/P , lateral X 30 each     GAIT TRAINING       TOTAL TIME FOR SESSION no   amb         stairs  6 inch step up X 10 each    curbs       Out side       stepping over objects    low hurdles  2 laps each  fwd, alternate, side   MANUAL       TOTAL TIME FOR SESSION Not performed    STM/ retro grade massage           Jt mobs       PROM       Passive stretch       MODALITIES           TOTAL TIME FOR SESSION Not performed   vaso        ice pack

## 2021-12-20 ENCOUNTER — HOSPITAL ENCOUNTER (OUTPATIENT)
Dept: PHYSICAL THERAPY | Age: 61
Setting detail: THERAPIES SERIES
Discharge: HOME | End: 2021-12-20
Attending: ORTHOPAEDIC SURGERY
Payer: COMMERCIAL

## 2021-12-20 DIAGNOSIS — Z96.653 PRESENCE OF ARTIFICIAL KNEE JOINT, BILATERAL: Primary | ICD-10-CM

## 2021-12-20 PROCEDURE — 97112 NEUROMUSCULAR REEDUCATION: CPT | Mod: GP | Performed by: PHYSICAL THERAPIST

## 2021-12-20 PROCEDURE — 97110 THERAPEUTIC EXERCISES: CPT | Mod: GP | Performed by: PHYSICAL THERAPIST

## 2021-12-20 NOTE — PROGRESS NOTES
PT DAILY NOTE FOR OUTPATIENT THERAPY    Patient: Fide Amin   MRN: [id number]  : 1960 60 y.o.  Referring Physician: Bennett Em MD  Date of Visit: 2021    Certification Dates: 21 through 22    Diagnosis:   1. Presence of artificial knee joint, bilateral        Chief Complaints:  weakness, left knee skin irritation    Precautions:   Existing Precautions/Restrictions: no known precautions/restrictions     TODAY'S VISIT     History/Vitals/Pain/Encounter Info - 21        Injury History/Precautions/Daily Required Info    Primary Therapist Lisa Kimbrough DPT [facility]     Chief Complaint/Reason for Visit  weakness, left knee skin irritation     Onset of Illness/Injury or Date of Surgery 21     Referring Physician Dr. Em     Existing Precautions/Restrictions no known precautions/restrictions     Pertinent History of Current Functional Problem Pt reports mild pain, mostly stiffness, pt concerned about being behind due to comoplications post op and is rushing to get better.  Discussed the importance of safety and realistic time lines with pt     Document Type daily treatment     Patient/Family/Caregiver Comments/Observations I just have trouble going down the steps     Start Time 0905     Stop Time 1000     Time Calculation (min) 55 min     Patient reported fall since last visit No        Pain Assessment    Currently in pain Yes     Preferred Pain Scale number (Numeric Rating Pain Scale)     Pain Side/Orientation left;right     Pain: Body location Knee     Pain Rating (0-10): Pre Activity 0     Pain Rating (0-10): Post Activity 2        Pain Intervention    Intervention  TE     Post Intervention Comments Pain noted after eccentric work, improved with rest of quads                Daily Treatment Assessment and Plan - 21        Daily Treatment Assessment and Plan    Progress toward goals Progressing     Daily Outcome Summary Pt with decreased eccentric control  with stair climbing. Completed eccentric step down work forward and lateral with fair tolerance as well as wall slides. Pt fatigues quickly with eccentric work and would benefit from further progression of balance training.     Plan and Recommendations Continue to progress range and strength, monitor L knee incision.                     OBJECTIVE DATA TAKEN TODAY:    None taken    Today's Treatment:    Exercises Performed today Sets/ Reps comments Current Session Time   Eval date 11/1/2021  completed      Progress note  Due date 11/29/2021        Re eval  Due date:        THER ACT        toTOTAL TIME FOR SESSION 0 min    HEP        updated as noted               THER EX       TOTAL TIME FOR SESSION    45 TE            Bike: R  yes 6 min recumbant                         ROM/stretching       Prone quad stretch  2 x 30  Noodle under thigh, floating the knee   HSS yes 3x30s strap     Supine  PB physio curls + bridge    Heel slides        Bridges  Quad set w heel prop  SLR  SLR circles  Hip flexor stretch    GSS@slant board  Clam in sidely  Hip abd in sidely  Side lying hip flexion/ext   yes      no          No    yes    No    Yes      Yes  yes   2 x 10      2 x 10        2 x 10 5s  5s x20    2x10 ea  5 x 5 each      2x1 min    2x10  2x10  X 10                 p-ball      1#            green TB  1#                                     Attempted 2 lb c/o low back pain   Sitting  LAQ  LAQ band  Hamstring stretch  Knee flexion  Sit to stand   Yes        yes   3 x 10  X 10  2 x 30 sec    2 x10   5#  red      4# dumbbell           standing       Hamstring stretch  3 x 30  At step   Standing quad stretch yes 3 x 30  With strap   3 way hip       Wall slides yes 2 x 10     Side lunge  X 10   top half   Step up  2x10 each 8 inches    Hip abd No   2x10     HR no 2x10     Knee flex no 2x10     Forward heel taps yes 2 x 10  With unilateral support 4 inch step   Lateral heel taps yes 2 x10     NEURO RE-ED       TOTAL TIME FOR SESSION  8-22 Minutes      biodex balance            blue foam  yes  2x30s   tandem  FT, FT HT, HN, EC    SLS       Tandem walking yes II bars x 2     High knees yes      Knee curls yes II bars x2      rocker board  yes  A/P , lateral X 30 each  Too difficult static stand   GAIT TRAINING       TOTAL TIME FOR SESSION no   amb         stairs  6 inch step up X 10 each    curbs       Out side       stepping over objects    low hurdles  2 laps each  fwd, alternate, side   MANUAL       TOTAL TIME FOR SESSION Not performed    STM/ retro grade massage           Jt mobs       PROM       Passive stretch       MODALITIES           TOTAL TIME FOR SESSION Not performed   vaso        ice pack

## 2021-12-20 NOTE — OP PT TREATMENT LOG
Exercises Performed today Sets/ Reps comments Current Session Time   Eval date 11/1/2021  completed      Progress note  Due date 11/29/2021        Re eval  Due date:        THER ACT        toTOTAL TIME FOR SESSION 0 min    HEP        updated as noted               THER EX       TOTAL TIME FOR SESSION    45 TE            Bike: R  yes 6 min recumbant                         ROM/stretching       Prone quad stretch  2 x 30  Noodle under thigh, floating the knee   HSS yes 3x30s strap     Supine  PB physio curls + bridge    Heel slides        Bridges  Quad set w heel prop  SLR  SLR circles  Hip flexor stretch    GSS@slant board  Clam in sidely  Hip abd in sidely  Side lying hip flexion/ext   yes      no          No    yes    No    Yes      Yes  yes   2 x 10      2 x 10        2 x 10 5s  5s x20    2x10 ea  5 x 5 each      2x1 min    2x10  2x10  X 10                 p-ball      1#            green TB  1#                                     Attempted 2 lb c/o low back pain   Sitting  LAQ  LAQ band  Hamstring stretch  Knee flexion  Sit to stand   Yes        yes   3 x 10  X 10  2 x 30 sec    2 x10   5#  red      4# dumbbell           standing       Hamstring stretch  3 x 30  At step   Standing quad stretch yes 3 x 30  With strap   3 way hip       Wall slides yes 2 x 10     Side lunge  X 10   top half   Step up  2x10 each 8 inches    Hip abd No   2x10     HR no 2x10     Knee flex no 2x10     Forward heel taps yes 2 x 10  With unilateral support 4 inch step   Lateral heel taps yes 2 x10     NEURO RE-ED       TOTAL TIME FOR SESSION 8-22 Minutes      biodex balance            blue foam  yes  2x30s   tandem  FT, FT HT, HN, EC    SLS       Tandem walking yes II bars x 2     High knees yes      Knee curls yes II bars x2      rocker board  yes  A/P , lateral X 30 each  Too difficult static stand   GAIT TRAINING       TOTAL TIME FOR SESSION no   amb         stairs  6 inch step up X 10 each    curbs       Out side       stepping over  objects    low hurdles  2 laps each  fwd, alternate, side   MANUAL       TOTAL TIME FOR SESSION Not performed    STM/ retro grade massage           Jt mobs       PROM       Passive stretch       MODALITIES           TOTAL TIME FOR SESSION Not performed   vaso        ice pack

## 2021-12-23 ENCOUNTER — HOSPITAL ENCOUNTER (OUTPATIENT)
Dept: PHYSICAL THERAPY | Age: 61
Setting detail: THERAPIES SERIES
Discharge: HOME | End: 2021-12-23
Attending: ORTHOPAEDIC SURGERY
Payer: COMMERCIAL

## 2021-12-23 DIAGNOSIS — Z96.653 PRESENCE OF ARTIFICIAL KNEE JOINT, BILATERAL: Primary | ICD-10-CM

## 2021-12-23 PROCEDURE — 97110 THERAPEUTIC EXERCISES: CPT | Mod: GP,59 | Performed by: PHYSICAL THERAPIST

## 2021-12-23 PROCEDURE — 97150 GROUP THERAPEUTIC PROCEDURES: CPT | Mod: GP,59 | Performed by: PHYSICAL THERAPIST

## 2021-12-23 NOTE — PROGRESS NOTES
PT DAILY NOTE FOR OUTPATIENT THERAPY    Patient: Fide Amin   MRN: [id number]  : 1960 61 y.o.  Referring Physician: Bennett Em MD  Date of Visit: 2021    Certification Dates:   through      Diagnosis:   1. Presence of artificial knee joint, bilateral        Chief Complaints:  weakness, left knee skin irritation    Precautions:   Existing Precautions/Restrictions: no known precautions/restrictions     TODAY'S VISIT     History/Vitals/Pain/Encounter Info - 21 1052        Injury History/Precautions/Daily Required Info    Primary Therapist Lisa Kimbrough DPT [facility]     Chief Complaint/Reason for Visit  weakness, left knee skin irritation     Onset of Illness/Injury or Date of Surgery 21     Referring Physician Dr. Em     Existing Precautions/Restrictions no known precautions/restrictions     Pertinent History of Current Functional Problem Pt reports mild pain, mostly stiffness, pt concerned about being behind due to comoplications post op and is rushing to get better.  Discussed the importance of safety and realistic time lines with pt     Document Type daily treatment     Patient/Family/Caregiver Comments/Observations Pt reports emerging discomfort in superior right knee that increases with activity,  left knee skin results reveal scar tissuie and no infection     Start Time 1000     Stop Time 1100     Time Calculation (min) 60 min        Pain Assessment    Currently in pain Yes     Preferred Pain Scale number (Numeric Rating Pain Scale)     Pain Side/Orientation right     Pain: Body location Knee     Pain Rating (0-10): Pre Activity 3     Pain Rating (0-10): Post Activity 1     Pain Description intermittent        Pain Intervention    Intervention  there ex     Post Intervention Comments reduced sxs                Daily Treatment Assessment and Plan - 21 1052        Daily Treatment Assessment and Plan    Progress toward goals Slower than expected     Daily Outcome  Summary sxs reduxcedf post treatment,  quad sxs better with prone exercises and hip flexor stretching     Plan and Recommendations Continue with prone exercises and hip flexor stretch off table                     OBJECTIVE DATA TAKEN TODAY:    None taken    Today's Treatment:    Exercises Performed today Sets/ Reps comments Current Session Time   Eval date 11/1/2021  completed      Progress note  Due date 11/29/2021        Re eval  Due date:        THER ACT        toTOTAL TIME FOR SESSION     HEP  yes      updated as noted               THER EX       TOTAL TIME FOR SESSION    30 TE  30 group            Bike: R  yes 10 min recumbant                         ROM/stretching       Prone quad stretch yes 2 x 30  Noodle under thigh, floating the knee   HSS yes 3x30s strap     Supine  PB physio curls    Heel slides        Bridges  Quad set w heel prop  SLR  SLR circles  Hip flexor stretch    GSS@slant board  Clam in sidely  Hip abd in sidely  Side lying hip flexion/ext   yes    no        yes  No      yes  No    Yes      Yes  yes   2 x 10    2 x 10        2 x 10 5s  5s x20    2x10 ea  5 x 5 each      2x1 min    2x10  2x10  X 10               p-ball      1#            green TB  1#                   G  G  G  G  G  G  G  G   Sitting  LAQ  LAQ band  Hamstring stretch  Knee flexion  Sit to stand   Yes  yes        yes   3 x 10  X 10        2 x10   4#  red        4# ball           standing       Hamstring stretch  3 x 30  At step   Standing quad stretch yes 3 x 30  With strap   3 way hip       Wall slides yes 2 x 10     Side lunge yes X 10   top half   Step up  2x10 each 8 inches    Hip abd No   2x10     HR no 2x10     Knee flex no 2x10     Update  HEP       NEURO RE-ED       TOTAL TIME FOR SESSION Not performed      biodex balance            blue foam    3x30s   tandem     SLS        rocker board    A/P , lateral X 30 each     GAIT TRAINING       TOTAL TIME FOR SESSION no   amb         stairs  6 inch step up X 10 each    curbs        Out side       stepping over objects    low hurdles  2 laps each  fwd, alternate, side   MANUAL       TOTAL TIME FOR SESSION Not performed    STM/ retro grade massage           Jt mobs       PROM       Passive stretch       MODALITIES           TOTAL TIME FOR SESSION 10 min billed with group   vaso        ice pack  yes

## 2021-12-23 NOTE — OP PT TREATMENT LOG
Exercises Performed today Sets/ Reps comments Current Session Time   Eval date 11/1/2021  completed      Progress note  Due date 11/29/2021        Re eval  Due date:        THER ACT        toTOTAL TIME FOR SESSION     HEP  yes      updated as noted               THER EX       TOTAL TIME FOR SESSION    30 TE  30 group            Bike: R  yes 10 min recumbant                         ROM/stretching       Prone quad stretch yes 2 x 30  Noodle under thigh, floating the knee   HSS yes 3x30s strap     Supine  PB physio curls    Heel slides        Bridges  Quad set w heel prop  SLR  SLR circles  Hip flexor stretch    GSS@slant board  Clam in sidely  Hip abd in sidely  Side lying hip flexion/ext   yes    no        yes  No      yes  No    Yes      Yes  yes   2 x 10    2 x 10        2 x 10 5s  5s x20    2x10 ea  5 x 5 each      2x1 min    2x10  2x10  X 10               p-ball      1#            green TB  1#                   G  G  G  G  G  G  G  G   Sitting  LAQ  LAQ band  Hamstring stretch  Knee flexion  Sit to stand   Yes  yes        yes   3 x 10  X 10        2 x10   4#  red        4# ball           standing       Hamstring stretch  3 x 30  At step   Standing quad stretch yes 3 x 30  With strap   3 way hip       Wall slides yes 2 x 10     Side lunge yes X 10   top half   Step up  2x10 each 8 inches    Hip abd No   2x10     HR no 2x10     Knee flex no 2x10     Update  HEP       NEURO RE-ED       TOTAL TIME FOR SESSION Not performed      biodex balance            blue foam    3x30s   tandem     SLS        rocker board    A/P , lateral X 30 each     GAIT TRAINING       TOTAL TIME FOR SESSION no   amb         stairs  6 inch step up X 10 each    curbs       Out side       stepping over objects    low hurdles  2 laps each  fwd, alternate, side   MANUAL       TOTAL TIME FOR SESSION Not performed    STM/ retro grade massage           Jt mobs       PROM       Passive stretch       MODALITIES           TOTAL TIME FOR SESSION 10 min  billed with group   vaso        ice pack  yes

## 2021-12-28 ENCOUNTER — HOSPITAL ENCOUNTER (OUTPATIENT)
Dept: PHYSICAL THERAPY | Age: 61
Setting detail: THERAPIES SERIES
Discharge: HOME | End: 2021-12-28
Attending: ORTHOPAEDIC SURGERY
Payer: COMMERCIAL

## 2021-12-28 DIAGNOSIS — Z96.653 PRESENCE OF ARTIFICIAL KNEE JOINT, BILATERAL: Primary | ICD-10-CM

## 2021-12-28 PROCEDURE — 97110 THERAPEUTIC EXERCISES: CPT | Mod: GP,CQ

## 2021-12-28 PROCEDURE — 97112 NEUROMUSCULAR REEDUCATION: CPT | Mod: GP,CQ

## 2021-12-28 NOTE — OP PT TREATMENT LOG
Exercises Performed today Sets/ Reps comments Current Session Time   Eval date 11/1/2021  completed      Progress note  Due date 11/29/2021        Re eval  Due date:        THER ACT        toTOTAL TIME FOR SESSION Billed with TE    HEP        updated as noted    ROM sasha  yes  L 130 R 125     THER EX       TOTAL TIME FOR SESSION  38-52 Minutes              Bike: R  yes 12 min recumbant                         ROM/stretching       Prone quad stretch yes 3 x 30  Towel roll under thigh, floating the knee   HSS yes 3x30s strap     Supine  PB physio curls    Heel slides        Bridges  Quad set w heel prop  SLR  SLR circles  Hip flexor stretch    GSS@slant board  Clam in sidely  Hip abd in sidely  Side lying hip flexion/ext   yes    no        yes  No      no  No    Yes    yes  Yes  yes   2 x 10    2 x 10        2 x 10 5s  5s x20    2x10 ea  5 x 5 each      2x1 min    2x10  2x10  X 10               p-ball      1#            green TB                        Sitting  LAQ  LAQ band  Hamstring stretch  Knee flexion  Sit to stand   No  no        no   3 x 10  X 10        2 x10   4#  red        4# ball           standing       Hamstring stretch  3 x 30  At step   Standing quad stretch no 3 x 30  With strap   3 way hip       Wall slides No 2 x 10     Side lunge no X 10   top half   Step up yes 2x10 each 8 inches    Hip abd yes   2x10 GTB    HR no 2x10     Knee flex no 2x10     Update  HEP       NEURO RE-ED       TOTAL TIME FOR SESSION 8-22 Minutes      biodex balance            blue foam  yes  3x30s   tandem     SLS yes 3x10 sec Foam      rocker board  yes  A/P , lateral X 30 each     GAIT TRAINING       TOTAL TIME FOR SESSION no   amb         stairs  6 inch step up X 10 each    curbs       Out side       stepping over objects    low hurdles  2 laps each  fwd, alternate, side   MANUAL       TOTAL TIME FOR SESSION Not performed    STM/ retro grade massage           Jt mobs       PROM       Passive stretch       MODALITIES            TOTAL TIME FOR SESSION    vaso        ice pack  yes

## 2021-12-28 NOTE — PROGRESS NOTES
PT DAILY NOTE FOR OUTPATIENT THERAPY    Patient: Fide Amin   MRN: [id number]  : 1960 61 y.o.  Referring Physician: Bennett Em MD  Date of Visit: 2021    Certification Dates: 21 through 22    Diagnosis:   1. Presence of artificial knee joint, bilateral        Chief Complaints:  weakness, left knee skin irritation    Precautions:   Existing Precautions/Restrictions: no known precautions/restrictions     TODAY'S VISIT     History/Vitals/Pain/Encounter Info - 21 172        Injury History/Precautions/Daily Required Info    Primary Therapist Lisa Kimbrough DPT [facility]     Chief Complaint/Reason for Visit  weakness, left knee skin irritation     Onset of Illness/Injury or Date of Surgery 21     Referring Physician Dr. Em     Existing Precautions/Restrictions no known precautions/restrictions     Pertinent History of Current Functional Problem Pt reports mild pain, mostly stiffness, pt concerned about being behind due to comoplications post op and is rushing to get better.  Discussed the importance of safety and realistic time lines with pt     OP Special[facility] Orthopedics     Document Type daily treatment     Patient/Family/Caregiver Comments/Observations Pt. reprots B knees feel stiff. Pt. has follow up with Dr. Em tomorrow.     Start Time 1600     Stop Time 1700     Time Calculation (min) 60 min     Patient reported fall since last visit No        Pain Assessment    Currently in pain No/Denies        Pain Intervention    Intervention  na     Post Intervention Comments na                Daily Treatment Assessment and Plan - 21        Daily Treatment Assessment and Plan    Progress toward goals Slower than expected     Daily Outcome Summary Flexion ROM WNL B knees (L 130, R 125). Pt. has pain with pressure over L prox incision in prone lying. Continued with warm up on bike and review of balance and strengtehning TE per log. Pt. reports no increased symptoms  at end of session.     Plan and Recommendations Continue to review stretches and advance strengthening supine and WB.                     Today's Treatment:    Exercises Performed today Sets/ Reps comments Current Session Time   Eval date 11/1/2021  completed      Progress note  Due date 11/29/2021        Re eval  Due date:        THER ACT        toTOTAL TIME FOR SESSION Billed with TE    HEP        updated as noted    ROM sasha  yes  L 130 R 125     THER EX       TOTAL TIME FOR SESSION  38-52 Minutes              Bike: R  yes 12 min recumbant                         ROM/stretching       Prone quad stretch yes 3 x 30  Towel roll under thigh, floating the knee   HSS yes 3x30s strap     Supine  PB physio curls    Heel slides        Bridges  Quad set w heel prop  SLR  SLR circles  Hip flexor stretch    GSS@slant board  Clam in sidely  Hip abd in sidely  Side lying hip flexion/ext   yes    no        yes  No      no  No    Yes    yes  Yes  yes   2 x 10    2 x 10        2 x 10 5s  5s x20    2x10 ea  5 x 5 each      2x1 min    2x10  2x10  X 10               p-ball      1#            green TB                        Sitting  LAQ  LAQ band  Hamstring stretch  Knee flexion  Sit to stand   No  no        no   3 x 10  X 10        2 x10   4#  red        4# ball           standing       Hamstring stretch  3 x 30  At step   Standing quad stretch no 3 x 30  With strap   3 way hip       Wall slides No 2 x 10     Side lunge no X 10   top half   Step up yes 2x10 each 8 inches    Hip abd yes   2x10 GTB    HR no 2x10     Knee flex no 2x10     Update  HEP       NEURO RE-ED       TOTAL TIME FOR SESSION 8-22 Minutes      biodex balance            blue foam  yes  3x30s   tandem     SLS yes 3x10 sec Foam      rocker board  yes  A/P , lateral X 30 each     GAIT TRAINING       TOTAL TIME FOR SESSION no   amb         stairs  6 inch step up X 10 each    curbs       Out side       stepping over objects    low hurdles  2 laps each  fwd, alternate,  side   MANUAL       TOTAL TIME FOR SESSION Not performed    STM/ retro grade massage           Jt mobs       PROM       Passive stretch       MODALITIES           TOTAL TIME FOR SESSION    vaso        ice pack  yes

## 2021-12-31 ENCOUNTER — HOSPITAL ENCOUNTER (OUTPATIENT)
Dept: PHYSICAL THERAPY | Age: 61
Setting detail: THERAPIES SERIES
Discharge: HOME | End: 2021-12-31
Attending: ORTHOPAEDIC SURGERY
Payer: COMMERCIAL

## 2021-12-31 DIAGNOSIS — Z96.653 PRESENCE OF ARTIFICIAL KNEE JOINT, BILATERAL: Primary | ICD-10-CM

## 2021-12-31 PROCEDURE — 97112 NEUROMUSCULAR REEDUCATION: CPT | Mod: GP,CQ

## 2021-12-31 PROCEDURE — 97110 THERAPEUTIC EXERCISES: CPT | Mod: GP,CQ

## 2021-12-31 NOTE — OP PT TREATMENT LOG
Exercises Performed today Sets/ Reps comments Current Session Time   Eval date 11/1/2021  completed      Progress note  Due date 11/29/2021        Re eval  Due date:        THER ACT        toTOTAL TIME FOR SESSION     HEP        updated as noted    ROM sasha  yes  L 130 R 125     THER EX       TOTAL TIME FOR SESSION  23-37 Minutes              Bike: R  yes 12 min recumbant                         ROM/stretching       Prone quad stretch no 3 x 30  Towel roll under thigh, floating the knee   HSS no 3x30s strap     Supine  PB physio curls    Heel slides        Bridges  Quad set w heel prop  SLR  SLR circles  Hip flexor stretch    GSS@slant board  Clam in sidely  Hip abd in sidely  Side lying hip flexion/ext   no    no        no  No      no  No    no    yes  No  no   2 x 10    2 x 10        2 x 10 5s  5s x20    2x10 ea  5 x 5 each      2x1 min    2x10  2x10  X 10               p-ball      1#            green TB                        Sitting  LAQ  LAQ band  Hamstring stretch  Knee flexion  Sit to stand   No  no      yes   3 x 10  X 10      2 x10   4#  red      4# ball,stand on foam from hi/lo mat           standing       Hamstring stretch  3 x 30  At step   Standing quad stretch no 3 x 30  With strap   3 way hip       Wall slides No 2 x 10     Side lunge no X 10   top half   stairs yes 1x Up reciprocal w/ HR down one at time w/o HR    Step up  2x10 each 8 inches    Hip abd std  Hip flex std Yes  yes   x10  x10 GTB  GTB    HR no 2x10     Knee flex no 2x10     Update  HEP       NEURO RE-ED       TOTAL TIME FOR SESSION 23-37 Minutes      biodex balance            blue foam  no  3x30s   tandem     Side step w/ TB yes 3 laps GTB     March with TB yes 3 laps GTB            SLS no 3x10 sec Foam      rocker board  yes  A/P , lateral X 30 ea taps  X 1 ea static     GAIT TRAINING       TOTAL TIME FOR SESSION no   amb         stairs  6 inch step up X 10 each    curbs       Out side       stepping over objects    low hurdles  2 laps  each  fwd, alternate, side   MANUAL       TOTAL TIME FOR SESSION Not performed    STM/ retro grade massage           Jt mobs       PROM       Passive stretch       MODALITIES           TOTAL TIME FOR SESSION    vaso        ice pack  yes

## 2021-12-31 NOTE — PROGRESS NOTES
PT DAILY NOTE FOR OUTPATIENT THERAPY    Patient: Fide Amin   MRN: [id number]  : 1960 61 y.o.  Referring Physician: Bennett Em MD  Date of Visit: 2021    Certification Dates: 21 through 22    Diagnosis:   1. Presence of artificial knee joint, bilateral        Chief Complaints:  weakness, left knee skin irritation    Precautions:   Existing Precautions/Restrictions: no known precautions/restrictions     TODAY'S VISIT     History/Vitals/Pain/Encounter Info - 21        Injury History/Precautions/Daily Required Info    Primary Therapist Lisa Kimbrough DPT [facility]     Chief Complaint/Reason for Visit  weakness, left knee skin irritation     Onset of Illness/Injury or Date of Surgery 21     Referring Physician Dr. Em     Existing Precautions/Restrictions no known precautions/restrictions     Pertinent History of Current Functional Problem Pt reports mild pain, mostly stiffness, pt concerned about being behind due to comoplications post op and is rushing to get better.  Discussed the importance of safety and realistic time lines with pt     OP [facility] Orthopedics     Document Type daily treatment     Patient/Family/Caregiver Comments/Observations Pt. reports she saw Dr. Em and had xrays and healing with TKA is on target and he is not concerned with the incisions at this time.     Start Time 0855     Patient reported fall since last visit No        Pain Assessment    Currently in pain No/Denies        Pain Intervention    Intervention  na     Post Intervention Comments na                Daily Treatment Assessment and Plan - 21        Daily Treatment Assessment and Plan    Progress toward goals Slower than expected     Daily Outcome Summary Continued warm up on Bike and review of TE for strength and balance as noted on log. Balance is still a challenge and has pain in L knee going down stairs reciprocally.     Plan and Recommendations Continue to  review stretches and advance strengthening supine and WB. Add lateral step downs and TKE n/v.                     Today's Treatment:    Exercises Performed today Sets/ Reps comments Current Session Time   Eval date 11/1/2021  completed      Progress note  Due date 11/29/2021        Re eval  Due date:        THER ACT        toTOTAL TIME FOR SESSION     HEP        updated as noted    ROM sasha  yes  L 130 R 125     THER EX       TOTAL TIME FOR SESSION  23-37 Minutes              Bike: R  yes 12 min recumbant                         ROM/stretching       Prone quad stretch no 3 x 30  Towel roll under thigh, floating the knee   HSS no 3x30s strap     Supine  PB physio curls    Heel slides        Bridges  Quad set w heel prop  SLR  SLR circles  Hip flexor stretch    GSS@slant board  Clam in sidely  Hip abd in sidely  Side lying hip flexion/ext   no    no        no  No      no  No    no    yes  No  no   2 x 10    2 x 10        2 x 10 5s  5s x20    2x10 ea  5 x 5 each      2x1 min    2x10  2x10  X 10               p-ball      1#            green TB                        Sitting  LAQ  LAQ band  Hamstring stretch  Knee flexion  Sit to stand   No  no      yes   3 x 10  X 10      2 x10   4#  red      4# ball,stand on foam from hi/lo mat           standing       Hamstring stretch  3 x 30  At step   Standing quad stretch no 3 x 30  With strap   3 way hip       Wall slides No 2 x 10     Side lunge no X 10   top half   stairs yes 1x Up reciprocal w/ HR down one at time w/o HR    Step up  2x10 each 8 inches    Hip abd std  Hip flex std Yes  yes   x10  x10 GTB  GTB    HR no 2x10     Knee flex no 2x10     Update  HEP       NEURO RE-ED       TOTAL TIME FOR SESSION 23-37 Minutes      biodex balance            blue foam  no  3x30s   tandem     Side step w/ TB yes 3 laps GTB     March with TB yes 3 laps GTB            SLS no 3x10 sec Foam      rocker board  yes  A/P , lateral X 30 ea taps  X 1 ea static     GAIT TRAINING       TOTAL TIME  FOR SESSION no   amb         stairs  6 inch step up X 10 each    curbs       Out side       stepping over objects    low hurdles  2 laps each  fwd, alternate, side   MANUAL       TOTAL TIME FOR SESSION Not performed    STM/ retro grade massage           Jt mobs       PROM       Passive stretch       MODALITIES           TOTAL TIME FOR SESSION    vaso        ice pack  yes

## 2022-01-04 ENCOUNTER — HOSPITAL ENCOUNTER (OUTPATIENT)
Dept: PHYSICAL THERAPY | Age: 62
Setting detail: THERAPIES SERIES
Discharge: HOME | End: 2022-01-04
Attending: ORTHOPAEDIC SURGERY
Payer: COMMERCIAL

## 2022-01-04 DIAGNOSIS — Z96.653 PRESENCE OF ARTIFICIAL KNEE JOINT, BILATERAL: Primary | ICD-10-CM

## 2022-01-04 PROCEDURE — 97530 THERAPEUTIC ACTIVITIES: CPT | Mod: GP | Performed by: PHYSICAL THERAPIST

## 2022-01-04 PROCEDURE — 97110 THERAPEUTIC EXERCISES: CPT | Mod: GP | Performed by: PHYSICAL THERAPIST

## 2022-01-04 NOTE — OP PT TREATMENT LOG
TESTS Performed  today         Time  Description     EVAL  11/1/2021    Progress note     12/6/2021 Progress note RA    30 te   Weight bearing status  full  full         Precautions No kneeling           Pt eduation yes  yes         HEP  yes  yes      add step ups and step downs at home, plus rolling pin quad massage   OUTcome measure  KOS  LEFS  KOS  50/80  46/50  53/80   TOTAL TIME FOR SESSION     Minutes   AROM             Left knee  5 to 110 prone  0 to 120  same       Right knee 5 to 120  0 to 130  same                     PROM             Left knee 0 to 115  0 to 125  0 to 130       Right knee 0 to 125  0 to 135  0 to 135                     MMT             Left quad 3/5  4/5  same       Right quad 4/5  4+/5  same                     Pat mobility             right good           left fair  fair      motion inhibited by skin irriation and scab on scar   Swelling minimal  continues on left knee  skin irritation  continues       Circumference  measurements Not taken           Pitting edema none    none                     Incision             Open or closed Left superior incision scabbed and red, right knee incision clean and healed  Left knee only: two  pencil size  Scabbed areas at proximal incision  left knee  Superior incision,    10/6 4 to 5 blotchy redden areas med,  Lateral, posterior, superior and inferior   drainage none  none drainage         hot warm  warm         Gait         TOTAL TIME FOR SESSION Not performed   gait  amb with single point cane,   no cane  Mild limp         stairs  step to step  step overt step up     Step to step down         Sit to stand   18 sec     Shoe wear  tie sneakers           Balance         TOTAL TIME FOR SESSION   Minutes   SLS  Right  left    Unable  unable    < 10  ,10     <10  <10 sec                     Modalities         TOTAL TIME FOR SESSION Not performed   cold             heat             VASO

## 2022-01-04 NOTE — PROGRESS NOTES
PT PROGRESS NOTE FOR OUTPATIENT THERAPY    Patient: Fide Amin   MRN: [id number]  : 1960 61 y.o.  Referring Physician: Bennett Em MD  Date of Visit: 2022      Certification Dates: 21 through 22    Recommended Frequency & Duration:  2 times/week for up to 3 months          Diagnosis: No diagnosis found.    Chief Complaints:  Chief Complaint   Patient presents with   • Dec ROM   • Pain   • Joint Pain   • Dec Strength   • Difficulty Walking   • Balance Deficits   • Decreased recreational/play activity   • Decreased Endurance       Precautions:   Existing Precautions/Restrictions: no known precautions/restrictions     TODAY'S VISIT:     General Information - 22 1229        Session Details    Document Type progress note     Mode of Treatment individual therapy;physical therapy     Patient/Family/Caregiver Comments/Observations Pt reports continuing to have quad pain bilateraly regardless of rest or activity.  Pt reports red blochy areas are spreading again, ortho surgeron not worried about the skin discoloration or surgical scabs as joint and bone healing is progressing as expected     [facility]        Time Calculation    Start Time 1000     Stop Time 1100     Time Calculation (min) 60 min        General Information    Onset of Illness/Injury or Date of Surgery 21     Referring Physician Dr. Em     Pertinent History of Current Functional Problem Pt reports mild pain, mostly stiffness, pt concerned about being behind due to comoplications post op and is rushing to get better.  Discussed the importance of safety and realistic time lines with pt     Existing Precautions/Restrictions no known precautions/restrictions                Daily Falls Screen - 22 1602        Daily Falls Assessment    Patient reported fall since last visit No                Pain/Vitals - 22 1543        Pain Assessment    Currently in pain Yes     Preferred Pain Scale  number (Numeric Rating Pain Scale)     Pain Side/Orientation right;left     Pain: Body location Knee     Pain Rating (0-10): Pre Activity 1     Pain Rating (0-10): Post Activity 1     Pain Description intermittent        Pain Intervention    Intervention  ther ex     Post Intervention Comments no change in pain                PT - 01/04/22 1500        Physical Therapy    Physical Therapy Ortho        PT Plan    Frequency of treatment 2 times/week     PT Duration 3 months     PT Cert From 11/01/21     PT Cert To 02/04/22     Date PT POC was sent to provider 11/01/21     Signed PT Plan of Care received?  Yes                   OBJECTIVE MEASUREMENTS/DATA:    See treatment log    Today's Treatment::      TESTS Performed  today         Time  Description     EVAL  11/1/2021    Progress note     12/6/2021 Progress note RA    30 te   Weight bearing status  full  full         Precautions No kneeling           Pt eduation yes  yes         HEP  yes  yes      add step ups and step downs at home, plus rolling pin quad massage   OUTcome measure  KOS  LEFS  KOS  50/80  46/50  53/80   TOTAL TIME FOR SESSION     Minutes   AROM             Left knee  5 to 110 prone  0 to 120  same       Right knee 5 to 120  0 to 130  same                     PROM             Left knee 0 to 115  0 to 125  0 to 130       Right knee 0 to 125  0 to 135  0 to 135                     MMT             Left quad 3/5  4/5  same       Right quad 4/5  4+/5  same                     Pat mobility             right good           left fair  fair      motion inhibited by skin irriation and scab on scar   Swelling minimal  continues on left knee  skin irritation  continues       Circumference  measurements Not taken           Pitting edema none    none                     Incision             Open or closed Left superior incision scabbed and red, right knee incision clean and healed  Left knee only: two  pencil size  Scabbed areas at proximal incision  left  knee  Superior incision,    10/6 4 to 5 blotchy redden areas med,  Lateral, posterior, superior and inferior   drainage none  none drainage         hot warm  warm         Gait         TOTAL TIME FOR SESSION Not performed   gait  amb with single point cane,   no cane  Mild limp         stairs  step to step  step overt step up     Step to step down         Sit to stand   18 sec     Shoe wear  tie sneakers           Balance         TOTAL TIME FOR SESSION   Minutes   SLS  Right  left    Unable  unable    < 10  ,10     <10  <10 sec                     Modalities         TOTAL TIME FOR SESSION Not performed   cold             heat             VASO                             Goals Addressed                 This Visit's Progress    • PT stg/ltgs knee        Short Term Goals Time Frame Result Comment/Progress   Knee pain at rest will decrease by 50% 4 met    Knee extension PROM 0 degrees 4 met    Knee flexion AROM > 90 degrees 4 met    Pt will perform a SLR without a lag 4 met    Pt will ambulate into out center from the car independently with appropriate assistive device 4 met No assistive device   KOS > 65% 4 Not met 53      Long Term Goals Time Frame Result Comment/Progress   KOS >95% 12 wks Not met          Active knee ROM 0 to 125 12wks met      Quad and hip abductor strength 5/5 12wks Not met    amb community distances without assistive device 12wks met    Pt will pick object off floor with squatting and no UE support 12wks met    Manage stairs step over step with a rail 12 wks Not met                   Clinical Impression:  Pt continues to make slow steady progress.  She has completed 5/6 stgs and 3/6 LTG.  PT would benefit from continued PT to improve function and safety on stairs and curbs

## 2022-01-07 ENCOUNTER — HOSPITAL ENCOUNTER (OUTPATIENT)
Dept: PHYSICAL THERAPY | Age: 62
Setting detail: THERAPIES SERIES
Discharge: HOME | End: 2022-01-07
Attending: ORTHOPAEDIC SURGERY
Payer: COMMERCIAL

## 2022-01-07 DIAGNOSIS — Z96.653 PRESENCE OF ARTIFICIAL KNEE JOINT, BILATERAL: Primary | ICD-10-CM

## 2022-01-07 PROCEDURE — 97112 NEUROMUSCULAR REEDUCATION: CPT | Mod: GP | Performed by: PHYSICAL THERAPIST

## 2022-01-07 PROCEDURE — 97110 THERAPEUTIC EXERCISES: CPT | Mod: GP | Performed by: PHYSICAL THERAPIST

## 2022-01-07 NOTE — PROGRESS NOTES
PT DAILY NOTE FOR OUTPATIENT THERAPY    Patient: Fide Amin   MRN: [id number]  : 1960 61 y.o.  Referring Physician: Bennett Em MD  Date of Visit: 2022    Certification Dates: 21 through 22    Diagnosis:   1. Presence of artificial knee joint, bilateral        Chief Complaints:  weakness, left knee skin irritation    Precautions:   Precautions comments: Seizures  Existing Precautions/Restrictions: no known precautions/restrictions     TODAY'S VISIT     History/Vitals/Pain/Encounter Info - 22 0909        Injury History/Precautions/Daily Required Info    Primary Therapist Lisa Kimbrough DPT [facility]     Chief Complaint/Reason for Visit  weakness, left knee skin irritation     Onset of Illness/Injury or Date of Surgery 21     Referring Physician Dr. Em     Existing Precautions/Restrictions no known precautions/restrictions     Precautions comments Seizures     Pertinent History of Current Functional Problem Pt reports mild pain, mostly stiffness, pt concerned about being behind due to comoplications post op and is rushing to get better.  Discussed the importance of safety and realistic time lines with pt     [facility]     Document Type daily treatment     Patient/Family/Caregiver Comments/Observations Pt reports continued B quad pain when rising from sitting and when going down stairs. Pain today 1/10. No change in skin irritation left knee.     Start Time 0900     Stop Time 1000     Time Calculation (min) 60 min     Patient reported fall since last visit No        Pain Assessment    Currently in pain Yes     Preferred Pain Scale number (Numeric Rating Pain Scale)     Pain Side/Orientation bilateral     Pain: Body location Knee     Pain Rating (0-10): Pre Activity 1     Pain Rating (0-10): Activity 1     Pain Rating (0-10): Post Activity 1     Pain Description intermittent     Pain Management Interventions prescribed exercises encouraged        Pain  Intervention    Intervention  TE, manual     Post Intervention Comments slight reduction in symptoms                Daily Treatment Assessment and Plan - 01/07/22 0909        Daily Treatment Assessment and Plan    Progress toward goals Slower than expected     Daily Outcome Summary Pt continues to report bilateral quad pain with sit to stand and when going down stairs. Little chnage noted with incision left knee. Hypertrophic scarring noted dark red color. Pt tolerated TE /NMRE today with slight reduction reported in pain post session     Plan and Recommendations Progress as tolerated. Trial IASTM B quads. Avoid scar                     OBJECTIVE DATA TAKEN TODAY:    None taken    Today's Treatment:    Exercises Performed today Sets/ Reps comments Current Session Time   Eval date 11/1/2021  completed      Progress note  Due date 11/29/2021        Re eval  Due date:        THER ACT        TOTAL TIME FOR SESSION     HEP        updated as noted    ROM sasha  no  L 130 R 125     THER EX       TOTAL TIME FOR SESSION  23-37 Minutes              Bike: R  yes 12 min recumbant Seat #8                         ROM/stretching       Prone quad stretch no 3 x 30  Towel roll under thigh, floating the knee   HSS no 3x30s strap     Supine  PB physio curls    Heel slides        Bridges  Quad set w heel prop  SLR  SLR circles  Hip flexor stretch    GSS@slant board  Clam in sidely  Hip abd in sidely  Side lying hip flexion/ext   no    no        no  No      no  No    no    yes  No  no   2 x 10    2 x 10        2 x 10 5s  5s x20    2x10 ea  5 x 5 each      2x1 min    2x10  2x10  X 10               p-ball      1#            green TB                        Sitting  LAQ  LAQ band  Hamstring stretch  Knee flexion  Sit to stand   No  no      yes   3 x 10  X 10      2 x10   4#  red      4# ball,stand on foam from hi/lo mat           standing       Hamstring stretch  3 x 30  At step   Standing quad stretch no 3 x 30  With strap   3 way hip        Wall slides No 2 x 10     Side lunge no X 10   top half   stairs yes 1x Up reciprocal w/ HR down one at time w/o HR    Step up  2x10 each 8 inches    Hip abd std  Hip flex std Yes  yes   x10  x10 GTB  GTB    HR no 2x10     Knee flex no 2x10     Update  HEP       NEURO RE-ED       TOTAL TIME FOR SESSION 23-37 Minutes      biodex balance            blue foam  no  3x30s   tandem     Side step w/ TB yes 3 laps GTB     March with TB yes 3 laps GTB            SLS no 3x10 sec Foam      rocker board  yes  A/P , lateral X 30 ea taps  X 1 ea static    30 sec    GAIT TRAINING       TOTAL TIME FOR SESSION no   amb         stairs  6 inch step up X 10 each    curbs       Out side       stepping over objects    low hurdles  2 laps each  fwd, alternate, side   MANUAL       TOTAL TIME FOR SESSION Not performed    STM/ retro grade massage           Jt mobs       PROM       Passive stretch       MODALITIES           TOTAL TIME FOR SESSION    vaso        ice pack  yes

## 2022-01-07 NOTE — OP PT TREATMENT LOG
Exercises Performed today Sets/ Reps comments Current Session Time   Eval date 11/1/2021  completed      Progress note  Due date 11/29/2021        Re eval  Due date:        THER ACT        TOTAL TIME FOR SESSION     HEP        updated as noted    ROM sasha  no  L 130 R 125     THER EX       TOTAL TIME FOR SESSION  23-37 Minutes              Bike: R  yes 12 min recumbant Seat #8                         ROM/stretching       Prone quad stretch no 3 x 30  Towel roll under thigh, floating the knee   HSS no 3x30s strap     Supine  PB physio curls    Heel slides        Bridges  Quad set w heel prop  SLR  SLR circles  Hip flexor stretch    GSS@slant board  Clam in sidely  Hip abd in sidely  Side lying hip flexion/ext   no    no        no  No      no  No    no    yes  No  no   2 x 10    2 x 10        2 x 10 5s  5s x20    2x10 ea  5 x 5 each      2x1 min    2x10  2x10  X 10               p-ball      1#            green TB                        Sitting  LAQ  LAQ band  Hamstring stretch  Knee flexion  Sit to stand   No  no      yes   3 x 10  X 10      2 x10   4#  red      4# ball,stand on foam from hi/lo mat           standing       Hamstring stretch  3 x 30  At step   Standing quad stretch no 3 x 30  With strap   3 way hip       Wall slides No 2 x 10     Side lunge no X 10   top half   stairs yes 1x Up reciprocal w/ HR down one at time w/o HR    Step up  2x10 each 8 inches    Hip abd std  Hip flex std Yes  yes   x10  x10 GTB  GTB    HR no 2x10     Knee flex no 2x10     Update  HEP       NEURO RE-ED       TOTAL TIME FOR SESSION 23-37 Minutes      biodex balance            blue foam  no  3x30s   tandem     Side step w/ TB yes 3 laps GTB     March with TB yes 3 laps GTB            SLS no 3x10 sec Foam      rocker board  yes  A/P , lateral X 30 ea taps  X 1 ea static    30 sec    GAIT TRAINING       TOTAL TIME FOR SESSION no   amb         stairs  6 inch step up X 10 each    curbs       Out side       stepping over objects    low  hurdles  2 laps each  fwd, alternate, side   MANUAL       TOTAL TIME FOR SESSION Not performed    STM/ retro grade massage           Jt mobs       PROM       Passive stretch       MODALITIES           TOTAL TIME FOR SESSION    vaso        ice pack  yes

## 2022-01-11 ENCOUNTER — HOSPITAL ENCOUNTER (OUTPATIENT)
Dept: PHYSICAL THERAPY | Age: 62
Setting detail: THERAPIES SERIES
Discharge: HOME | End: 2022-01-11
Attending: ORTHOPAEDIC SURGERY
Payer: COMMERCIAL

## 2022-01-11 DIAGNOSIS — Z96.653 PRESENCE OF ARTIFICIAL KNEE JOINT, BILATERAL: Primary | ICD-10-CM

## 2022-01-11 PROCEDURE — 97112 NEUROMUSCULAR REEDUCATION: CPT | Mod: GP | Performed by: PHYSICAL THERAPIST

## 2022-01-11 PROCEDURE — 97110 THERAPEUTIC EXERCISES: CPT | Mod: GP | Performed by: PHYSICAL THERAPIST

## 2022-01-11 NOTE — OP PT TREATMENT LOG
Exercises Performed today Sets/ Reps comments Current Session Time   Eval date 11/1/2021  completed      Progress note  Due date 11/29/2021        Re eval  Due date:        THER ACT        TOTAL TIME FOR SESSION     HEP        updated as noted    ROM sasha  no  L 130 R 125     THER EX       TOTAL TIME FOR SESSION 40 min            Bike: R  yes 12 min recumbant Seat #8                         ROM/stretching       Prone quad stretch no 3 x 30  Towel roll under thigh, floating the knee   HSS no 3x30s strap     Supine  PB physio curls    Heel slides        Bridges  Quad set w heel prop  SLR  SLR circles  Hip flexor stretch    GSS@slant board  Clam in sidely  Hip abd in sidely  Side lying hip flexion/ext   no    no        no  No      no  No    no    yes  yes  yes   2 x 10    2 x 10        2 x 10 5s  5s x20    2x10 ea  5 x 5 each      2x1 min    2x10  2x10  X 10               p-ball      1#            green TB                        Sitting  LAQ  LAQ band  Hamstring stretch  Knee flexion  Sit to stand   No  no      yes   3 x 10  X 10      2 x10   4#  red      4# ball,stand on foam from hi/lo mat           standing       Hamstring stretch  3 x 30  At step   Hip flexion at the walll with wall support yes 2 x 10     3 way hip       Wall slides yes 2 x 10     Side lunge no X 10   top half   stairs yes 1x Up reciprocal w/ HR down one at time w/o HR    Step up  2x10 each 8 inches    Hip abd std  Hip flex std Yes  yes   x10  x10 GTB  GTB    HR no 2x10     Knee flex no 2x10     Update  HEP       NEURO RE-ED       TOTAL TIME FOR SESSION 20 min      biodex balance            blue foam  yes  3x30s   tandem     Side step w/ TB yes 3 laps GTB     March with TB yes 3 laps GTB            SLS yes 3x10 sec Foam      rocker board  yes  A/P , lateral X 30 ea taps  X 1 ea static    30 sec    GAIT TRAINING       TOTAL TIME FOR SESSION no   amb         stairs  6 inch step up X 10 each    curbs       Out side       stepping over objects    low  hurdles  2 laps each  fwd, alternate, side   MANUAL       TOTAL TIME FOR SESSION Not performed    STM/ retro grade massage           Jt mobs       PROM       Passive stretch       MODALITIES           TOTAL TIME FOR SESSION    vaso        ice pack  yes

## 2022-01-11 NOTE — PROGRESS NOTES
PT DAILY NOTE FOR OUTPATIENT THERAPY    Patient: Fide Amin   MRN: [id number]  : 1960 61 y.o.  Referring Physician: Bennett Em MD  Date of Visit: 2022    Certification Dates: 21 through 22    Diagnosis:   1. Presence of artificial knee joint, bilateral        Chief Complaints:  weakness, left knee skin irritation    Precautions:   Existing Precautions/Restrictions: no known precautions/restrictions     TODAY'S VISIT     History/Vitals/Pain/Encounter Info - 22 1139        Injury History/Precautions/Daily Required Info    Primary Therapist Lisa Kimbrough DPT [facility]     Chief Complaint/Reason for Visit  weakness, left knee skin irritation     Onset of Illness/Injury or Date of Surgery 21     Referring Physician Dr. Em     Existing Precautions/Restrictions no known precautions/restrictions     Pertinent History of Current Functional Problem Pt reports mild pain, mostly stiffness, pt concerned about being behind due to comoplications post op and is rushing to get better.  Discussed the importance of safety and realistic time lines with pt     Document Type daily treatment     Patient/Family/Caregiver Comments/Observations Pt reports both legs are feeling better, she has been addressing wound on left knee over the last 3 days with improved healing noted     Start Time 1000     Stop Time 1100     Time Calculation (min) 60 min     Patient reported fall since last visit No        Pain Assessment    Currently in pain Yes     Preferred Pain Scale number (Numeric Rating Pain Scale)     Pain Side/Orientation bilateral     Pain: Body location Knee     Pain Rating (0-10): Pre Activity 3     Pain Rating (0-10): Post Activity 1     Pain Description intermittent     Pain Management Interventions medication administered        Pain Intervention    Intervention  ther ex, nme     Post Intervention Comments reduction in sxs                Daily Treatment Assessment and Plan - 22  1139        Daily Treatment Assessment and Plan    Progress toward goals Progressing     Daily Outcome Summary Pt with sxs reduction post treatment, pt able to complete new exercises without knee pain.  Bilateral and unilateral balance difficult for pt due to both resting and intention tremors.  HIp weakness bilaterally,  ITB and hip flexor tightness noted     Plan and Recommendations Add hip flexor stretching and hamstring cross over stretch to hep, continue with hip strengthening                     OBJECTIVE DATA TAKEN TODAY:    None taken    Today's Treatment:    Exercises Performed today Sets/ Reps comments Current Session Time   Eval date 11/1/2021  completed      Progress note  Due date 11/29/2021        Re eval  Due date:        THER ACT        TOTAL TIME FOR SESSION     HEP        updated as noted    ROM sasha  no  L 130 R 125     THER EX       TOTAL TIME FOR SESSION 40 min            Bike: R  yes 12 min recumbant Seat #8                         ROM/stretching       Prone quad stretch no 3 x 30  Towel roll under thigh, floating the knee   HSS no 3x30s strap     Supine  PB physio curls    Heel slides        Bridges  Quad set w heel prop  SLR  SLR circles  Hip flexor stretch    GSS@slant board  Clam in sidely  Hip abd in sidely  Side lying hip flexion/ext   no    no        no  No      no  No    no    yes  yes  yes   2 x 10    2 x 10        2 x 10 5s  5s x20    2x10 ea  5 x 5 each      2x1 min    2x10  2x10  X 10               p-ball      1#            green TB                        Sitting  LAQ  LAQ band  Hamstring stretch  Knee flexion  Sit to stand   No  no      yes   3 x 10  X 10      2 x10   4#  red      4# ball,stand on foam from hi/lo mat           standing       Hamstring stretch  3 x 30  At step   Hip flexion at the walll with wall support yes 2 x 10     3 way hip       Wall slides yes 2 x 10     Side lunge no X 10   top half   stairs yes 1x Up reciprocal w/ HR down one at time w/o HR    Step up  2x10  each 8 inches    Hip abd std  Hip flex std Yes  yes   x10  x10 GTB  GTB    HR no 2x10     Knee flex no 2x10     Update  HEP       NEURO RE-ED       TOTAL TIME FOR SESSION 20 min      biodex balance            blue foam  yes  3x30s   tandem     Side step w/ TB yes 3 laps GTB     March with TB yes 3 laps GTB            SLS yes 3x10 sec Foam      rocker board  yes  A/P , lateral X 30 ea taps  X 1 ea static    30 sec    GAIT TRAINING       TOTAL TIME FOR SESSION no   amb         stairs  6 inch step up X 10 each    curbs       Out side       stepping over objects    low hurdles  2 laps each  fwd, alternate, side   MANUAL       TOTAL TIME FOR SESSION Not performed    STM/ retro grade massage           Jt mobs       PROM       Passive stretch       MODALITIES           TOTAL TIME FOR SESSION    vaso        ice pack  yes

## 2022-01-14 ENCOUNTER — HOSPITAL ENCOUNTER (OUTPATIENT)
Dept: PHYSICAL THERAPY | Age: 62
Setting detail: THERAPIES SERIES
Discharge: HOME | End: 2022-01-14
Attending: ORTHOPAEDIC SURGERY
Payer: COMMERCIAL

## 2022-01-14 DIAGNOSIS — Z96.653 PRESENCE OF ARTIFICIAL KNEE JOINT, BILATERAL: Primary | ICD-10-CM

## 2022-01-14 PROCEDURE — 97110 THERAPEUTIC EXERCISES: CPT | Mod: GP

## 2022-01-14 PROCEDURE — 97112 NEUROMUSCULAR REEDUCATION: CPT | Mod: GP

## 2022-01-14 NOTE — OP PT TREATMENT LOG
"Exercises Performed today Sets/ Reps comments Current Session Time   Eval date 11/1/2021  completed      Progress note  Due date 11/29/2021        Re eval  Due date:        THER ACT        TOTAL TIME FOR SESSION     HEP        updated as noted    ROM sasha  no  L 130 R 125     THER EX       TOTAL TIME FOR SESSION 40 min            Bike: R  yes 10 min recumbant Seat #8                         ROM/stretching       Prone quad stretch yes 3 x 30s  Towel roll under thigh, floating the knee   HSS yes 3x30s strap     Supine  PB physio curls    Heel slides        Bridges  Quad set w heel prop  SLR  SLR circles  Hip flexor stretch    GSS@slant board  Clam in sidely  Hip abd in sidely  Side lying hip flexion/ext   no    no        no  No      no  No    no    yes  yes  yes   2 x 10    2 x 10        2 x 10 5s  5s x20    2x10 ea  5 x 5 each      2x1 min    2x10  2x10  2x10               p-ball      1#            green TB                        Sitting  LAQ  LAQ band  Hamstring stretch  Knee flexion  Sit to stand   No  no      yes   3 x 10  X 10      2 x10   4#  red      4# ball,stand on foam from hi/lo mat           standing       Hamstring stretch  3 x 30  At step   Hip flexion at the walll with wall support yes 2 x 10     3 way hip       Wall slides yes 2 x 10     Side lunge no X 10   top half   stairs no 1x Up reciprocal w/ HR down one at time w/o HR    Step up yes 2x10 each 4 inches Did up and over 4\" for ecc. quad   Hip abd std  Hip flex std Yes  yes   x10  x10 GTB  GTB    HR no 2x10     Knee flex no 2x10     Update  HEP       NEURO RE-ED       TOTAL TIME FOR SESSION 20 min      biodex balance            blue foam  yes  3x30s   tandem     Side step w/ TB yes 3 laps GTB     March with TB yes 3 laps GTB            SLS yes 3x10 sec Foam      rocker board  yes  A/P , lateral X 30 ea taps  X 1 ea static    30 sec    GAIT TRAINING       TOTAL TIME FOR SESSION no   amb         stairs  6 inch step up X 10 each    curbs       Out side "       stepping over objects    low hurdles  2 laps each  fwd, alternate, side   MANUAL       TOTAL TIME FOR SESSION Not performed    STM/ retro grade massage           Jt mobs       PROM       Passive stretch       MODALITIES           TOTAL TIME FOR SESSION    vaso       CP no      declined

## 2022-01-14 NOTE — PROGRESS NOTES
PT DAILY NOTE FOR OUTPATIENT THERAPY    Patient: Fide Amin   MRN: [id number]  : 1960 61 y.o.  Referring Physician: Bennett Em MD  Date of Visit: 2022    Certification Dates: 21 through 22    Diagnosis:   1. Presence of artificial knee joint, bilateral        Chief Complaints:  weakness, left knee skin irritation    Precautions:   Precautions comments: Seizures  Existing Precautions/Restrictions: no known precautions/restrictions     TODAY'S VISIT     History/Vitals/Pain/Encounter Info - 22 1202        Injury History/Precautions/Daily Required Info    Primary Therapist Lisa Kimbrough DPT [facility]     Chief Complaint/Reason for Visit  weakness, left knee skin irritation     Onset of Illness/Injury or Date of Surgery 21     Referring Physician Dr. Em     Existing Precautions/Restrictions no known precautions/restrictions     Precautions comments Seizures     Pertinent History of Current Functional Problem Pt reports mild pain, mostly stiffness, pt concerned about being behind due to comoplications post op and is rushing to get better.  Discussed the importance of safety and realistic time lines with pt     Document Type daily treatment     Patient/Family/Caregiver Comments/Observations Pt. reports that she continues to have difficulty descending the stairs reciprocally. Pt. has been applying aquaphor to left knee, with improved healing.     Start Time 1200     Stop Time 1300     Time Calculation (min) 60 min     Patient reported fall since last visit No        Pain Assessment    Currently in pain Yes     Preferred Pain Scale number (Numeric Rating Pain Scale)     Pain Side/Orientation bilateral     Pain: Body location Knee     Pain Rating (0-10): Pre Activity 1     Pain Rating (0-10): Activity 1     Pain Rating (0-10): Post Activity 1        Pain Intervention    Intervention  TE     Post Intervention Comments mild symptoms present                Daily Treatment  Assessment and Plan - 01/14/22 1202        Daily Treatment Assessment and Plan    Daily Outcome Summary Pt. was CGA with balance exercises secondary to reduced trunk control and decreased hip/ankle strategy. Modified FSU to step over at 4 inches to allow pt. to develop eccentric quad strength required for descending of stair reciprocally. Pt. was able to descend 4 inch step comfortably and with control on right LE; however, pt. noted reduced eccentric control on left citing anterior knee pain consistent with location of wound. Pt. tolerated session well and without increase in symptoms.     Plan and Recommendations progress as tolerated                     OBJECTIVE DATA TAKEN TODAY:    None taken    Today's Treatment:    Exercises Performed today Sets/ Reps comments Current Session Time   Eval date 11/1/2021  completed      Progress note  Due date 11/29/2021        Re eval  Due date:        THER ACT        TOTAL TIME FOR SESSION     HEP        updated as noted    ROM sasha  no  L 130 R 125     THER EX       TOTAL TIME FOR SESSION 40 min            Bike: R  yes 10 min recumbant Seat #8                         ROM/stretching       Prone quad stretch yes 3 x 30s  Towel roll under thigh, floating the knee   HSS yes 3x30s strap     Supine  PB physio curls    Heel slides        Bridges  Quad set w heel prop  SLR  SLR circles  Hip flexor stretch    GSS@slant board  Clam in sidely  Hip abd in sidely  Side lying hip flexion/ext   no    no        no  No      no  No    no    yes  yes  yes   2 x 10    2 x 10        2 x 10 5s  5s x20    2x10 ea  5 x 5 each      2x1 min    2x10  2x10  2x10               p-ball      1#            green TB                        Sitting  LAQ  LAQ band  Hamstring stretch  Knee flexion  Sit to stand   No  no      yes   3 x 10  X 10      2 x10   4#  red      4# ball,stand on foam from hi/lo mat           standing       Hamstring stretch  3 x 30  At step   Hip flexion at the walll with wall support yes  "2 x 10     3 way hip       Wall slides yes 2 x 10     Side lunge no X 10   top half   stairs no 1x Up reciprocal w/ HR down one at time w/o HR    Step up yes 2x10 each 4 inches Did up and over 4\" for ecc. quad   Hip abd std  Hip flex std Yes  yes   x10  x10 GTB  GTB    HR no 2x10     Knee flex no 2x10     Update  HEP       NEURO RE-ED       TOTAL TIME FOR SESSION 20 min      biodex balance            blue foam  yes  3x30s   tandem     Side step w/ TB yes 3 laps GTB     March with TB yes 3 laps GTB            SLS yes 3x10 sec Foam      rocker board  yes  A/P , lateral X 30 ea taps  X 1 ea static    30 sec    GAIT TRAINING       TOTAL TIME FOR SESSION no   amb         stairs  6 inch step up X 10 each    curbs       Out side       stepping over objects    low hurdles  2 laps each  fwd, alternate, side   MANUAL       TOTAL TIME FOR SESSION Not performed    STM/ retro grade massage           Jt mobs       PROM       Passive stretch       MODALITIES           TOTAL TIME FOR SESSION    vaso       CP no      declined                               "

## 2022-01-18 ENCOUNTER — HOSPITAL ENCOUNTER (OUTPATIENT)
Dept: PHYSICAL THERAPY | Age: 62
Setting detail: THERAPIES SERIES
Discharge: HOME | End: 2022-01-18
Attending: ORTHOPAEDIC SURGERY
Payer: COMMERCIAL

## 2022-01-18 DIAGNOSIS — Z96.653 PRESENCE OF ARTIFICIAL KNEE JOINT, BILATERAL: Primary | ICD-10-CM

## 2022-01-18 PROCEDURE — 97110 THERAPEUTIC EXERCISES: CPT | Mod: GP

## 2022-01-18 PROCEDURE — 97112 NEUROMUSCULAR REEDUCATION: CPT | Mod: GP

## 2022-01-18 NOTE — PROGRESS NOTES
PT DAILY NOTE FOR OUTPATIENT THERAPY    Patient: Fide Amin   MRN: [id number]  : 1960 61 y.o.  Referring Physician: Bennett Em MD  Date of Visit: 2022    Certification Dates: 21 through 22    Diagnosis:   1. Presence of artificial knee joint, bilateral        Chief Complaints:  weakness, left knee skin irritation    Precautions:   Existing Precautions/Restrictions: no known precautions/restrictions     TODAY'S VISIT     History/Vitals/Pain/Encounter Info - 22 1100        Injury History/Precautions/Daily Required Info    Primary Therapist Lisa Kimbrough DPT [facility]     Chief Complaint/Reason for Visit  weakness, left knee skin irritation     Onset of Illness/Injury or Date of Surgery 21     Referring Physician Dr. Em     Existing Precautions/Restrictions no known precautions/restrictions     Pertinent History of Current Functional Problem Pt reports mild pain, mostly stiffness, pt concerned about being behind due to comoplications post op and is rushing to get better.  Discussed the importance of safety and realistic time lines with pt     Document Type daily treatment     Patient/Family/Caregiver Comments/Observations Pt reports she has some stiffness in both knees L>R.  She went to her PCP yesterday and received a tropical cream to put on her knee scabs. Of note she reports she still has issues with standing from a lower surface and continues to use an eleavated toilet seat at home due to difficulty performing transfers.     Start Time 1058     Stop Time 1158     Time Calculation (min) 60 min     Patient reported fall since last visit No        Pain Assessment    Currently in pain Yes     Preferred Pain Scale number (Numeric Rating Pain Scale)     Pain Side/Orientation bilateral     Pain: Body location Knee     Pain Rating (0-10): Pre Activity 2   L knee 2, R knee 1    Pain Rating (0-10): Activity 1     Pain Rating (0-10): Post Activity 1        Pain Intervention     Intervention  TE     Post Intervention Comments Monitored throughout                Daily Treatment Assessment and Plan - 01/18/22 1100        Daily Treatment Assessment and Plan    Progress toward goals Progressing     Daily Outcome Summary Began with warm up on RB for active warm up. Followed warm up with NMRE. On rocker board lateral static holds pt with majority of LOB to L side. Performed therex as outlined in treatment log. Added lateral step downs on 4 inch step to promote eccentric quad control. Good tolerance toward exercises with mild muscle soreness in anterior knee post exercises. Pain did improve at end of session with subjective report of less stiffness.     Plan and Recommendations Cont with PT POC. Progress as tolerated focusing on Quad control.                     OBJECTIVE DATA TAKEN TODAY:    None taken    Today's Treatment:    Exercises Performed today Sets/ Reps comments Current Session Time   Eval date 11/1/2021  completed      Progress note  Due date 11/29/2021        Re eval  Due date:        THER ACT        TOTAL TIME FOR SESSION     HEP        updated as noted    ROM sasha  no  L 130 R 125     THER EX       TOTAL TIME FOR SESSION 40 min            Bike: R  yes 10 min recumbant Seat #8                         ROM/stretching       Prone quad stretch yes 3 x 30s  Towel roll under thigh, floating the knee   HSS yes 3x30s strap     Supine  PB physio curls    Heel slides        Bridges  Quad set w heel prop  SLR  SLR circles  Hip flexor stretch    GSS@slant board  Clam in sidely  Hip abd in sidely  Side lying hip flexion/ext   no    no        no  No      no  No    no    yes  yes  no   2 x 10    2 x 10        2 x 10 5s  5s x20    2x10 ea  5 x 5 each      2x1 min    2x10  2x10  2x10               p-ball      1#            green TB                        Sitting  LAQ  LAQ band  Hamstring stretch  Knee flexion  Sit to stand   No  no      yes   3 x 10  X 10      2 x10   4#  red      4# ball,stand on  "foam from hi/lo mat           standing       Hamstring stretch  3 x 30  At step   Hip flexion at the walll with wall support yes 2 x 10     3 way hip       Wall slides yes 2 x 10     Side lunge no X 10   top half   stairs no 1x Up reciprocal w/ HR down one at time w/o HR    Step up yes 2x10 each 4 inches Did up and over 4\" for ecc. quad   Lateral step downs yes 1x10 4 inch step    Hip abd std  Hip flex std Yes  yes   x10  x10 GTB  GTB    HR no 2x10     Knee flex no 2x10     Update  HEP       NEURO RE-ED       TOTAL TIME FOR SESSION 20 min      biodex balance            blue foam  yes  3x30s   tandem     Side step w/ TB yes 3 laps GTB     March with TB yes 3 laps GTB            SLS yes 3x10 sec Foam      rocker board  yes  A/P , lateral X 30 ea taps  X 1 ea static    30 sec    GAIT TRAINING       TOTAL TIME FOR SESSION no   amb         stairs  6 inch step up X 10 each    curbs       Out side       stepping over objects    low hurdles  2 laps each  fwd, alternate, side   MANUAL       TOTAL TIME FOR SESSION Not performed    STM/ retro grade massage           Jt mobs       PROM       Passive stretch       MODALITIES           TOTAL TIME FOR SESSION    vaso       CP no      declined                               "

## 2022-01-18 NOTE — OP PT TREATMENT LOG
"Exercises Performed today Sets/ Reps comments Current Session Time   Eval date 11/1/2021  completed      Progress note  Due date 11/29/2021        Re eval  Due date:        THER ACT        TOTAL TIME FOR SESSION     HEP        updated as noted    ROM sasha  no  L 130 R 125     THER EX       TOTAL TIME FOR SESSION 40 min            Bike: R  yes 10 min recumbant Seat #8                         ROM/stretching       Prone quad stretch yes 3 x 30s  Towel roll under thigh, floating the knee   HSS yes 3x30s strap     Supine  PB physio curls    Heel slides        Bridges  Quad set w heel prop  SLR  SLR circles  Hip flexor stretch    GSS@slant board  Clam in sidely  Hip abd in sidely  Side lying hip flexion/ext   no    no        no  No      no  No    no    yes  yes  no   2 x 10    2 x 10        2 x 10 5s  5s x20    2x10 ea  5 x 5 each      2x1 min    2x10  2x10  2x10               p-ball      1#            green TB                        Sitting  LAQ  LAQ band  Hamstring stretch  Knee flexion  Sit to stand   No  no      yes   3 x 10  X 10      2 x10   4#  red      4# ball,stand on foam from hi/lo mat           standing       Hamstring stretch  3 x 30  At step   Hip flexion at the walll with wall support yes 2 x 10     3 way hip       Wall slides yes 2 x 10     Side lunge no X 10   top half   stairs no 1x Up reciprocal w/ HR down one at time w/o HR    Step up yes 2x10 each 4 inches Did up and over 4\" for ecc. quad   Lateral step downs yes 1x10 4 inch step    Hip abd std  Hip flex std Yes  yes   x10  x10 GTB  GTB    HR no 2x10     Knee flex no 2x10     Update  HEP       NEURO RE-ED       TOTAL TIME FOR SESSION 20 min      biodex balance            blue foam  yes  3x30s   tandem     Side step w/ TB yes 3 laps GTB     March with TB yes 3 laps GTB            SLS yes 3x10 sec Foam      rocker board  yes  A/P , lateral X 30 ea taps  X 1 ea static    30 sec    GAIT TRAINING       TOTAL TIME FOR SESSION no   amb         stairs  6 inch " step up X 10 each    curbs       Out side       stepping over objects    low hurdles  2 laps each  fwd, alternate, side   MANUAL       TOTAL TIME FOR SESSION Not performed    STM/ retro grade massage           Jt mobs       PROM       Passive stretch       MODALITIES           TOTAL TIME FOR SESSION    vaso       CP no      declined

## 2022-01-21 ENCOUNTER — HOSPITAL ENCOUNTER (OUTPATIENT)
Dept: PHYSICAL THERAPY | Age: 62
Setting detail: THERAPIES SERIES
Discharge: HOME | End: 2022-01-21
Attending: ORTHOPAEDIC SURGERY
Payer: COMMERCIAL

## 2022-01-21 DIAGNOSIS — Z96.653 PRESENCE OF ARTIFICIAL KNEE JOINT, BILATERAL: Primary | ICD-10-CM

## 2022-01-21 PROCEDURE — 97110 THERAPEUTIC EXERCISES: CPT | Mod: GP | Performed by: PHYSICAL THERAPIST

## 2022-01-21 NOTE — PROGRESS NOTES
PT DAILY NOTE FOR OUTPATIENT THERAPY    Patient: Fide Amin   MRN: [id number]  : 1960 61 y.o.  Referring Physician: Bennett Em MD  Date of Visit: 2022    Certification Dates: 21 through 22    Diagnosis:   1. Presence of artificial knee joint, bilateral        Chief Complaints:  weakness, left knee skin irritation    Precautions:   Existing Precautions/Restrictions: no known precautions/restrictions     TODAY'S VISIT     History/Vitals/Pain/Encounter Info - 22 1200        Injury History/Precautions/Daily Required Info    Primary Therapist Lisa Kimbrough DPT [facility]     Chief Complaint/Reason for Visit  weakness, left knee skin irritation     Onset of Illness/Injury or Date of Surgery 21     Referring Physician Dr. Em     Existing Precautions/Restrictions no known precautions/restrictions     Pertinent History of Current Functional Problem Pt reports mild pain, mostly stiffness, pt concerned about being behind due to comoplications post op and is rushing to get better.  Discussed the importance of safety and realistic time lines with pt     Document Type daily treatment     Patient/Family/Caregiver Comments/Observations Pt reports receiving antibiotic cream for Left knee skin irritation from PCP with improved results.  Widespread reddness is dissipating and redden areas are dry.  Pt reports scar scab sloughed off wihtout drainage of any kind.  Knee pain is mild and she is able to descend two steps step over step with a rail     Start Time 0800     Stop Time 0900     Time Calculation (min) 60 min     Patient reported fall since last visit No        Pain Assessment    Currently in pain Yes     Preferred Pain Scale number (Numeric Rating Pain Scale)     Pain Side/Orientation bilateral     Pain Rating (0-10): Pre Activity 2     Pain Rating (0-10): Post Activity 3        Pain Intervention    Intervention  ther ex     Post Intervention Comments increase in left knee pain                 Daily Treatment Assessment and Plan - 01/21/22 1200        Daily Treatment Assessment and Plan    Progress toward goals Slower than expected     Daily Outcome Summary Pt continiues to need verbal cues to walk with head up and stand tall.  Pt needs verbal cues to bend knees while in standing and when descending stairs.  Pt with mild pain during squating and 2 to 4 inch stepping exercises     Plan and Recommendations Continue with squatting and stepping exercises                     OBJECTIVE DATA TAKEN TODAY:    None taken    Today's Treatment:    Exercises Performed today Sets/ Reps comments Current Session Time   Eval date    Teams 11/1/2021    yes  completed      Progress note  Due date 11/29/2021  completed      Re eval  Due date:        THER ACT        TOTAL TIME FOR SESSION     HEP        updated as noted    ROM sasha  no  L 130 R 125     THER EX       TOTAL TIME FOR SESSION 60min            Bike: R  yes 12 min recumbant Seat #8    Treadmnll yes 12 mn 2% 1.8                  ROM/stretching       Prone quad stretch no 3 x 30  Towel roll under thigh, floating the knee   HSS no 3x30s strap     Supine  PB physio curls    Heel slides        Bridges  Quad set w heel prop  SLR  SLR circles  Hip flexor stretch    GSS@slant board  Clam in sidely  Hip abd in sidely  Side lying hip flexion/ext   no    no        no  No      no  No    yes    yes  yes  yes   2 x 10    2 x 10        2 x 10 5s  5s x20    2x10 ea  5 x 5 each      2x1 min    2x10  2x10  X 10               p-ball      1#            green TB                        Sitting  LAQ  LAQ band  Hamstring stretch  Knee flexion  Sit to stand   No  no      yes   3 x 10  X 10      2 x10   4#  red      4# ball,stand on foam from hi/lo mat           standing       Hamstring stretch  3 x 30  At step   Hip flexion at the walll with wall support yes 2 x 10     3 way hip yes      Wall slides yes 2 x 10     Side lunge no X 10   top half   stairs yes 1x Up reciprocal w/  HR down one at time w/o HR    Step up yes 2x10 each 8 inches    Hip abd std  Hip flex std Yes  yes   x10  x10 GTB  GTB    HR bilateral yes 2x10     Step downs yes 2x10  2 in step   Step agility yes 2 x 20  2 in step   NEURO RE-ED       TOTAL TIME FOR SESSION       biodex balance            blue foam   3x30s   tandem     Side step w/ TB  3 laps GTB     March with TB  3 laps GTB            SLS  3x10 sec Foam      rocker board    A/P , lateral X 30 ea taps  X 1 ea static    30 sec    GAIT TRAINING       TOTAL TIME FOR SESSION no   amb         stairs  6 inch step up X 10 each    curbs       Out side       stepping over objects    low hurdles  2 laps each  fwd, alternate, side   MANUAL       TOTAL TIME FOR SESSION Not performed    STM/ retro grade massage           Jt mobs       PROM       Passive stretch       MODALITIES           TOTAL TIME FOR SESSION    vaso        ice pack  yes

## 2022-01-21 NOTE — OP PT TREATMENT LOG
Exercises Performed today Sets/ Reps comments Current Session Time   Eval date    Teams 11/1/2021    yes  completed      Progress note  Due date 11/29/2021  completed      Re eval  Due date:        THER ACT        TOTAL TIME FOR SESSION     HEP        updated as noted    ROM sasha  no  L 130 R 125     THER EX       TOTAL TIME FOR SESSION 60min            Bike: R  yes 12 min recumbant Seat #8    Treadmnll yes 12 mn 2% 1.8                  ROM/stretching       Prone quad stretch no 3 x 30  Towel roll under thigh, floating the knee   HSS no 3x30s strap     Supine  PB physio curls    Heel slides        Bridges  Quad set w heel prop  SLR  SLR circles  Hip flexor stretch    GSS@slant board  Clam in sidely  Hip abd in sidely  Side lying hip flexion/ext   no    no        no  No      no  No    yes    yes  yes  yes   2 x 10    2 x 10        2 x 10 5s  5s x20    2x10 ea  5 x 5 each      2x1 min    2x10  2x10  X 10               p-ball      1#            green TB                        Sitting  LAQ  LAQ band  Hamstring stretch  Knee flexion  Sit to stand   No  no      yes   3 x 10  X 10      2 x10   4#  red      4# ball,stand on foam from hi/lo mat           standing       Hamstring stretch  3 x 30  At step   Hip flexion at the walll with wall support yes 2 x 10     3 way hip yes      Wall slides yes 2 x 10     Side lunge no X 10   top half   stairs yes 1x Up reciprocal w/ HR down one at time w/o HR    Step up yes 2x10 each 8 inches    Hip abd std  Hip flex std Yes  yes   x10  x10 GTB  GTB    HR bilateral yes 2x10     Step downs yes 2x10  2 in step   Step agility yes 2 x 20  2 in step   NEURO RE-ED       TOTAL TIME FOR SESSION       biodex balance            blue foam   3x30s   tandem     Side step w/ TB  3 laps GTB     March with TB  3 laps GTB            SLS  3x10 sec Foam      rocker board    A/P , lateral X 30 ea taps  X 1 ea static    30 sec    GAIT TRAINING       TOTAL TIME FOR SESSION no   amb         stairs  6 inch step  up X 10 each    curbs       Out side       stepping over objects    low hurdles  2 laps each  fwd, alternate, side   MANUAL       TOTAL TIME FOR SESSION Not performed    STM/ retro grade massage           Jt mobs       PROM       Passive stretch       MODALITIES           TOTAL TIME FOR SESSION    vaso        ice pack  yes

## 2022-01-24 ENCOUNTER — HOSPITAL ENCOUNTER (OUTPATIENT)
Dept: PHYSICAL THERAPY | Age: 62
Setting detail: THERAPIES SERIES
Discharge: HOME | End: 2022-01-24
Attending: ORTHOPAEDIC SURGERY
Payer: COMMERCIAL

## 2022-01-24 DIAGNOSIS — Z96.653 PRESENCE OF ARTIFICIAL KNEE JOINT, BILATERAL: Primary | ICD-10-CM

## 2022-01-24 PROCEDURE — 97110 THERAPEUTIC EXERCISES: CPT | Mod: GP,CQ

## 2022-01-24 NOTE — PROGRESS NOTES
PT DAILY NOTE FOR OUTPATIENT THERAPY    Patient: Fide Amin   MRN: [id number]  : 1960 61 y.o.  Referring Physician: Bennett Em MD  Date of Visit: 2022    Certification Dates: 21 through 22    Diagnosis:   1. Presence of artificial knee joint, bilateral        Chief Complaints:  weakness, left knee skin irritation    Precautions:   Existing Precautions/Restrictions: no known precautions/restrictions     TODAY'S VISIT    Time In Session:  Start Time: 705  Stop Time: 803  Time Calculation (min): 58 min   History/Vitals/Pain/Encounter Info - 22        Injury History/Precautions/Daily Required Info    Primary Therapist Lisa Kimbrough DPT [facility]     Chief Complaint/Reason for Visit  weakness, left knee skin irritation     Onset of Illness/Injury or Date of Surgery 21     Referring Physician Dr. Em     Existing Precautions/Restrictions no known precautions/restrictions     Pertinent History of Current Functional Problem Pt reports mild pain, mostly stiffness, pt concerned about being behind due to comoplications post op and is rushing to get better.  Discussed the importance of safety and realistic time lines with pt     OP [facility]     Document Type daily treatment     Patient/Family/Caregiver Comments/Observations Pt. reports improved integrity of incision since starting to use topical antibiotic. No pain in B knees just stiffness.     Patient reported fall since last visit No     Start Time 705     Stop Time 803     Time Calculation (min) 58 min        Pain Assessment    Currently in pain No/Denies        Pain Intervention    Intervention  na     Post Intervention Comments na                Daily Treatment Assessment and Plan - 22        Daily Treatment Assessment and Plan    Progress toward goals Slower than expected     Daily Outcome Summary Added sidelying hip abd with TB and 2# weights added to SLR. Pt. balance improving slowly.  ROM is WFL but continues to have weakness with descending stairs. Pt. reports less stiffness at end of session.     Plan and Recommendations Continue with higher level standing strength and balance TE.                       Today's Treatment:    Exercises Performed today Sets/ Reps comments Current Session Time   Eval date    Teams 11/1/2021    yes  completed      Progress note  Due date 11/29/2021  completed      Re eval  Due date:        THER ACT        TOTAL TIME FOR SESSION     HEP        updated as noted    ROM sasha  no  L 130 R 125     THER EX       TOTAL TIME FOR SESSION 58 min            Bike: R  yes 8 min recumbant Seat #8    Treadmnll no 12 mn 2% 1.8                  ROM/stretching       Prone quad stretch yes 5x 30  Towel roll under thigh, floating the knee   HSS no 3x30s strap     Supine  PB physio curls    Heel slides        Bridges  Quad set w heel prop  SLR  SLR circles  Hip flexor stretch    GSS@slant board  Clam in sidely  Hip abd in sidely  Side lying hip flexion/ext   Yes    no        yes  No    yes  no  No    yes    yes  yes  no   10s x 10    2 x 10        2 x 10 5s  5s x20    2x10 ea  5 x 5 each      2x1 min    2x10  2x10  X 10               p-ball      2#            green TB  Green TB                      Sitting  LAQ  LAQ band  Hamstring stretch  Knee flexion  Sit to stand   No  no      no   3 x 10  X 10      2 x10   4#  red      4# ball,stand on foam from hi/lo mat           standing       Hamstring stretch  3 x 30  At step   Hip flexion at the walll with wall support no 2 x 10     3 way hip no      Wall slides yes 3 x 10     Side lunge no X 10   top half   stairs no 1x Up reciprocal w/ HR down one at time w/o HR    Step up forward  Lateral up and over No  Yes     2x10 each  2x10 ea 8 inches  6 inches     Hip abd std  Hip flex std No  no   x10  x10 GTB  GTB    HR bilateral no 2x10     Step downs No 2x10  2 in step   Step agility no 2 x 20  2 in step   NEURO RE-ED       TOTAL TIME FOR SESSION        biodex balance            blue foam   3x30s   tandem     Side step w/ TB  3 laps GTB     March with TB  3 laps GTB            SLS  3x10 sec Foam      rocker board    A/P , lateral X 30 ea taps  X 1 ea static    30 sec    GAIT TRAINING       TOTAL TIME FOR SESSION no   amb         stairs  6 inch step up X 10 each    curbs       Out side       stepping over objects    low hurdles  2 laps each  fwd, alternate, side   MANUAL       TOTAL TIME FOR SESSION Not performed    STM/ retro grade massage           Jt mobs       PROM       Passive stretch       MODALITIES           TOTAL TIME FOR SESSION    vaso        ice pack  yes

## 2022-01-24 NOTE — OP PT TREATMENT LOG
Exercises Performed today Sets/ Reps comments Current Session Time   Eval date    Teams 11/1/2021    yes  completed      Progress note  Due date 11/29/2021  completed      Re eval  Due date:        THER ACT        TOTAL TIME FOR SESSION     HEP        updated as noted    ROM sasha  no  L 130 R 125     THER EX       TOTAL TIME FOR SESSION 58 min            Bike: R  yes 8 min recumbant Seat #8    Treadmnll no 12 mn 2% 1.8                  ROM/stretching       Prone quad stretch yes 5x 30  Towel roll under thigh, floating the knee   HSS no 3x30s strap     Supine  PB physio curls    Heel slides        Bridges  Quad set w heel prop  SLR  SLR circles  Hip flexor stretch    GSS@slant board  Clam in sidely  Hip abd in sidely  Side lying hip flexion/ext   Yes    no        yes  No    yes  no  No    yes    yes  yes  no   10s x 10    2 x 10        2 x 10 5s  5s x20    2x10 ea  5 x 5 each      2x1 min    2x10  2x10  X 10               p-ball      2#            green TB  Green TB                      Sitting  LAQ  LAQ band  Hamstring stretch  Knee flexion  Sit to stand   No  no      no   3 x 10  X 10      2 x10   4#  red      4# ball,stand on foam from hi/lo mat           standing       Hamstring stretch  3 x 30  At step   Hip flexion at the walll with wall support no 2 x 10     3 way hip no      Wall slides yes 3 x 10     Side lunge no X 10   top half   stairs no 1x Up reciprocal w/ HR down one at time w/o HR    Step up forward  Lateral up and over No  Yes     2x10 each  2x10 ea 8 inches  6 inches     Hip abd std  Hip flex std No  no   x10  x10 GTB  GTB    HR bilateral no 2x10     Step downs No 2x10  2 in step   Step agility no 2 x 20  2 in step   NEURO RE-ED       TOTAL TIME FOR SESSION       biodex balance            blue foam   3x30s   tandem     Side step w/ TB  3 laps GTB     March with TB  3 laps GTB            SLS  3x10 sec Foam      rocker board    A/P , lateral X 30 ea taps  X 1 ea static    30 sec    GAIT TRAINING        TOTAL TIME FOR SESSION no   amb         stairs  6 inch step up X 10 each    curbs       Out side       stepping over objects    low hurdles  2 laps each  fwd, alternate, side   MANUAL       TOTAL TIME FOR SESSION Not performed    STM/ retro grade massage           Jt mobs       PROM       Passive stretch       MODALITIES           TOTAL TIME FOR SESSION    vaso        ice pack  yes

## 2022-01-28 ENCOUNTER — HOSPITAL ENCOUNTER (OUTPATIENT)
Dept: PHYSICAL THERAPY | Age: 62
Setting detail: THERAPIES SERIES
Discharge: HOME | End: 2022-01-28
Attending: ORTHOPAEDIC SURGERY
Payer: COMMERCIAL

## 2022-01-28 DIAGNOSIS — Z96.653 PRESENCE OF ARTIFICIAL KNEE JOINT, BILATERAL: Primary | ICD-10-CM

## 2022-01-28 PROCEDURE — 97110 THERAPEUTIC EXERCISES: CPT | Mod: GP,CQ

## 2022-01-28 PROCEDURE — 97112 NEUROMUSCULAR REEDUCATION: CPT | Mod: GP,CQ

## 2022-01-28 NOTE — OP PT TREATMENT LOG
Exercises Performed today Sets/ Reps comments Current Session Time   Eval date    Teams 11/1/2021    yes  completed      Progress note  Due date 11/29/2021  completed      Re eval  Due date:        THER ACT        TOTAL TIME FOR SESSION     HEP        updated as noted    ROM sasha  no  L 130 R 125     THER EX  [id number]       TOTAL TIME FOR SESSION 23-37 Minutes              Bike: R  yes 8 min recumbant Seat #8    Treadmnll no 12 mn 2% 1.8                  ROM/stretching       Prone quad stretch yes 5x 30  Towel roll under thigh, floating the knee   HSS no 3x30s strap     Supine  PB physio curls    Heel slides        Bridges  Quad set w heel prop  SLR  SLR circles  Hip flexor stretch    GSS@slant board  Clam in sidely  Hip abd in sidely  Side lying hip flexion/ext   no    no        no  No    no  no  No    yes    no  no  no   10s x 10    2 x 10        2 x 10 5s  5s x20    2x10 ea  5 x 5 each      2x1 min    2x10  2x10  X 10               p-ball      2#            green TB  Green TB                      Sitting  LAQ  LAQ band  Hamstring stretch  Knee flexion  Sit to stand   No  no      no   3 x 10  X 10      2 x10   4#  red      4# ball,stand on foam from hi/lo mat           standing       Hamstring stretch  3 x 30  At step   Hip flexion at the walll with wall support no 2 x 10     3 way hip no      Wall slides yes 3 x 10     Side lunge no X 10   top half   stairs no 1x Up reciprocal w/ HR down one at time w/o HR    Step up forward  Lateral up and over No  no     2x10 each  2x10 ea 8 inches  6 inches     Step up, lateral step down/up, step back yes 2x10 each 6 inches    Hip abd std  Hip flex std No  no   x10  x10 GTB  GTB    HR bilateral yes 2x10 B and U 2x10 each    Step downs No 2x10  2 in step   Step agility no 2 x 20  2 in step   NEURO RE-ED  [id number]       TOTAL TIME FOR SESSION    23-37 Minutes      biodex balance            blue foam yes  3x30s   tandem     Side step w/ TB yes 3 laps GTB     March with TB yes 3 laps  GTB     SLS [place] taps  yes x5 each leg Gait belt, circles on floor surrounding pt    SLS yes 3x10 sec Foam      rocker board    A/P , lateral X 30 ea taps  X 1 ea static    30 sec    GAIT TRAINING       TOTAL TIME FOR SESSION Billed with Neuro     amb         stairs  6 inch step up X 10 each    curbs       Out side       stepping over objects  yes  low hurdles  2 laps each  fwd, alternate, side   MANUAL       TOTAL TIME FOR SESSION Not performed    STM/ retro grade massage           Jt mobs       PROM       Passive stretch       MODALITIES           TOTAL TIME FOR SESSION    vaso        ice pack  no

## 2022-01-28 NOTE — PROGRESS NOTES
PT DAILY NOTE FOR OUTPATIENT THERAPY    Patient: Fide Amin   MRN: [id number]  : 1960 61 y.o.  Referring Physician: Bennett Em MD  Date of Visit: 2022    Certification Dates: 21 through 22    Diagnosis:   1. Presence of artificial knee joint, bilateral        Chief Complaints:  weakness, left knee skin irritation    Precautions:   Precautions comments: seizures  Existing Precautions/Restrictions: no known precautions/restrictions     TODAY'S VISIT    Time In Session:  Start Time: 0900  Stop Time: 1000  Time Calculation (min): 60 min   History/Vitals/Pain/Encounter Info - 22        Injury History/Precautions/Daily Required Info    Document Type daily treatment     Primary Therapist Lisa Kimbrough DPT [facility]     Chief Complaint/Reason for Visit  weakness, left knee skin irritation     Onset of Illness/Injury or Date of Surgery 21     Referring Physician Dr. Em     Existing Precautions/Restrictions no known precautions/restrictions     Precautions comments seizures     Pertinent History of Current Functional Problem Pt reports mild pain, mostly stiffness, pt concerned about being behind due to comoplications post op and is rushing to get better.  Discussed the importance of safety and realistic time lines with pt     OP Specialty Orthopedics     Patient/Family/Caregiver Comments/Observations Pt denies pain in both knees but feels increased achiness in L knee. Pt states that she was playing on the floor with her grandchildren yesterday and was having difficulty getting up off floor trying to avoid kneeling on L knee.     Patient reported fall since last visit No     Start Time 0900     Stop Time 1000     Time Calculation (min) 60 min        Pain Assessment    Currently in pain No/Denies        Pain Intervention    Intervention  TE, NMRE     Post Intervention Comments see assessment                Daily Treatment Assessment and Plan - 22 09        Daily  Treatment Assessment and Plan    Progress toward goals Slower than expected     Daily Outcome Summary Initiated treatment with recumbant bike x 8 mins, pt performed LE stretches and strengthening ex's as noted in activity log. Added B/L SLS [place] taps, gait belt applied CGA, VC to maintain upright posture and bend knees during stance. Added step up/lateral step/step down with close supervision, see activity log for sets and reps. SLS and tandem on foam performed in parallel bars, B/L handheld support prn, close supervision needed. Inspected L knee incision and advised pt to use Aquaphor on incision site at night vs with antibiotic cream. Educated on floor transfer with pillow to kneel on to increase safety. Pt reports no pain at the end of the session.     Plan and Recommendations Review getting up from floor as needed, continue with stairs and standing dynamic balance ex's NV. Add TM NV.                         Today's Treatment:    Exercises Performed today Sets/ Reps comments Current Session Time   Eval date    Teams 11/1/2021    yes  completed      Progress note  Due date 11/29/2021  completed      Re eval  Due date:        THER ACT        TOTAL TIME FOR SESSION     HEP        updated as noted    ROM sasha  no  L 130 R 125     THER EX  [id number]       TOTAL TIME FOR SESSION 23-37 Minutes              Bike: R  yes 8 min recumbant Seat #8    Treadmnll no 12 mn 2% 1.8                  ROM/stretching       Prone quad stretch yes 5x 30  Towel roll under thigh, floating the knee   HSS no 3x30s strap     Supine  PB physio curls    Heel slides        Bridges  Quad set w heel prop  SLR  SLR circles  Hip flexor stretch    GSS@slant board  Clam in sidely  Hip abd in sidely  Side lying hip flexion/ext   no    no        no  No    no  no  No    yes    no  no  no   10s x 10    2 x 10        2 x 10 5s  5s x20    2x10 ea  5 x 5 each      2x1 min    2x10  2x10  X 10               p-ball      2#            green TB  Green TB                       Sitting  LAQ  LAQ band  Hamstring stretch  Knee flexion  Sit to stand   No  no      no   3 x 10  X 10      2 x10   4#  red      4# ball,stand on foam from hi/lo mat           standing       Hamstring stretch  3 x 30  At step   Hip flexion at the walll with wall support no 2 x 10     3 way hip no      Wall slides yes 3 x 10     Side lunge no X 10   top half   stairs no 1x Up reciprocal w/ HR down one at time w/o HR    Step up forward  Lateral up and over No  no     2x10 each  2x10 ea 8 inches  6 inches     Step up, lateral step down/up, step back yes 2x10 each 6 inches    Hip abd std  Hip flex std No  no   x10  x10 GTB  GTB    HR bilateral yes 2x10 B and U 2x10 each    Step downs No 2x10  2 in step   Step agility no 2 x 20  2 in step   NEURO RE-ED  [id number]       TOTAL TIME FOR SESSION    23-37 Minutes      biodex balance            blue foam yes  3x30s   tandem     Side step w/ TB yes 3 laps GTB     March with TB yes 3 laps GTB     SLS [place] taps  yes x5 each leg Gait belt, circles on floor surrounding pt    SLS yes 3x10 sec Foam      rocker board    A/P , lateral X 30 ea taps  X 1 ea static    30 sec    GAIT TRAINING       TOTAL TIME FOR SESSION Billed with Neuro     amb         stairs  6 inch step up X 10 each    curbs       Out side       stepping over objects  yes  low hurdles  2 laps each  fwd, alternate, side   MANUAL       TOTAL TIME FOR SESSION Not performed    STM/ retro grade massage           Jt mobs       PROM       Passive stretch       MODALITIES           TOTAL TIME FOR SESSION    vaso        ice pack  no                             I attest that I was present and directed all activities, made all clinical decisions and was responsible for all assessments while Margo HUGGINS ([facility]) participated or observed partially in the treatment session of this patient.  Jasmyne Bonilla BS, PTA  License # TE [id number]

## 2022-01-31 ENCOUNTER — HOSPITAL ENCOUNTER (OUTPATIENT)
Dept: PHYSICAL THERAPY | Age: 62
Setting detail: THERAPIES SERIES
Discharge: HOME | End: 2022-01-31
Attending: ORTHOPAEDIC SURGERY
Payer: COMMERCIAL

## 2022-01-31 DIAGNOSIS — Z96.653 PRESENCE OF ARTIFICIAL KNEE JOINT, BILATERAL: Primary | ICD-10-CM

## 2022-01-31 PROCEDURE — 97112 NEUROMUSCULAR REEDUCATION: CPT | Mod: GP,CQ

## 2022-01-31 PROCEDURE — 97110 THERAPEUTIC EXERCISES: CPT | Mod: GP,CQ

## 2022-01-31 NOTE — OP PT TREATMENT LOG
Exercises Performed today Sets/ Reps comments Current Session Time   Eval date    Teams 11/1/2021    yes  completed      Progress note  Due date 11/29/2021  completed      Re eval  Due date:        THER ACT        TOTAL TIME FOR SESSION     HEP        updated as noted    ROM sasha  no  L 130 R 125     THER EX  [id number]       TOTAL TIME FOR SESSION 38-52 Minutes              Bike: R  yes 8 min recumbant Seat #8    Treadmnll yes 12 mn 1.5                  ROM/stretching       Prone quad stretch no 5x 30  Towel roll under thigh, floating the knee   HSS no 3x30s strap     Supine  PB physio curls    Heel slides        Bridges  Quad set w heel prop  SLR  SLR circles  Hip flexor stretch    GSS@slant board  Clam in sidely  Hip abd in sidely  Side lying hip flexion/ext   no    no        no  No    no  no  No    yes    no  no  no   10s x 10    2 x 10        2 x 10 5s  5s x20    2x10 ea  5 x 5 each      2x1 min    2x10  2x10  X 10               p-ball      2#            green TB  Green TB                      Sitting  LAQ  LAQ band  Hamstring stretch  Knee flexion  Sit to stand   yes  no      yes   3 x 10  X 10      2 x10   4#  red      4# ball,stand on foam from hi/lo mat           standing       Hamstring stretch  3 x 30  At step   Hip flexion at the walll with wall support no 2 x 10     3 way hip no      Wall slides yes 3 x 10     Side lunge no X 10   top half   stairs no 1x Up reciprocal w/ HR down one at time w/o HR    Step up and over  Lateral up and over yes  yes     2x10 each  2x10 ea 4 inches  6 inches     Step up, lateral step down/up, step back no 2x10 each 6 inches    Hip abd std  Hip flex std yes  yes   2x10  2x10 GTB  GTB    HR bilateral yes 2x10 B and U 2x10 each    Step downs No 2x10  2 in step   Step agility no 2 x 20  2 in step   NEURO RE-ED  [id number]       TOTAL TIME FOR SESSION    8-22 Minutes      biodex balance            blue foam yes  3x30s   tandem     Side step w/ TB no 3 laps GTB     March with TB no 3 laps  GTB     SLS [place] taps  yes x5 each leg Gait belt, circles on floor surrounding pt    SLS yes 3x10 sec Foam      rocker board    A/P , lateral X 30 ea taps  X 1 ea static    30 sec    GAIT TRAINING       TOTAL TIME FOR SESSION Not performed     amb         stairs  6 inch step up X 10 each    curbs       Out side       stepping over objects  no  low hurdles  2 laps each  fwd, alternate, side   MANUAL       TOTAL TIME FOR SESSION Not performed    STM/ retro grade massage           Jt mobs       PROM       Passive stretch       MODALITIES           TOTAL TIME FOR SESSION    vaso        ice pack  no                  I attest that I was present and directed all activities, made all clinical decisions and was responsible for all assessments while Margo HUGGINS ([facility]) participated or observed partially in the treatment session of this patient.  Jasmyne BLAIR, PTA  License # TE [id number]

## 2022-01-31 NOTE — PROGRESS NOTES
PT DAILY NOTE FOR OUTPATIENT THERAPY    Patient: Fide Amin   MRN: [id number]  : 1960 61 y.o.  Referring Physician: Bennett Em MD  Date of Visit: 2022    Certification Dates: 21 through 22    Diagnosis:   1. Presence of artificial knee joint, bilateral        Chief Complaints:  weakness, left knee skin irritation    Precautions:   Precautions comments: seizures  Existing Precautions/Restrictions: no known precautions/restrictions     TODAY'S VISIT    Time In Session:  Start Time: 958  Stop Time: 1100  Time Calculation (min): 62 min   History/Vitals/Pain/Encounter Info - 22 1001        Injury History/Precautions/Daily Required Info    Document Type daily treatment     Primary Therapist Lisa Kimbrough DPT [facility]     Chief Complaint/Reason for Visit  weakness, left knee skin irritation     Onset of Illness/Injury or Date of Surgery 21     Referring Physician Dr. Em     Existing Precautions/Restrictions no known precautions/restrictions     Precautions comments seizures     Pertinent History of Current Functional Problem Pt reports mild pain, mostly stiffness, pt concerned about being behind due to comoplications post op and is rushing to get better.  Discussed the importance of safety and realistic time lines with pt     OP Specialty Orthopedics     Patient/Family/Caregiver Comments/Observations Pt reports soreness in B/L knees today but denies any pain. Pt states that she started descending stairs reciprocally at home yesterday and doesn't have pain in L knee.     Patient reported fall since last visit No     Start Time 958     Stop Time 1100     Time Calculation (min) 62 min        Pain Assessment    Currently in pain No/Denies        Pain Intervention    Intervention  TE, NMRE     Post Intervention Comments see assessment                Daily Treatment Assessment and Plan - 22 1001        Daily Treatment Assessment and Plan    Progress toward goals Slower  than expected     Daily Outcome Summary Pt states that she is still using a toilet riser at home because her toilet is very low and has L knee pain when standing up. Continued with recumbant bike x 8 mins, pt performed LE strengthening ex's as noted in activity log. SLS and tandem on foam performed in parallel bars, B/L handheld support prn, close supervision needed. Pt finished with cardio on Treadmill x12 mins. Pt reports mild pain in L knee when performing step downs with RLE leading on 4 in step, VC to maintain level hips and bend the L knee during ex. Pt reports no pain at the end of the session but stiffness in B/L knees.     Plan and Recommendations Plan re-eval NV per primary PT to extend POC. Continue with LE strengthening ex's, progress as tolerated NV.                         Today's Treatment:    Exercises Performed today Sets/ Reps comments Current Session Time   Eval date    Teams 11/1/2021    yes  completed      Progress note  Due date 11/29/2021  completed      Re eval  Due date:        THER ACT        TOTAL TIME FOR SESSION     HEP        updated as noted    ROM sasha  no  L 130 R 125     THER EX  [id number]       TOTAL TIME FOR SESSION 38-52 Minutes              Bike: R  yes 8 min recumbant Seat #8    Treadmnll yes 12 mn 1.5                  ROM/stretching       Prone quad stretch no 5x 30  Towel roll under thigh, floating the knee   HSS no 3x30s strap     Supine  PB physio curls    Heel slides        Bridges  Quad set w heel prop  SLR  SLR circles  Hip flexor stretch    GSS@slant board  Clam in sidely  Hip abd in sidely  Side lying hip flexion/ext   no    no        no  No    no  no  No    yes    no  no  no   10s x 10    2 x 10        2 x 10 5s  5s x20    2x10 ea  5 x 5 each      2x1 min    2x10  2x10  X 10               p-ball      2#            green TB  Green TB                      Sitting  LAQ  LAQ band  Hamstring stretch  Knee flexion  Sit to stand   yes  no      yes   3 x 10  X 10      2 x10    4#  red      4# ball,stand on foam from hi/lo mat           standing       Hamstring stretch  3 x 30  At step   Hip flexion at the walll with wall support no 2 x 10     3 way hip no      Wall slides yes 3 x 10     Side lunge no X 10   top half   stairs no 1x Up reciprocal w/ HR down one at time w/o HR    Step up and over  Lateral up and over yes  yes     2x10 each  2x10 ea 4 inches  6 inches     Step up, lateral step down/up, step back no 2x10 each 6 inches    Hip abd std  Hip flex std yes  yes   2x10  2x10 GTB  GTB    HR bilateral yes 2x10 B and U 2x10 each    Step downs No 2x10  2 in step   Step agility no 2 x 20  2 in step   NEURO RE-ED  [id number]       TOTAL TIME FOR SESSION    8-22 Minutes      biodex balance            blue foam yes  3x30s   tandem     Side step w/ TB no 3 laps GTB     March with TB no 3 laps GTB     SLS [place] taps  yes x5 each leg Gait belt, circles on floor surrounding pt    SLS yes 3x10 sec Foam      rocker board    A/P , lateral X 30 ea taps  X 1 ea static    30 sec    GAIT TRAINING       TOTAL TIME FOR SESSION Not performed     amb         stairs  6 inch step up X 10 each    curbs       Out side       stepping over objects  no  low hurdles  2 laps each  fwd, alternate, side   MANUAL       TOTAL TIME FOR SESSION Not performed    STM/ retro grade massage           Jt mobs       PROM       Passive stretch       MODALITIES           TOTAL TIME FOR SESSION    vaso        ice pack  no                  I attest that I was present and directed all activities, made all clinical decisions and was responsible for all assessments while Margo HUGGINS ([facility]) participated or observed partially in the treatment session of this patient.  Jasmyne BLAIR, PTA  License # [id number]

## 2022-02-04 ENCOUNTER — HOSPITAL ENCOUNTER (OUTPATIENT)
Dept: PHYSICAL THERAPY | Age: 62
Setting detail: THERAPIES SERIES
Discharge: HOME | End: 2022-02-04
Attending: ORTHOPAEDIC SURGERY
Payer: COMMERCIAL

## 2022-02-04 DIAGNOSIS — Z96.653 PRESENCE OF ARTIFICIAL KNEE JOINT, BILATERAL: Primary | ICD-10-CM

## 2022-02-04 PROCEDURE — 97110 THERAPEUTIC EXERCISES: CPT | Mod: GP

## 2022-02-04 PROCEDURE — 97530 THERAPEUTIC ACTIVITIES: CPT | Mod: GP

## 2022-02-04 NOTE — PROGRESS NOTES
Referring Provider: By co-signing this Plan of Care (POC) either electronically or physically you agree to the following:    I have reviewed the the Plan of Care established by the therapist within this document and certify that the services are skilled and medically necessary. I have reviewed the plan and recommend that these services continue to meet the goals stated in this document.       EXTERNAL PROVIDER FAXING BACK:    PHYSICIAN SIGNATURE: __________________________________     DATE: ___________________    TIME: _________    IMPORTANT:  If returning this Plan of Care by fax, please fax back ONLY the signature page.   _____________________________________________________________________    [facility] Fax: [phone]      PT RE-EVALUATION FOR OUTPATIENT THERAPY    Patient: Fide Amin   MRN: [id number]  : 1960 61 y.o.  Referring Physician: Bennett Em MD  Date of Visit: 2022      New Certification Dates: 22 through 22    Recommended Frequency & Duration:  2 times/week for up to 6 weeks          Diagnosis:   1. Presence of artificial knee joint, bilateral        Chief Complaints:  Chief Complaint   Patient presents with   • Balance Deficits   • Pain       Precautions:   Existing Precautions/Restrictions: no known precautions/restrictions    TODAY'S VISIT:    Time In Session:  Start Time: 0800  Stop Time: 0900  Time Calculation (min): 60 min   General Information - 22 0808        Session Details    Document Type re-evaluation     Mode of Treatment physical therapy     Patient/Family/Caregiver Comments/Observations Pt presents for PT re-eval. Since starting physical therapy she feels she has made ~75% improvement.  She feels she has made most improvement in her ROM.  She reports her biggest issue is transitioning between positions such as sitting to standing and it causes aching pain in her quad. She also reports she continues to have difficulty with  descending steps using step over step pattern.     [facility]        General Information    Onset of Illness/Injury or Date of Surgery 09/20/21     Referring Physician Dr. Em     Pertinent History of Current Functional Problem Pt reports mild pain, mostly stiffness, pt concerned about being behind due to comoplications post op and is rushing to get better.  Discussed the importance of safety and realistic time lines with pt     Existing Precautions/Restrictions no known precautions/restrictions        PT Time Calculation    Start Time 0800     Stop Time 0900     Time Calculation (min) 60 min                Daily Falls Screen - 02/04/22 0808        Daily Falls Assessment    Patient reported fall since last visit No                Pain/Vitals - 02/04/22 0808        Pain Assessment    Currently in pain Yes     Pain Side/Orientation bilateral     Pain: Body location Knee     Pain Rating (0-10): Pre Activity 1     Pain Rating (0-10): Activity 3   Descending stairs on L    Pain Rating (0-10): Post Activity 1     Pain Description aching   stiffness       Pain Intervention    Intervention  TE, NMRE     Post Intervention Comments Monitored                PT - 02/04/22 0808        Physical Therapy    Physical Therapy Ortho        PT Plan    Frequency of treatment 2 times/week     PT Duration 6 weeks     PT Cert From 02/04/22     PT Cert To 03/18/22     Date PT POC was sent to provider 02/04/22     Signed PT Plan of Care received?  No                Assessment and Plan - 02/04/22 0843        Assessment    Plan of Care reviewed and patient/family in agreement Yes     System Pathology/Pathophysiology Noted musculoskeletal     Functional Limitations in Following Categories (PT Eval) community/leisure;home management;self-care     Rehab Potential/Prognosis good, to achieve stated therapy goals     Problem List decreased endurance;decreased flexibility;decreased ROM;decreased strength;impaired balance;pain      Clinical Assessment Fide presents to PT for Re-evaluation. Subjectively she reports ~75% improvement. Objectively she demonstrates improvements in knee ROM, knee strength, and overall pain levels. She continues to be most limited with her eccentric quad control which has affected her ability to perform transfers and stairs. She also continues to demonstrate dec'd balance. Pt will benefit from skilled therapeutic intervention for 2x/week for 6 weeks to continue to focus on improving her eccentric quad control and functional mobiliy to help her progres towards her goals.     Plan and Recommendations POC per primary PT     Planned Services CPT 12912 Gait training;CPT 97474 Manual therapy;CPT 39460 Neuromuscular Reeducation;CPT 77876 Self-care/Home management training;CPT 29273 Therapeutic activities;CPT 68325 Therapeutic Massage;CPT 91335 Therapeutic exercises;CPT 49792 Vasopneumatic device therapy (Application of blood vessel compression or decompression device to 1 or more areas);CPT 84429 Electrical stimulation UNATTENDED;CPT 43387 Hot/Cold Packs therapy                   OBJECTIVE MEASUREMENTS/DATA:       Today's Treatment::      TESTS Performed  today         Time  Description     EVAL  11/1/2021    Progress note     12/6/2021 Progress note RA      2/4    30 min TA for re-eval   Weight bearing status  full  full         Precautions No kneeling           Pt eduation yes  yes         HEP  yes  yes      add step ups and step downs at home, plus rolling pin quad massage   OUTcome measure  KOS  LEFS  KOS  50/80  46/50  53/80   TOTAL TIME FOR SESSION     Minutes   AROM             Left knee  5 to 110 prone  0 to 120  same  0-128     Right knee 5 to 120  0 to 130  same  0-130                   PROM             Left knee 0 to 115  0 to 125  0 to 130  0-132     Right knee 0 to 125  0 to 135  0 to 135  0-135                   MMT             Left quad 3/5  4/5  same  4+/5     Right quad 4/5  4+/5  same  5/5                    Pat mobility             right good           left fair  fair    Mild pain with superior glide  motion inhibited by skin irriation and scab on scar   Swelling minimal  continues on left knee  skin irritation  continues  Improvement in skin since using tropical cream     Circumference  measurements Not taken           Pitting edema none    none                     Incision             Open or closed Left superior incision scabbed and red, right knee incision clean and healed  Left knee only: two  pencil size  Scabbed areas at proximal incision  left knee  Superior incision,    10/6 4 to 5 blotchy redden areas med,  Lateral, posterior, superior and inferior   drainage none  none drainage         hot warm  warm         Gait         TOTAL TIME FOR SESSION Not performed   gait  amb with single point cane,   no cane  Mild limp    no cane  Mild antalgic on L     stairs  step to step  step overt step up     Step to step down    Ascending step over step.  Able to descent step over step however with increased pain and dec'd control      Sit to stand   18 sec     Shoe wear  tie sneakers           Balance         TOTAL TIME FOR SESSION   Minutes   SLS  Right  left    Unable  unable    < 10  ,10     <10  <10 sec    3.08s  2.5s                   Modalities         TOTAL TIME FOR SESSION Not performed   cold             heat             VASO                 Exercises Performed today Sets/ Reps comments Current Session Time   Eval date    Teams 11/1/2021    yes  completed      Progress note  Due date 11/29/2021  completed      Re eval  Due date:        THER ACT        TOTAL TIME FOR SESSION     HEP        updated as noted    ROM sasha  no  L 130 R 125     THER EX  [id number]       TOTAL TIME FOR SESSION 23-37 Minutes              Bike: R  yes 10 min recumbant Seat #8    Treadmnll no 12 mn 1.5                  ROM/stretching       Prone quad stretch no 5x 30  Towel roll under thigh, floating the knee   HSS no 3x30s strap     Supine  PB  physio curls    Heel slides        Bridges  Quad set w heel prop  SLR  SLR circles  Hip flexor stretch    GSS@slant board  Clam in sidely  Hip abd in sidely  Side lying hip flexion/ext   no    no        no  No    no  no  No    yes    no  no  no   10s x 10    2 x 10        2 x 10 5s  5s x20    2x10 ea  5 x 5 each      2x1 min    2x10  2x10  X 10               p-ball      2#            green TB  Green TB                      Sitting  LAQ  LAQ band  Hamstring stretch  Knee flexion  Sit to stand   yes  no      yes   3 x 10  X 10      2 x10   4#  red      4# ball,stand on foam from hi/lo mat           standing       Hamstring stretch  3 x 30  At step   Hip flexion at the walll with wall support no 2 x 10     3 way hip no      Wall slides yes 3 x 10     Side lunge no X 10   top half   stairs no 1x Up reciprocal w/ HR down one at time w/o HR    Step up and over  Lateral up and over yes  yes     2x10 each  2x10 ea 4 inches  6 inches     Step up, lateral step down/up, step back no 2x10 each 6 inches    Hip abd std  Hip flex std yes  yes   2x10  2x10 GTB  GTB    TKE with green TB yes 2x10 GTB Focusing on eccentric quad slow and control    HR bilateral yes 2x10 B and U 2x10 each    Step downs No 2x10  2 in step   Step agility no 2 x 20  2 in step   NEURO RE-ED  [id number]       TOTAL TIME FOR SESSION    Not performed      biodex balance            blue foam no  3x30s   tandem     Side step w/ TB no 3 laps GTB     March with TB no 3 laps GTB     SLS [place] taps  no x5 each leg Gait belt, circles on floor surrounding pt    SLS no 3x10 sec Foam      rocker board    A/P , lateral X 30 ea taps  X 1 ea static    30 sec    GAIT TRAINING       TOTAL TIME FOR SESSION Not performed     amb         stairs  6 inch step up X 10 each    curbs       Out side       stepping over objects  no  low hurdles  2 laps each  fwd, alternate, side   MANUAL       TOTAL TIME FOR SESSION Not performed    STM/ retro grade massage           Jt mobs       PROM        Passive stretch       MODALITIES           TOTAL TIME FOR SESSION    vaso        ice pack  no                            Goals:  Goals     • PT stg/ltgs knee      Short Term Goals Time Frame Result Comment/Progress   Knee pain at rest will decrease by 50% 4 met    Knee extension PROM 0 degrees 4 met    Knee flexion AROM > 90 degrees 4 met    Pt will perform a SLR without a lag 4 met    Pt will ambulate into out center from the car independently with appropriate assistive device 4 met No assistive device   KOS > 65% 4 Not met 53      Long Term Goals Time Frame Result Comment/Progress   KOS >95% 12 wks Ongoing          Active knee ROM 0 to 125 12wks met      Quad and hip abductor strength 5/5 12wks Ongoing    amb community distances without assistive device 12wks met    Pt will pick object off floor with squatting and no UE support 12wks met    Manage stairs step over step with a rail 12 wks Not met Requires rail and has dec'd control during descent     Updated goals:  Perform 5x STS in less than 15 seconds  Descend stairs with step over step pattern with good quad control and minimal pain  Perform SLS for 5 seconds b/l Les               This 61 y.o. year old female presents to PT with above stated diagnosis. Physical Therapy evaluation reveals decreased endurance,decreased flexibility,decreased ROM,decreased strength,impaired balance,pain resulting in community/leisure,home management,self-care limitations. Fide Amin will benefit from skilled PT services to address limitation, work towards rehab and patient goals and maximize PLOF of chosen ADLs.     Planned Services: The patient's treatment will include CPT 65673 Gait training,CPT 27345 Manual therapy,CPT 14606 Neuromuscular Reeducation,CPT 33103 Self-care/Home management training,CPT 66131 Therapeutic activities,CPT 57497 Therapeutic Massage,CPT 69181 Therapeutic exercises,CPT 18491 Vasopneumatic device therapy (Application of blood vessel compression or  decompression device to 1 or more areas),CPT 57183 Electrical stimulation UNATTENDED,CPT 02443 Hot/Cold Packs therapy, .

## 2022-02-04 NOTE — OP PT TREATMENT LOG
TESTS Performed  today         Time  Description     EVAL  11/1/2021    Progress note     12/6/2021 Progress note RA      2/4    30 min TA for re-eval   Weight bearing status  full  full         Precautions No kneeling           Pt eduation yes  yes         HEP  yes  yes      add step ups and step downs at home, plus rolling pin quad massage   OUTcome measure  KOS  LEFS  KOS  50/80  46/50  53/80   TOTAL TIME FOR SESSION     Minutes   AROM             Left knee  5 to 110 prone  0 to 120  same  0-128     Right knee 5 to 120  0 to 130  same  0-130                   PROM             Left knee 0 to 115  0 to 125  0 to 130  0-132     Right knee 0 to 125  0 to 135  0 to 135  0-135                   MMT             Left quad 3/5  4/5  same  4+/5     Right quad 4/5  4+/5  same  5/5                   Pat mobility             right good           left fair  fair    Mild pain with superior glide  motion inhibited by skin irriation and scab on scar   Swelling minimal  continues on left knee  skin irritation  continues  Improvement in skin since using tropical cream     Circumference  measurements Not taken           Pitting edema none    none                     Incision             Open or closed Left superior incision scabbed and red, right knee incision clean and healed  Left knee only: two  pencil size  Scabbed areas at proximal incision  left knee  Superior incision,    10/6 4 to 5 blotchy redden areas med,  Lateral, posterior, superior and inferior   drainage none  none drainage         hot warm  warm         Gait         TOTAL TIME FOR SESSION Not performed   gait  amb with single point cane,   no cane  Mild limp    no cane  Mild antalgic on L     stairs  step to step  step overt step up     Step to step down    Ascending step over step.  Able to descent step over step however with increased pain and dec'd control      Sit to stand   18 sec     Shoe wear  tie sneakers           Balance         TOTAL TIME FOR SESSION    Minutes   SLS  Right  left    Unable  unable    < 10  ,10     <10  <10 sec    3.08s  2.5s                   Modalities         TOTAL TIME FOR SESSION Not performed   cold             heat             VASO                 Exercises Performed today Sets/ Reps comments Current Session Time   Eval date    Teams 11/1/2021    yes  completed      Progress note  Due date 11/29/2021  completed      Re eval  Due date:        THER ACT        TOTAL TIME FOR SESSION     HEP        updated as noted    ROM sasha  no  L 130 R 125     THER EX  [id number]       TOTAL TIME FOR SESSION 23-37 Minutes              Bike: R  yes 10 min recumbant Seat #8    Treadmnll no 12 mn 1.5                  ROM/stretching       Prone quad stretch no 5x 30  Towel roll under thigh, floating the knee   HSS no 3x30s strap     Supine  PB physio curls    Heel slides        Bridges  Quad set w heel prop  SLR  SLR circles  Hip flexor stretch    GSS@slant board  Clam in sidely  Hip abd in sidely  Side lying hip flexion/ext   no    no        no  No    no  no  No    yes    no  no  no   10s x 10    2 x 10        2 x 10 5s  5s x20    2x10 ea  5 x 5 each      2x1 min    2x10  2x10  X 10               p-ball      2#            green TB  Green TB                      Sitting  LAQ  LAQ band  Hamstring stretch  Knee flexion  Sit to stand   yes  no      yes   3 x 10  X 10      2 x10   4#  red      4# ball,stand on foam from hi/lo mat           standing       Hamstring stretch  3 x 30  At step   Hip flexion at the walll with wall support no 2 x 10     3 way hip no      Wall slides yes 3 x 10     Side lunge no X 10   top half   stairs no 1x Up reciprocal w/ HR down one at time w/o HR    Step up and over  Lateral up and over yes  yes     2x10 each  2x10 ea 4 inches  6 inches     Step up, lateral step down/up, step back no 2x10 each 6 inches    Hip abd std  Hip flex std yes  yes   2x10  2x10 GTB  GTB    TKE with green TB yes 2x10 GTB Focusing on eccentric quad slow and control     HR bilateral yes 2x10 B and U 2x10 each    Step downs No 2x10  2 in step   Step agility no 2 x 20  2 in step   NEURO RE-ED  [id number]       TOTAL TIME FOR SESSION    Not performed      biodex balance            blue foam no  3x30s   tandem     Side step w/ TB no 3 laps GTB     March with TB no 3 laps GTB     SLS [place] taps  no x5 each leg Gait belt, circles on floor surrounding pt    SLS no 3x10 sec Foam      rocker board    A/P , lateral X 30 ea taps  X 1 ea static    30 sec    GAIT TRAINING       TOTAL TIME FOR SESSION Not performed     amb         stairs  6 inch step up X 10 each    curbs       Out side       stepping over objects  no  low hurdles  2 laps each  fwd, alternate, side   MANUAL       TOTAL TIME FOR SESSION Not performed    STM/ retro grade massage           Jt mobs       PROM       Passive stretch       MODALITIES           TOTAL TIME FOR SESSION    vaso        ice pack  no

## 2022-02-04 NOTE — Clinical Note
[address] [address]  [phone]      Dear DR. Em,      Thank you for this referral. Please review the attached notes and plan of care for your approval.  Please contact our department with any questions.     Sincerely,     Mike Demarco, PT    Referring Provider: By co-signing this Plan of Care (POC) either electronically or physically you agree to the following:    I have reviewed the the Plan of Care established by the therapist within this document and certify that the services are skilled and medically necessary. I have reviewed the plan and recommend that these services continue to meet the goals stated in this document.       EXTERNAL PROVIDER FAXING BACK:    PHYSICIAN SIGNATURE: __________________________________     DATE: ___________________    TIME: _________    IMPORTANT:  If returning this Plan of Care by fax, please fax back ONLY the signature page.   _____________________________________________________________________    [facility] Fax: [phone]      PT RE-EVALUATION FOR OUTPATIENT THERAPY    Patient: Fide Amin   MRN: [id number]  : 1960 61 y.o.  Referring Physician: Bennett Em MD  Date of Visit: 2022      New Certification Dates: 22 through 22    Recommended Frequency & Duration:  2 times/week for up to 6 weeks          Diagnosis:   1. Presence of artificial knee joint, bilateral        Chief Complaints:  Chief Complaint   Patient presents with   • Balance Deficits   • Pain       Precautions:   Existing Precautions/Restrictions: no known precautions/restrictions    TODAY'S VISIT:    Time In Session:  Start Time: 0800  Stop Time: 0900  Time Calculation (min): 60 min   General Information - 22 0808        Session Details    Document Type re-evaluation     Mode of Treatment physical therapy     Patient/Family/Caregiver Comments/Observations Pt presents for PT re-eval. Since starting physical therapy she feels she has made  ~75% improvement.  She feels she has made most improvement in her ROM.  She reports her biggest issue is transitioning between positions such as sitting to standing and it causes aching pain in her quad. She also reports she continues to have difficulty with descending steps using step over step pattern.     [facility]        General Information    Onset of Illness/Injury or Date of Surgery 09/20/21     Referring Physician Dr. Em     Pertinent History of Current Functional Problem Pt reports mild pain, mostly stiffness, pt concerned about being behind due to comoplications post op and is rushing to get better.  Discussed the importance of safety and realistic time lines with pt     Existing Precautions/Restrictions no known precautions/restrictions        PT Time Calculation    Start Time 0800     Stop Time 0900     Time Calculation (min) 60 min                Daily Falls Screen - 02/04/22 0808        Daily Falls Assessment    Patient reported fall since last visit No                Pain/Vitals - 02/04/22 0808        Pain Assessment    Currently in pain Yes     Pain Side/Orientation bilateral     Pain: Body location Knee     Pain Rating (0-10): Pre Activity 1     Pain Rating (0-10): Activity 3   Descending stairs on L    Pain Rating (0-10): Post Activity 1     Pain Description aching   stiffness       Pain Intervention    Intervention  TE, NMRE     Post Intervention Comments Monitored                PT - 02/04/22 0808        Physical Therapy    Physical Therapy Ortho        PT Plan    Frequency of treatment 2 times/week     PT Duration 6 weeks     PT Cert From 02/04/22     PT Cert To 03/18/22     Date PT POC was sent to provider 02/04/22     Signed PT Plan of Care received?  No                Assessment and Plan - 02/04/22 0843        Assessment    Plan of Care reviewed and patient/family in agreement Yes     System Pathology/Pathophysiology Noted musculoskeletal     Functional Limitations in  Following Categories (PT Eval) community/leisure;home management;self-care     Rehab Potential/Prognosis good, to achieve stated therapy goals     Problem List decreased endurance;decreased flexibility;decreased ROM;decreased strength;impaired balance;pain     Clinical Assessment Fide presents to PT for Re-evaluation. Subjectively she reports ~75% improvement. Objectively she demonstrates improvements in knee ROM, knee strength, and overall pain levels. She continues to be most limited with her eccentric quad control which has affected her ability to perform transfers and stairs. She also continues to demonstrate dec'd balance. Pt will benefit from skilled therapeutic intervention for 2x/week for 6 weeks to continue to focus on improving her eccentric quad control and functional mobiliy to help her progres towards her goals.     Plan and Recommendations POC per primary PT     Planned Services CPT 51541 Gait training;CPT 55061 Manual therapy;CPT 42417 Neuromuscular Reeducation;CPT 61010 Self-care/Home management training;CPT 87697 Therapeutic activities;CPT 30634 Therapeutic Massage;CPT 81681 Therapeutic exercises;CPT 23002 Vasopneumatic device therapy (Application of blood vessel compression or decompression device to 1 or more areas);CPT 48685 Electrical stimulation UNATTENDED;CPT 02127 Hot/Cold Packs therapy                   OBJECTIVE MEASUREMENTS/DATA:       Today's Treatment::      TESTS Performed  today         Time  Description     EVAL  11/1/2021    Progress note     12/6/2021 Progress note RA      2/4    30 min TA for re-eval   Weight bearing status  full  full         Precautions No kneeling           Pt eduation yes  yes         HEP  yes  yes      add step ups and step downs at home, plus rolling pin quad massage   OUTcome measure  KOS  LEFS  KOS  50/80  46/50  53/80   TOTAL TIME FOR SESSION     Minutes   AROM             Left knee  5 to 110 prone  0 to 120  same  0-128     Right knee 5 to 120  0 to 130   same  0-130                   PROM             Left knee 0 to 115  0 to 125  0 to 130  0-132     Right knee 0 to 125  0 to 135  0 to 135  0-135                   MMT             Left quad 3/5  4/5  same  4+/5     Right quad 4/5  4+/5  same  5/5                   Pat mobility             right good           left fair  fair    Mild pain with superior glide  motion inhibited by skin irriation and scab on scar   Swelling minimal  continues on left knee  skin irritation  continues  Improvement in skin since using tropical cream     Circumference  measurements Not taken           Pitting edema none    none                     Incision             Open or closed Left superior incision scabbed and red, right knee incision clean and healed  Left knee only: two  pencil size  Scabbed areas at proximal incision  left knee  Superior incision,    10/6 4 to 5 blotchy redden areas med,  Lateral, posterior, superior and inferior   drainage none  none drainage         hot warm  warm         Gait         TOTAL TIME FOR SESSION Not performed   gait  amb with single point cane,   no cane  Mild limp    no cane  Mild antalgic on L     stairs  step to step  step overt step up     Step to step down    Ascending step over step.  Able to descent step over step however with increased pain and dec'd control      Sit to stand   18 sec     Shoe wear  tie sneakers           Balance         TOTAL TIME FOR SESSION   Minutes   SLS  Right  left    Unable  unable    < 10  ,10     <10  <10 sec    3.08s  2.5s                   Modalities         TOTAL TIME FOR SESSION Not performed   cold             heat             VASO                 Exercises Performed today Sets/ Reps comments Current Session Time   Eval date    Teams 11/1/2021    yes  completed      Progress note  Due date 11/29/2021  completed      Re eval  Due date:        THER ACT        TOTAL TIME FOR SESSION     HEP        updated as noted    ROM sasha  no  L 130 R 125     THER EX  [id number]        TOTAL TIME FOR SESSION 23-37 Minutes              Bike: R  yes 10 min recumbant Seat #8    Treadmnll no 12 mn 1.5                  ROM/stretching       Prone quad stretch no 5x 30  Towel roll under thigh, floating the knee   HSS no 3x30s strap     Supine  PB physio curls    Heel slides        Bridges  Quad set w heel prop  SLR  SLR circles  Hip flexor stretch    GSS@slant board  Clam in sidely  Hip abd in sidely  Side lying hip flexion/ext   no    no        no  No    no  no  No    yes    no  no  no   10s x 10    2 x 10        2 x 10 5s  5s x20    2x10 ea  5 x 5 each      2x1 min    2x10  2x10  X 10               p-ball      2#            green TB  Green TB                      Sitting  LAQ  LAQ band  Hamstring stretch  Knee flexion  Sit to stand   yes  no      yes   3 x 10  X 10      2 x10   4#  red      4# ball,stand on foam from hi/lo mat           standing       Hamstring stretch  3 x 30  At step   Hip flexion at the walll with wall support no 2 x 10     3 way hip no      Wall slides yes 3 x 10     Side lunge no X 10   top half   stairs no 1x Up reciprocal w/ HR down one at time w/o HR    Step up and over  Lateral up and over yes  yes     2x10 each  2x10 ea 4 inches  6 inches     Step up, lateral step down/up, step back no 2x10 each 6 inches    Hip abd std  Hip flex std yes  yes   2x10  2x10 GTB  GTB    TKE with green TB yes 2x10 GTB Focusing on eccentric quad slow and control    HR bilateral yes 2x10 B and U 2x10 each    Step downs No 2x10  2 in step   Step agility no 2 x 20  2 in step   NEURO RE-ED  [id number]       TOTAL TIME FOR SESSION    Not performed      biodex balance            blue foam no  3x30s   tandem     Side step w/ TB no 3 laps GTB     March with TB no 3 laps GTB     SLS [place] taps  no x5 each leg Gait belt, circles on floor surrounding pt    SLS no 3x10 sec Foam      rocker board    A/P , lateral X 30 ea taps  X 1 ea static    30 sec    GAIT TRAINING       TOTAL TIME FOR SESSION Not performed      amb         stairs  6 inch step up X 10 each    curbs       Out side       stepping over objects  no  low hurdles  2 laps each  fwd, alternate, side   MANUAL       TOTAL TIME FOR SESSION Not performed    STM/ retro grade massage           Jt mobs       PROM       Passive stretch       MODALITIES           TOTAL TIME FOR SESSION    vaso        ice pack  no                            Goals:  Goals     • PT stg/ltgs knee      Short Term Goals Time Frame Result Comment/Progress   Knee pain at rest will decrease by 50% 4 met    Knee extension PROM 0 degrees 4 met    Knee flexion AROM > 90 degrees 4 met    Pt will perform a SLR without a lag 4 met    Pt will ambulate into out center from the car independently with appropriate assistive device 4 met No assistive device   KOS > 65% 4 Not met 53      Long Term Goals Time Frame Result Comment/Progress   KOS >95% 12 wks Ongoing          Active knee ROM 0 to 125 12wks met      Quad and hip abductor strength 5/5 12wks Ongoing    amb community distances without assistive device 12wks met    Pt will pick object off floor with squatting and no UE support 12wks met    Manage stairs step over step with a rail 12 wks Not met Requires rail and has dec'd control during descent     Updated goals:  Perform 5x STS in less than 15 seconds  Descend stairs with step over step pattern with good quad control and minimal pain  Perform SLS for 5 seconds b/l Les               This 61 y.o. year old female presents to PT with above stated diagnosis. Physical Therapy evaluation reveals decreased endurance,decreased flexibility,decreased ROM,decreased strength,impaired balance,pain resulting in community/leisure,home management,self-care limitations. Fide Amin will benefit from skilled PT services to address limitation, work towards rehab and patient goals and maximize PLOF of chosen ADLs.     Planned Services: The patient's treatment will include CPT 03081 Gait training,CPT 33002 Manual  therapy,CPT 07773 Neuromuscular Reeducation,CPT 30084 Self-care/Home management training,CPT 53819 Therapeutic activities,CPT 32585 Therapeutic Massage,CPT 77140 Therapeutic exercises,CPT 52382 Vasopneumatic device therapy (Application of blood vessel compression or decompression device to 1 or more areas),CPT 89587 Electrical stimulation UNATTENDED,CPT 22394 Hot/Cold Packs therapy, .

## 2022-02-07 ENCOUNTER — HOSPITAL ENCOUNTER (OUTPATIENT)
Dept: PHYSICAL THERAPY | Age: 62
Setting detail: THERAPIES SERIES
Discharge: HOME | End: 2022-02-07
Attending: ORTHOPAEDIC SURGERY
Payer: COMMERCIAL

## 2022-02-07 DIAGNOSIS — Z96.653 PRESENCE OF ARTIFICIAL KNEE JOINT, BILATERAL: Primary | ICD-10-CM

## 2022-02-07 PROCEDURE — 97110 THERAPEUTIC EXERCISES: CPT | Mod: GP,CQ

## 2022-02-07 NOTE — PROGRESS NOTES
PT DAILY NOTE FOR OUTPATIENT THERAPY    Patient: Fide Amin   MRN: [id number]  : 1960 61 y.o.  Referring Physician: Bennett Em MD  Date of Visit: 2022    Certification Dates: 22 through 22    Diagnosis:   1. Presence of artificial knee joint, bilateral        Chief Complaints:  weakness, left knee skin irritation    Precautions:   Precautions comments: seizures  Existing Precautions/Restrictions: no known precautions/restrictions     TODAY'S VISIT    Time In Session:  Start Time: 907  Stop Time: 1005  Time Calculation (min): 58 min   History/Vitals/Pain/Encounter Info - 22 0920        Injury History/Precautions/Daily Required Info    Document Type daily treatment     Primary Therapist Lisa Kimbrough DPT [facility]     Chief Complaint/Reason for Visit  weakness, left knee skin irritation     Onset of Illness/Injury or Date of Surgery 21     Referring Physician Dr. Em     Existing Precautions/Restrictions no known precautions/restrictions     Precautions comments seizures     Pertinent History of Current Functional Problem Pt reports mild pain, mostly stiffness, pt concerned about being behind due to comoplications post op and is rushing to get better.  Discussed the importance of safety and realistic time lines with pt     OP Specialty Orthopedics     Patient/Family/Caregiver Comments/Observations Pt reports achiness in B knees and increases in L knee achiness when performing sit to stands and descending stairs but denies pain.     Patient reported fall since last visit No     Start Time 907        Pain Assessment    Currently in pain No/Denies        Pain Intervention    Intervention  na     Post Intervention Comments na                Daily Treatment Assessment and Plan - 22 0920        Daily Treatment Assessment and Plan    Progress toward goals Progressing     Daily Outcome Summary Pt reports performing floor transfer at home last week had to move chair to  the R side to decrease WB on L knee to stand up. Continued w/ recumbant bike x 10 mins followed by TM x 12 mins at 2.0 speed, VC to maintain upright posture to avoid rounded shoulders. Pt performed LE stretches, strengthening ex's, and step ex's as noted on activity log. Modified sit to stands w/ 4# ball emphasizing eccentric sit down for quad control. Added lateral heel taps on 4 in step standing on LLE only, Demo/VC/TC needed to correct form and resisted YTB into varus to faciliate neutral L knee alignment, B/L handheld support prn. Pt reports quad muscle fatigue after the treatment but denies pain in L knee.     Plan and Recommendations Continue with eccentric quad control with step ex's NV.                     Today's Treatment:    Exercises Performed today Sets/ Reps comments Current Session Time   Eval date    Teams 11/1/2021    yes  completed      Progress note  Due date 11/29/2021  completed      Re eval  Due date:        THER ACT        TOTAL TIME FOR SESSION     HEP        updated as noted    ROM sasha  no  L 130 R 125     THER EX  [id number]       TOTAL TIME FOR SESSION 53-67 Minutes              Bike: R  yes 10 min recumbant Seat #8    Treadmnll yes 12 mn 2.0                  ROM/stretching       Prone quad stretch no 5x 30  Towel roll under thigh, floating the knee   HSS no 3x30s strap     Supine  PB physio curls    Heel slides        Bridges  Quad set w heel prop  SLR  SLR circles  Hip flexor stretch    GSS@slant board  Clam in sidely  Hip abd in sidely  Side lying hip flexion/ext   no    no        no  No    no  no  No    yes    no  no  no   10s x 10    2 x 10        2 x 10 5s  5s x20    2x10 ea  5 x 5 each      2x1 min    2x10  2x10  X 10               p-ball      2#            green TB  Green TB                      Sitting  LAQ  LAQ band  Hamstring stretch  Knee flexion  Sit to stand   yes  no      yes   3 x 10  X 10      2 x10   4#  red      4# ball, eccentric            standing       Hamstring stretch   3 x 30  At step   Hip flexion at the walll with wall support no 2 x 10     3 way hip no      Wall slides no 3 x 10     Side lunge no X 10   top half   stairs no 1x Up reciprocal w/ HR down one at time w/o HR    Step up and over  Lateral up and over yes  yes     2x10 each  2x10 each 4 inches  6 inches     Lateral heel taps yes 3x10 4 inches Mirror, YTB pulling knee into valgus   Step up, lateral step down/up, step back no 2x10 each 6 inches    Hip abd std  Hip flex std yes  yes   2x10  2x10 GTB  GTB    HR bilateral yes 2x10 B and U 2x10 each    Step downs No 2x10  2 in step   Step agility no 2 x 20  2 in step   NEURO RE-ED  [id number]       TOTAL TIME FOR SESSION    Billed w/ TE      biodex balance            blue foam yes  3x30s   tandem     Side step w/ TB no 3 laps GTB     March with TB no 3 laps GTB     SLS [place] taps  no x5 each leg Gait belt, circles on floor surrounding pt    SLS no 3x10 sec Foam      rocker board    A/P , lateral X 30 ea taps  X 1 ea static    30 sec    GAIT TRAINING       TOTAL TIME FOR SESSION Not performed     amb         stairs  6 inch step up X 10 each    curbs       Out side       stepping over objects  no  low hurdles  2 laps each  fwd, alternate, side   MANUAL       TOTAL TIME FOR SESSION Not performed    STM/ retro grade massage           Jt mobs       PROM       Passive stretch       MODALITIES           TOTAL TIME FOR SESSION    vaso        ice pack  no                  I attest that I was present and directed all activities, made all clinical decisions and was responsible for all assessments while Margo HUGGINS ([facility]) participated or observed partially in the treatment session of this patient.  Jasmyne Bonilla BS, PTA  License # [id number]

## 2022-02-07 NOTE — OP PT TREATMENT LOG
Exercises Performed today Sets/ Reps comments Current Session Time   Eval date    Teams 11/1/2021    yes  completed      Progress note  Due date 11/29/2021  completed      Re eval  Due date:        THER ACT        TOTAL TIME FOR SESSION     HEP        updated as noted    ROM sasha  no  L 130 R 125     THER EX  [id number]       TOTAL TIME FOR SESSION 53-67 Minutes              Bike: R  yes 10 min recumbant Seat #8    Treadmnll yes 12 mn 2.0                  ROM/stretching       Prone quad stretch no 5x 30  Towel roll under thigh, floating the knee   HSS no 3x30s strap     Supine  PB physio curls    Heel slides        Bridges  Quad set w heel prop  SLR  SLR circles  Hip flexor stretch    GSS@slant board  Clam in sidely  Hip abd in sidely  Side lying hip flexion/ext   no    no        no  No    no  no  No    yes    no  no  no   10s x 10    2 x 10        2 x 10 5s  5s x20    2x10 ea  5 x 5 each      2x1 min    2x10  2x10  X 10               p-ball      2#            green TB  Green TB                      Sitting  LAQ  LAQ band  Hamstring stretch  Knee flexion  Sit to stand   yes  no      yes   3 x 10  X 10      2 x10   4#  red      4# ball, eccentric            standing       Hamstring stretch  3 x 30  At step   Hip flexion at the walll with wall support no 2 x 10     3 way hip no      Wall slides no 3 x 10     Side lunge no X 10   top half   stairs no 1x Up reciprocal w/ HR down one at time w/o HR    Step up and over  Lateral up and over yes  yes     2x10 each  2x10 each 4 inches  6 inches     Lateral heel taps yes 3x10 4 inches Mirror, YTB pulling knee into valgus   Step up, lateral step down/up, step back no 2x10 each 6 inches    Hip abd std  Hip flex std yes  yes   2x10  2x10 GTB  GTB    HR bilateral yes 2x10 B and U 2x10 each    Step downs No 2x10  2 in step   Step agility no 2 x 20  2 in step   NEURO RE-ED  [id number]       TOTAL TIME FOR SESSION    Billed w/ TE      biodex balance            blue foam yes  3x30s   tandem      Side step w/ TB no 3 laps GTB     March with TB no 3 laps GTB     SLS [place] taps  no x5 each leg Gait belt, circles on floor surrounding pt    SLS no 3x10 sec Foam      rocker board    A/P , lateral X 30 ea taps  X 1 ea static    30 sec    GAIT TRAINING       TOTAL TIME FOR SESSION Not performed     amb         stairs  6 inch step up X 10 each    curbs       Out side       stepping over objects  no  low hurdles  2 laps each  fwd, alternate, side   MANUAL       TOTAL TIME FOR SESSION Not performed    STM/ retro grade massage           Jt mobs       PROM       Passive stretch       MODALITIES           TOTAL TIME FOR SESSION    vaso        ice pack  no                  I attest that I was present and directed all activities, made all clinical decisions and was responsible for all assessments while Margo HUGGINS ([facility]) participated or observed partially in the treatment session of this patient.  Jasmyne Bonilla BS, PTA  License # [id number]

## 2022-02-11 ENCOUNTER — HOSPITAL ENCOUNTER (OUTPATIENT)
Dept: PHYSICAL THERAPY | Age: 62
Setting detail: THERAPIES SERIES
Discharge: HOME | End: 2022-02-11
Attending: ORTHOPAEDIC SURGERY
Payer: COMMERCIAL

## 2022-02-11 DIAGNOSIS — Z96.653 PRESENCE OF ARTIFICIAL KNEE JOINT, BILATERAL: Primary | ICD-10-CM

## 2022-02-11 PROCEDURE — 97110 THERAPEUTIC EXERCISES: CPT | Mod: GP,CQ

## 2022-02-11 NOTE — PROGRESS NOTES
"PT DAILY NOTE FOR OUTPATIENT THERAPY    Patient: Fide Amin   MRN: [id number]  : 1960 61 y.o.  Referring Physician: Bennett Em MD  Date of Visit: 2022    Certification Dates: 22 through 22    Diagnosis:   1. Presence of artificial knee joint, bilateral        Chief Complaints:  weakness, left knee skin irritation    Precautions:   Precautions comments: seizures  Existing Precautions/Restrictions: no known precautions/restrictions     TODAY'S VISIT    Time In Session:  Start Time: 904  Stop Time: 958  Time Calculation (min): 54 min   History/Vitals/Pain/Encounter Info - 22        Injury History/Precautions/Daily Required Info    Document Type daily treatment     Primary Therapist Lisa Kimbrough DPT [facility]     Chief Complaint/Reason for Visit  weakness, left knee skin irritation     Onset of Illness/Injury or Date of Surgery 21     Referring Physician Dr. Em     Existing Precautions/Restrictions no known precautions/restrictions     Precautions comments seizures     Pertinent History of Current Functional Problem Pt reports mild pain, mostly stiffness, pt concerned about being behind due to comoplications post op and is rushing to get better.  Discussed the importance of safety and realistic time lines with pt     OP Specialty Orthopedics     Patient/Family/Caregiver Comments/Observations Pt reports increase in L knee pain 3/10 after getting up and down off floor \"too many\" times w/ grandchildren yesterday.     Patient reported fall since last visit No        Pain Assessment    Currently in pain Yes     Preferred Pain Scale number (Numeric Rating Pain Scale)     Pain Side/Orientation left     Pain: Body location Knee     Pain Rating (0-10): Pre Activity 3     Pain Rating (0-10): Post Activity 1        Pain Intervention    Intervention  TE     Post Intervention Comments see assessment                Daily Treatment Assessment and Plan - 22        " Daily Treatment Assessment and Plan    Progress toward goals Progressing     Daily Outcome Summary Pt reports increased pain w/ walking and had to descend stairs at home w/ step to pattern. Continued w/ recumbant bike x 12 mins. Pt performed LE stretches and table strengthening ex's as noted in activity log. Pt reports L knee is achey and sore w/ exercises but no increase in pain. Advised pt to apply CP to L knee at home. Pt reports 1/10 pain/soreness at the end of the session.     Plan and Recommendations Continue with eccentric quad control with strengthening ex's and step downs NV.                     Today's Treatment:    Exercises Performed today Sets/ Reps comments Current Session Time   Eval date    Teams 11/1/2021    yes  completed      Progress note  Due date 11/29/2021  completed      Re eval  Due date:        THER ACT        TOTAL TIME FOR SESSION     HEP        updated as noted    ROM sasha  no  L 130 R 125     THER EX  [id number]       TOTAL TIME FOR SESSION 53-67 Minutes              Bike: R  yes 12 min recumbant Seat #8    Treadmnll no 12 mn 2.0                  ROM/stretching       Prone quad stretch yes 5x 30  Towel roll under thigh, floating the knee   HSS yes 3x30s strap     Supine  PB physio curls    Heel slides        Bridges  Quad set w heel prop  SLR w/ QS  SLR circles  Hip flexor stretch    GSS@slant board  Clam in sidely  Hip abd in sidely  Side lying hip flexion/ext   yes    yes        yes  No    yes  no  No    yes    yes  yes  no   10s x 10    1 x 10        2 x 10 5s  5s x20    3x10   5 x 5 each      2x1 min    3x10  3x10  X 10               p-ball      2#            green TB  Green TB                      Sitting  LAQ  LAQ band  Hamstring stretch  Knee flexion  Sit to stand   yes  no      no   3 x 10  X 10      2 x10   4#  red      4# ball, eccentric            standing       Hamstring stretch  3 x 30  At step   Hip flexion at the walll with wall support no 2 x 10     3 way hip no      Wall  slides no 3 x 10     Side lunge no X 10   top half   stairs no 1x Up reciprocal w/ HR down one at time w/o HR    Step up and over  Lateral up and over no  no     2x10 each  2x10 each 4 inches  6 inches     Lateral heel taps no 3x10 4 inches Mirror, YTB pulling knee into valgus   Step up, lateral step down/up, step back no 2x10 each 6 inches    Hip abd std  Hip flex std no  no   2x10  2x10 GTB  GTB    HR bilateral no 2x10 B and U 2x10 each    Step downs No 2x10  2 in step   Step agility no 2 x 20  2 in step   NEURO RE-ED  [id number]       TOTAL TIME FOR SESSION    Not performed        biodex balance            blue foam no  3x30s   tandem     Side step w/ TB no 3 laps GTB     March with TB no 3 laps GTB     SLS [place] taps  no x5 each leg Gait belt, circles on floor surrounding pt    SLS no 3x10 sec Foam      rocker board    A/P , lateral X 30 ea taps  X 1 ea static    30 sec    GAIT TRAINING       TOTAL TIME FOR SESSION Not performed     amb         stairs  6 inch step up X 10 each    curbs       Out side       stepping over objects  no  low hurdles  2 laps each  fwd, alternate, side   MANUAL       TOTAL TIME FOR SESSION Not performed    STM/ retro grade massage           Jt mobs       PROM       Passive stretch       MODALITIES           TOTAL TIME FOR SESSION    vaso        ice pack  no                  I attest that I was present and directed all activities, made all clinical decisions and was responsible for all assessments while Margo HUGGINS ([facility]) participated or observed partially in the treatment session of this patient.  Jasmyne BLAIR, PTA  License # TE [id number]

## 2022-02-11 NOTE — OP PT TREATMENT LOG
Exercises Performed today Sets/ Reps comments Current Session Time   Eval date    Teams 11/1/2021    yes  completed      Progress note  Due date 11/29/2021  completed      Re eval  Due date:        THER ACT        TOTAL TIME FOR SESSION     HEP        updated as noted    ROM sasha  no  L 130 R 125     THER EX  [id number]       TOTAL TIME FOR SESSION 53-67 Minutes              Bike: R  yes 12 min recumbant Seat #8    Treadmnll no 12 mn 2.0                  ROM/stretching       Prone quad stretch yes 5x 30  Towel roll under thigh, floating the knee   HSS yes 3x30s strap     Supine  PB physio curls    Heel slides        Bridges  Quad set w heel prop  SLR w/ QS  SLR circles  Hip flexor stretch    GSS@slant board  Clam in sidely  Hip abd in sidely  Side lying hip flexion/ext   yes    yes        yes  No    yes  no  No    yes    yes  yes  no   10s x 10    1 x 10        2 x 10 5s  5s x20    3x10   5 x 5 each      2x1 min    3x10  3x10  X 10               p-ball      2#            green TB  Green TB                      Sitting  LAQ  LAQ band  Hamstring stretch  Knee flexion  Sit to stand   yes  no      no   3 x 10  X 10      2 x10   4#  red      4# ball, eccentric            standing       Hamstring stretch  3 x 30  At step   Hip flexion at the walll with wall support no 2 x 10     3 way hip no      Wall slides no 3 x 10     Side lunge no X 10   top half   stairs no 1x Up reciprocal w/ HR down one at time w/o HR    Step up and over  Lateral up and over no  no     2x10 each  2x10 each 4 inches  6 inches     Lateral heel taps no 3x10 4 inches Mirror, YTB pulling knee into valgus   Step up, lateral step down/up, step back no 2x10 each 6 inches    Hip abd std  Hip flex std no  no   2x10  2x10 GTB  GTB    HR bilateral no 2x10 B and U 2x10 each    Step downs No 2x10  2 in step   Step agility no 2 x 20  2 in step   NEURO RE-ED  [id number]       TOTAL TIME FOR SESSION    Not performed        biodex balance            blue foam no  3x30s    tandem     Side step w/ TB no 3 laps GTB     March with TB no 3 laps GTB     SLS [place] taps  no x5 each leg Gait belt, circles on floor surrounding pt    SLS no 3x10 sec Foam      rocker board    A/P , lateral X 30 ea taps  X 1 ea static    30 sec    GAIT TRAINING       TOTAL TIME FOR SESSION Not performed     amb         stairs  6 inch step up X 10 each    curbs       Out side       stepping over objects  no  low hurdles  2 laps each  fwd, alternate, side   MANUAL       TOTAL TIME FOR SESSION Not performed    STM/ retro grade massage           Jt mobs       PROM       Passive stretch       MODALITIES           TOTAL TIME FOR SESSION    vaso        ice pack  no                  I attest that I was present and directed all activities, made all clinical decisions and was responsible for all assessments while Margo HUGGINS ([facility]) participated or observed partially in the treatment session of this patient.  Jasmyne BLAIR, PTA  License # TE [id number]

## 2022-02-15 ENCOUNTER — HOSPITAL ENCOUNTER (OUTPATIENT)
Dept: PHYSICAL THERAPY | Age: 62
Setting detail: THERAPIES SERIES
Discharge: HOME | End: 2022-02-15
Attending: ORTHOPAEDIC SURGERY
Payer: COMMERCIAL

## 2022-02-15 DIAGNOSIS — Z96.653 PRESENCE OF ARTIFICIAL KNEE JOINT, BILATERAL: Primary | ICD-10-CM

## 2022-02-15 PROCEDURE — 97110 THERAPEUTIC EXERCISES: CPT | Mod: GP | Performed by: PHYSICAL THERAPIST

## 2022-02-15 PROCEDURE — 97140 MANUAL THERAPY 1/> REGIONS: CPT | Mod: GP | Performed by: PHYSICAL THERAPIST

## 2022-02-15 NOTE — PROGRESS NOTES
PT DAILY NOTE FOR OUTPATIENT THERAPY    Patient: Fide Amin   MRN: [id number]  : 1960 61 y.o.  Referring Physician: Bennett Em MD  Date of Visit: 2/15/2022    Certification Dates: 22 through 22    Diagnosis:   1. Presence of artificial knee joint, bilateral        Chief Complaints:  weakness, left knee skin irritation    Precautions:   Existing Precautions/Restrictions: no known precautions/restrictions     TODAY'S VISIT    Time In Session:  Start Time: 1100  Stop Time: 1200  Time Calculation (min): 60 min   History/Vitals/Pain/Encounter Info - 02/15/22 1120        Injury History/Precautions/Daily Required Info    Document Type daily treatment     Primary Therapist Lisa Kimbrough DPT [facility]     Chief Complaint/Reason for Visit  weakness, left knee skin irritation     Onset of Illness/Injury or Date of Surgery 21     Referring Physician Dr. mE     Existing Precautions/Restrictions no known precautions/restrictions     Pertinent History of Current Functional Problem Pt reports mild pain, mostly stiffness, pt concerned about being behind due to comoplications post op and is rushing to get better.  Discussed the importance of safety and realistic time lines with pt     Patient/Family/Caregiver Comments/Observations Pt reports having the most difficutly and pain with squats, stairs     Patient reported fall since last visit No        Pain Assessment    Currently in pain Yes     Preferred Pain Scale number (Numeric Rating Pain Scale)     Pain Side/Orientation left     Pain: Body location Knee     Pain Rating (0-10): Pre Activity 3     Pain Rating (0-10): Post Activity 1     Pain Description intermittent        Pain Intervention    Intervention  ther ex, manual     Post Intervention Comments reduced sxs                Daily Treatment Assessment and Plan - 02/15/22 1531        Daily Treatment Assessment and Plan    Progress toward goals Progressing     Daily Outcome Summary sxs  reduced post treatment, quad muscle tightness and itb tightness resolved with stm     Plan and Recommendations Continue with stm to quad and itb at home, slr f/b quad stretch order of hep                     OBJECTIVE DATA TAKEN TODAY:    None taken    Today's Treatment:    Exercises Performed today Sets/ Reps comments Current Session Time   Eval date    Teams 11/1/2021    yes  completed      Progress note  Due date 11/29/2021  completed      Re eval  Due date: 2/4/2022  To 3/18/2022       THER ACT        TOTAL TIME FOR SESSION     HEP        updated as noted    ROM sasha  no  L 130 R 125     THER EX  [id number]       TOTAL TIME FOR SESSION 45 min              Bike: R  yes 12 min recumbant Seat #8    Treadmnll no 12 mn 2.0                  ROM/stretching       Prone quad stretch yes 5x 30  Towel roll under thigh, floating the knee   HSS yes 3x30s strap     Supine  PB physio curls    Heel slides        Bridges  Quad set w heel prop  SLR w/ QS  SLR circles  Hip flexor stretch    GSS@slant board  Clam in sidely  Hip abd in sidely  Side lying hip flexion/ext   yes    yes        yes  No    yes  no  No    yes    yes  yes  no   10s x 10    1 x 10        2 x 10 5s  5s x20    3x10   5 x 5 each      2x1 min    3x10  3x10  X 10               p-ball      2#            green TB  Green TB                      Sitting  LAQ  LAQ band  Hamstring stretch  Knee flexion  Sit to stand   yes  no      no   3 x 10  X 10      2 x10   4#  red      4# ball, eccentric            standing       Hamstring stretch  3 x 30  At step   Hip flexion at the walll with wall support no 2 x 10     3 way hip no      Wall slides no 3 x 10     Side lunge no X 10   top half   stairs no 1x Up reciprocal w/ HR down one at time w/o HR    Step up and over  Lateral up and over no  no     2x10 each  2x10 each 4 inches  6 inches     Lateral heel taps no 3x10 4 inches Mirror, YTB pulling knee into valgus   Step up, lateral step down/up, step back no 2x10 each 6 inches     Hip abd std  Hip flex std no  no   2x10  2x10 GTB  GTB    HR bilateral no 2x10 B and U 2x10 each    Step downs No 2x10  2 in step   Step agility no 2 x 20  2 in step   NEURO RE-ED  [id number]       TOTAL TIME FOR SESSION    Not performed        biodex balance            blue foam no  3x30s   tandem     Side step w/ TB no 3 laps GTB     March with TB no 3 laps GTB     SLS [place] taps  no x5 each leg Gait belt, circles on floor surrounding pt    SLS no 3x10 sec Foam      rocker board    A/P , lateral X 30 ea taps  X 1 ea static    30 sec    GAIT TRAINING       TOTAL TIME FOR SESSION Not performed     amb         stairs  6 inch step up X 10 each    curbs       Out side       stepping over objects  no  low hurdles  2 laps each  fwd, alternate, side   MANUAL       TOTAL TIME FOR SESSION     STM/ retro grade massage  yes      quad ms, quad tendon, itb   Jt mobs yes      PROM       Passive stretch yes      MODALITIES           TOTAL TIME FOR SESSION    vaso        ice pack  no                  I attest that I was present and directed all activities, made all clinical decisions and was responsible for all assessments while Margo HUGGINS ([facility]) participated or observed partially in the treatment session of this patient.  Jasmyne Bonilla BS, PTA  License # TE [id number]

## 2022-02-15 NOTE — OP PT TREATMENT LOG
Exercises Performed today Sets/ Reps comments Current Session Time   Eval date    Teams 11/1/2021    yes  completed      Progress note  Due date 11/29/2021  completed      Re eval  Due date: 2/4/2022  To 3/18/2022       THER ACT        TOTAL TIME FOR SESSION     HEP        updated as noted    ROM sasha  no  L 130 R 125     THER EX  [id number]       TOTAL TIME FOR SESSION 45 min              Bike: R  yes 12 min recumbant Seat #8    Treadmnll no 12 mn 2.0                  ROM/stretching       Prone quad stretch yes 5x 30  Towel roll under thigh, floating the knee   HSS yes 3x30s strap     Supine  PB physio curls    Heel slides        Bridges  Quad set w heel prop  SLR w/ QS  SLR circles  Hip flexor stretch    GSS@slant board  Clam in sidely  Hip abd in sidely  Side lying hip flexion/ext   yes    yes        yes  No    yes  no  No    yes    yes  yes  no   10s x 10    1 x 10        2 x 10 5s  5s x20    3x10   5 x 5 each      2x1 min    3x10  3x10  X 10               p-ball      2#            green TB  Green TB                      Sitting  LAQ  LAQ band  Hamstring stretch  Knee flexion  Sit to stand   yes  no      no   3 x 10  X 10      2 x10   4#  red      4# ball, eccentric            standing       Hamstring stretch  3 x 30  At step   Hip flexion at the walll with wall support no 2 x 10     3 way hip no      Wall slides no 3 x 10     Side lunge no X 10   top half   stairs no 1x Up reciprocal w/ HR down one at time w/o HR    Step up and over  Lateral up and over no  no     2x10 each  2x10 each 4 inches  6 inches     Lateral heel taps no 3x10 4 inches Mirror, YTB pulling knee into valgus   Step up, lateral step down/up, step back no 2x10 each 6 inches    Hip abd std  Hip flex std no  no   2x10  2x10 GTB  GTB    HR bilateral no 2x10 B and U 2x10 each    Step downs No 2x10  2 in step   Step agility no 2 x 20  2 in step   NEURO RE-ED  [id number]       TOTAL TIME FOR SESSION    Not performed        biodex balance            blue foam  no  3x30s   tandem     Side step w/ TB no 3 laps GTB     March with TB no 3 laps GTB     SLS [place] taps  no x5 each leg Gait belt, circles on floor surrounding pt    SLS no 3x10 sec Foam      rocker board    A/P , lateral X 30 ea taps  X 1 ea static    30 sec    GAIT TRAINING       TOTAL TIME FOR SESSION Not performed     amb         stairs  6 inch step up X 10 each    curbs       Out side       stepping over objects  no  low hurdles  2 laps each  fwd, alternate, side   MANUAL       TOTAL TIME FOR SESSION     STM/ retro grade massage  yes      quad ms, quad tendon, itb   Jt mobs yes      PROM       Passive stretch yes      MODALITIES           TOTAL TIME FOR SESSION    vaso        ice pack  no                  I attest that I was present and directed all activities, made all clinical decisions and was responsible for all assessments while Margo HUGGINS ([facility]) participated or observed partially in the treatment session of this patient.  Jasymne BLAIR, PTA  License # TE [id number]

## 2022-02-18 ENCOUNTER — HOSPITAL ENCOUNTER (OUTPATIENT)
Dept: PHYSICAL THERAPY | Age: 62
Setting detail: THERAPIES SERIES
Discharge: HOME | End: 2022-02-18
Attending: ORTHOPAEDIC SURGERY
Payer: COMMERCIAL

## 2022-02-18 DIAGNOSIS — Z96.653 PRESENCE OF ARTIFICIAL KNEE JOINT, BILATERAL: Primary | ICD-10-CM

## 2022-02-18 PROCEDURE — 97110 THERAPEUTIC EXERCISES: CPT | Mod: GP,CQ

## 2022-02-18 PROCEDURE — 97112 NEUROMUSCULAR REEDUCATION: CPT | Mod: GP,CQ

## 2022-02-18 NOTE — OP PT TREATMENT LOG
Exercises Performed today Sets/ Reps comments Current Session Time   Eval date    Teams 11/1/2021    yes  completed      Progress note  Due date 11/29/2021  completed      Re eval  Due date: 2/4/2022  To 3/18/2022       THER ACT        TOTAL TIME FOR SESSION     HEP        updated as noted    ROM sasha  no  L 130 R 125     THER EX  [id number]       TOTAL TIME FOR SESSION 23-37 Minutes                Bike: R  yes 6 min recumbant Seat #8    Treadmnll yes 6 mn 1.5-1.7                  ROM/stretching       Prone quad stretch yes 5x 30  Towel roll under thigh, floating the knee   HSS no 3x30s strap     Supine  PB physio curls    Heel slides        Bridges  Quad set w heel prop  SLR w/ QS  SLR circles  Hip flexor stretch    GSS@slant board  Clam in sidely  Hip abd in sidely  Side lying hip flexion/ext   no    no        no  No    yes  no  No    yes    no  no  no   10s x 10    1 x 10        2 x 10 5s  5s x20    3x10   5 x 5 each      2x1 min    3x10  3x10  X 10               p-ball      2#            green TB  Green TB                      Sitting  LAQ  LAQ band  Hamstring stretch  Knee flexion  Sit to stand   no  no      Yes    Yes    3 x 10  X 10      1 x10    2x10   4#  red      4# ball, eccentric     W/ 360 turns           standing       Hamstring stretch  3 x 30  At step   Hip flexion at the walll with wall support no 2 x 10     3 way hip no      Wall slides no 3 x 10     Side lunge no X 10   top half   stairs no 1x Up reciprocal w/ HR down one at time w/o HR    Step up and over  Lateral up and over no  no     2x10 each  2x10 each 4 inches  6 inches     Lateral heel taps no 3x10 4 inches Mirror, YTB pulling knee into valgus   Step up, lateral step down/up, step back no 2x10 each 6 inches    Hip abd std  Hip flex std no  no   2x10  2x10 GTB  GTB    HR bilateral no 2x10 B and U 2x10 each    Step downs No 2x10  2 in step   Step agility no 2 x 20  2 in step   NEURO RE-ED  [id number]       TOTAL TIME FOR SESSION    23-37 Minutes         biodex balance            blue foam yes  3x30s   tandem     Side step w/ TB yes 2 laps GTB     March with TB yes 2 laps GTB     4 square step yes 2 monster steps, 2 side steps     SLS [place] taps  no x5 each leg Gait belt, circles on floor surrounding pt    SLS yes 3x10 sec Foam             rocker board    A/P , lateral X 30 ea taps  X 1 ea static    30 sec    GAIT TRAINING       TOTAL TIME FOR SESSION Not performed     amb         stairs  6 inch step up X 10 each    curbs       Out side       stepping over objects  no  low hurdles  2 laps each  fwd, alternate, side   MANUAL       TOTAL TIME FOR SESSION Not performed      STM/ retro grade massage no      quad ms, quad tendon, itb   Jt mobs no      PROM       Passive stretch no      MODALITIES           TOTAL TIME FOR SESSION Not performed     vaso        ice pack  no                  I attest that I was present and directed all activities, made all clinical decisions and was responsible for all assessments while Margo HUGGINS ([facility]) participated or observed partially in the treatment session of this patient.  Jasmyne BLAIR, PTA  License # TE [id number]

## 2022-02-18 NOTE — PROGRESS NOTES
PT DAILY NOTE FOR OUTPATIENT THERAPY    Patient: Fide Amin   MRN: [id number]  : 1960 61 y.o.  Referring Physician: Bennett Em MD  Date of Visit: 2022    Certification Dates: 22 through 22    Diagnosis:   1. Presence of artificial knee joint, bilateral        Chief Complaints:  weakness, left knee skin irritation    Precautions:   Existing Precautions/Restrictions: no known precautions/restrictions     TODAY'S VISIT    Time In Session:  Start Time: 903  Stop Time:   Time Calculation (min): 60 min   History/Vitals/Pain/Encounter Info - 22        Injury History/Precautions/Daily Required Info    Document Type daily treatment     Primary Therapist Lisa Kimbrough DPT [facility]     Chief Complaint/Reason for Visit  weakness, left knee skin irritation     Onset of Illness/Injury or Date of Surgery 21     Referring Physician Dr. Em     Existing Precautions/Restrictions no known precautions/restrictions     Pertinent History of Current Functional Problem Pt reports mild pain, mostly stiffness, pt concerned about being behind due to comoplications post op and is rushing to get better.  Discussed the importance of safety and realistic time lines with pt     OP Specialty Orthopedics     Patient/Family/Caregiver Comments/Observations Pt reports 1/10 pain in L knee and achiness in B/L LE after watching grandchildren yesterday which requires frequent floor transfers. Pt reports she has been using rolling pin on B/L quads at home.     Patient reported fall since last visit No        Pain Assessment    Currently in pain Yes     Preferred Pain Scale number (Numeric Rating Pain Scale)     Pain Side/Orientation left     Pain: Body location Knee     Pain Rating (0-10): Pre Activity 1     Pain Rating (0-10): Post Activity 0        Pain Intervention    Intervention  TE, NMRE     Post Intervention Comments see assessment                Daily Treatment Assessment and Plan -  02/18/22 0923        Daily Treatment Assessment and Plan    Progress toward goals Progressing     Daily Outcome Summary Continued w/ warm up on recumbant bike x 6 mins. Pt performed LE stretches and strengthening ex's, see activity log for reps and sets. Progressed sit to stands w/ 4# ball with 360 turns, added square step w/ monster walk and side steps. Pt performed NBOS and tandem balance on foam within // bars, B/L handheld support prn, close supervision. Reviewed new supine stretch added last visit w/ Primary PT. Finished w/ TM x 6 mins, pt reports 0/10 pain in L knee but increased soreness.     Plan and Recommendations Continue w/ LE strengthening ex's and dynamic balance, progress as tolerated NV.                     Today's Treatment:    Exercises Performed today Sets/ Reps comments Current Session Time   Eval date    Teams 11/1/2021    yes  completed      Progress note  Due date 11/29/2021  completed      Re eval  Due date: 2/4/2022  To 3/18/2022       THER ACT        TOTAL TIME FOR SESSION     HEP        updated as noted    ROM sasha  no  L 130 R 125     THER EX  [id number]       TOTAL TIME FOR SESSION 23-37 Minutes                Bike: R  yes 6 min recumbant Seat #8    Treadmnll yes 6 mn 1.5-1.7                  ROM/stretching       Prone quad stretch yes 5x 30  Towel roll under thigh, floating the knee   HSS no 3x30s strap     Supine  PB physio curls    Heel slides        Bridges  Quad set w heel prop  SLR w/ QS  SLR circles  Hip flexor stretch    GSS@slant board  Clam in sidely  Hip abd in sidely  Side lying hip flexion/ext   no    no        no  No    yes  no  No    yes    no  no  no   10s x 10    1 x 10        2 x 10 5s  5s x20    3x10   5 x 5 each      2x1 min    3x10  3x10  X 10               p-ball      2#            green TB  Green TB                      Sitting  LAQ  LAQ band  Hamstring stretch  Knee flexion  Sit to stand   no  no      Yes    Yes    3 x 10  X 10      1 x10    2x10   4#  red      4#  ball, eccentric     W/ 360 turns           standing       Hamstring stretch  3 x 30  At step   Hip flexion at the walll with wall support no 2 x 10     3 way hip no      Wall slides no 3 x 10     Side lunge no X 10   top half   stairs no 1x Up reciprocal w/ HR down one at time w/o HR    Step up and over  Lateral up and over no  no     2x10 each  2x10 each 4 inches  6 inches     Lateral heel taps no 3x10 4 inches Mirror, YTB pulling knee into valgus   Step up, lateral step down/up, step back no 2x10 each 6 inches    Hip abd std  Hip flex std no  no   2x10  2x10 GTB  GTB    HR bilateral no 2x10 B and U 2x10 each    Step downs No 2x10  2 in step   Step agility no 2 x 20  2 in step   NEURO RE-ED  [id number]       TOTAL TIME FOR SESSION    23-37 Minutes        biodex balance            blue foam yes  3x30s   tandem     Side step w/ TB yes 2 laps GTB     March with TB yes 2 laps GTB     4 square step yes 2 monster steps, 2 side steps     SLS [place] taps  no x5 each leg Gait belt, circles on floor surrounding pt    SLS yes 3x10 sec Foam             rocker board    A/P , lateral X 30 ea taps  X 1 ea static    30 sec    GAIT TRAINING       TOTAL TIME FOR SESSION Not performed     amb         stairs  6 inch step up X 10 each    curbs       Out side       stepping over objects  no  low hurdles  2 laps each  fwd, alternate, side   MANUAL       TOTAL TIME FOR SESSION Not performed      STM/ retro grade massage no      quad ms, quad tendon, itb   Jt mobs no      PROM       Passive stretch no      MODALITIES           TOTAL TIME FOR SESSION Not performed     vaso        ice pack  no                  I attest that I was present and directed all activities, made all clinical decisions and was responsible for all assessments while Margo HUGGINS ([facility]) participated or observed partially in the treatment session of this patient.  Jasmyne BLAIR, PTA  License # [id number]

## 2022-02-22 ENCOUNTER — HOSPITAL ENCOUNTER (OUTPATIENT)
Dept: PHYSICAL THERAPY | Age: 62
Setting detail: THERAPIES SERIES
Discharge: HOME | End: 2022-02-22
Attending: ORTHOPAEDIC SURGERY
Payer: COMMERCIAL

## 2022-02-22 DIAGNOSIS — Z96.653 PRESENCE OF ARTIFICIAL KNEE JOINT, BILATERAL: Primary | ICD-10-CM

## 2022-02-22 PROCEDURE — 97110 THERAPEUTIC EXERCISES: CPT | Mod: GP,CQ,59

## 2022-02-22 PROCEDURE — 97150 GROUP THERAPEUTIC PROCEDURES: CPT | Mod: GP,CQ

## 2022-02-22 PROCEDURE — 97112 NEUROMUSCULAR REEDUCATION: CPT | Mod: GP,CQ,59

## 2022-02-22 NOTE — PROGRESS NOTES
"PT DAILY NOTE FOR OUTPATIENT THERAPY    Patient: Fide Amin   MRN: [id number]  : 1960 61 y.o.  Referring Physician: Bennett Em MD  Date of Visit: 2022    Certification Dates: 22 through 22    Diagnosis:   1. Presence of artificial knee joint, bilateral        Chief Complaints:  weakness, left knee skin irritation    Precautions:   Precautions comments: seizures  Existing Precautions/Restrictions: no known precautions/restrictions     TODAY'S VISIT    Time In Session:  Start Time: 1100  Stop Time: 1200  Time Calculation (min): 60 min   History/Vitals/Pain/Encounter Info - 22 1110        Injury History/Precautions/Daily Required Info    Document Type daily treatment     Primary Therapist Lisa Kimbrough DPT [facility]     Chief Complaint/Reason for Visit  weakness, left knee skin irritation     Onset of Illness/Injury or Date of Surgery 21     Referring Physician Dr. Em     Existing Precautions/Restrictions no known precautions/restrictions     Precautions comments seizures     Pertinent History of Current Functional Problem Pt reports mild pain, mostly stiffness, pt concerned about being behind due to comoplications post op and is rushing to get better.  Discussed the importance of safety and realistic time lines with pt     OP Specialty Orthopedics     Patient/Family/Caregiver Comments/Observations Pt. reports if she \"rolls\" her quad before she gets out of bed she feels better.     Patient reported fall since last visit No        Pain Assessment    Currently in pain No/Denies        Pain Intervention    Intervention  na     Post Intervention Comments na                Daily Treatment Assessment and Plan - 22 1110        Daily Treatment Assessment and Plan    Progress toward goals Progressing     Daily Outcome Summary Pt. reports continued stiffness in L knee and mild pain with descending stairs. Continued with bike warm up and review of strength and CKC strength " TE.     Plan and Recommendations Continue to advance as able.                         Today's Treatment:    Exercises Performed today Sets/ Reps comments Current Session Time   Eval date    Teams 11/1/2021    yes  completed      Progress note  Due date 11/29/2021  completed      Re eval  Due date: 2/4/2022  To 3/18/2022       THER ACT        TOTAL TIME FOR SESSION     HEP        updated as noted    ROM sasha  no  L 130 R 125     THER EX  [id number]       TOTAL TIME FOR SESSION 8-22 Minutes   30 min group                Bike: R  yes 6 min recumbant Seat #8  G   Treadmill no 6 mn 1.5-1.7                  ROM/stretching       Prone quad stretch yes 3x 30  Towel roll under thigh, floating the knee   HSS yes 3x30s strap     Supine  PB physio curls    Heel slides        Bridges   w/ knees ext  W/knees flexed    Quad set w heel prop  SLR w/ QS  SLR circles  Hip flexor stretch    GSS@slant board  Clam in sidely  Hip abd in sidely  Side lying hip flexion/ext   yes    no          yes  yes    no  no  No    no    yes  no  no   10s x 10    1 x 10          2 x 10 5s  5s x20    3x10   5 x 5 each      2x1 min    3x30s  3x10  X 10                 p-ball  p-ball    2#                   G               Sitting  LAQ  LAQ band  Hamstring stretch  Knee flexion  Sit to stand   no  no      no  no    3 x 10  X 10      1 x10    2x10   4#  red      4# ball, eccentric     W/ 360 turns           standing       Hamstring stretch  3 x 30  At step   Hip flexion at the walll with wall support no 2 x 10     3 way hip no      Wall slides no 3 x 10     Side lunge no X 10   top half   stairs yes 1x Up reciprocal w/ HR down one at time w/o HR    Step up and over  Lateral up and over no  no     2x10 each  2x10 each 4 inches  6 inches     Lateral heel taps  Forward heel tap Yes  yes 2x10  2x10 4 inches  4 inches    Step up, lateral step down/up, step back no 2x10 each 6 inches    Hip abd std  Hip flex std no  no   2x10  2x10 GTB  GTB    HR bilateral no 2x10 B  and U 2x10 each    Step downs No 2x10  2 in step   Step agility no 2 x 20  2 in step   NEURO RE-ED  [id number]       TOTAL TIME FOR SESSION    8-22 Minutes        biodex balance            blue foam no  3x30s   tandem     Side step w/ TB yes 2 laps GTB     March with TB no 2 laps GTB     4 square step yes 2 monster steps, 2 side steps     SLS [place] taps  no x5 each leg Gait belt, circles on floor surrounding pt    SLS yes 3x20s Foam             rocker board  no  A/P , lateral X 30 ea taps  X 1 ea static    30 sec    GAIT TRAINING       TOTAL TIME FOR SESSION Not performed     amb         stairs  6 inch step up X 10 each    curbs       Out side       stepping over objects  no  low hurdles  2 laps each  fwd, alternate, side   MANUAL       TOTAL TIME FOR SESSION Not performed      STM/ retro grade massage no      quad ms, quad tendon, itb   Jt mobs no      PROM       Passive stretch no      MODALITIES           TOTAL TIME FOR SESSION Not performed     vaso        ice pack  no

## 2022-02-22 NOTE — OP PT TREATMENT LOG
Exercises Performed today Sets/ Reps comments Current Session Time   Eval date    Teams 11/1/2021    yes  completed      Progress note  Due date 11/29/2021  completed      Re eval  Due date: 2/4/2022  To 3/18/2022       THER ACT        TOTAL TIME FOR SESSION     HEP        updated as noted    ROM sasha  no  L 130 R 125     THER EX  [id number]       TOTAL TIME FOR SESSION 8-22 Minutes   30 min group                Bike: R  yes 6 min recumbant Seat #8  G   Treadmill no 6 mn 1.5-1.7                  ROM/stretching       Prone quad stretch yes 3x 30  Towel roll under thigh, floating the knee   HSS yes 3x30s strap     Supine  PB physio curls    Heel slides        Bridges   w/ knees ext  W/knees flexed    Quad set w heel prop  SLR w/ QS  SLR circles  Hip flexor stretch    GSS@slant board  Clam in sidely  Hip abd in sidely  Side lying hip flexion/ext   yes    no          yes  yes    no  no  No    no    yes  no  no   10s x 10    1 x 10          2 x 10 5s  5s x20    3x10   5 x 5 each      2x1 min    3x30s  3x10  X 10                 p-ball  p-ball    2#                   G               Sitting  LAQ  LAQ band  Hamstring stretch  Knee flexion  Sit to stand   no  no      no  no    3 x 10  X 10      1 x10    2x10   4#  red      4# ball, eccentric     W/ 360 turns           standing       Hamstring stretch  3 x 30  At step   Hip flexion at the walll with wall support no 2 x 10     3 way hip no      Wall slides no 3 x 10     Side lunge no X 10   top half   stairs yes 1x Up reciprocal w/ HR down one at time w/o HR    Step up and over  Lateral up and over no  no     2x10 each  2x10 each 4 inches  6 inches     Lateral heel taps  Forward heel tap Yes  yes 2x10  2x10 4 inches  4 inches    Step up, lateral step down/up, step back no 2x10 each 6 inches    Hip abd std  Hip flex std no  no   2x10  2x10 GTB  GTB    HR bilateral no 2x10 B and U 2x10 each    Step downs No 2x10  2 in step   Step agility no 2 x 20  2 in step   NEURO RE-ED  [id number]        TOTAL TIME FOR SESSION    8-22 Minutes        biodex balance            blue foam no  3x30s   tandem     Side step w/ TB yes 2 laps GTB     March with TB no 2 laps GTB     4 square step yes 2 monster steps, 2 side steps     SLS [place] taps  no x5 each leg Gait belt, circles on floor surrounding pt    SLS yes 3x20s Foam             rocker board  no  A/P , lateral X 30 ea taps  X 1 ea static    30 sec    GAIT TRAINING       TOTAL TIME FOR SESSION Not performed     amb         stairs  6 inch step up X 10 each    curbs       Out side       stepping over objects  no  low hurdles  2 laps each  fwd, alternate, side   MANUAL       TOTAL TIME FOR SESSION Not performed      STM/ retro grade massage no      quad ms, quad tendon, itb   Jt mobs no      PROM       Passive stretch no      MODALITIES           TOTAL TIME FOR SESSION Not performed     vaso        ice pack  no

## 2022-03-01 ENCOUNTER — HOSPITAL ENCOUNTER (OUTPATIENT)
Dept: PHYSICAL THERAPY | Age: 62
Setting detail: THERAPIES SERIES
Discharge: HOME | End: 2022-03-01
Attending: ORTHOPAEDIC SURGERY
Payer: COMMERCIAL

## 2022-03-01 DIAGNOSIS — Z96.653 PRESENCE OF ARTIFICIAL KNEE JOINT, BILATERAL: Primary | ICD-10-CM

## 2022-03-01 DIAGNOSIS — R26.9 GAIT DISORDER: ICD-10-CM

## 2022-03-01 PROCEDURE — 97530 THERAPEUTIC ACTIVITIES: CPT | Mod: GP | Performed by: PHYSICAL THERAPIST

## 2022-03-01 PROCEDURE — 97110 THERAPEUTIC EXERCISES: CPT | Mod: GP | Performed by: PHYSICAL THERAPIST

## 2022-03-01 NOTE — OP PT TREATMENT LOG
TESTS Performed  today         Time  Description     EVAL  11/1/2021    Progress note     12/6/2021 Progress note  RA  2/4    progress note  3/1/2022         30 min TA for progress    30min te   Weight bearing status  full  full         Precautions No kneeling           Pt eduation yes  yes         HEP  yes  yes      add step ups and step downs at home, plus rolling pin quad massage   OUTcome measure  KOS  LEFS  KOS  50/80  46/50  53/80   TOTAL TIME FOR SESSION     Minutes   AROM             Left knee  5 to 110 prone  0 to 120  same  0-128     Right knee 5 to 120  0 to 130  same  0-130                   PROM             Left knee 0 to 115  0 to 125  0 to 130  0-132     Right knee 0 to 125  0 to 135  0 to 135  0-135                   MMT             Left quad 3/5  4/5  same  4+/5     Right quad 4/5  4+/5  same  5/5                   Pat mobility             right good           left fair  fair    Mild pain with superior glide  motion inhibited by skin irriation and scab on scar   Swelling minimal  continues on left knee  skin irritation  continues  Improvement in skin since using tropical cream     Circumference  measurements Not taken           Pitting edema none    none                     Incision             Open or closed Left superior incision scabbed and red, right knee incision clean and healed  Left knee only: two  pencil size  Scabbed areas at proximal incision  left knee  Superior incision,  improving but dark redden raised areas persist. Less pain  10/6 4 to 5 blotchy redden areas med,  Lateral, posterior, superior and inferior   drainage none  none drainage         hot warm  warm         Gait         TOTAL TIME FOR SESSION Not performed   gait  amb with single point cane,   no cane  Mild limp    no cane  Mild antalgic on L     stairs  step to step  step overt step up     Step to step down    Ascending step over step. No rail  Able to descent step over step slowly no rail     Sit to stand     18 sec   30sec sit to stand x 16    5x sit to stand  13     Shoe wear  tie sneakers      6mwt     1400     Balance         TOTAL TIME FOR SESSION   Minutes   SLS  Right  left    Unable  unable    < 10  ,10     <10  <10 sec    5 sec  5 sec                   Modalities         TOTAL TIME FOR SESSION Not performed   cold             heat             VASO

## 2022-03-02 NOTE — PROGRESS NOTES
PT PROGRESS NOTE FOR OUTPATIENT THERAPY    Patient: Fide Amin   MRN: [id number]  : 1960 61 y.o.  Referring Physician: Bennett Em MD  Date of Visit: 3/1/2022      Certification Dates: 22 through 22    Recommended Frequency & Duration:  2 times/week for up to 6 weeks          Diagnosis:   1. Presence of artificial knee joint, bilateral    2. Gait disorder        Chief Complaints:  Chief Complaint   Patient presents with   • Pain   • Dec ROM   • Dec Strength   • Difficulty Walking       Precautions:   Existing Precautions/Restrictions: no known precautions/restrictions     TODAY'S VISIT:    Time In Session:  Start Time: 1105  Stop Time: 1200  Time Calculation (min): 55 min   General Information - 22 1308        Session Details    Document Type progress note     Mode of Treatment individual therapy;physical therapy     Patient/Family/Caregiver Comments/Observations Pt reports quad pain is getting much better on the left side and she often is pain free with sit to stand activities.  Pt reports if she does have pain that it is located laterally and that is getting less as well     OP Specialty Orthopedics        General Information    Referring Physician Dr. Em     History of present illness/functional impairment Post op TKR bilaterall with complicated medical course and scarring of left knee incisions circumferential about the knee     Existing Precautions/Restrictions no known precautions/restrictions                Daily Falls Screen - 22 1308        Daily Falls Assessment    Patient reported fall since last visit No                Pain/Vitals - 22 1929        Pain Assessment    Currently in pain Yes     Preferred Pain Scale number (Numeric Rating Pain Scale)     Pain Side/Orientation left     Pain: Body location Knee     Pain Rating (0-10): Pre Activity 1     Pain Rating (0-10): Post Activity 1     Pain Description intermittent        Pain Intervention     Intervention  ther ex, ther act     Post Intervention Comments no change                PT - 03/01/22 1308        Physical Therapy    Physical Therapy Ortho        PT Plan    Frequency of treatment 2 times/week     PT Duration 6 weeks     PT Cert From 02/04/22     PT Cert To 03/18/22     Date PT POC was sent to provider 02/04/22     Signed PT Plan of Care received?  Yes                   OBJECTIVE MEASUREMENTS/DATA:    See treatment log    Today's Treatment::       TESTS Performed  today         Time  Description     EVAL  11/1/2021    Progress note     12/6/2021 Progress note  RA  2/4    progress note  3/1/2022         30 min TA for progress    30min te   Weight bearing status  full  full         Precautions No kneeling           Pt eduation yes  yes         HEP  yes  yes      add step ups and step downs at home, plus rolling pin quad massage   OUTcome measure  KOS  LEFS  KOS  50/80  46/50  53/80   TOTAL TIME FOR SESSION     Minutes   AROM             Left knee  5 to 110 prone  0 to 120  same  0-128     Right knee 5 to 120  0 to 130  same  0-130                   PROM             Left knee 0 to 115  0 to 125  0 to 130  0-132     Right knee 0 to 125  0 to 135  0 to 135  0-135                   MMT             Left quad 3/5  4/5  same  4+/5     Right quad 4/5  4+/5  same  5/5                   Pat mobility             right good           left fair  fair    Mild pain with superior glide  motion inhibited by skin irriation and scab on scar   Swelling minimal  continues on left knee  skin irritation  continues  Improvement in skin since using tropical cream     Circumference  measurements Not taken           Pitting edema none    none                     Incision             Open or closed Left superior incision scabbed and red, right knee incision clean and healed  Left knee only: two  pencil size  Scabbed areas at proximal incision  left knee  Superior incision,  improving but dark redden raised areas persist. Less  pain  10/6 4 to 5 blotchy redden areas med,  Lateral, posterior, superior and inferior   drainage none  none drainage         hot warm  warm         Gait         TOTAL TIME FOR SESSION Not performed   gait  amb with single point cane,   no cane  Mild limp    no cane  Mild antalgic on L     stairs  step to step  step overt step up     Step to step down    Ascending step over step. No rail  Able to descent step over step slowly no rail     Sit to stand     18 sec  30sec sit to stand x 16    5x sit to stand  13     Shoe wear  tie sneakers      6mwt     1400     Balance         TOTAL TIME FOR SESSION   Minutes   SLS  Right  left    Unable  unable    < 10  ,10     <10  <10 sec    5 sec  5 sec                   Modalities         TOTAL TIME FOR SESSION Not performed   cold             heat             VASO                    Goals Addressed                 This Visit's Progress    • PT stg/ltgs knee        Short Term Goals Time Frame Result Comment/Progress   Knee pain at rest will decrease by 50% 4 met    Knee extension PROM 0 degrees 4 met    Knee flexion AROM > 90 degrees 4 met    Pt will perform a SLR without a lag 4 met    Pt will ambulate into [facility] center from the car independently with appropriate assistive device 4 met No assistive device   KOS > 65% 4 Not met 53      Long Term Goals Time Frame Result Comment/Progress   KOS >95% 12 wks Ongoing          Active knee ROM 0 to 125 12wks met      Quad and hip abductor strength 5/5 12wks Ongoing    amb community distances without assistive device 12wks met    Pt will pick object off floor with squatting and no UE support 12wks met    Manage stairs step over step with a rail 12 wks  met Requires rail and has dec'd control during descent     Updated goals:  Perform 5x STS in less than 15 seconds not met  Descend stairs with step over step pattern with good quad control and minimal pain met  Perform SLS for 5 seconds b/l Les  met          Pt continues to make slow steady  progress with improvements in LE function, during sit to stand, stairs, and gait speed.  Pt continues to have mild lateral knee pain that is improving with itb stretching. Pt to continue to benefit from PT to finish POC and work on independent transition to hep

## 2022-03-03 ENCOUNTER — HOSPITAL ENCOUNTER (OUTPATIENT)
Dept: PHYSICAL THERAPY | Age: 62
Setting detail: THERAPIES SERIES
Discharge: HOME | End: 2022-03-03
Attending: ORTHOPAEDIC SURGERY
Payer: COMMERCIAL

## 2022-03-03 DIAGNOSIS — Z96.653 PRESENCE OF ARTIFICIAL KNEE JOINT, BILATERAL: Primary | ICD-10-CM

## 2022-03-03 DIAGNOSIS — R26.9 GAIT DISORDER: ICD-10-CM

## 2022-03-03 PROCEDURE — 97110 THERAPEUTIC EXERCISES: CPT | Mod: GP,CQ

## 2022-03-03 NOTE — OP PT TREATMENT LOG
Exercises Performed today Sets/ Reps comments Current Session Time   Eval date    Teams 11/1/2021    yes  completed      Progress note  Due date 11/29/2021  completed      Re eval  Due date: 2/4/2022  To 3/18/2022       THER ACT        TOTAL TIME FOR SESSION     HEP        updated as noted    ROM sasha  no  L 130 R 125     THER EX  [id number]       TOTAL TIME FOR SESSION 53-67 Minutes                   Bike: R  yes 15min recumbant Seat #8     Treadmill no 6 mn 1.5-1.7                  ROM/stretching       Prone quad stretch yes 3x 30  Towel roll under thigh, floating the knee   HSS yes 3x30s strap     Supine  PB physio curls    Heel slides        Bridges   w/ knees ext  W/knees flexed  Triple threat    Quad set w heel prop  SLR w/ QS  SLR circles  Hip flexor stretch    GSS@slant board  Clam in sidely  Hip abd in sidely  Side lying hip flexion/ext   no    no          no  yes  Yes    no  no  No    no    yes  no  no   10s x 10    1 x 10          2 x 10 5s  5s x20  x10    3x10   5 x 5 each      2x1 min    3x30s  3x10  X 10                 p-ball  p-ball  p-ball  2#                                Sitting  LAQ  LAQ band  Hamstring stretch  Knee flexion  Sit to stand   no  no      no  no    3 x 10  X 10      1 x10    2x10   4#  red      4# ball, eccentric     W/ 360 turns           standing       Hamstring stretch  3 x 30  At step   Hip flexion at the walll with wall support no 2 x 10     3 way hip no      Wall squats yes 2 x 10 With ball squeeze    Side lunge no X 10   top half   stairs yes 1x Up reciprocal w/ HR down one at time w/o HR    Step up and over  Lateral up and over no  no     2x10 each  2x10 each 4 inches  6 inches     Lateral heel taps  Forward heel tap Yes  yes 2x10  2x10 4 inches  4 inches    Step up, lateral step down/up, step back no 2x10 each 6 inches    Hip abd std  Hip flex std no  no   2x10  2x10 GTB  GTB    HR bilateral no 2x10 B and U 2x10 each    Step downs No 2x10  2 in step   Step agility no 2 x 20  2  in step   NEURO RE-ED  [id number]       TOTAL TIME FOR SESSION    Billed with TE        biodex balance            blue foam no  3x30s   tandem     Side step w/ TB yes 3 laps blueTB     March with TB no 2 laps GTB     4 square step yes 2 monster steps, 2 side steps GTB at ankle    SLS [place] taps  no x5 each leg Gait belt, circles on floor surrounding pt    SLS no 3x20s Foam             rocker board  no  A/P , lateral X 30 ea taps  X 1 ea static    30 sec    GAIT TRAINING       TOTAL TIME FOR SESSION Not performed     amb         stairs  6 inch step up X 10 each    curbs       Out side       stepping over objects  no  low hurdles  2 laps each  fwd, alternate, side   MANUAL       TOTAL TIME FOR SESSION Not performed      STM/ retro grade massage no      quad ms, quad tendon, itb   Jt mobs no      PROM       Passive stretch no      MODALITIES           TOTAL TIME FOR SESSION Not performed     vaso        ice pack  no

## 2022-03-03 NOTE — PROGRESS NOTES
PT DAILY NOTE FOR OUTPATIENT THERAPY    Patient: Fide Amin   MRN: [id number]  : 1960 61 y.o.  Referring Physician: Bennett Em MD  Date of Visit: 3/3/2022    Certification Dates: 22 through 22    Diagnosis:   1. Presence of artificial knee joint, bilateral    2. Gait disorder        Chief Complaints:  weakness, left knee skin irritation    Precautions:   Existing Precautions/Restrictions: no known precautions/restrictions     TODAY'S VISIT    Time In Session:  Start Time: 1102  Stop Time: 1202  Time Calculation (min): 60 min   History/Vitals/Pain/Encounter Info - 22 1403        Injury History/Precautions/Daily Required Info    Document Type daily treatment     Primary Therapist Lisa Kimbrough DPT [facility]     Chief Complaint/Reason for Visit  weakness, left knee skin irritation     Onset of Illness/Injury or Date of Surgery 21     Referring Physician Dr. Em     Existing Precautions/Restrictions no known precautions/restrictions     History of present illness/functional impairment Post op TKR bilaterall with complicated medical course and scarring of left knee incisions circumferential about the knee     OP Specialty Orthopedics     Patient/Family/Caregiver Comments/Observations Pt. questioning the numbness and continued pain at L lateral knee. Discussed with patient that full recovery from TKR can take up to 1 year and that she should direct any significant concerns to her suregeon.     Patient reported fall since last visit No        Pain Assessment    Currently in pain No/Denies        Pain Intervention    Intervention  na     Post Intervention Comments na                Daily Treatment Assessment and Plan - 22 1403        Daily Treatment Assessment and Plan    Progress toward goals Progressing     Daily Outcome Summary Endurance on bike x 15 min and review of TE for BLE as noted on activity log. Pt. strength is improving slowly but balance with dynamic activity  is progressing well. Pt. will continue to benefit from PT intervention.     Plan and Recommendations Continue to advance as able.                       Today's Treatment:    Exercises Performed today Sets/ Reps comments Current Session Time   Eval date    Teams 11/1/2021    yes  completed      Progress note  Due date 11/29/2021  completed      Re eval  Due date: 2/4/2022  To 3/18/2022       THER ACT        TOTAL TIME FOR SESSION     HEP        updated as noted    ROM sasha  no  L 130 R 125     THER EX  [id number]       TOTAL TIME FOR SESSION 53-67 Minutes                   Bike: R  yes 15min recumbant Seat #8     Treadmill no 6 mn 1.5-1.7                  ROM/stretching       Prone quad stretch yes 3x 30  Towel roll under thigh, floating the knee   HSS yes 3x30s strap     Supine  PB physio curls    Heel slides        Bridges   w/ knees ext  W/knees flexed  Triple threat    Quad set w heel prop  SLR w/ QS  SLR circles  Hip flexor stretch    GSS@slant board  Clam in sidely  Hip abd in sidely  Side lying hip flexion/ext   no    no          no  yes  Yes    no  no  No    no    yes  no  no   10s x 10    1 x 10          2 x 10 5s  5s x20  x10    3x10   5 x 5 each      2x1 min    3x30s  3x10  X 10                 p-ball  p-ball  p-ball  2#                                Sitting  LAQ  LAQ band  Hamstring stretch  Knee flexion  Sit to stand   no  no      no  no    3 x 10  X 10      1 x10    2x10   4#  red      4# ball, eccentric     W/ 360 turns           standing       Hamstring stretch  3 x 30  At step   Hip flexion at the walll with wall support no 2 x 10     3 way hip no      Wall squats yes 2 x 10 With ball squeeze    Side lunge no X 10   top half   stairs yes 1x Up reciprocal w/ HR down one at time w/o HR    Step up and over  Lateral up and over no  no     2x10 each  2x10 each 4 inches  6 inches     Lateral heel taps  Forward heel tap Yes  yes 2x10  2x10 4 inches  4 inches    Step up, lateral step down/up, step back no 2x10  each 6 inches    Hip abd std  Hip flex std no  no   2x10  2x10 GTB  GTB    HR bilateral no 2x10 B and U 2x10 each    Step downs No 2x10  2 in step   Step agility no 2 x 20  2 in step   NEURO RE-ED  [id number]       TOTAL TIME FOR SESSION    Billed with TE        biodex balance            blue foam no  3x30s   tandem     Side step w/ TB yes 3 laps blueTB     March with TB no 2 laps GTB     4 square step yes 2 monster steps, 2 side steps GTB at ankle    SLS [place] taps  no x5 each leg Gait belt, circles on floor surrounding pt    SLS no 3x20s Foam             rocker board  no  A/P , lateral X 30 ea taps  X 1 ea static    30 sec    GAIT TRAINING       TOTAL TIME FOR SESSION Not performed     amb         stairs  6 inch step up X 10 each    curbs       Out side       stepping over objects  no  low hurdles  2 laps each  fwd, alternate, side   MANUAL       TOTAL TIME FOR SESSION Not performed      STM/ retro grade massage no      quad ms, quad tendon, itb   Jt mobs no      PROM       Passive stretch no      MODALITIES           TOTAL TIME FOR SESSION Not performed     vaso        ice pack  no

## 2022-03-07 ENCOUNTER — HOSPITAL ENCOUNTER (OUTPATIENT)
Dept: PHYSICAL THERAPY | Age: 62
Setting detail: THERAPIES SERIES
Discharge: HOME | End: 2022-03-07
Attending: ORTHOPAEDIC SURGERY
Payer: COMMERCIAL

## 2022-03-07 DIAGNOSIS — Z96.653 HISTORY OF TOTAL KNEE ARTHROPLASTY, BILATERAL: ICD-10-CM

## 2022-03-07 DIAGNOSIS — Z96.653 PRESENCE OF ARTIFICIAL KNEE JOINT, BILATERAL: Primary | ICD-10-CM

## 2022-03-07 DIAGNOSIS — R26.9 GAIT DISORDER: ICD-10-CM

## 2022-03-07 PROCEDURE — 97110 THERAPEUTIC EXERCISES: CPT | Mod: GP,59 | Performed by: PHYSICAL THERAPIST

## 2022-03-07 PROCEDURE — 97150 GROUP THERAPEUTIC PROCEDURES: CPT | Mod: GP,59 | Performed by: PHYSICAL THERAPIST

## 2022-03-07 NOTE — OP PT TREATMENT LOG
Exercises Performed today Sets/ Reps comments Current Session Time   Eval date    Teams 11/1/2021    yes  completed      Progress note  Due date 11/29/2021  completed      Re eval  Due date: 2/4/2022  To 3/18/2022       THER ACT        TOTAL TIME FOR SESSION     HEP        updated as noted    ROM sasha  no  L 130 R 125     THER EX  [id number]       TOTAL TIME FOR SESSION 30 min te  1 x group                Bike: R   15min recumbant Seat #8    Treadmill yes 6 mn 1.5-1.7 G                 ROM/stretching       Prone quad stretch yes 3x 30  Towel roll under thigh, floating the knee   HSS yes 3x30s strap  G   Supine  PB physio curls    Heel slides        Bridges   w/ knees ext  W/knees flexed  Triple threat    Quad set w heel prop  SLR w/ QS  SLR circles  Hip flexor stretch    GSS@slant board  Clam in sidely  Hip abd in sidely  Side lying hip flexion/ext   no    no          no  yes  Yes    no  no  No    no    yes  yes  Yes  Yes  Yes  yes   10s x 10    1 x 10          2 x 10 5s  5s x20  x10    3x10   5 x 5 each      2x1 min    3x30s  3x10  X 10                 p-ball  p-ball  p-ball  2#              G  G  G  G  G  G                Sitting  LAQ  LAQ band  Hamstring stretch  Knee flexion  Sit to stand   no  no      no  no    3 x 10  X 10      1 x10    2x10   4#  red      4# ball, eccentric     W/ 360 turns           standing       Hamstring stretch  3 x 30  At step   Hip flexion at the walll with wall support yes 2 x 10     3 way hip no      Wall squats yes 2 x 10 With ball squeeze    Side lunge yes X 10   top half   stairs yes 1x Up reciprocal w/ HR down one at time w/o HR    Step up and over  Lateral up and over Yes  yes     2x10 each  2x10 each 4 inches  6 inches     Lateral heel taps  Forward heel tap Yes  yes 2x10  2x10 4 inches  4 inches    Step up, lateral step down/up, step back yes 2x10 each 6 inches    Hip abd std  Hip flex std no  no   2x10  2x10 GTB  GTB    HR bilateral yes 2x10 B and U 2x10 each    Step downs yes  2x10  2 in step   Step agility yes 2 x 20  2 in step   NEURO RE-ED  [id number]       TOTAL TIME FOR SESSION    Billed with TE        biodex balance            blue foam no  3x30s   tandem     Side step w/ TB yes 3 laps blueTB     March with TB no 2 laps GTB     4 square step yes 2 monster steps, 2 side steps GTB at ankle    SLS [place] taps  no x5 each leg Gait belt, circles on floor surrounding pt    SLS no 3x20s Foam             rocker board  no  A/P , lateral X 30 ea taps  X 1 ea static    30 sec    GAIT TRAINING       TOTAL TIME FOR SESSION Not performed     amb         stairs  6 inch step up X 10 each    curbs       Out side       stepping over objects  no  low hurdles  2 laps each  fwd, alternate, side   MANUAL       TOTAL TIME FOR SESSION Not performed      STM/ retro grade massage no      quad ms, quad tendon, itb   Jt mobs no      PROM       Passive stretch no      MODALITIES           TOTAL TIME FOR SESSION Not performed     vaso        ice pack  no

## 2022-03-07 NOTE — PROGRESS NOTES
PT DAILY NOTE FOR OUTPATIENT THERAPY    Patient: Fide Amin   MRN: [id number]  : 1960 61 y.o.  Referring Physician: Bennett Em MD  Date of Visit: 3/7/2022    Certification Dates: 22 through 22    Diagnosis:   1. Presence of artificial knee joint, bilateral    2. Gait disorder    3. History of total knee arthroplasty, bilateral        Chief Complaints:  weakness, left knee skin irritation    Precautions:   Existing Precautions/Restrictions: no known precautions/restrictions     TODAY'S VISIT    Time In Session:  Start Time: 1005  Stop Time: 1105  Time Calculation (min): 60 min   History/Vitals/Pain/Encounter Info - 22 1141        Injury History/Precautions/Daily Required Info    Document Type daily treatment     Primary Therapist Lisa Kimbrough DPT [facility]     Chief Complaint/Reason for Visit  weakness, left knee skin irritation     Onset of Illness/Injury or Date of Surgery 21     Referring Physician Dr. Em     Existing Precautions/Restrictions no known precautions/restrictions     History of present illness/functional impairment Post op TKR bilaterall with complicated medical course and scarring of left knee incisions circumferential about the knee     Patient/Family/Caregiver Comments/Observations Knee is feeling a little better, going downt the stairs is still challenging     Patient reported fall since last visit No        Pain Assessment    Currently in pain Yes     Preferred Pain Scale number (Numeric Rating Pain Scale)     Pain Side/Orientation bilateral     Pain: Body location Knee     Pain Rating (0-10): Pre Activity 1     Pain Rating (0-10): Post Activity 1     Pain Description intermittent        Pain Intervention    Intervention  ther ex     Post Intervention Comments no chnge                Daily Treatment Assessment and Plan - 22 1141        Daily Treatment Assessment and Plan    Progress toward goals Progressing     Daily Outcome Summary  Improvement noted in step ups and down , unilateral and bilateral balance,  tremor increase noted with exertion and ball toss     Plan and Recommendations Continue with managing stairs, and leg strengthening                     OBJECTIVE DATA TAKEN TODAY:    None taken    Today's Treatment:    Exercises Performed today Sets/ Reps comments Current Session Time   Eval date    Teams 11/1/2021    yes  completed      Progress note  Due date 11/29/2021  completed      Re eval  Due date: 2/4/2022  To 3/18/2022       THER ACT        TOTAL TIME FOR SESSION     HEP        updated as noted    ROM sasha  no  L 130 R 125     THER EX  [id number]       TOTAL TIME FOR SESSION 30 min te  1 x group                Bike: R   15min recumbant Seat #8    Treadmill yes 6 mn 1.5-1.7 G                 ROM/stretching       Prone quad stretch yes 3x 30  Towel roll under thigh, floating the knee   HSS yes 3x30s strap  G   Supine  PB physio curls    Heel slides        Bridges   w/ knees ext  W/knees flexed  Triple threat    Quad set w heel prop  SLR w/ QS  SLR circles  Hip flexor stretch    GSS@slant board  Clam in sidely  Hip abd in sidely  Side lying hip flexion/ext   no    no          no  yes  Yes    no  no  No    no    yes  yes  Yes  Yes  Yes  yes   10s x 10    1 x 10          2 x 10 5s  5s x20  x10    3x10   5 x 5 each      2x1 min    3x30s  3x10  X 10                 p-ball  p-ball  p-ball  2#              G  G  G  G  G  G                Sitting  LAQ  LAQ band  Hamstring stretch  Knee flexion  Sit to stand   no  no      no  no    3 x 10  X 10      1 x10    2x10   4#  red      4# ball, eccentric     W/ 360 turns           standing       Hamstring stretch  3 x 30  At step   Hip flexion at the walll with wall support yes 2 x 10     3 way hip no      Wall squats yes 2 x 10 With ball squeeze    Side lunge yes X 10   top half   stairs yes 1x Up reciprocal w/ HR down one at time w/o HR    Step up and over  Lateral up and over Yes  yes     2x10  each  2x10 each 4 inches  6 inches     Lateral heel taps  Forward heel tap Yes  yes 2x10  2x10 4 inches  4 inches    Step up, lateral step down/up, step back yes 2x10 each 6 inches    Hip abd std  Hip flex std no  no   2x10  2x10 GTB  GTB    HR bilateral yes 2x10 B and U 2x10 each    Step downs yes 2x10  2 in step   Step agility yes 2 x 20  2 in step   NEURO RE-ED  [id number]       TOTAL TIME FOR SESSION    Billed with TE        biodex balance            blue foam no  3x30s   tandem     Side step w/ TB yes 3 laps blueTB     March with TB no 2 laps GTB     4 square step yes 2 monster steps, 2 side steps GTB at ankle    SLS [place] taps  no x5 each leg Gait belt, circles on floor surrounding pt    SLS no 3x20s Foam             rocker board  no  A/P , lateral X 30 ea taps  X 1 ea static    30 sec    GAIT TRAINING       TOTAL TIME FOR SESSION Not performed     amb         stairs  6 inch step up X 10 each    curbs       Out side       stepping over objects  no  low hurdles  2 laps each  fwd, alternate, side   MANUAL       TOTAL TIME FOR SESSION Not performed      STM/ retro grade massage no      quad ms, quad tendon, itb   Jt mobs no      PROM       Passive stretch no      MODALITIES           TOTAL TIME FOR SESSION Not performed     vaso        ice pack  no

## 2022-03-11 ENCOUNTER — HOSPITAL ENCOUNTER (OUTPATIENT)
Dept: PHYSICAL THERAPY | Age: 62
Setting detail: THERAPIES SERIES
Discharge: HOME | End: 2022-03-11
Attending: ORTHOPAEDIC SURGERY
Payer: COMMERCIAL

## 2022-03-11 DIAGNOSIS — R26.9 GAIT DISORDER: ICD-10-CM

## 2022-03-11 DIAGNOSIS — Z96.653 HISTORY OF TOTAL KNEE ARTHROPLASTY, BILATERAL: ICD-10-CM

## 2022-03-11 DIAGNOSIS — Z96.653 PRESENCE OF ARTIFICIAL KNEE JOINT, BILATERAL: Primary | ICD-10-CM

## 2022-03-11 PROCEDURE — 97150 GROUP THERAPEUTIC PROCEDURES: CPT | Mod: GP,CQ

## 2022-03-11 PROCEDURE — 97110 THERAPEUTIC EXERCISES: CPT | Mod: GP,CQ,59

## 2022-03-11 NOTE — OP PT TREATMENT LOG
Exercises Performed today Sets/ Reps comments Current Session Time   Eval date    Teams 11/1/2021    yes  completed      Progress note  Due date 11/29/2021  completed      Re eval  Due date: 2/4/2022  To 3/18/2022       THER ACT        TOTAL TIME FOR SESSION     HEP        updated as noted    ROM sasha  no  L 130 R 125     THER EX  [id number]       TOTAL TIME FOR SESSION 30 min te  1 x group                Bike: R   15min recumbant Seat #8    Treadmill yes 10 min 2.0-2.2 2% G                 ROM/stretching       Prone quad stretch yes 3x 30  Towel roll under thigh, floating the knee   HSS yes 3x30s strap  G   Supine  PB physio curls    Heel slides        Bridges   w/ knees ext  W/knees flexed  Triple threat    Quad set w heel prop  SLR w/ QS  SLR circles  Hip flexor stretch    GSS@slant board  Clam in sidely  Hip abd in sidely  Side lying hip flexion/ext   no    no          no  No  no    no  no  No    no    yes  yes  No  no  no  no   10s x 10    1 x 10          2 x 10 5s  5s x20  x10    3x10   5 x 5 each      2x1 min    3x30s  3x10  X 10                 p-ball  p-ball  p-ball  2#              G  G  G  G  G  G                Sitting  LAQ  LAQ band  Hamstring stretch  Knee flexion  Sit to stand   no  no      no  no    3 x 10  X 10      1 x10    2x10   4#  red      4# ball, eccentric     W/ 360 turns    TKE yes 5s x 15 each  GTB    standing       Hamstring stretch  3 x 30  At step   Hip flexion at the walll with wall support no 2 x 10     3 way hip no      Wall squats yes 2 x 10 With TB    Side lunge no X 10   top half   stairs no 1x Up reciprocal w/ HR down one at time w/o HR    Step up and over  Lateral up and over no  yes     2x10 each  2x10 each 4 inches  6 inches     Lateral heel taps  Forward heel tap Yes  yes 2x10  2x10 4 inches  4 inches    Step up, lateral step down/up, step back no 2x10 each 6 inches    Hip abd std  Hip flex std no  no   2x10  2x10 GTB  GTB    HR bilateral yes 2x10 U 2x10 each    Step downs no 2x10   2 in step   Step agility no 2 x 20  2 in step   NEURO RE-ED  [id number]       TOTAL TIME FOR SESSION    Billed with TE        biodex balance            blue foam no  3x30s   tandem     Side step w/ TB yes 3 laps blueTB     March with TB no 2 laps GTB     4 square step yes 2 monster steps, 2 side steps GTB at ankle    SLS [place] taps  no x5 each leg Gait belt, circles on floor surrounding pt    SLS yes 5x20s each Foam             rocker board  no  A/P , lateral X 30 ea taps  X 1 ea static    30 sec    GAIT TRAINING       TOTAL TIME FOR SESSION Not performed     amb         stairs  6 inch step up X 10 each    curbs       Out side       stepping over objects  no  low hurdles  2 laps each  fwd, alternate, side   MANUAL       TOTAL TIME FOR SESSION Not performed      STM/ retro grade massage no      quad ms, quad tendon, itb   Jt mobs no      PROM       Passive stretch no      MODALITIES           TOTAL TIME FOR SESSION Not performed     vaso        ice pack  no

## 2022-03-11 NOTE — PROGRESS NOTES
PT DAILY NOTE FOR OUTPATIENT THERAPY    Patient: Fide Amin   MRN: [id number]  : 1960 61 y.o.  Referring Physician: Bennett Em MD  Date of Visit: 3/11/2022    Certification Dates: 22 through 22    Diagnosis:   1. Presence of artificial knee joint, bilateral    2. Gait disorder    3. History of total knee arthroplasty, bilateral        Chief Complaints:  weakness, left knee skin irritation    Precautions:   Existing Precautions/Restrictions: no known precautions/restrictions     TODAY'S VISIT    Time In Session:  Start Time: 0900  Stop Time: 1000  Time Calculation (min): 60 min   History/Vitals/Pain/Encounter Info - 22 1121        Injury History/Precautions/Daily Required Info    Document Type daily treatment     Primary Therapist Lisa Kimbrough DPT [facility]     Chief Complaint/Reason for Visit  weakness, left knee skin irritation     Onset of Illness/Injury or Date of Surgery 21     Referring Physician Dr. Em     Existing Precautions/Restrictions no known precautions/restrictions     History of present illness/functional impairment Post op TKR bilaterall with complicated medical course and scarring of left knee incisions circumferential about the knee     [facility]     Patient/Family/Caregiver Comments/Observations Pt. reports that she has no more appts scheduled.     Patient reported fall since last visit No        Pain Assessment    Currently in pain No/Denies        Pain Intervention    Intervention  na     Post Intervention Comments na                Daily Treatment Assessment and Plan - 22 1121        Daily Treatment Assessment and Plan    Progress toward goals Progressing     Daily Outcome Summary Pt. endurance to ADL's improving continues to have intermittent pain in B knees L>R. COntinued warm upon TM x10 min increased speed to 2.2mph with 2% incline and added TKE with TB. Pt. to continue with HEP and to progress ADL's as tolerated.     Plan  and Recommendations Continue to advance dynamic balance and LE strength. Pt. scheduled for 2 more visits.                         Today's Treatment:    Exercises Performed today Sets/ Reps comments Current Session Time   Eval date    Teams 11/1/2021    yes  completed      Progress note  Due date 11/29/2021  completed      Re eval  Due date: 2/4/2022  To 3/18/2022       THER ACT        TOTAL TIME FOR SESSION     HEP        updated as noted    ROM sasha  no  L 130 R 125     THER EX  [id number]       TOTAL TIME FOR SESSION 30 min te  1 x group                Bike: R   15min recumbant Seat #8    Treadmill yes 10 min 2.0-2.2 2% G                 ROM/stretching       Prone quad stretch yes 3x 30  Towel roll under thigh, floating the knee   HSS yes 3x30s strap  G   Supine  PB physio curls    Heel slides        Bridges   w/ knees ext  W/knees flexed  Triple threat    Quad set w heel prop  SLR w/ QS  SLR circles  Hip flexor stretch    GSS@slant board  Clam in sidely  Hip abd in sidely  Side lying hip flexion/ext   no    no          no  No  no    no  no  No    no    yes  yes  No  no  no  no   10s x 10    1 x 10          2 x 10 5s  5s x20  x10    3x10   5 x 5 each      2x1 min    3x30s  3x10  X 10                 p-ball  p-ball  p-ball  2#              G  G  G  G  G  G                Sitting  LAQ  LAQ band  Hamstring stretch  Knee flexion  Sit to stand   no  no      no  no    3 x 10  X 10      1 x10    2x10   4#  red      4# ball, eccentric     W/ 360 turns    TKE yes 5s x 15 each  GTB    standing       Hamstring stretch  3 x 30  At step   Hip flexion at the walll with wall support no 2 x 10     3 way hip no      Wall squats yes 2 x 10 With TB    Side lunge no X 10   top half   stairs no 1x Up reciprocal w/ HR down one at time w/o HR    Step up and over  Lateral up and over no  yes     2x10 each  2x10 each 4 inches  6 inches     Lateral heel taps  Forward heel tap Yes  yes 2x10  2x10 4 inches  4 inches    Step up, lateral step  down/up, step back no 2x10 each 6 inches    Hip abd std  Hip flex std no  no   2x10  2x10 GTB  GTB    HR bilateral yes 2x10 U 2x10 each    Step downs no 2x10  2 in step   Step agility no 2 x 20  2 in step   NEURO RE-ED  [id number]       TOTAL TIME FOR SESSION    Billed with TE        biodex balance            blue foam no  3x30s   tandem     Side step w/ TB yes 3 laps blueTB     March with TB no 2 laps GTB     4 square step yes 2 monster steps, 2 side steps GTB at ankle    SLS [place] taps  no x5 each leg Gait belt, circles on floor surrounding pt    SLS yes 5x20s each Foam             rocker board  no  A/P , lateral X 30 ea taps  X 1 ea static    30 sec    GAIT TRAINING       TOTAL TIME FOR SESSION Not performed     amb         stairs  6 inch step up X 10 each    curbs       Out side       stepping over objects  no  low hurdles  2 laps each  fwd, alternate, side   MANUAL       TOTAL TIME FOR SESSION Not performed      STM/ retro grade massage no      quad ms, quad tendon, itb   Jt mobs no      PROM       Passive stretch no      MODALITIES           TOTAL TIME FOR SESSION Not performed     vaso        ice pack  no

## 2022-03-16 ENCOUNTER — HOSPITAL ENCOUNTER (OUTPATIENT)
Dept: PHYSICAL THERAPY | Age: 62
Setting detail: THERAPIES SERIES
Discharge: HOME | End: 2022-03-16
Attending: ORTHOPAEDIC SURGERY
Payer: COMMERCIAL

## 2022-03-16 DIAGNOSIS — Z96.653 PRESENCE OF ARTIFICIAL KNEE JOINT, BILATERAL: Primary | ICD-10-CM

## 2022-03-16 DIAGNOSIS — R26.9 GAIT DISORDER: ICD-10-CM

## 2022-03-16 DIAGNOSIS — Z96.653 HISTORY OF TOTAL KNEE ARTHROPLASTY, BILATERAL: ICD-10-CM

## 2022-03-16 PROCEDURE — 97110 THERAPEUTIC EXERCISES: CPT | Mod: GP,CQ

## 2022-03-16 PROCEDURE — 97530 THERAPEUTIC ACTIVITIES: CPT | Mod: GP,CQ

## 2022-03-16 NOTE — PROGRESS NOTES
PT DAILY NOTE FOR OUTPATIENT THERAPY    Patient: Fide Amin   MRN: [id number]  : 1960 61 y.o.  Referring Physician: Bennett Em MD  Date of Visit: 3/16/2022    Certification Dates: 22 through 22    Diagnosis:   1. Presence of artificial knee joint, bilateral    2. Gait disorder    3. History of total knee arthroplasty, bilateral        Chief Complaints:  weakness, left knee skin irritation    Precautions:   Precautions comments: seizures  Existing Precautions/Restrictions: other (see comments)     TODAY'S VISIT    Time In Session:  Start Time: 905  Stop Time: 100  Time Calculation (min): 60 min   History/Vitals/Pain/Encounter Info - 22 0910        Injury History/Precautions/Daily Required Info    Document Type daily treatment     Primary Therapist Lisa Kimbrough DPT [facility]     Chief Complaint/Reason for Visit  weakness, left knee skin irritation     Onset of Illness/Injury or Date of Surgery 21     Referring Physician Dr. Em     Existing Precautions/Restrictions other (see comments)     Precautions comments seizures     History of present illness/functional impairment Post op TKR bilaterall with complicated medical course and scarring of left knee incisions circumferential about the knee     [facility]     Patient/Family/Caregiver Comments/Observations Pt. reports no new issues.     Patient reported fall since last visit No        Pain Assessment    Currently in pain No/Denies        Pain Intervention    Intervention  na     Post Intervention Comments na                Daily Treatment Assessment and Plan - 22 0910        Daily Treatment Assessment and Plan    Progress toward goals Progressing     Daily Outcome Summary Continues to report some difficulty with going down stairs and curbs and when standing up from sitting for prolonged time. KOS 70 % and 5x STS 13 sec. 12 min walk on TM at 2.0 to 2.4 mph with 2% incline. Review of ROM and strength TE  as noted on log. No issues noted at end of session.     Plan and Recommendations Pt. to bring HEP for review and finalization and complete assessment for D/C.                       Today's Treatment:    Exercises Performed today Sets/ Reps comments Current Session Time   Eval date    Teams 11/1/2021    yes  completed      Progress note  Due date 11/29/2021  completed      Re eval  Due date: 2/4/2022  To 3/18/2022       THER ACT        TOTAL TIME FOR SESSION 8-22 Minutes      HEP        updated as noted   KOS, 5x sts yes 70%, 13sec      ROM sasha  MMT  no  yes  L 130  Hip ABD R L R 125  Knee ext R L     THER EX  [id number]       TOTAL TIME FOR SESSION 38-52 Minutes                  Bike: R   15min recumbant Seat #8    Treadmill yes 12 min 2.0-2.4 2%                  ROM/stretching       Prone quad stretch yes 3x 30s  Towel roll under thigh, floating the knee   HSS yes 3x30s strap     Supine  PB physio curls    Heel slides        Bridges   w/ knees ext  W/knees flexed  Triple threat    Quad set w heel prop  SLR w/ QS  SLR circles  Hip flexor stretch    GSS@slant board  Clam in sidely  Hip abd in sidely  Side lying hip flexion/ext   no    no          Yes  Yes  yes    no  no  No    no    yes    Yes  yes  no  no   10s x 10    1 x 10           x 10 5s  5s x10  x10    3x10   5 x 5 each      2x1 min    3x30s    2x10  2X 10                 p-ball  p-ball  p-ball        2#            GTB  GTB                    Sitting  LAQ  LAQ band  Hamstring curl  stand  Knee flexion  Sit to stand   yes  no  yes    no  no    2 x 10  X 10  2x10    1 x10    2x10   4#  red  4#    4# ball, eccentric     W/ 360 turns    TKE no 5s x 15 each  GTB    standing       Hamstring stretch  3 x 30  At step   Hip flexion at the walll with wall support no 2 x 10     3 way hip no      Wall squats no 2 x 10 With TB    Side lunge no X 10   top half   stairs no 1x Up reciprocal w/ HR down one at time w/o HR    Step up and over  Lateral up and over no  no     2x10  each  2x10 each 4 inches  6 inches     Lateral heel taps  Forward heel tap Yes  yes 2x10  2x10 4 inches  4 inches    Step up, lateral step down/up, step back no 2x10 each 6 inches    Hip abd std  Hip flex std no  no   2x10  2x10 GTB  GTB    HR bilateral no 2x10 U 2x10 each    Step downs no 2x10  2 in step   Step agility no 2 x 20  2 in step   NEURO RE-ED  [id number]       TOTAL TIME FOR SESSION    Billed with TE        biodex balance            blue foam no  3x30s   tandem     Side step w/ TB no 3 laps blueTB     March with TB no 2 laps GTB     4 square step no 2 monster steps, 2 side steps GTB at ankle    SLS [place] taps  no x5 each leg Gait belt, circles on floor surrounding pt    SLS yes 5x20s each Foam             rocker board  no  A/P , lateral X 30 ea taps  X 1 ea static    30 sec    GAIT TRAINING       TOTAL TIME FOR SESSION Not performed     amb         stairs  6 inch step up X 10 each    curbs       Out side       stepping over objects  no  low hurdles  2 laps each  fwd, alternate, side   MANUAL       TOTAL TIME FOR SESSION Not performed      STM/ retro grade massage no      quad ms, quad tendon, itb   Jt mobs no      PROM       Passive stretch no      MODALITIES           TOTAL TIME FOR SESSION Not performed     vaso        ice pack  no

## 2022-03-16 NOTE — OP PT TREATMENT LOG
Exercises Performed today Sets/ Reps comments Current Session Time   Eval date    Teams 11/1/2021    yes  completed      Progress note  Due date 11/29/2021  completed      Re eval  Due date: 2/4/2022  To 3/18/2022       THER ACT        TOTAL TIME FOR SESSION 8-22 Minutes      HEP        updated as noted   KOS, 5x sts yes 70%, 13sec      ROM sasha  MMT  no  yes  L 130  Hip ABD R L R 125  Knee ext R L     THER EX  [id number]       TOTAL TIME FOR SESSION 38-52 Minutes                  Bike: R   15min recumbant Seat #8    Treadmill yes 12 min 2.0-2.4 2%                  ROM/stretching       Prone quad stretch yes 3x 30s  Towel roll under thigh, floating the knee   HSS yes 3x30s strap     Supine  PB physio curls    Heel slides        Bridges   w/ knees ext  W/knees flexed  Triple threat    Quad set w heel prop  SLR w/ QS  SLR circles  Hip flexor stretch    GSS@slant board  Clam in sidely  Hip abd in sidely  Side lying hip flexion/ext   no    no          Yes  Yes  yes    no  no  No    no    yes    Yes  yes  no  no   10s x 10    1 x 10           x 10 5s  5s x10  x10    3x10   5 x 5 each      2x1 min    3x30s    2x10  2X 10                 p-ball  p-ball  p-ball        2#            GTB  GTB                    Sitting  LAQ  LAQ band  Hamstring curl  stand  Knee flexion  Sit to stand   yes  no  yes    no  no    2 x 10  X 10  2x10    1 x10    2x10   4#  red  4#    4# ball, eccentric     W/ 360 turns    TKE no 5s x 15 each  GTB    standing       Hamstring stretch  3 x 30  At step   Hip flexion at the walll with wall support no 2 x 10     3 way hip no      Wall squats no 2 x 10 With TB    Side lunge no X 10   top half   stairs no 1x Up reciprocal w/ HR down one at time w/o HR    Step up and over  Lateral up and over no  no     2x10 each  2x10 each 4 inches  6 inches     Lateral heel taps  Forward heel tap Yes  yes 2x10  2x10 4 inches  4 inches    Step up, lateral step down/up, step back no 2x10 each 6 inches    Hip abd std  Hip flex  std no  no   2x10  2x10 GTB  GTB    HR bilateral no 2x10 U 2x10 each    Step downs no 2x10  2 in step   Step agility no 2 x 20  2 in step   NEURO RE-ED  [id number]       TOTAL TIME FOR SESSION    Billed with TE        biodex balance            blue foam no  3x30s   tandem     Side step w/ TB no 3 laps blueTB     March with TB no 2 laps GTB     4 square step no 2 monster steps, 2 side steps GTB at ankle    SLS [place] taps  no x5 each leg Gait belt, circles on floor surrounding pt    SLS yes 5x20s each Foam             rocker board  no  A/P , lateral X 30 ea taps  X 1 ea static    30 sec    GAIT TRAINING       TOTAL TIME FOR SESSION Not performed     amb         stairs  6 inch step up X 10 each    curbs       Out side       stepping over objects  no  low hurdles  2 laps each  fwd, alternate, side   MANUAL       TOTAL TIME FOR SESSION Not performed      STM/ retro grade massage no      quad ms, quad tendon, itb   Jt mobs no      PROM       Passive stretch no      MODALITIES           TOTAL TIME FOR SESSION Not performed     vaso        ice pack  no

## 2022-03-18 ENCOUNTER — HOSPITAL ENCOUNTER (OUTPATIENT)
Dept: PHYSICAL THERAPY | Age: 62
Setting detail: THERAPIES SERIES
Discharge: HOME | End: 2022-03-18
Attending: ORTHOPAEDIC SURGERY
Payer: COMMERCIAL

## 2022-03-18 DIAGNOSIS — R26.9 GAIT DISORDER: ICD-10-CM

## 2022-03-18 DIAGNOSIS — Z96.653 PRESENCE OF ARTIFICIAL KNEE JOINT, BILATERAL: Primary | ICD-10-CM

## 2022-03-18 PROCEDURE — 97110 THERAPEUTIC EXERCISES: CPT | Mod: GP | Performed by: PHYSICAL THERAPIST

## 2022-03-18 PROCEDURE — 97530 THERAPEUTIC ACTIVITIES: CPT | Mod: GP | Performed by: PHYSICAL THERAPIST

## 2022-03-18 NOTE — PROGRESS NOTES
PT DISCHARGE NOTE FOR OUTPATIENT THERAPY    Patient: Fide Amin   MRN: [id number]  : 1960 61 y.o.  Referring Physician: Bennett Em MD  Date of Visit: 3/18/2022      Certification Dates: 22 through 22    Total Visit Count: 38    Chief Complaints:  Chief Complaint   Patient presents with   • Dec ROM   • Dec Strength   • Difficulty Walking   • Decreased Mobility   • Decreased Endurance   • Decreased recreational/play activity       Precautions:  Existing Precautions/Restrictions: seizures     TODAY'S VISIT:    Time In Session:  Start Time: 0900  Stop Time: 1000  Time Calculation (min): 60 min   General Information - 22 1230        Session Details    Document Type discharge evaluation     Mode of Treatment individual therapy;physical therapy     Patient/Family/Caregiver Comments/Observations Pt reports bilateral knee sxs improved with less pain with stairs and during sit to stand.  Left leg knee scars continue to heal and are monitored by PCP     [facility]        General Information    Referring Physician Dr. Em     History of present illness/functional impairment s/p bilateral knee replacement wiht complicated medical course initially     Existing Precautions/Restrictions seizures                Daily Falls Screen - 22 1230        Daily Falls Assessment    Patient reported fall since last visit No                Pain/Vitals - 22 1230        Pain Assessment    Currently in pain No/Denies     Pain Side/Orientation bilateral     Pain: Body location Knee     Pain Rating (0-10): Pre Activity 0     Pain Rating (0-10): Post Activity 0     Pain Description intermittent        Pain Intervention    Intervention  na     Post Intervention Comments na                PT - 22 1230        Physical Therapy    Physical Therapy Ortho        PT Plan    Frequency of treatment 2 times/week     PT Duration 6 weeks     PT Cert From 22     PT Cert To 22      Date PT POC was sent to provider 02/04/22     Signed PT Plan of Care received?  Yes                   OBJECTIVE MEASUREMENTS/DATA:    See treatment log    Today's Treatment:       TESTS Performed  today         Time  Description     EVAL  11/1/2021    Progress note     12/6/2021 Progress note  RA  2/4     progress note  3/1/2022        DC note    3/18/2022  30 min TA for progress     30min te   Weight bearing status  full  full         Precautions No kneeling           Pt eduation yes  yes         HEP  yes  yes      add step ups and step downs at home, plus rolling pin quad massage   OUTcome measure  KOS  LEFS  KOS  50/80  46/50  53/80  58/60 TOTAL TIME FOR SESSION     Minutes   AROM             Left knee  5 to 110 prone  0 to 120  same  0-128  o to 130   Right knee 5 to 120  0 to 130  same  0-130  0 to 130                 PROM             Left knee 0 to 115  0 to 125  0 to 130  0-132  o to 135   Right knee 0 to 125  0 to 135  0 to 135  0-135  0 to 135                 MMT             Left quad 3/5  4/5  same  4+/5  same   Right quad 4/5  4+/5  same  5/5  same                 Pat mobility             right good           left fair  fair    Mild pain with superior glide  motion inhibited by skin irriation and scab on scar   Swelling minimal  continues on left knee  skin irritation  continues  Improvement in skin since using tropical cream  none   Circumference  measurements Not taken           Pitting edema none    none    none                 Incision             Open or closed Left superior incision scabbed and red, right knee incision clean and healed  Left knee only: two  pencil size  Scabbed areas at proximal incision  left knee  Superior incision,  improving but dark redden raised areas persist. Less pain  10/6 4 to 5 blotchy redden areas med,  Lateral, posterior, superior and inferior   drainage none  none drainage         hot warm  warm         Gait         TOTAL TIME FOR SESSION Not performed   gait  amb with  single point cane,   no cane  Mild limp    no cane  Mild antalgic on L  no cane, sway increases with fatigue   stairs  step to step  step overt step up     Step to step down    Ascending step over step. No rail  Able to descent step over step slowly no rail     Sit to stand     18 sec  30sec sit to stand x 16     5x sit to stand  13  16        11   Shoe wear  tie sneakers      6mwt     1400  6mwt    1600   Balance         TOTAL TIME FOR SESSION   Minutes   SLS  Right  left    Unable  unable    < 10  ,10     <10  <10 sec    5 sec  5 sec                   Modalities         TOTAL TIME FOR SESSION Not performed   cold             heat             VASO                                Goals Addressed                 This Visit's Progress    • COMPLETED: Mutually agreed upon pain goal        Mutually agreed upon pain goal: walk without stiffness and pain      • COMPLETED: PT stg/ltgs knee        Short Term Goals Time Frame Result Comment/Progress   Knee pain at rest will decrease by 50% 4 met    Knee extension PROM 0 degrees 4 met    Knee flexion AROM > 90 degrees 4 met    Pt will perform a SLR without a lag 4 met    Pt will ambulate into out center from the car independently with appropriate assistive device 4 met No assistive device   KOS > 65% 4 met 58/80      Long Term Goals Time Frame Result Comment/Progress   KOS >95% 12 wks Ongoing          Active knee ROM 0 to 125 12wks met      Quad and hip abductor strength 5/5 12wks Ongoing    amb community distances without assistive device 12wks met    Pt will pick object off floor with squatting and no UE support 12wks met    Manage stairs step over step with a rail 12 wks  met Requires rail and has dec'd control during descent     Updated goals:  Perform 5x STS in less than 15 seconds not met  Descend stairs with step over step pattern with good quad control and minimal pain met  Perform SLS for 5 seconds b/l Les  met            Clinical Impression: Pt has met the majority  of stgs and ltgs. Strength and overall function persists.  KOS = 72% Pt with mild gait impairments, mostly with fatigue.  Pt has improved in gait endurance, speed and leg strength.  Pt is independent in HEP Dc with continued hep is recommended

## 2022-03-18 NOTE — OP PT TREATMENT LOG
TESTS Performed  today         Time  Description     EVAL  11/1/2021    Progress note     12/6/2021 Progress note  RA  2/4     progress note  3/1/2022        DC note    3/18/2022  30 min TA for progress     30min te   Weight bearing status  full  full         Precautions No kneeling           Pt eduation yes  yes         HEP  yes  yes      add step ups and step downs at home, plus rolling pin quad massage   OUTcome measure  KOS  LEFS  KOS  50/80  46/50  53/80  58/60 TOTAL TIME FOR SESSION     Minutes   AROM             Left knee  5 to 110 prone  0 to 120  same  0-128  o to 130   Right knee 5 to 120  0 to 130  same  0-130  0 to 130                 PROM             Left knee 0 to 115  0 to 125  0 to 130  0-132  o to 135   Right knee 0 to 125  0 to 135  0 to 135  0-135  0 to 135                 MMT             Left quad 3/5  4/5  same  4+/5  same   Right quad 4/5  4+/5  same  5/5  same                 Pat mobility             right good           left fair  fair    Mild pain with superior glide  motion inhibited by skin irriation and scab on scar   Swelling minimal  continues on left knee  skin irritation  continues  Improvement in skin since using tropical cream  none   Circumference  measurements Not taken           Pitting edema none    none    none                 Incision             Open or closed Left superior incision scabbed and red, right knee incision clean and healed  Left knee only: two  pencil size  Scabbed areas at proximal incision  left knee  Superior incision,  improving but dark redden raised areas persist. Less pain  10/6 4 to 5 blotchy redden areas med,  Lateral, posterior, superior and inferior   drainage none  none drainage         hot warm  warm         Gait         TOTAL TIME FOR SESSION Not performed   gait  amb with single point cane,   no cane  Mild limp    no cane  Mild antalgic on L  no cane, sway increases with fatigue   stairs  step to step  step overt step up     Step to step down     Ascending step over step. No rail  Able to descent step over step slowly no rail     Sit to stand     18 sec  30sec sit to stand x 16     5x sit to stand  13  16        11   Shoe wear  tie sneakers      6mwt     1400  6mwt    1600   Balance         TOTAL TIME FOR SESSION   Minutes   SLS  Right  left    Unable  unable    < 10  ,10     <10  <10 sec    5 sec  5 sec                   Modalities         TOTAL TIME FOR SESSION Not performed   cold             heat             VASO

## 2022-04-04 ENCOUNTER — OFFICE VISIT (OUTPATIENT)
Dept: OBGYN CLINIC | Facility: CLINIC | Age: 62
End: 2022-04-04
Payer: COMMERCIAL

## 2022-04-04 VITALS
SYSTOLIC BLOOD PRESSURE: 120 MMHG | HEIGHT: 62 IN | BODY MASS INDEX: 24.4 KG/M2 | DIASTOLIC BLOOD PRESSURE: 70 MMHG | WEIGHT: 132.6 LBS

## 2022-04-04 DIAGNOSIS — Z78.0 POST-MENOPAUSAL: ICD-10-CM

## 2022-04-04 DIAGNOSIS — N36.8 URETHRAL CYST: Primary | ICD-10-CM

## 2022-04-04 DIAGNOSIS — N36.1 URETHRAL DIVERTICULUM: ICD-10-CM

## 2022-04-04 PROCEDURE — 99203 OFFICE O/P NEW LOW 30 MIN: CPT | Performed by: OBSTETRICS & GYNECOLOGY

## 2022-04-04 RX ORDER — NORTRIPTYLINE HYDROCHLORIDE 75 MG/1
75 CAPSULE ORAL
COMMUNITY
Start: 2022-02-02

## 2022-04-04 RX ORDER — ASCORBIC ACID 250 MG
250 TABLET ORAL 2 TIMES DAILY
COMMUNITY
Start: 2021-10-14

## 2022-04-04 RX ORDER — FAMOTIDINE 20 MG/1
20 TABLET, FILM COATED ORAL DAILY
COMMUNITY
Start: 2021-10-14

## 2022-04-04 RX ORDER — ATORVASTATIN CALCIUM 40 MG/1
40 TABLET, FILM COATED ORAL DAILY
COMMUNITY
Start: 2022-02-02

## 2022-04-04 RX ORDER — ALBUTEROL SULFATE 90 UG/1
2 AEROSOL, METERED RESPIRATORY (INHALATION) 4 TIMES DAILY
COMMUNITY
Start: 2021-10-14

## 2022-04-04 RX ORDER — BUSPIRONE HYDROCHLORIDE 10 MG/1
10 TABLET ORAL 4 TIMES DAILY
COMMUNITY
Start: 2022-02-15

## 2022-04-04 RX ORDER — MONTELUKAST SODIUM 10 MG/1
10 TABLET ORAL DAILY
COMMUNITY
Start: 2021-10-14

## 2022-04-04 RX ORDER — LAMOTRIGINE 200 MG/1
2 TABLET, EXTENDED RELEASE ORAL DAILY
COMMUNITY
Start: 2022-03-03

## 2022-04-04 RX ORDER — FERROUS SULFATE 325(65) MG
325 TABLET ORAL 2 TIMES DAILY
COMMUNITY
Start: 2021-10-14

## 2022-04-04 RX ORDER — ROPINIROLE 1 MG/1
TABLET, FILM COATED ORAL
COMMUNITY
Start: 2022-03-26

## 2022-04-04 RX ORDER — SERTRALINE HYDROCHLORIDE 100 MG/1
300 TABLET, FILM COATED ORAL DAILY
COMMUNITY
Start: 2022-02-02

## 2022-04-04 RX ORDER — LISINOPRIL 10 MG/1
10 TABLET ORAL
COMMUNITY

## 2022-04-04 NOTE — PROGRESS NOTES
PROBLEM GYNECOLOGICAL VISIT    Kadeem Quiñonez is a 59 y o  female who presents today with complaint of bulge in   Vaginal area   Her general medical history has been reviewed and she reports it as follows:    Past Medical History:   Diagnosis Date    Asthma     Epilepsy ([address] )     HTN (hypertension)     Restless leg      Past Surgical History:   Procedure Laterality Date     SECTION      REPLACEMENT TOTAL KNEE BILATERAL      SINUS SURGERY       OB History    No obstetric history on file  Social History     Tobacco Use    Smoking status: Not on file    Smokeless tobacco: Not on file   Substance Use Topics    Alcohol use: Not on file    Drug use: Not on file       Current Outpatient Medications   Medication Instructions    albuterol (PROVENTIL HFA,VENTOLIN HFA) 90 mcg/act inhaler 2 puffs, Inhalation, 4 times daily    ascorbic acid (VITAMIN C) 250 mg, Oral, 2 times daily    atorvastatin (LIPITOR) 40 mg, Oral, Daily    Breo Ellipta 200-25 MCG/INH inhaler 1 puff, Inhalation, Daily    busPIRone (BUSPAR) 10 mg, Oral, 4 times daily    Cholecalciferol 2,500 Units, Oral, Daily    famotidine (PEPCID) 20 mg, Oral, Daily    ferrous sulfate 325 mg, Oral, 2 times daily    lamoTRIgine  MG TB24 2 tablets, Oral, Daily    lisinopril (ZESTRIL) 10 mg, Oral    montelukast (SINGULAIR) 10 mg, Oral, Daily    Multiple Vitamins-Minerals (MULTI-VITAMIN/MINERALS PO) 1 tablet, Oral, Daily    nortriptyline (PAMELOR) 75 mg, Oral, Daily at bedtime    rOPINIRole (REQUIP) 1 mg tablet TAKE 1 TABLET BY MOUTH AT DINNER AND 2 TABS AT NIGHT & 1/2 TABLET IN THE AFTERNOON    sertraline (ZOLOFT) 300 mg, Oral, Daily       History of Present Illness:   77-year-old female here for bulge from the vagina x2 weeks  No fevers or chills no vaginal bleeding no discharge  No leaking of  Urine  + hospitalized for 6-8 weeks for aspiration   w  Intubation after  Double knee surgery in  2021        Review of Systems:  Review of Systems   Constitutional: Negative  Gastrointestinal: Negative  Genitourinary: Positive for frequency  Negative for decreased urine volume, difficulty urinating, dyspareunia, dysuria, flank pain, genital sores, hematuria, menstrual problem, pelvic pain, urgency, vaginal bleeding, vaginal discharge and vaginal pain  Neurological: Negative  Psychiatric/Behavioral: Negative  Physical Exam:  /70 (BP Location: Left arm, Patient Position: Sitting, Cuff Size: Standard)   Ht 5' 2" (1 575 m)   Wt 60 1 kg (132 lb 9 6 oz)   BMI 24 25 kg/m²   Physical Exam  Constitutional:       Appearance: Normal appearance  She is normal weight  Genitourinary:      Bladder normal       No lesions in the vagina  Right Labia: No rash, tenderness, lesions or skin changes  Left Labia: No tenderness, lesions or skin changes  No inguinal adenopathy present in the right or left side  No vaginal discharge, erythema, tenderness, bleeding, ulceration, granulation tissue or cuff induration  No vaginal prolapse present  Severe vaginal atrophy present  Right Adnexa: not tender, not full and no mass present  Left Adnexa: not tender, not full and no mass present  Cervix is not parous  No cervical motion tenderness, discharge, friability, lesion, polyp or nabothian cyst       Urethral meatus caruncle not present  Urethral prolapse and mass present  No urethral tenderness, discharge or stress urinary incontinence with cough stress test present  Pelvic Floor: Levator muscle strength is 3/5  Pelvic exam was performed with patient in the lithotomy position  Rectum:      No rectal mass, anal fissure, tenderness or rectovaginal septum nodularity  Abdominal:      General: Abdomen is flat  Palpations: Abdomen is soft  Hernia: There is no hernia in the left inguinal area or right inguinal area     Lymphadenopathy:      Lower Body: No right inguinal adenopathy  No left inguinal adenopathy  Neurological:      Mental Status: She is alert  Assessment:   1  Urethral  Cyst  Vs  Urethral  Diverticulum       Plan:   DW pt and    That it is a cyst located  Coming from her  Urethral    Use  Aquaphor  helaing ointment  Or  A+d ointment to site  Needs fu with Urology  Call office for help if she is unable to find anyone  Needs wellness  Exam  Dr Gris Milan cock in to see patient  For verification  Reviewed with patient that [facility], but that they will not necessarily be reviewed by me immediately  Explained that I will review results at my earliest opportunity and contact patient appropriately

## 2023-06-18 NOTE — PLAN OF CARE
Problem: Rehabilitation ([facility]) Plan of Care  Goal: Plan of Care Review  Outcome: Progressing  Flowsheets (Taken 10/15/2021 1119)  Progress: improving  Plan of Care Reviewed With: patient  Outcome Summary: pt aaoX3. continent of b&b. on 1/2L O2 via nasal cannula at all times. Call bell within reach.    No

## 2024-02-27 ENCOUNTER — TRANSCRIBE ORDERS (OUTPATIENT)
Dept: SCHEDULING | Age: 64
End: 2024-02-27

## 2024-02-27 DIAGNOSIS — S42.009D FRACTURE OF UNSPECIFIED PART OF UNSPECIFIED CLAVICLE, SUBSEQUENT ENCOUNTER FOR FRACTURE WITH ROUTINE HEALING: Primary | ICD-10-CM

## 2024-03-21 ENCOUNTER — TRANSCRIBE ORDERS (OUTPATIENT)
Dept: SCHEDULING | Age: 64
End: 2024-03-21

## 2024-03-21 DIAGNOSIS — R56.9 UNSPECIFIED CONVULSIONS (CMS/HCC): ICD-10-CM

## 2024-03-21 DIAGNOSIS — R26.9 UNSPECIFIED ABNORMALITIES OF GAIT AND MOBILITY: Primary | ICD-10-CM

## 2024-04-30 ENCOUNTER — TRANSCRIBE ORDERS (OUTPATIENT)
Dept: REGISTRATION | Facility: REHABILITATION | Age: 64
End: 2024-04-30

## 2024-04-30 DIAGNOSIS — R26.9 UNSPECIFIED ABNORMALITIES OF GAIT AND MOBILITY: ICD-10-CM

## 2024-04-30 DIAGNOSIS — R25.9 UNSPECIFIED ABNORMAL INVOLUNTARY MOVEMENTS: Primary | ICD-10-CM

## 2024-09-26 LAB
EXTERNAL HIV SCREEN: NORMAL
HCV AB SER-ACNC: NORMAL

## 2024-10-05 NOTE — OP PT TREATMENT LOG
Exercises Performed today Sets/ Reps comments Current Session Time   Eval date 11/1/2021  completed      Progress note  Due date 11/29/2021        Re eval  Due date:        THER ACT        toTOTAL TIME FOR SESSION Not performed                           THER EX       TOTAL TIME FOR SESSION    45 Minutes          Bike: R  yes    upright          Nu step                     ROM/stretching       Supine  PB physio curls  SLR off PB  Heel slides  Singke knee to chest  Ball sqz  Bridges  Quad set w heel prop  SLR  Hip flexor stretch   Yes  Yes  Yes  Yes  Yes      Yes         2 x 10  2 x 10  2 x 10  2 x 10  2 x 10  2 x 10     Sitting  FAQ  Hamstring stretch  Knee flexion  Sit to stand           yes           2 x10     Prone  Knee flexion yes 2 x 10     standing       3 way hip       Wall slides       Assisted sqts         sqts                            Update  HEP       NEURO RE-ED       TOTAL TIME FOR SESSION Not performed    biodex balance            blue foam           SLS        rocker board           GAIT TRAINING       TOTAL TIME FOR SESSION 15 min   amb yes        stairs       curbs       Out side       stepping over objects  yes         MANUAL       TOTAL TIME FOR SESSION Not performed    STM/ retro grade massage           Jt mobs       PROM       Passive stretch       MODALITIES           TOTAL TIME FOR SESSION Not performed   vaso        ice pack                     Private car

## 2024-10-31 ENCOUNTER — HOSPITAL ENCOUNTER (OUTPATIENT)
Dept: RADIOLOGY | Age: 64
Discharge: HOME | End: 2024-10-31
Attending: NURSE PRACTITIONER
Payer: MEDICARE

## 2024-10-31 ENCOUNTER — TRANSCRIBE ORDERS (OUTPATIENT)
Dept: RADIOLOGY | Age: 64
End: 2024-10-31

## 2024-10-31 DIAGNOSIS — Z12.31 ENCOUNTER FOR SCREENING MAMMOGRAM FOR MALIGNANT NEOPLASM OF BREAST: Primary | ICD-10-CM

## 2024-10-31 DIAGNOSIS — Z12.31 ENCOUNTER FOR SCREENING MAMMOGRAM FOR MALIGNANT NEOPLASM OF BREAST: ICD-10-CM

## 2024-10-31 PROCEDURE — 77063 BREAST TOMOSYNTHESIS BI: CPT

## 2024-12-13 ENCOUNTER — HOSPITAL ENCOUNTER (OUTPATIENT)
Dept: RADIOLOGY | Age: 64
Discharge: HOME | End: 2024-12-13
Attending: RADIOLOGY
Payer: MEDICARE

## 2024-12-13 DIAGNOSIS — R92.8 ABNORMAL MAMMOGRAM: ICD-10-CM

## 2024-12-13 PROCEDURE — 76642 ULTRASOUND BREAST LIMITED: CPT | Mod: 50

## 2024-12-13 PROCEDURE — 77066 DX MAMMO INCL CAD BI: CPT

## 2025-02-24 ENCOUNTER — OFFICE VISIT (OUTPATIENT)
Age: 65
End: 2025-02-24
Payer: MEDICARE

## 2025-02-24 VITALS
BODY MASS INDEX: 21.35 KG/M2 | DIASTOLIC BLOOD PRESSURE: 72 MMHG | HEIGHT: 62 IN | SYSTOLIC BLOOD PRESSURE: 132 MMHG | WEIGHT: 116 LBS

## 2025-02-24 DIAGNOSIS — R79.89 LIVER FUNCTION TEST ABNORMALITY: Primary | ICD-10-CM

## 2025-02-24 PROCEDURE — G2211 COMPLEX E/M VISIT ADD ON: HCPCS | Performed by: INTERNAL MEDICINE

## 2025-02-24 PROCEDURE — 99204 OFFICE O/P NEW MOD 45 MIN: CPT | Performed by: INTERNAL MEDICINE

## 2025-02-24 RX ORDER — DUPILUMAB 300 MG/2ML
INJECTION, SOLUTION SUBCUTANEOUS
COMMUNITY
Start: 2025-02-20

## 2025-02-24 RX ORDER — AZELASTINE HYDROCHLORIDE 137 UG/1
SPRAY, METERED NASAL
COMMUNITY
Start: 2025-02-10

## 2025-02-24 RX ORDER — GABAPENTIN 600 MG/1
TABLET ORAL
COMMUNITY

## 2025-02-24 RX ORDER — IPRATROPIUM BROMIDE 42 UG/1
SPRAY, METERED NASAL
COMMUNITY

## 2025-02-24 RX ORDER — FLUTICASONE PROPIONATE 50 MCG
SPRAY, SUSPENSION (ML) NASAL
COMMUNITY
Start: 2025-02-10

## 2025-02-24 RX ORDER — EZETIMIBE 10 MG/1
10 TABLET ORAL DAILY
COMMUNITY

## 2025-02-24 RX ORDER — ROSUVASTATIN CALCIUM 40 MG/1
TABLET, COATED ORAL EVERY 24 HOURS
COMMUNITY

## 2025-02-24 NOTE — PROGRESS NOTES
"Name: Leslie Reardon      : 1960      MRN: [id number]  Encounter Provider: Cyrus Yuan DO  Encounter Date: 2025   Encounter department: [facility]OLOG[facility] SPECIAL[facility] [place]  :  Assessment & Plan  Liver function test abnormality  Very mild transaminitis.  Seems that this has happened in the past and potentially chalked up to statin use.  Ultrasound has been normal.  She is completely asymptomatic.  I will check autoimmune screening, but think this is likely related to her statin  Orders:    CLARITZA Screen w/Reflex Cascade; Future    Antimitochondrial antibody; Future    IgG 1, 2, 3, and 4; Future    LIVER KIDNEY MICROSOME(LKM-1) AB IGG; Future        History of Present Illness   HPI  Leslie Reardon is a 64 y.o. female who presents in consultation from her PCP due to abnormal transaminases.  Apparently she has had this issue in the past for which an ultrasound was ordered.  I guess there was discussion about stopping a statin, however she does not remember this and is currently taking rosuvastatin.  Her liver tests were normal last fall, but a couple weeks ago her dermatologist ordered some liver tests for unclear reasons that showed very mild transaminitis.  She currently is on Dupixent for atopic dermatitis.  Her viral hepatitis panel was negative.  She has no complaints.      Review of Systems       Objective   /72   Ht 5' 2\" (1.575 m)   Wt 52.6 kg (116 lb)   BMI 21.22 kg/m²      Physical Exam  General Appearance: NAD, cooperative, alert  Eyes: Anicteric  GI:  Soft, non-tender, non-distended; normal bowel sounds; no masses, no organomegaly   Rectal: Deferred  Musculoskeletal: No edema.  Skin:  No jaundice      "

## 2025-03-19 LAB
ANA SER QL: POSITIVE
DSDNA AB SER-ACNC: 177 IU/ML
ENA RNP AB SER-ACNC: ABNORMAL AI
ENA SM AB SER IA-ACNC: ABNORMAL AI
ENA SM+RNP AB SER IA-ACNC: ABNORMAL AI
LKM-1 IGG SER IA-ACNC: <=20 U
MITOCHONDRIA AB SER QL IF: NEGATIVE
NUCLEOSOME AB SER IA-ACNC: ABNORMAL AI

## 2025-03-21 ENCOUNTER — TELEPHONE (OUTPATIENT)
Age: 65
End: 2025-03-21

## 2025-03-21 NOTE — TELEPHONE ENCOUNTER
Pt called in regard to lab results. Informed pt provider needs to review results. Pt verbalized understanding.

## 2025-03-25 ENCOUNTER — RESULTS FOLLOW-UP (OUTPATIENT)
Dept: GASTROENTEROLOGY | Facility: CLINIC | Age: 65
End: 2025-03-25

## 2025-03-25 ENCOUNTER — TELEPHONE (OUTPATIENT)
Age: 65
End: 2025-03-25

## 2025-03-25 NOTE — TELEPHONE ENCOUNTER
Patients GI provider:  Dr. MIGUEL    Number to return call: [phone]    Reason for call: Pt calling office requesting recent lab results. Please review results and contact patient.

## 2025-03-26 DIAGNOSIS — R79.89 LIVER FUNCTION TEST ABNORMALITY: Primary | ICD-10-CM

## 2025-03-26 NOTE — TELEPHONE ENCOUNTER
IGG order was not for [facility]. I also checked [facility] and there are no results. Do you wish to reorder?

## 2025-04-08 ENCOUNTER — TRANSCRIBE ORDERS (OUTPATIENT)
Dept: REGISTRATION | Facility: REHABILITATION | Age: 65
End: 2025-04-08

## 2025-04-08 DIAGNOSIS — M48.061 SPINAL STENOSIS, LUMBAR REGION, WITHOUT NEUROGENIC CLAUDICATION: Primary | ICD-10-CM

## 2025-04-16 ENCOUNTER — HOSPITAL ENCOUNTER (OUTPATIENT)
Dept: PHYSICAL THERAPY | Age: 65
Setting detail: THERAPIES SERIES
Discharge: HOME | End: 2025-04-16
Attending: ORTHOPAEDIC SURGERY
Payer: MEDICARE

## 2025-04-16 DIAGNOSIS — M48.061 SPINAL STENOSIS, LUMBAR REGION, WITHOUT NEUROGENIC CLAUDICATION: ICD-10-CM

## 2025-04-16 PROCEDURE — 97161 PT EVAL LOW COMPLEX 20 MIN: CPT | Mod: GP,KX

## 2025-04-16 PROCEDURE — 97530 THERAPEUTIC ACTIVITIES: CPT | Mod: GP,KX

## 2025-04-16 NOTE — OP PT TREATMENT LOG
Exercises Performed today Sets/ Reps comments Current Session Time   Eval date    TEAMS 4/16       Prog Due date:         POC Due date:        Re Eval  CPT 94040        THER ACT   CPT 54470       TOTAL TIME FOR SESSION 8-22 Minutes     pt education  yes          HEP Update  yes          THER EX  CPT 21150      Group  CPT 79155 TOTAL TIME FOR SESSION   Not performed    bike  nv       ube Future       efx       treadmill              ROM/stretching       Hip flexors nv      hamstring nv      piriformis nv      Open book nv      LTR       Seat lum flex nv             Strengthening       TAC       TAC w/ march nv      90/90 iso        Dead bug nv      Bird dog nv      Primal push up       Pallof press nv      Squat        Side step nv       Monster walk       Lateral step down                                                                        NEURO RE-ED  CPT 55766       TOTAL TIME FOR SESSION Not performed    Romberg nv  foam     Tandem nv  Firm     SLS nv  Modified     Rocker board           GAIT TRAINING  CPT 59814       TOTAL TIME FOR SESSION  Not performed    amb         Outside amb              MANUAL  CPT 28277       TOTAL TIME FOR SESSION Not performed     STM  nv    left QL and paraspinals      Jt mobs       PROM       Passive stretch       MODALITIES  CPT 12463  HP/CP  CPT 65452  Vaso       TOTAL TIME FOR SESSION Not performed    heat Prn       ice/vaso

## 2025-04-16 NOTE — PROGRESS NOTES
Physical Therapy Evaluation    [facility] Fax: [phone]    PT EVALUATION FOR OUTPATIENT THERAPY    Patient: Fide Amin    MRN: [id number]  : 1960 64 y.o.     Referring Physician: Oscar Rogers MD  Date of Visit: 2025      Certification Dates:  25 through 25         Recommended Frequency & Duration:  2 times/week for up to 3 months     Diagnosis:   1. Spinal stenosis, lumbar region, without neurogenic claudication        Chief Complaints:   Chief Complaint   Patient presents with    Pain    Balance Deficits       Precautions: seizures  Precautions additional comments:      Past Medical History:   Past Medical History:   Diagnosis Date    Arthritis     OA    Asthma     Depression     Deviated septum     Epilepsy (CMS/HCC)     Last seizure 8 years ago    GERD (gastroesophageal reflux disease)     Hypertension     Lipid disorder     Motion sickness     RLS (restless legs syndrome)        Past Surgical History:   Past Surgical History   Procedure Laterality Date     section      x1    KNEE TOTAL BILATERAL REPLACEMENT Bilateral 2021    Performed by Bennett Em MD at [facility] OR    Sinus surgery           LEARNING ASSESSMENT    Assessment completed:  Yes    Learner name:  Fide    Learner: Patient    Learning Barriers:  Learning barriers: No Barriers    Preferred Language: English     Needed: No    Education Provided:   Method: Discussion, Handout, and Demonstration  Readiness: acceptance  Response: Demonstrated understanding      CO-LEARNER ASSESSMENT:    Completed: No      Welcome letter discussed: Yes Patient provided with Welcome Letter, which includes attendance policy. Provided education regarding cancellation and no-show policy. Education regarding the importance of participation and regular attendance to maximize goal attainment.       OBJECTIVE MEASUREMENTS/DATA:    Time In Session:  Start Time: 1100  Stop Time: 1200  Time Calculation (min):  60 min   Assessment and Plan - 04/16/25 1059          Assessment    Plan of Care reviewed and patient/family in agreement Yes     System Pathology/Pathophysiology Noted musculoskeletal     Functional Limitations in Following Categories (PT Eval) self-care;home management;community/leisure     Rehab Potential/Prognosis good, to achieve stated therapy goals     Problem List pain;decreased strength;abnormal muscle tone     Clinical Assessment Fide is a 64 yof who presents to physical therpay with CC of low back pain which she does not as chronic but with recent exacerbation that has not started to feel better. Exam today revealed some decreased ROM with increased pain in low back during forward bend, decreased strength throughout hips, WNL hip ROM, and fair core strength. Postural deviations with increased kyphosis and balance impairment with static standing balance assessment. She will benefit from skilled PT to address these limitations to restore her PLOF including her ability to bend, and lift for ADL tasks.     Plan and Recommendations Initiate POC     Planned Services CPT 89840 Manual therapy;CPT 04521 Neuromuscular Reeducation;CPT 50260 Self-care/Home management training;CPT 89519 Therapeutic activities;CPT 80956 Therapeutic exercises;CPT 92559 Hot/Cold Packs therapy                    General Information - 04/16/25 1059          Session Details    Document Type initial evaluation     Mode of Treatment individual therapy        General Information    Onset of Illness/Injury or Date of Surgery 03/31/25     Referring Physician Dr Rogers     History of present illness/functional impairment Chronic low back pain that has been getting worse more recently. Often times it would be most painful in the morning and after getting up and starting to move it would loosen up. A few weeks ago woke up to the feeling of a muscle spasm and this time the symptoms didn't go away with movement or with position changes. Saw the MD on  4/11 and was given a a muscle relaxor and steroid taper pack which she is still finishing up.  Currently most aggravated with postion changes, bending, and lifting.     Existing Precautions/Restrictions seizures                    Pain/Vitals - 04/16/25 1059          Pain Assessment    Currently in pain Yes     Preferred Pain Scale number (Numeric Rating Pain Scale)     Pain: Body location Back     Pain Rating (0-10): Pre Activity 1     Pain Rating (0-10): Activity 4     Pain Rating (0-10): Post Activity 1        Pain Intervention    Intervention  IE     Post Intervention Comments IE                    Falls/Food Screening - 04/16/25 1059          Initial Falls Assessment    One or more falls in the last year No        Food Insecurity    Within the past 12 months, you worried that your food would run out before you got the money to buy more. Never true     Within the past 12 months, the food you bought just didn't last and you didn't have money to get more. Never true                    PT - 04/16/25 1059          Physical Therapy    Physical Therapy Specialty Orth[facility] Sports PT        PT Plan    Frequency of treatment 2 times/week     PT Duration 3 months     PT Cert From 04/16/25     PT Cert To 07/09/25     Date PT POC was sent to provider 04/16/25                       Living Environment    Living Environment - 04/16/25 1059          Living Environment    People in Home spouse     Living Arrangements house     Living Environment Comment 2 story house, bed, bath, and laudry on 2nd floor     Transportation Concerns none     Financial Concerns none        Relationship/Environment    Name(s) of People in Home  [place]                   JAZZYOF:    Prior Level of Function - 04/16/25 1059          OTHER    Previous level of function Able to get up and down, bend, lift without limitations                   ROM    Range of Motion - 04/16/25 1100          Lumbar PROM    Lumbar Flexion --   FT to feet, increased  movement through thoracic spine    Lumbar Extension 35     Lumbar Left SB --   Ft to distal thigh    Lumbar Right SB --   Ft to distal thigh    Lumbar Left Rotation --   70%    Lumbar Right Rotation --   70%                  MMT    Manual Muscle Tests - 04/16/25 1059          RIGHT: Lower Extremity Manual Muscle Test Assessment    Hip Flexion gross movement (4/5) good     Hip Internal Rotation gross movement (4/5) good     Hip External Rotation gross movement (4/5) good     Knee Flexion strength (4/5) good     Knee Extension strength (4+/5) good plus     Ankle Dorsiflexion gross movement (4+/5) good plus     Ankle Plantarflexion gross movement (4/5) good     Ankle Inversion gross movement (4/5) good     Ankle Eversion gross movement (4/5) good        LEFT: Lower Extremity Manual Muscle Test Assessment    Hip Flexion gross movement (4/5) good     Hip Internal Rotation gross movement (4/5) good     Hip External Rotation gross movement (4/5) good     Knee Flexion strength (4/5) good     Knee Extension strength (4/5) good     Ankle Dorsiflexion gross movement (4+/5) good plus     Ankle Plantarflexion gross movement (4/5) good     Ankle Inversion gross movement (4/5) good     Ankle Eversion gross movement (4/5) good        Additional Manual Muscle Tests    Additional MMT Lower abdominal: 4/5                   Palpation    Palpation - 04/16/25 1059          Palpation    Back Palpation  +TTP left QL and paraspinals; TTP BL PSIS                   Ortho Special Tests    Orthopedic Special Tests - 04/16/25 1200          Cookie (MDT) Tests    RFIS neg     CATHERINE neg                   Balance/Posture    Balance and Posture - 04/16/25 1059          Balance Assessment    Balance Assessment standing static balance     Static Standing Balance moderate impairment     Comment, Balance Romberg EO: 30sec; Romberg EC: 20 seconds; Tandem R/L >10 seconds; SLS unable with UE support        Postural Deviations    Postural Deviations upper  back     Upper Back kyphosis     Comment, Postural Deviations increased kyphosis        Posture    Posture postural deviations                   Outcome Measures    PT Outcome Measures - 04/16/25 1059          Subjective Outcome Measures    Oswestry (EAGLE) 5/50                      Goals        Lumbar      Short Term Goals Time Frame Result Comment/  Progress   Subjective outcome scores will improve by 10 % 4wks      Pain with activity will decrease by 50% 4wks     Pt will perform HEP independently 4wks        Long Term Goals Time Frame Result Comment/  Progress   Subjective outcome scores < 10% 12wks       Return to pre injury level of sports/recreational/work duties/activities sxs free/< 2/10 12wks     Core Strength 4+/5 12wks     Improve lumbar ROM to WFL 12wks              Mutually agreed upon pain goal      Mutually agreed upon pain goal: Reduce pain at worst/with activity to 2/10        Patient specific goal      Patient specific goal: Being able to bend better and utilize better body mechanics, and improve posture                 TREATMENT PLAN:    Exercises Performed today Sets/ Reps comments Current Session Time   Eval date    TEAMS 4/16       Prog Due date:         POC Due date:        Re Eval  CPT 45150        THER ACT   CPT 30756       TOTAL TIME FOR SESSION 8-22 Minutes     pt education  yes          HEP Update  yes          THER EX  CPT 81576      Group  CPT 73603 TOTAL TIME FOR SESSION   Not performed    bike  nv       ube Future       efx       treadmill              ROM/stretching       Hip flexors nv      hamstring nv      piriformis nv      Open book nv      LTR       Seat lum flex nv             Strengthening       TAC       TAC w/ march nv      90/90 iso        Dead bug nv      Bird dog nv      Primal push up       Pallof press nv      Squat        Side step nv       Monster walk       Lateral step down                                                                        NEURO RE-ED  CPT 91548        TOTAL TIME FOR SESSION Not performed    Romberg nv  foam     Tandem nv  Firm     SLS nv  Modified     Rocker board           GAIT TRAINING  CPT 44760       TOTAL TIME FOR SESSION  Not performed    amb         Outside amb              MANUAL  CPT 23167       TOTAL TIME FOR SESSION Not performed     STM  nv    left QL and paraspinals      Jt mobs       PROM       Passive stretch       MODALITIES  CPT 62137  HP/CP  CPT 75265  Vaso       TOTAL TIME FOR SESSION Not performed    heat Prn       ice/vaso                ASSESSMENT:    This 64 y.o. year old female presents to PT with above stated diagnosis. Physical Therapy evaluation reveals pain, decreased strength, abnormal muscle tone resulting in self-care, home management, community/leisure limitations. Fide Amin will benefit from skilled PT services to address limitation, work towards rehab and patient goals and maximize PLOF of chosen ADLs.     Planned Services: The patient's treatment will include CPT 69713 Manual therapy, CPT 93335 Neuromuscular Reeducation, CPT 29686 Self-care/Home management training, CPT 04754 Therapeutic activities, CPT 12808 Therapeutic exercises, CPT 72377 Hot/Cold Packs therapy, .     Aurora Hogan, PT

## 2025-04-16 NOTE — LETTER
Dear DR. Rogers,    Thank you for this referral. Please review the attached notes and plan of care for your approval.  Please contact our department with any questions.     Sincerely,     Aurora Hogan, PT  [address] [address]  Phone [phone]  Fax  [phone]    By co-signing this Plan of Care (POC) you agree to the following:  I have reviewed the the Plan of Care established by the therapist within this document and certify that the services are skilled and medically necessary. I have reviewed the plan and recommend that these services continue to meet the goals stated in this document.    PHYSICIAN SIGNATURE: __________________________________     DATE: ___________________  TIME: _____________           Physical Therapy Plan of Care 25   Effective from: 2025  Effective to: 2025    Plan ID: [id number]            Participants as of Finalize on 2025    Name Type Comments Contact Info    Oscar Rogers MD Referring Provider  [phone]    Aurora Hogan, PT Physical Therapist         Last Progress Notes Note     Author: Aurora Hogan PT Status: Signed Last edited: 2025 11:00 AM       Physical Therapy Evaluation    [facility] Fax: [phone]    PT EVALUATION FOR OUTPATIENT THERAPY    Patient: Fide Amin    MRN: [id number]  : 1960 64 y.o.     Referring Physician: Oscar Rogers MD  Date of Visit: 2025      Certification Dates:  25 through 25         Recommended Frequency & Duration:  2 times/week for up to 3 months     Diagnosis:   1. Spinal stenosis, lumbar region, without neurogenic claudication        Chief Complaints:   Chief Complaint   Patient presents with    Pain    Balance Deficits       Precautions: seizures  Precautions additional comments:      Past Medical History:   Past Medical History:   Diagnosis Date    Arthritis     OA    Asthma     Depression     Deviated septum     Epilepsy (CMS/[facility])     Last  seizure 8 years ago    GERD (gastroesophageal reflux disease)     Hypertension     Lipid disorder     Motion sickness     RLS (restless legs syndrome)        Past Surgical History:   Past Surgical History   Procedure Laterality Date     section      x1    KNEE TOTAL BILATERAL REPLACEMENT Bilateral 2021    Performed by Bennett Em MD at  OR    Sinus surgery           LEARNING ASSESSMENT    Assessment completed:  Yes    Learner name:  Fide    Learner: Patient    Learning Barriers:  Learning barriers: No Barriers    Preferred Language: English     Needed: No    Education Provided:   Method: Discussion, Handout, and Demonstration  Readiness: acceptance  Response: Demonstrated understanding      CO-LEARNER ASSESSMENT:    Completed: No      Welcome letter discussed: Yes Patient provided with Welcome Letter, which includes attendance policy. Provided education regarding cancellation and no-show policy. Education regarding the importance of participation and regular attendance to maximize goal attainment.       OBJECTIVE MEASUREMENTS/DATA:    Time In Session:  Start Time: 1100  Stop Time: 1200  Time Calculation (min): 60 min   Assessment and Plan - 25 1059          Assessment    Plan of Care reviewed and patient/family in agreement Yes     System Pathology/Pathophysiology Noted musculoskeletal     Functional Limitations in Following Categories (PT Eval) self-care;home management;community/leisure     Rehab Potential/Prognosis good, to achieve stated therapy goals     Problem List pain;decreased strength;abnormal muscle tone     Clinical Assessment Fide is a 64 yof who presents to physical therpay with CC of low back pain which she does not as chronic but with recent exacerbation that has not started to feel better. Exam today revealed some decreased ROM with increased pain in low back during forward bend, decreased strength throughout hips, WNL hip ROM, and fair core strength.  Postural deviations with increased kyphosis and balance impairment with static standing balance assessment. She will benefit from skilled PT to address these limitations to restore her PLOF including her ability to bend, and lift for ADL tasks.     Plan and Recommendations Initiate POC     Planned Services CPT 35762 Manual therapy;CPT 01364 Neuromuscular Reeducation;CPT 24304 Self-care/Home management training;CPT 27817 Therapeutic activities;CPT 69574 Therapeutic exercises;CPT 98247 Hot/Cold Packs therapy                    General Information - 04/16/25 1059          Session Details    Document Type initial evaluation     Mode of Treatment individual therapy        General Information    Onset of Illness/Injury or Date of Surgery 03/31/25     Referring Physician Dr Rogers     History of present illness/functional impairment Chronic low back pain that has been getting worse more recently. Often times it would be most painful in the morning and after getting up and starting to move it would loosen up. A few weeks ago woke up to the feeling of a muscle spasm and this time the symptoms didn't go away with movement or with position changes. Saw the MD on 4/11 and was given a a muscle relaxor and steroid taper pack which she is still finishing up.  Currently most aggravated with postion changes, bending, and lifting.     Existing Precautions/Restrictions seizures                    Pain/Vitals - 04/16/25 1059          Pain Assessment    Currently in pain Yes     Preferred Pain Scale number (Numeric Rating Pain Scale)     Pain: Body location Back     Pain Rating (0-10): Pre Activity 1     Pain Rating (0-10): Activity 4     Pain Rating (0-10): Post Activity 1        Pain Intervention    Intervention  IE     Post Intervention Comments IE                    Falls/Food Screening - 04/16/25 1059          Initial Falls Assessment    One or more falls in the last year No        Food Insecurity    Within the past 12 months, you  worried that your food would run out before you got the money to buy more. Never true     Within the past 12 months, the food you bought just didn't last and you didn't have money to get more. Never true                    PT - 04/16/25 1059          Physical Therapy    Physical Therapy [facility] PT        PT Plan    Frequency of treatment 2 times/week     PT Duration 3 months     PT Cert From 04/16/25     PT Cert To 07/09/25     Date PT POC was sent to provider 04/16/25                       Living Environment    Living Environment - 04/16/25 1059          Living Environment    People in Home spouse     Living Arrangements house     Living Environment Comment 2 story house, bed, bath, and laudry on 2nd floor     Transportation Concerns none     Financial Concerns none        Relationship/Environment    Name(s) of People in Home  Gildardo MCDUFFIE:    Prior Level of Function - 04/16/25 1059          OTHER    Previous level of function Able to get up and down, bend, lift without limitations                   ROM    Range of Motion - 04/16/25 1100          Lumbar PROM    Lumbar Flexion --   FT to feet, increased movement through thoracic spine    Lumbar Extension 35     Lumbar Left SB --   Ft to distal thigh    Lumbar Right SB --   Ft to distal thigh    Lumbar Left Rotation --   70%    Lumbar Right Rotation --   70%                  MMT    Manual Muscle Tests - 04/16/25 1059          RIGHT: Lower Extremity Manual Muscle Test Assessment    Hip Flexion gross movement (4/5) good     Hip Internal Rotation gross movement (4/5) good     Hip External Rotation gross movement (4/5) good     Knee Flexion strength (4/5) good     Knee Extension strength (4+/5) good plus     Ankle Dorsiflexion gross movement (4+/5) good plus     Ankle Plantarflexion gross movement (4/5) good     Ankle Inversion gross movement (4/5) good     Ankle Eversion gross movement (4/5) good        LEFT: Lower Extremity  Manual Muscle Test Assessment    Hip Flexion gross movement (4/5) good     Hip Internal Rotation gross movement (4/5) good     Hip External Rotation gross movement (4/5) good     Knee Flexion strength (4/5) good     Knee Extension strength (4/5) good     Ankle Dorsiflexion gross movement (4+/5) good plus     Ankle Plantarflexion gross movement (4/5) good     Ankle Inversion gross movement (4/5) good     Ankle Eversion gross movement (4/5) good        Additional Manual Muscle Tests    Additional MMT Lower abdominal: 4/5                   Palpation    Palpation - 04/16/25 1059          Palpation    Back Palpation  +TTP left QL and paraspinals; TTP BL PSIS                   Ortho Special Tests    Orthopedic Special Tests - 04/16/25 1200          Cookie (MDT) Tests    RFIS neg     CATHERINE neg                   Balance/Posture    Balance and Posture - 04/16/25 1059          Balance Assessment    Balance Assessment standing static balance     Static Standing Balance moderate impairment     Comment, Balance Romberg EO: 30sec; Romberg EC: 20 seconds; Tandem R/L >10 seconds; SLS unable with UE support        Postural Deviations    Postural Deviations upper back     Upper Back kyphosis     Comment, Postural Deviations increased kyphosis        Posture    Posture postural deviations                   Outcome Measures    PT Outcome Measures - 04/16/25 1059          Subjective Outcome Measures    Oswestry (EAGLE) 5/50                      Goals        Lumbar      Short Term Goals Time Frame Result Comment/  Progress   Subjective outcome scores will improve by 10 % 4wks      Pain with activity will decrease by 50% 4wks     Pt will perform HEP independently 4wks        Long Term Goals Time Frame Result Comment/  Progress   Subjective outcome scores < 10% 12wks       Return to pre injury level of sports/recreational/work duties/activities sxs free/< 2/10 12wks     Core Strength 4+/5 12wks     Improve lumbar ROM to WFL 12wks               Mutually agreed upon pain goal      Mutually agreed upon pain goal: Reduce pain at worst/with activity to 2/10        Patient specific goal      Patient specific goal: Being able to bend better and utilize better body mechanics, and improve posture                 TREATMENT PLAN:    Exercises Performed today Sets/ Reps comments Current Session Time   Eval date    TEAMS 4/16       Prog Due date:         POC Due date:        Re Eval  CPT 02217        THER ACT   CPT 42574       TOTAL TIME FOR SESSION 8-22 Minutes     pt education  yes          HEP Update  yes          THER EX  CPT 77323      Group  CPT 65813 TOTAL TIME FOR SESSION   Not performed    bike  nv       ube Future       efx       treadmill              ROM/stretching       Hip flexors nv      hamstring nv      piriformis nv      Open book nv      LTR       Seat lum flex nv             Strengthening       TAC       TAC w/ march nv      90/90 iso        Dead bug nv      Bird dog nv      Primal push up       Pallof press nv      Squat        Side step nv       Monster walk       Lateral step down                                                                        NEURO RE-ED  CPT 61202       TOTAL TIME FOR SESSION Not performed    Romberg nv  foam     Tandem nv  Firm     SLS nv  Modified     Rocker board           GAIT TRAINING  CPT 79387       TOTAL TIME FOR SESSION  Not performed    amb         Outside amb              MANUAL  CPT 21723       TOTAL TIME FOR SESSION Not performed     STM  nv    left QL and paraspinals      Jt mobs       PROM       Passive stretch       MODALITIES  CPT 73304  HP/CP  CPT 54079  Vaso       TOTAL TIME FOR SESSION Not performed    heat Prn       ice/vaso                ASSESSMENT:    This 64 y.o. year old female presents to PT with above stated diagnosis. Physical Therapy evaluation reveals pain, decreased strength, abnormal muscle tone resulting in self-care, home management, community/leisure limitations. Fide Brennan  will benefit from skilled PT services to address limitation, work towards rehab and patient goals and maximize PLOF of chosen ADLs.     Planned Services: The patient's treatment will include CPT 98730 Manual therapy, CPT 44968 Neuromuscular Reeducation, CPT 78407 Self-care/Home management training, CPT 76328 Therapeutic activities, CPT 97661 Therapeutic exercises, CPT 80454 Hot/Cold Packs therapy, .     Aurora Hogan PT                           Current Participants as of 4/16/2025    Name Type Comments Contact Info    Oscar Rogers MD Referring Provider  [phone]    Signature pending    Aurora Hogan PT Physical Therapist      Signature pending

## 2025-04-21 ENCOUNTER — HOSPITAL ENCOUNTER (OUTPATIENT)
Dept: PHYSICAL THERAPY | Age: 65
Setting detail: THERAPIES SERIES
Discharge: HOME | End: 2025-04-21
Attending: ORTHOPAEDIC SURGERY
Payer: MEDICARE

## 2025-04-21 DIAGNOSIS — M48.061 SPINAL STENOSIS, LUMBAR REGION, WITHOUT NEUROGENIC CLAUDICATION: Primary | ICD-10-CM

## 2025-04-21 PROCEDURE — 97140 MANUAL THERAPY 1/> REGIONS: CPT | Mod: GP,CQ,KX

## 2025-04-21 PROCEDURE — 97110 THERAPEUTIC EXERCISES: CPT | Mod: GP,CQ,KX

## 2025-04-21 NOTE — OP PT TREATMENT LOG
Exercises Performed today Sets/ Reps comments Current Session Time   Eval date    TEAMS 4/16       Prog Due date:         POC Due date:        Re Eval  CPT 11999        THER ACT   CPT 83460       TOTAL TIME FOR SESSION Not performed     pt education  yes          HEP Update  yes          THER EX  CPT 36952      Group  CPT 68738 TOTAL TIME FOR SESSION   38-52 Minutes    bike  y 10:00 S:4 lvl:1     ube Future       efx       treadmill              ROM/stretching       Prone quad stretch y :30x3     hamstring nv      piriformis nv      Open book y :05x10     PPU y :05x10     Seat lum flex nv             Strengthening       TAC       TAC w/ march y 2x10     90/90 iso        Bridge w/ TAC & ball squeeze y 2x10     Dead bug nv      Bird dog nv      Primal push up       Pallof press nv      Squat        Side step nv       Monster walk       Lateral step down                                                                        NEURO RE-ED  CPT 61818       TOTAL TIME FOR SESSION Not performed    Romberg nv  foam     Tandem nv  Firm     SLS nv  Modified     Rocker board           GAIT TRAINING  CPT 46472       TOTAL TIME FOR SESSION  Not performed    amb         Outside amb              MANUAL  CPT 44702       TOTAL TIME FOR SESSION 8-22 Minutes     STM  y    left QL and paraspinals      Jt mobs       PROM       Passive stretch       MODALITIES  CPT 72360  HP/CP  CPT 17739  Vaso       TOTAL TIME FOR SESSION Not performed    heat       ice/vaso

## 2025-04-21 NOTE — PROGRESS NOTES
Physical Therapy Visit    PT DAILY NOTE FOR OUTPATIENT THERAPY    Patient: Fide Amin MRN: [id number]  : 1960 64 y.o.  Referring Physician: Oscar Rogers MD  Date of Visit: 2025    Certification Dates: 25 through 25     Diagnosis:   1. Spinal stenosis, lumbar region, without neurogenic claudication        Chief Complaints:  Spinal stenosis, LBP    Precautions:   Existing Precautions/Restrictions: seizures      TODAY'S VISIT    Time In Session:  Start Time: 1055  Stop Time: 1151  Time Calculation (min): 56 min   History/Vitals/Pain/Encounter Info - 25 1059          Injury History/Precautions/Daily Required Info    Document Type daily treatment     Primary Therapist April Edison     Chief Complaint/Reason for Visit  Spinal stenosis, LBP     Onset of Illness/Injury or Date of Surgery 25     Referring Physician Dr Rogers     Existing Precautions/Restrictions seizures     History of present illness/functional impairment Chronic low back pain that has been getting worse more recently. Often times it would be most painful in the morning and after getting up and starting to move it would loosen up. A few weeks ago woke up to the feeling of a muscle spasm and this time the symptoms didn't go away with movement or with position changes. Saw the MD on  and was given a a muscle relaxor and steroid taper pack which she is still finishing up.  Currently most aggravated with postion changes, bending, and lifting.     Patient/Family/Caregiver Comments/Observations Fide returns for her first follow up reporting low pain scale today, 1/10. She is agreeable to rec bike warmup.     Patient reported fall since last visit No        Pain Assessment    Currently in pain Yes     Preferred Pain Scale number (Numeric Rating Pain Scale)     Pain: Body location Back     Pain Rating (0-10): Pre Activity 1        Pain Intervention    Intervention  initiate POC     Post Intervention Comments see  "assessment                    Daily Treatment Assessment and Plan - 04/21/25 1059          Daily Treatment Assessment and Plan    Progress toward goals Progressing     Daily Outcome Summary Session focused on educating Fide on proper form and technique ofher new exercises. She felt \"regi sore\" by the end of her session and was agreeable to sitting with MHP before she concluded her session. A few minutes past and she felt it was too hot and did not like it. Discontinue use of MHP moving forward. Did not bill for MHP today.     Plan and Recommendations Review TE and progress as appropriate                          OBJECTIVE DATA TAKEN TODAY:    Today's Treatment:    Exercises Performed today Sets/ Reps comments Current Session Time   Eval date    TEAMS 4/16       Prog Due date:         POC Due date:        Re Eval  CPT 09249        THER ACT   CPT 52945       TOTAL TIME FOR SESSION Not performed     pt education  yes          HEP Update  yes          THER EX  CPT 25200      Group  CPT 80842 TOTAL TIME FOR SESSION   38-52 Minutes    bike  y 10:00 S:4 lvl:1     ube Future       efx       treadmill              ROM/stretching       Prone quad stretch y :30x3     hamstring nv      piriformis nv      Open book y :05x10     PPU y :05x10     Seat lum flex nv             Strengthening       TAC       TAC w/ march y 2x10     90/90 iso        Bridge w/ TAC & ball squeeze y 2x10     Dead bug nv      Bird dog nv      Primal push up       Pallof press nv      Squat        Side step nv       Monster walk       Lateral step down                                                                        NEURO RE-ED  CPT 44206       TOTAL TIME FOR SESSION Not performed    Romberg nv  foam     Tandem nv  Firm     SLS nv  Modified     Rocker board           GAIT TRAINING  CPT 98389       TOTAL TIME FOR SESSION  Not performed    amb         Outside amb              MANUAL  CPT 56845       TOTAL TIME FOR SESSION 8-22 Minutes     STM  y    " left QL and paraspinals      Jt mobs       PROM       Passive stretch       MODALITIES  CPT 89030  HP/CP  CPT 65508  Vaso       TOTAL TIME FOR SESSION Not performed    heat       ice/vaso

## 2025-04-28 ENCOUNTER — HOSPITAL ENCOUNTER (OUTPATIENT)
Dept: PHYSICAL THERAPY | Age: 65
Setting detail: THERAPIES SERIES
Discharge: HOME | End: 2025-04-28
Attending: ORTHOPAEDIC SURGERY
Payer: MEDICARE

## 2025-04-28 DIAGNOSIS — M48.061 SPINAL STENOSIS, LUMBAR REGION, WITHOUT NEUROGENIC CLAUDICATION: Primary | ICD-10-CM

## 2025-04-28 PROCEDURE — 97140 MANUAL THERAPY 1/> REGIONS: CPT | Mod: GP,CQ,KX

## 2025-04-28 PROCEDURE — 97110 THERAPEUTIC EXERCISES: CPT | Mod: GP,CQ,KX

## 2025-04-28 NOTE — PROGRESS NOTES
Physical Therapy Visit    PT DAILY NOTE FOR OUTPATIENT THERAPY    Patient: Fide Amin MRN: [id number]  : 1960 64 y.o.  Referring Physician: Oscar Rogers MD  Date of Visit: 2025    Certification Dates: 25 through 25     Diagnosis:   1. Spinal stenosis, lumbar region, without neurogenic claudication        Chief Complaints:  Spinal stenosis, LBP    Precautions:   Existing Precautions/Restrictions: seizures      TODAY'S VISIT    Time In Session:  Start Time: 1100  Stop Time: 1157  Time Calculation (min): 57 min   History/Vitals/Pain/Encounter Info - 25 1059          Injury History/Precautions/Daily Required Info    Document Type daily treatment     Primary Therapist April Edison     Chief Complaint/Reason for Visit  Spinal stenosis, LBP     Onset of Illness/Injury or Date of Surgery 25     Referring Physician Dr Rogers     Existing Precautions/Restrictions seizures     History of present illness/functional impairment Chronic low back pain that has been getting worse more recently. Often times it would be most painful in the morning and after getting up and starting to move it would loosen up. A few weeks ago woke up to the feeling of a muscle spasm and this time the symptoms didn't go away with movement or with position changes. Saw the MD on  and was given a a muscle relaxor and steroid taper pack which she is still finishing up.  Currently most aggravated with postion changes, bending, and lifting.     Patient/Family/Caregiver Comments/Observations Fide had increased pain over the weekend. She thinks she may have bent over too many times. She also notes having to cancel her last session d/t sore throat but is feeling normal today.     Patient reported fall since last visit No        Pain Assessment    Currently in pain Yes     Preferred Pain Scale number (Numeric Rating Pain Scale)     Pain: Body location Back     Pain Rating (0-10): Pre Activity 1        Pain  Intervention    Intervention  active warmup, manual, TE     Post Intervention Comments see assessment                    Daily Treatment Assessment and Plan - 04/28/25 1059          Daily Treatment Assessment and Plan    Progress toward goals Progressing     Daily Outcome Summary Progressed to light strengthening today with red level resistance band. Reminders to breathe with her reps were given when needed. Plan to issue resistance band to use with HEP upgrades if appropriate.     Plan and Recommendations Review TE and progress as appropriate                          OBJECTIVE DATA TAKEN TODAY:    Today's Treatment:    Exercises Performed today Sets/ Reps comments Current Session Time   Eval date    TEAMS 4/16       Prog Due date:         POC Due date:        Re Eval  CPT 57674        THER ACT   CPT 79332       TOTAL TIME FOR SESSION Not performed     pt education  yes          HEP Update  yes          THER EX  CPT 87824      Group  CPT 60275 TOTAL TIME FOR SESSION   38-52 Minutes    bike  y 10:00 S:5 lvl:2     ube Future       efx       treadmill              ROM/stretching       Prone quad stretch y :30x3     hamstring nv      piriformis nv      Open book y :05x10     PPU y :05x10     Seat lum flex nv             Strengthening       TAC       TAC w/ march y 2x10     90/90 iso        Bridge w/ TAC & ball squeeze y 2x10     Dead bug nv      Bird dog nv      clamshell y 2x10     Pallof press nv      Hip 2 way y 2x10  RTB (abd&ext)    Squat        Side step nv       Monster walk       Lateral step down                                                                        NEURO RE-ED  CPT 62960       TOTAL TIME FOR SESSION Not performed    Romberg nv  foam     Tandem nv  Firm     SLS nv  Modified     Rocker board           GAIT TRAINING  CPT 26091       TOTAL TIME FOR SESSION  Not performed    amb         Outside amb              MANUAL  CPT 18297       TOTAL TIME FOR SESSION 8-22 Minutes     STM  y    left QL and  paraspinals      Jt mobs       PROM       Passive stretch       MODALITIES  CPT 84303  HP/CP  CPT 29220  Vaso       TOTAL TIME FOR SESSION Not performed    heat       ice/vaso

## 2025-04-28 NOTE — OP PT TREATMENT LOG
Exercises Performed today Sets/ Reps comments Current Session Time   Eval date    TEAMS 4/16       Prog Due date:         POC Due date:        Re Eval  CPT 05256        THER ACT   CPT 09174       TOTAL TIME FOR SESSION Not performed     pt education  yes          HEP Update  yes          THER EX  CPT 93242      Group  CPT 49343 TOTAL TIME FOR SESSION   38-52 Minutes    bike  y 10:00 S:5 lvl:2     ube Future       efx       treadmill              ROM/stretching       Prone quad stretch y :30x3     hamstring nv      piriformis nv      Open book y :05x10     PPU y :05x10     Seat lum flex nv             Strengthening       TAC       TAC w/ march y 2x10     90/90 iso        Bridge w/ TAC & ball squeeze y 2x10     Dead bug nv      Bird dog nv      clamshell y 2x10     Pallof press nv      Hip 2 way y 2x10  RTB (abd&ext)    Squat        Side step nv       Monster walk       Lateral step down                                                                        NEURO RE-ED  CPT 59898       TOTAL TIME FOR SESSION Not performed    Romberg nv  foam     Tandem nv  Firm     SLS nv  Modified     Rocker board           GAIT TRAINING  CPT 77262       TOTAL TIME FOR SESSION  Not performed    amb         Outside amb              MANUAL  CPT 89143       TOTAL TIME FOR SESSION 8-22 Minutes     STM  y    left QL and paraspinals      Jt mobs       PROM       Passive stretch       MODALITIES  CPT 56059  HP/CP  CPT 29563  Vaso       TOTAL TIME FOR SESSION Not performed    heat       ice/vaso

## 2025-05-01 ENCOUNTER — HOSPITAL ENCOUNTER (OUTPATIENT)
Dept: PHYSICAL THERAPY | Age: 65
Setting detail: THERAPIES SERIES
Discharge: HOME | End: 2025-05-01
Attending: ORTHOPAEDIC SURGERY
Payer: MEDICARE

## 2025-05-01 DIAGNOSIS — M48.061 SPINAL STENOSIS, LUMBAR REGION, WITHOUT NEUROGENIC CLAUDICATION: Primary | ICD-10-CM

## 2025-05-01 PROCEDURE — 97530 THERAPEUTIC ACTIVITIES: CPT | Mod: GP,KX

## 2025-05-01 PROCEDURE — 97110 THERAPEUTIC EXERCISES: CPT | Mod: GP,KX

## 2025-05-01 PROCEDURE — 97140 MANUAL THERAPY 1/> REGIONS: CPT | Mod: GP,KX

## 2025-05-06 ENCOUNTER — HOSPITAL ENCOUNTER (OUTPATIENT)
Dept: PHYSICAL THERAPY | Age: 65
Setting detail: THERAPIES SERIES
Discharge: HOME | End: 2025-05-06
Attending: ORTHOPAEDIC SURGERY
Payer: MEDICARE

## 2025-05-06 DIAGNOSIS — M48.061 SPINAL STENOSIS, LUMBAR REGION, WITHOUT NEUROGENIC CLAUDICATION: Primary | ICD-10-CM

## 2025-05-06 PROCEDURE — 97110 THERAPEUTIC EXERCISES: CPT | Mod: GP,CQ,KX

## 2025-05-06 PROCEDURE — 97140 MANUAL THERAPY 1/> REGIONS: CPT | Mod: GP,CQ,KX

## 2025-05-08 ENCOUNTER — HOSPITAL ENCOUNTER (OUTPATIENT)
Dept: PHYSICAL THERAPY | Age: 65
Setting detail: THERAPIES SERIES
Discharge: HOME | End: 2025-05-08
Attending: ORTHOPAEDIC SURGERY
Payer: MEDICARE

## 2025-05-08 DIAGNOSIS — M48.061 SPINAL STENOSIS, LUMBAR REGION, WITHOUT NEUROGENIC CLAUDICATION: Primary | ICD-10-CM

## 2025-05-08 PROCEDURE — 97110 THERAPEUTIC EXERCISES: CPT | Mod: GP,KX

## 2025-05-08 PROCEDURE — 97140 MANUAL THERAPY 1/> REGIONS: CPT | Mod: GP,KX

## 2025-05-12 ENCOUNTER — HOSPITAL ENCOUNTER (OUTPATIENT)
Dept: PHYSICAL THERAPY | Age: 65
Setting detail: THERAPIES SERIES
Discharge: HOME | End: 2025-05-12
Attending: ORTHOPAEDIC SURGERY
Payer: MEDICARE

## 2025-05-12 DIAGNOSIS — M48.061 SPINAL STENOSIS, LUMBAR REGION, WITHOUT NEUROGENIC CLAUDICATION: Primary | ICD-10-CM

## 2025-05-12 PROCEDURE — 97110 THERAPEUTIC EXERCISES: CPT | Mod: GP,CQ,KX

## 2025-05-22 ENCOUNTER — HOSPITAL ENCOUNTER (OUTPATIENT)
Dept: PHYSICAL THERAPY | Age: 65
Setting detail: THERAPIES SERIES
Discharge: HOME | End: 2025-05-22
Attending: ORTHOPAEDIC SURGERY
Payer: MEDICARE

## 2025-05-22 DIAGNOSIS — M48.061 SPINAL STENOSIS, LUMBAR REGION, WITHOUT NEUROGENIC CLAUDICATION: Primary | ICD-10-CM

## 2025-05-22 PROCEDURE — 97530 THERAPEUTIC ACTIVITIES: CPT | Mod: GP,KX

## 2025-05-22 PROCEDURE — 97110 THERAPEUTIC EXERCISES: CPT | Mod: GP,KX

## (undated) DEVICE — MANIFOLD FOUR PORT NEPTUNE

## (undated) DEVICE — DRAPE SPLIT U IMPERVIOUS 89331

## (undated) DEVICE — PAD GROUND ELECTROSURGICAL W/CORD

## (undated) DEVICE — SOLN IRRIG STER WATER 1000ML

## (undated) DEVICE — TIP COAXIAL FEMORAL CANAL

## (undated) DEVICE — PACK RFID KNEE ADD ON

## (undated) DEVICE — SET HANDPIECE INTERPULSE

## (undated) DEVICE — GOWN SURG X-LARGE MICROCOOL

## (undated) DEVICE — HOOD FLYTE SURGICOOL

## (undated) DEVICE — KIT LEG STABILIZATION SPIDER

## (undated) DEVICE — PACK DRAPE TABLE

## (undated) DEVICE — APPLICATOR CHLORAPREP 26ML ORANGE TINT

## (undated) DEVICE — BANDAGE FLEX MASTER  6IN

## (undated) DEVICE — ARTHROSCOPY TUBING ADAPTOR Y

## (undated) DEVICE — SUTURE QUILL PDO 0 RA-1067Q

## (undated) DEVICE — VEST STERILE

## (undated) DEVICE — DRAPE LARGE REVERSE FOLD

## (undated) DEVICE — GOWN SURGICAL MICROCOOL IMPERVIOUS XXL

## (undated) DEVICE — SYRINGE DISP LUER-LOK 30 CC

## (undated) DEVICE — Device

## (undated) DEVICE — CUFF TOURNIQUET DISP 34 X 4

## (undated) DEVICE — KIT CEMENT SCULPS

## (undated) DEVICE — BAG DECANTER

## (undated) DEVICE — SUTURE VICRYL 3-0  J497G

## (undated) DEVICE — SUTURE QUILL PDO 2 RX-1066Q

## (undated) DEVICE — DRESSING ABD STERILE 7X8IN

## (undated) DEVICE — BLADE SAGITTAL DUAL CUT 4125-137-090

## (undated) DEVICE — PACK UNIVERSAL TOTAL JOINT

## (undated) DEVICE — DRAPE IOBAN

## (undated) DEVICE — STOCKINETTE IMPERVIOUS X-LARGE 12X48

## (undated) DEVICE — ADHESIVE SKIN DERMABOND ADVANCED 0.7ML

## (undated) DEVICE — PENEVAC1 NONSTICK SMOKE EVAC

## (undated) DEVICE — MANIFOLD SINGLE PORT NEPTUNE

## (undated) DEVICE — SOLN IRRIG .9%SOD 1000ML

## (undated) DEVICE — NEEDLE HYPODERMIC PRO 18G X 1 1/2 IN

## (undated) DEVICE — NEEDLE DISP SPINAL 22GX3-1/2IN

## (undated) DEVICE — WRAP COBAN ADHERENT 6 X 5YDS STERILE

## (undated) DEVICE — GLOVE PROTEXIS PI ORTHO 9.0

## (undated) DEVICE — DRAPE EXTREMITY BILATERAL

## (undated) DEVICE — DRAPE SPIDER2 SWITCH

## (undated) DEVICE — CLOTH PREPPING SAGE 2% CHG 2/PK

## (undated) DEVICE — SUCTION 18FR FRAZIER DISPOSABLE

## (undated) DEVICE — KIT CEMENT MIXING CARTRIDGE AND NOZZLE

## (undated) DEVICE — KIT CEMENT MIXING W/SPATULA

## (undated) DEVICE — GLOVE LINER PROTEXIS 9.0 PI BLUE